# Patient Record
Sex: FEMALE | Race: WHITE | NOT HISPANIC OR LATINO | Employment: FULL TIME | URBAN - METROPOLITAN AREA
[De-identification: names, ages, dates, MRNs, and addresses within clinical notes are randomized per-mention and may not be internally consistent; named-entity substitution may affect disease eponyms.]

---

## 2017-01-10 ENCOUNTER — APPOINTMENT (OUTPATIENT)
Dept: MRI IMAGING | Facility: CLINIC | Age: 67
End: 2017-01-10
Attending: EMERGENCY MEDICINE
Payer: COMMERCIAL

## 2017-01-10 ENCOUNTER — HOSPITAL ENCOUNTER (EMERGENCY)
Facility: CLINIC | Age: 67
Discharge: HOME | End: 2017-01-10
Attending: EMERGENCY MEDICINE | Admitting: EMERGENCY MEDICINE
Payer: COMMERCIAL

## 2017-01-10 VITALS
RESPIRATION RATE: 16 BRPM | DIASTOLIC BLOOD PRESSURE: 90 MMHG | SYSTOLIC BLOOD PRESSURE: 130 MMHG | BODY MASS INDEX: 35.94 KG/M2 | HEART RATE: 80 BPM | WEIGHT: 229 LBS | OXYGEN SATURATION: 97 % | TEMPERATURE: 97.9 F | HEIGHT: 67 IN

## 2017-01-10 DIAGNOSIS — R20.2 FACIAL PARESTHESIA: ICD-10-CM

## 2017-01-10 LAB
CREAT BLD-MCNC: 0.9 MG/DL (ref 0.52–1.04)
GFR SERPL CREATININE-BSD FRML MDRD: 63 ML/MIN/1.7M2

## 2017-01-10 PROCEDURE — 82565 ASSAY OF CREATININE: CPT

## 2017-01-10 PROCEDURE — 96374 THER/PROPH/DIAG INJ IV PUSH: CPT | Performed by: EMERGENCY MEDICINE

## 2017-01-10 PROCEDURE — 70549 MR ANGIOGRAPH NECK W/O&W/DYE: CPT

## 2017-01-10 PROCEDURE — 25500045 ZZH RX 255: Performed by: EMERGENCY MEDICINE

## 2017-01-10 PROCEDURE — 70553 MRI BRAIN STEM W/O & W/DYE: CPT

## 2017-01-10 PROCEDURE — A9585 GADOBUTROL INJECTION: HCPCS | Performed by: EMERGENCY MEDICINE

## 2017-01-10 PROCEDURE — 25000125 ZZHC RX 250: Performed by: EMERGENCY MEDICINE

## 2017-01-10 PROCEDURE — 99285 EMERGENCY DEPT VISIT HI MDM: CPT | Mod: 25 | Performed by: EMERGENCY MEDICINE

## 2017-01-10 PROCEDURE — 99284 EMERGENCY DEPT VISIT MOD MDM: CPT | Mod: Z6 | Performed by: EMERGENCY MEDICINE

## 2017-01-10 RX ORDER — GADOBUTROL 604.72 MG/ML
10 INJECTION INTRAVENOUS ONCE
Status: COMPLETED | OUTPATIENT
Start: 2017-01-10 | End: 2017-01-10

## 2017-01-10 RX ADMIN — GADOBUTROL 10 ML: 604.72 INJECTION INTRAVENOUS at 11:07

## 2017-01-10 RX ADMIN — MIDAZOLAM 0.5 MG: 1 INJECTION INTRAMUSCULAR; INTRAVENOUS at 10:11

## 2017-01-10 ASSESSMENT — ENCOUNTER SYMPTOMS
NECK PAIN: 0
NUMBNESS: 1
FACIAL ASYMMETRY: 0
HEADACHES: 0
PALPITATIONS: 0
WEAKNESS: 0
SPEECH DIFFICULTY: 0

## 2017-01-10 ASSESSMENT — VISUAL ACUITY: OU: NORMAL ACUITY

## 2017-01-10 NOTE — DISCHARGE INSTRUCTIONS
"Please make an appointment to follow up with Your Primary Care Provider in 5-7 days.  If you have any worsening weakness, numbness, vision changes, difficulty walking or talking or other concerns, return to the emergency department for re-evaluation.        Paraesthesias  Paraesthesia refers to a burning or prickling sensation that is sometimes felt in the hands, arms, legs or feet. It can also occur in other parts of the body. It can also feel like tingling or numbness, skin crawling, or itching. The feeling is not comfortable, but it is not painful. (The \"pins and needles\" feeling that happens when a foot or hand \"falls asleep\" is a temporary paraesthesia.)  Paraesthesias that last or come and go may be caused by medical issues that need to be treated. These include stroke, bulging disk (pressing on a nerve), a trapped nerve), vitamin deficiencies, or even certain medicines.  Tests are often done. These tests may include blood tests, X-ray, CT (computerized tomography) scan, or a muscle test (electromyography). Depending on the cause, treatment may include physical therapy.  Home care    Tell the healthcare provider about all medicines you take. This includes prescription and over-the-counter medicines, vitamins, and herbs. Ask if any of the medicines may be causing your problems. Do not make any changes to prescription medicines without talking to your healthcare provider first.    You may be prescribed medicines to help relieve the tingling feeling or for pain. Take all medicines as directed.    A numb hand or foot may be more prone to injury. To help protect it:    Always use oven mitts.    Test water with an unaffected hand or foot.    Use caution when trimming nails. File sharp areas.    Wear shoes that fit well to avoid pressure points, blisters, and ulcers.    Inspect your hands and feet carefully (including the soles of your feet and between your toes) at least once a week. If you see red areas, sores, or " other problems, tell your healthcare provider.  Follow-up care  Follow up with your doctor or as advised by our staff. You may need further testing or evaluation.  When to seek medical advice  Call your healthcare provider right away if any of the following occur:    Numbness or weakness of the face, one arm, or one leg    Slurred speech, confusion, trouble speaking, walking, or seeing    Severe headache, fainting spell, dizziness, or seizure    Chest, arm, neck, or upper back pain    Loss of bladder or bowel control    Open wound with redness, swelling, or pus    8562-5980 [employer]. [address] [place] [address]. All rights reserved. This information is not intended as a substitute for professional medical care. Always follow your healthcare professional's instructions.

## 2017-01-10 NOTE — ED AVS SNAPSHOT
" [facility], Emergency Department    [address] [facility] [address]    Phone:  [phone]                                       Alysha Youngblood   MRN: [id number]    Department:  [facility], Emergency Department   Date of Visit:  1/10/2017           Patient Information     Date Of Birth          1950        Your diagnoses for this visit were:     Facial paresthesia        You were seen by Ashley Sarabia MD.        Discharge Instructions       Please make an appointment to follow up with Your Primary Care Provider in 5-7 days.  If you have any worsening weakness, numbness, vision changes, difficulty walking or talking or other concerns, return to the emergency department for re-evaluation.        Paraesthesias  Paraesthesia refers to a burning or prickling sensation that is sometimes felt in the hands, arms, legs or feet. It can also occur in other parts of the body. It can also feel like tingling or numbness, skin crawling, or itching. The feeling is not comfortable, but it is not painful. (The \"pins and needles\" feeling that happens when a foot or hand \"falls asleep\" is a temporary paraesthesia.)  Paraesthesias that last or come and go may be caused by medical issues that need to be treated. These include stroke, bulging disk (pressing on a nerve), a trapped nerve), vitamin deficiencies, or even certain medicines.  Tests are often done. These tests may include blood tests, X-ray, CT (computerized tomography) scan, or a muscle test (electromyography). Depending on the cause, treatment may include physical therapy.  Home care    Tell the healthcare provider about all medicines you take. This includes prescription and over-the-counter medicines, vitamins, and herbs. Ask if any of the medicines may be causing your problems. Do not make any changes to prescription medicines without talking to your healthcare provider first.    You may be prescribed medicines to help relieve the tingling feeling " or for pain. Take all medicines as directed.    A numb hand or foot may be more prone to injury. To help protect it:    Always use oven mitts.    Test water with an unaffected hand or foot.    Use caution when trimming nails. File sharp areas.    Wear shoes that fit well to avoid pressure points, blisters, and ulcers.    Inspect your hands and feet carefully (including the soles of your feet and between your toes) at least once a week. If you see red areas, sores, or other problems, tell your healthcare provider.  Follow-up care  Follow up with your doctor or as advised by our staff. You may need further testing or evaluation.  When to seek medical advice  Call your healthcare provider right away if any of the following occur:    Numbness or weakness of the face, one arm, or one leg    Slurred speech, confusion, trouble speaking, walking, or seeing    Severe headache, fainting spell, dizziness, or seizure    Chest, arm, neck, or upper back pain    Loss of bladder or bowel control    Open wound with redness, swelling, or pus    6209-9825 [employer]. [address] [place] [address]. All rights reserved. This information is not intended as a substitute for professional medical care. Always follow your healthcare professional's instructions.              24 Hour Appointment Hotline       To make an appointment at any [facility], call [phone] ([phone]). If you don't have a family doctor or clinic, we will help you find one. [facility] are conveniently located to serve the needs of you and your family.             Review of your medicines      Our records show that you are taking the medicines listed below. If these are incorrect, please call your family doctor or clinic.        Dose / Directions Last dose taken    AMBIEN PO   Dose:  5 mg        Take 5 mg by mouth nightly as needed for sleep   Refills:  0        apixaban ANTICOAGULANT 5 MG tablet   Commonly known as:   "ELIQUIS   Dose:  5 mg   Quantity:  180 tablet        Take 1 tablet (5 mg) by mouth 2 times daily   Refills:  3        fish oil-omega-3 fatty acids 1000 MG capsule        One capsule daily.   Refills:  0        hydrochlorothiazide 25 MG tablet   Commonly known as:  HYDRODIURIL   Dose:  25 mg        Take 25 mg by mouth daily   Refills:  0        losartan 100 MG tablet   Commonly known as:  COZAAR   Dose:  100 mg        Take 100 mg by mouth daily   Refills:  0        lovastatin 40 MG tablet   Commonly known as:  MEVACOR   Dose:  40 mg        Take 40 mg by mouth At Bedtime.   Refills:  0        metoprolol 25 MG 24 hr tablet   Commonly known as:  TOPROL-XL   Dose:  50 mg        Take 50 mg by mouth daily   Refills:  0        SLEEP AID PO        Refills:  0        VIACTIV PO        Take  by mouth.   Refills:  0                Procedures and tests performed during your visit     Creatinine POCT    ISTAT creatinine  POCT    MR Brain for Stroke Complete (UU,UA,SH)      Orders Needing Specimen Collection     None      Pending Results     Date and Time Order Name Status Description    1/10/2017 0925 MR Brain for Stroke Complete (UU,UA,SH) In process             Pending Culture Results     No orders found from 1/9/2017 to 1/11/2017.            Thank you for choosing [facility]       Thank you for choosing [facility] for your care. Our goal is always to provide you with excellent care. Hearing back from our patients is one way we can continue to improve our services. Please take a few minutes to complete the written survey that you may receive in the mail after you visit with us. Thank you!        [employer] Information     [employer] lets you send messages to your doctor, view your test results, renew your prescriptions, schedule appointments and more. To sign up, go to [email or web address] . Click on \"Log in\" on the left side of the screen, which will take you to the Welcome page. Then click on \"Sign up Now\" on the right side of the " page.     You will be asked to enter the access code listed below, as well as some personal information. Please follow the directions to create your username and password.     Your access code is: [email or web address]  Expires: 2017  6:30 AM     Your access code will  in 90 days. If you need help or a new code, please call your [facility] clinic or [phone].        Care EveryWhere ID     This is your Care EveryWhere ID. This could be used by other organizations to access your [facility] medical records  [id number]        After Visit Summary       This is your record. Keep this with you and show to your community pharmacist(s) and doctor(s) at your next visit.

## 2017-01-10 NOTE — ED NOTES
PT arrived from stress testing. PT reports numbness to the left side of face x 3 days. Numbness in left upper leg.

## 2017-01-10 NOTE — ED PROVIDER NOTES
History     Chief Complaint   Patient presents with     Numbness     HPI  Alysha Youngblood is a 66 year old female with a history of atrial fibrillation s/p ablation (2012), hypertension, Hashimoto's disease, and appendectomy who presents from clinic for evaluation of left-sided numbness. Patient was in clinic this morning for a routine nuclear stress test when she complained that over the past three days she has noticed slight numbness of the left side of her face as well as her left lower extremity localized to her thigh, so she was referred here to the emergency department for further evaluation. She reports her left leg numbness first started after exercising abtou 2-3 weeks ago, and notes her symptoms will wax and wane, worsening after exercise, specifically the Elliptical. She denies bilateral lower extremity weakness, or weakness of her bilateral upper extremities. She denies paresthesias of her other extremities. Three days ago the left face began to feel numb without any weakness.  No vision changes, speech difficulty, or drooling. She denies headaches, hearing changes or tinnitus. No neck pain. No medication allergies. She is anticoagulated on Eliquis for a history of atrial fibrillation.  Denies any recent palpitations.      I have reviewed the Medications, Allergies, Past Medical and Surgical History, and Social History in the [employer] system.  Past Medical History   Diagnosis Date     Atrial fibrillation (H)      ablated 1/12     ISABELA (obstructive sleep apnea) 3/12     AHI 7     Hypertension      Hyperlipidaemia      Hashimoto's disease      PAC (premature atrial contraction)      Ex-smoker      PVC (premature ventricular contraction)        Past Surgical History   Procedure Laterality Date     Appendectomy       Ent surgery       H ablation focal afib  6/24/2011     [facility].      H ablation focal afib  1/12/2012     [facility]. afib and aflutter ablations.        Family History   Problem Relation Age of Onset  "    CANCER No family hx of      No known family hx of skin cancer       Social History   Substance Use Topics     Smoking status: Never Smoker      Smokeless tobacco: Never Used     Alcohol Use: No     No current facility-administered medications for this encounter.     Current Outpatient Prescriptions   Medication     Doxylamine Succinate, Sleep, (SLEEP AID PO)     apixaban ANTICOAGULANT (ELIQUIS) 5 MG tablet     Zolpidem Tartrate (AMBIEN PO)     losartan (COZAAR) 100 MG tablet     hydrochlorothiazide (HYDRODIURIL) 25 MG tablet     metoprolol (TOPROL-XL) 25 MG 24 hr tablet     lovastatin (MEVACOR) 40 MG tablet     fish oil-omega-3 fatty acids (FISH OIL) 1000 MG capsule     Calcium-Vitamin D-Vitamin K (VIACTIV PO)     Facility-Administered Medications Ordered in Other Encounters   Medication     technetium Tc 99m tetrofosmin 2UD study (MYOVIEW) radioisotope injection 3-42 mCi        Allergies   Allergen Reactions     Nsaids GI Disturbance       Review of Systems   HENT: Negative for drooling and hearing loss.    Eyes: Negative for visual disturbance.   Cardiovascular: Negative for palpitations.   Musculoskeletal: Negative for neck pain.   Neurological: Positive for numbness (left side of face; left thigh). Negative for syncope, facial asymmetry, speech difficulty, weakness and headaches.   All other systems reviewed and are negative.      Physical Exam   Pulse: 85  Temp: 97.9  F (36.6  C)  Resp: 16  Height: 170.2 cm (5' 7\")  Weight: 103.874 kg (229 lb)  SpO2: 96 %  Physical Exam  General: patient is alert and oriented and in no acute distress   Head: atraumatic and normocephalic   EENT: moist mucus membranes without tonsillar erythema or exudates, pupils equal round and reactive, slight conjunctival injection in the left eye,TM pearly gray without erythema or effusion  Neck: supple   Cardiovascular: regular rate and rhythm, extremities warm and well perfused, no lower extremity edema  Pulmonary: lungs clear to " auscultation bilaterally   Abdomen: soft, non-tender   Musculoskeletal: normal range of motion   Neurological: alert and oriented, moving all extremities symmetrically, CN II-XII intact, strength 5/5 and symmetric in , elbow flexion/extension, hip flexion/extension, knee flexion/extension and ankle plantar/dorsiflexion, sensation to light touch in distal upper and lower extremities intact, normal finger to nose bilaterally  Skin: warm, dry     ED Course   Procedures       9:17 AM  The patient was seen and examined by Dr. Sarabia in Room 9.     Critical Care time:  none               Labs Ordered and Resulted from Time of ED Arrival Up to the Time of Departure from the ED - No data to display    Assessments & Plan (with Medical Decision Making)   Mrs. Youngblood is a 66 year old female with a history of atrial fibrillation s/p ablation (2012), hypertension, Hashimoto's disease, and appendectomy who presents from clinic for evaluation of left-sided numbness.  I have obtained an MRI which does not show any evidence of acute stroke.  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  She does not have evidence of peripheral weakness to suggest a Bell's palsy.  She has no skin lesions or lesions in the ear canal to suggest shingles.  No temporal artery tenderness on palpation to suggest temporal arteritis.  Will plan to discharge to home with instructions to follow up with her primary care provider for reevaluation or return to the ED with any new or worsening symptoms.      I have reviewed the nursing notes.    I have reviewed the findings, diagnosis, plan and need for follow up with the patient.    Discharge Medication List as of 1/10/2017 12:58 PM          Final diagnoses:   Facial paresthesia   IHenny, am serving as a trained medical scribe to document services personally performed by Ashley Sarabia MD, based on  the provider's statements to me.   I, Ashley Sarabia MD, was physically present and have reviewed and verified the accuracy of this note documented by Henny Fernández.      1/10/2017   [facility], [place], EMERGENCY DEPARTMENT      Ashley Sarabia MD  01/10/17 1549

## 2017-01-10 NOTE — ED AVS SNAPSHOT
[facility], [place], Emergency Department    [address] [place] [address]    Phone:  [phone]                                       Alysha Youngblood   MRN: [id number]    Department:  [facility], Emergency Department   Date of Visit:  1/10/2017           After Visit Summary Signature Page     I have received my discharge instructions, and my questions have been answered. I have discussed any challenges I see with this plan with the nurse or doctor.    ..........................................................................................................................................  Patient/Patient Representative Signature      ..........................................................................................................................................  Patient Representative Print Name and Relationship to Patient    ..................................................               ................................................  Date                                            Time    ..........................................................................................................................................  Reviewed by Signature/Title    ...................................................              ..............................................  Date                                                            Time

## 2017-01-16 ENCOUNTER — TELEPHONE (OUTPATIENT)
Dept: NURSING | Facility: CLINIC | Age: 67
End: 2017-01-16

## 2017-01-16 ENCOUNTER — HOSPITAL ENCOUNTER (EMERGENCY)
Facility: CLINIC | Age: 67
Discharge: HOME | End: 2017-01-16
Attending: EMERGENCY MEDICINE | Admitting: EMERGENCY MEDICINE
Payer: COMMERCIAL

## 2017-01-16 VITALS
HEART RATE: 122 BPM | TEMPERATURE: 98.2 F | DIASTOLIC BLOOD PRESSURE: 78 MMHG | OXYGEN SATURATION: 98 % | RESPIRATION RATE: 8 BRPM | SYSTOLIC BLOOD PRESSURE: 125 MMHG

## 2017-01-16 DIAGNOSIS — I48.3 TYPICAL ATRIAL FLUTTER (H): ICD-10-CM

## 2017-01-16 LAB
ANION GAP SERPL CALCULATED.3IONS-SCNC: 10 MMOL/L (ref 3–14)
BASOPHILS # BLD AUTO: 0 10E9/L (ref 0–0.2)
BASOPHILS NFR BLD AUTO: 0.2 %
BUN SERPL-MCNC: 19 MG/DL (ref 7–30)
CALCIUM SERPL-MCNC: 9.4 MG/DL (ref 8.5–10.1)
CHLORIDE SERPL-SCNC: 101 MMOL/L (ref 94–109)
CO2 SERPL-SCNC: 26 MMOL/L (ref 20–32)
CREAT SERPL-MCNC: 0.89 MG/DL (ref 0.52–1.04)
DIFFERENTIAL METHOD BLD: ABNORMAL
EOSINOPHIL # BLD AUTO: 0.1 10E9/L (ref 0–0.7)
EOSINOPHIL NFR BLD AUTO: 1.5 %
ERYTHROCYTE [DISTWIDTH] IN BLOOD BY AUTOMATED COUNT: 12.6 % (ref 10–15)
GFR SERPL CREATININE-BSD FRML MDRD: 64 ML/MIN/1.7M2
GLUCOSE SERPL-MCNC: 104 MG/DL (ref 70–99)
HCT VFR BLD AUTO: 47.4 % (ref 35–47)
HGB BLD-MCNC: 15.9 G/DL (ref 11.7–15.7)
IMM GRANULOCYTES # BLD: 0.1 10E9/L (ref 0–0.4)
IMM GRANULOCYTES NFR BLD: 0.6 %
LYMPHOCYTES # BLD AUTO: 1.1 10E9/L (ref 0.8–5.3)
LYMPHOCYTES NFR BLD AUTO: 12.6 %
MAGNESIUM SERPL-MCNC: 2.2 MG/DL (ref 1.6–2.3)
MCH RBC QN AUTO: 30.9 PG (ref 26.5–33)
MCHC RBC AUTO-ENTMCNC: 33.5 G/DL (ref 31.5–36.5)
MCV RBC AUTO: 92 FL (ref 78–100)
MONOCYTES # BLD AUTO: 0.6 10E9/L (ref 0–1.3)
MONOCYTES NFR BLD AUTO: 6.7 %
NEUTROPHILS # BLD AUTO: 7.1 10E9/L (ref 1.6–8.3)
NEUTROPHILS NFR BLD AUTO: 78.4 %
NRBC # BLD AUTO: 0 10*3/UL
NRBC BLD AUTO-RTO: 0 /100
PLATELET # BLD AUTO: 239 10E9/L (ref 150–450)
POTASSIUM SERPL-SCNC: 3.2 MMOL/L (ref 3.4–5.3)
RBC # BLD AUTO: 5.15 10E12/L (ref 3.8–5.2)
SODIUM SERPL-SCNC: 137 MMOL/L (ref 133–144)
TSH SERPL DL<=0.05 MIU/L-ACNC: 1.83 MU/L (ref 0.4–4)
WBC # BLD AUTO: 9.1 10E9/L (ref 4–11)

## 2017-01-16 PROCEDURE — 93010 ELECTROCARDIOGRAM REPORT: CPT | Mod: XU | Performed by: EMERGENCY MEDICINE

## 2017-01-16 PROCEDURE — 96361 HYDRATE IV INFUSION ADD-ON: CPT | Performed by: EMERGENCY MEDICINE

## 2017-01-16 PROCEDURE — 96374 THER/PROPH/DIAG INJ IV PUSH: CPT | Performed by: EMERGENCY MEDICINE

## 2017-01-16 PROCEDURE — 99291 CRITICAL CARE FIRST HOUR: CPT | Mod: 25 | Performed by: EMERGENCY MEDICINE

## 2017-01-16 PROCEDURE — 99285 EMERGENCY DEPT VISIT HI MDM: CPT | Mod: 25 | Performed by: EMERGENCY MEDICINE

## 2017-01-16 PROCEDURE — 25000128 H RX IP 250 OP 636: Performed by: EMERGENCY MEDICINE

## 2017-01-16 PROCEDURE — 92960 CARDIOVERSION ELECTRIC EXT: CPT | Mod: 59 | Performed by: EMERGENCY MEDICINE

## 2017-01-16 PROCEDURE — 25000125 ZZHC RX 250: Performed by: EMERGENCY MEDICINE

## 2017-01-16 PROCEDURE — 80048 BASIC METABOLIC PNL TOTAL CA: CPT | Performed by: EMERGENCY MEDICINE

## 2017-01-16 PROCEDURE — 93005 ELECTROCARDIOGRAM TRACING: CPT | Mod: XU | Performed by: EMERGENCY MEDICINE

## 2017-01-16 PROCEDURE — 25000125 ZZHC RX 250

## 2017-01-16 PROCEDURE — 25000132 ZZH RX MED GY IP 250 OP 250 PS 637: Performed by: EMERGENCY MEDICINE

## 2017-01-16 PROCEDURE — 85025 COMPLETE CBC W/AUTO DIFF WBC: CPT | Performed by: EMERGENCY MEDICINE

## 2017-01-16 PROCEDURE — 92960 CARDIOVERSION ELECTRIC EXT: CPT | Performed by: EMERGENCY MEDICINE

## 2017-01-16 PROCEDURE — 84443 ASSAY THYROID STIM HORMONE: CPT | Performed by: EMERGENCY MEDICINE

## 2017-01-16 PROCEDURE — 83735 ASSAY OF MAGNESIUM: CPT | Performed by: EMERGENCY MEDICINE

## 2017-01-16 RX ORDER — SODIUM CHLORIDE 9 MG/ML
1000 INJECTION, SOLUTION INTRAVENOUS CONTINUOUS
Status: DISCONTINUED | OUTPATIENT
Start: 2017-01-16 | End: 2017-01-17 | Stop reason: HOSPADM

## 2017-01-16 RX ORDER — POTASSIUM CHLORIDE 20MEQ/15ML
40 LIQUID (ML) ORAL ONCE
Status: COMPLETED | OUTPATIENT
Start: 2017-01-16 | End: 2017-01-16

## 2017-01-16 RX ORDER — FENTANYL CITRATE 50 UG/ML
75 INJECTION, SOLUTION INTRAMUSCULAR; INTRAVENOUS ONCE
Status: COMPLETED | OUTPATIENT
Start: 2017-01-16 | End: 2017-01-16

## 2017-01-16 RX ORDER — LABETALOL HYDROCHLORIDE 5 MG/ML
10 INJECTION, SOLUTION INTRAVENOUS ONCE
Status: COMPLETED | OUTPATIENT
Start: 2017-01-16 | End: 2017-01-16

## 2017-01-16 RX ORDER — SODIUM CHLORIDE 9 MG/ML
INJECTION, SOLUTION INTRAVENOUS CONTINUOUS
Status: DISCONTINUED | OUTPATIENT
Start: 2017-01-16 | End: 2017-01-17 | Stop reason: HOSPADM

## 2017-01-16 RX ORDER — FENTANYL CITRATE 50 UG/ML
INJECTION, SOLUTION INTRAMUSCULAR; INTRAVENOUS
Status: COMPLETED
Start: 2017-01-16 | End: 2017-01-16

## 2017-01-16 RX ORDER — PROPOFOL 10 MG/ML
INJECTION, EMULSION INTRAVENOUS
Status: COMPLETED
Start: 2017-01-16 | End: 2017-01-16

## 2017-01-16 RX ORDER — PROPOFOL 10 MG/ML
90 INJECTION, EMULSION INTRAVENOUS ONCE
Status: COMPLETED | OUTPATIENT
Start: 2017-01-16 | End: 2017-01-16

## 2017-01-16 RX ADMIN — Medication 40 MEQ: at 20:22

## 2017-01-16 RX ADMIN — PROPOFOL 90 MG: 10 INJECTION, EMULSION INTRAVENOUS at 22:10

## 2017-01-16 RX ADMIN — SODIUM CHLORIDE 1000 ML: 9 INJECTION, SOLUTION INTRAVENOUS at 21:33

## 2017-01-16 RX ADMIN — LABETALOL HYDROCHLORIDE 10 MG: 5 INJECTION, SOLUTION INTRAVENOUS at 21:33

## 2017-01-16 RX ADMIN — FENTANYL CITRATE 75 MCG: 50 INJECTION, SOLUTION INTRAMUSCULAR; INTRAVENOUS at 22:10

## 2017-01-16 RX ADMIN — SODIUM CHLORIDE 1000 ML: 9 INJECTION, SOLUTION INTRAVENOUS at 19:05

## 2017-01-16 ASSESSMENT — ENCOUNTER SYMPTOMS
DIARRHEA: 0
NAUSEA: 1
PALPITATIONS: 1
VOMITING: 0
CHEST TIGHTNESS: 0
SHORTNESS OF BREATH: 0

## 2017-01-16 NOTE — ED AVS SNAPSHOT
[facility], [place], Emergency Department    [address] [place] [address]    Phone:  [phone]                                       Alysha Youngblood   MRN: [id number]    Department:  [facility], Emergency Department   Date of Visit:  1/16/2017           After Visit Summary Signature Page     I have received my discharge instructions, and my questions have been answered. I have discussed any challenges I see with this plan with the nurse or doctor.    ..........................................................................................................................................  Patient/Patient Representative Signature      ..........................................................................................................................................  Patient Representative Print Name and Relationship to Patient    ..................................................               ................................................  Date                                            Time    ..........................................................................................................................................  Reviewed by Signature/Title    ...................................................              ..............................................  Date                                                            Time

## 2017-01-16 NOTE — TELEPHONE ENCOUNTER
"Call Type: Triage Call    Presenting Problem: On Beta Blockers  for history of A fib, reports  new onset rapid pulse rate of \"119\" for past 45-60 minutes.  Reports  a \"fluttering\" sensation in chest.  No missed doses of medications.  Triage Note:  Guideline Title: Irregular Heartbeat  Recommended Disposition: See ED Immediately  Original Inclination: Wanted to speak with a nurse  Override Disposition:  Intended Action: Go to Hospital / ED  Physician Contacted: No  New onset of rapid pulse (greater than 120 beats/minute at rest) OR slow pulse  (less than 50 beats per minute at rest) ?  YES  Loss of consciousness for any period of time ? NO  New or worsening signs and symptoms that may indicate shock ? NO  Signs of dehydration and pulse rate at rest greater than 100 beats/minute ? NO  Currently having palpitations/irregular heartbeats AND severe breathing problems ?  NO  Has a pacemaker AND new symptoms of decreased cardiac output (episode of feeling  faint, dizzy or fatigue, sudden slowing of pulse, or shortness of breath) ? NO  Any other cardiac signs/symptoms for more than 5 minutes, now or within last hour.  Pain is NOT associated with taking a deep breath or a productive cough, movement,  or touch to a localized area on the chest or upper body. ? NO  Known heart failure (HF) and new onset of palpitations or irregular pulse ? NO  Recently ingested or used stimulants or drugs AND pulse rate is suddenly irregular  or more than 120 beats/minute at rest for 30 minutes or more ? NO  Sudden onset of rapid pulse (greater than 120 beats/minute at rest) OR slow pulse  (less than 50 beats per minute at rest) with heat exposure (high temperature,  high humidity, strenuous exercise) ? NO  Pulse rate is suddenly more irregular or suddenly more than 100 beats/minute at  rest AND known coronary artery disease (history of myocardial infraction (MI),  angina or cardiac surgery) ? NO  Known heart valve disease AND pulse rate is " suddenly more irregular or suddenly  more than 100 beats/minute at rest ? NO  Known pulmonary hypertension AND pulse rate is suddenly more irregular or suddenly  more than 100 beats/minute at rest ? NO  Physician Instructions:  Care Advice: Another adult should drive.

## 2017-01-16 NOTE — ED AVS SNAPSHOT
[facility], Emergency Department    [address] [facility] [address]    Phone:  [phone]                                       Alysha Youngblood   MRN: [id number]    Department:  [facility], Emergency Department   Date of Visit:  1/16/2017           Patient Information     Date Of Birth          1950        Your diagnoses for this visit were:     Typical atrial flutter (H)        You were seen by Varun Balderrama MD.        Discharge Instructions         Follow up with Dr. Norris  Return if any problems or concerns    24 Hour Appointment Hotline       To make an appointment at any [facility] clinic, call [phone] ([phone]). If you don't have a family doctor or clinic, we will help you find one. [facility] clinics are conveniently located to serve the needs of you and your family.             Review of your medicines      Our records show that you are taking the medicines listed below. If these are incorrect, please call your family doctor or clinic.        Dose / Directions Last dose taken    AMBIEN PO   Dose:  5 mg        Take 5 mg by mouth nightly as needed for sleep   Refills:  0        apixaban ANTICOAGULANT 5 MG tablet   Commonly known as:  ELIQUIS   Dose:  5 mg   Quantity:  180 tablet        Take 1 tablet (5 mg) by mouth 2 times daily   Refills:  3        fish oil-omega-3 fatty acids 1000 MG capsule        One capsule daily.   Refills:  0        hydrochlorothiazide 25 MG tablet   Commonly known as:  HYDRODIURIL   Dose:  25 mg        Take 25 mg by mouth daily   Refills:  0        losartan 100 MG tablet   Commonly known as:  COZAAR   Dose:  100 mg        Take 100 mg by mouth daily   Refills:  0        lovastatin 40 MG tablet   Commonly known as:  MEVACOR   Dose:  40 mg        Take 40 mg by mouth At Bedtime.   Refills:  0        metoprolol 25 MG 24 hr tablet   Commonly known as:  TOPROL-XL   Dose:  50 mg        Take 50 mg by mouth daily   Refills:  0        SLEEP AID PO         "Refills:  0        VIACTIV PO        Take  by mouth.   Refills:  0                Procedures and tests performed during your visit     Basic metabolic panel    CBC with platelets differential    EKG 12 lead    EKG 12-lead    EKG 12-lead, tracing only    Magnesium    TSH      Orders Needing Specimen Collection     None      Pending Results     No orders found from 1/15/2017 to 2017.            Pending Culture Results     No orders found from 1/15/2017 to 2017.            Thank you for choosing [facility]       Thank you for choosing [facility] for your care. Our goal is always to provide you with excellent care. Hearing back from our patients is one way we can continue to improve our services. Please take a few minutes to complete the written survey that you may receive in the mail after you visit with us. Thank you!        InfoReachhart Information     Alitalia lets you send messages to your doctor, view your test results, renew your prescriptions, schedule appointments and more. To sign up, go to [email or web address] . Click on \"Log in\" on the left side of the screen, which will take you to the Welcome page. Then click on \"Sign up Now\" on the right side of the page.     You will be asked to enter the access code listed below, as well as some personal information. Please follow the directions to create your username and password.     Your access code is: [email or web address]  Expires: 2017  6:30 AM     Your access code will  in 90 days. If you need help or a new code, please call your [facility] clinic or [phone].        Care EveryWhere ID     This is your Care EveryWhere ID. This could be used by other organizations to access your [facility] medical records  [id number]        After Visit Summary       This is your record. Keep this with you and show to your community pharmacist(s) and doctor(s) at your next visit.                  "

## 2017-01-17 ENCOUNTER — MYC MEDICAL ADVICE (OUTPATIENT)
Dept: CARDIOLOGY | Facility: CLINIC | Age: 67
End: 2017-01-17

## 2017-01-17 LAB
INTERPRETATION ECG - MUSE: NORMAL

## 2017-01-17 NOTE — PROCEDURES
"     Cardioversion/Defibrillation:      Performed by Dread Weiner     Pre Procedure Rhythm:  Atrial Flutter  Consent: Consent given by: Patient who states understanding of the procedure being performed after discussing the  risks, benefits and alternatives.  Time out: Immediately prior to procedure a \"time out\" was called to verify the correct patient, procedure, equipment, support staff and site/side marked as required.    Sedation was managed by the emergency department physician:  Patient sedated with Fentanyl and Propofol  Vital signs, airway and pulse oximetry were monitored and remained stable throughout the procedure and sedation was maintained until the procedure was complete.  The patient was monitored with staff at the bedside until she fully regained consciousness.  Procedure: The patient was placed in the supine position and the chest area was exposed. The cardioversion pads were applied in the standard manner and configuration.  The Biphasic defibrillator was set on the Synchronized mode and the patient was shocked at 100 Joules, resulting in Sinus Rhythm.    The Patient tolerated the procedure well with no immediate complications.    The patient will be monitored in the emergency department and then discharged home to follow up with her cardiologist Dr. :Tyra Norris.    The patient has a history of atrial fibrillation and flutter and underwent ablations in 2011 and 2012.  She reports not having any symptomatic atrial fibrillation or flutter since then until today when she felt the sudden onset of dizziness and palpitations.  An echocardiogram on 11/22/16 showed a preserved ejection fraction and no significant valvular disease.  She neither appears to be in acute heart failure nor does she have symptoms of an acute infection.  TSH is normal.    She is anticoagulated with apixaban and assures me that she has been taking it regularly, including this morning and evening.  She is on metoprolol 50 mg XL " daily.    She is to follow up with Dr. Norris in clinic.

## 2017-01-17 NOTE — ED NOTES
Pt arrived via car with his  reporting that her heart rate is irregular. Pt has a history of atrial fibulation and had two ablations in the past. Pt alert and oriented x4, , irregular. /104. Pt reports she can tell when her heart is irregular. Denies chest pain or SOB.

## 2017-01-17 NOTE — ED PROVIDER NOTES
History     Chief Complaint   Patient presents with     Irregular Heart Beat     HPI  Alysha Youngblood is a 66 year old female with a history of atrial fibrillation, hypertension, hyperlipidemia, PACs and PVCs who presents to the Emergency Department with palpitations. The patient states that she felt her heart racing about 2 hours prior to arrival. She is followed by Dr. Norris. Patient reports that she had a cup of coffee today and has been taking her medication as prescribed. Last week the patient traveled to [place] and [place]. No leg swelling or shortness of breath. She reports nausea associated with her palpitations and this is normal for her. No recent illnesses.      Patient believes she could be dehydrated because she woke up with a dry mouth this morning. Her baseline heart rate is reportedly in the 80's.     I have reviewed the Medications, Allergies, Past Medical and Surgical History, and Social History in the iStoryTime system.    PAST MEDICAL HISTORY  Past Medical History   Diagnosis Date     Atrial fibrillation (H)      ablated 1/12     ISABELA (obstructive sleep apnea) 3/12     AHI 7     Hypertension      Hyperlipidaemia      Hashimoto's disease      PAC (premature atrial contraction)      Ex-smoker      PVC (premature ventricular contraction)      PAST SURGICAL HISTORY  Past Surgical History   Procedure Laterality Date     Appendectomy       Ent surgery       H ablation focal afib  6/24/2011     [facility].      H ablation focal afib  1/12/2012     [facility]. afib and aflutter ablations.      FAMILY HISTORY  Family History   Problem Relation Age of Onset     CANCER No family hx of      No known family hx of skin cancer     SOCIAL HISTORY  Social History   Substance Use Topics     Smoking status: Never Smoker      Smokeless tobacco: Never Used     Alcohol Use: No     MEDICATIONS  Current Facility-Administered Medications   Medication     0.9% sodium chloride infusion     0.9% sodium chloride infusion     Current  Outpatient Prescriptions   Medication     apixaban ANTICOAGULANT (ELIQUIS) 5 MG tablet     losartan (COZAAR) 100 MG tablet     hydrochlorothiazide (HYDRODIURIL) 25 MG tablet     metoprolol (TOPROL-XL) 25 MG 24 hr tablet     lovastatin (MEVACOR) 40 MG tablet     fish oil-omega-3 fatty acids (FISH OIL) 1000 MG capsule     Calcium-Vitamin D-Vitamin K (VIACTIV PO)     Doxylamine Succinate, Sleep, (SLEEP AID PO)     Zolpidem Tartrate (AMBIEN PO)     ALLERGIES  Allergies   Allergen Reactions     Nsaids GI Disturbance        Review of Systems   Respiratory: Negative for chest tightness and shortness of breath.    Cardiovascular: Positive for palpitations. Negative for chest pain and leg swelling.   Gastrointestinal: Positive for nausea. Negative for vomiting and diarrhea.   All other systems reviewed and are negative.      Physical Exam   BP: (!) 157/104 mmHg  Heart Rate: 123  Temp: 98.2  F (36.8  C)  Resp: 16  SpO2: 97 %  Physical Exam   Constitutional: She is oriented to person, place, and time. She appears well-developed and well-nourished. No distress.   Cardiovascular: Regular rhythm and normal heart sounds.  Tachycardia present.    No murmur heard.  Pulmonary/Chest: Effort normal and breath sounds normal.   Neurological: She is alert and oriented to person, place, and time.   Skin: She is diaphoretic.   Psychiatric: She has a normal mood and affect. Her behavior is normal.   Nursing note and vitals reviewed.      ED Course   Procedures             EKG Interpretation:      Interpreted by Varun Balderrama  Time reviewed: 1830  Symptoms at time of EKG: palp.   Rhythm: normal sinus   Rate: normal  Axis: normal  Ectopy: none  Conduction: normal  ST Segments/ T Waves: No ST-T wave changes  Q Waves: none  Comparison to prior: new onset Atrial flutter    Clinical Impression: atrial flutter           Cardioversion/Defibrillation:      Performed by Varun Balderrama     Pre Procedure Rhythm:  Atrial  "Flutter  Consent: Consent given by: Patient who states understanding of the procedure being performed after discussing the  risks, benefits and alternatives.  Time out: Immediately prior to procedure a \"time out\" was called to verify the correct patient, procedure, equipment, support staff and site/side marked as required.    Sedation:  Patient sedated with Fentanyl and Propofol  Vital signs, airway and pulse oximetry were monitored and remained stable throughout the procedure and sedation was maintained until the procedure was complete.  The patient was monitored with staff at the bedside until she fully regained consciousness.  Procedure: The patient was placed in the supine position and the chest area was exposed. The cardioversion pads were applied in the standard manner and configuration.     The Biphasic defibrillator was set on the Synchronized mode and the patient was shocked at 125 Joules, resulting in Sinus Rhythm.     The Patient tolerated the procedure well with no immediate complications.        Post cardioversion EKG shows normal sinus rhythm the rate is 81 no ST abnormalities, no ectopy        Critical Care time:  was 45 minutes for this patient excluding procedures.           Labs Ordered and Resulted from Time of ED Arrival Up to the Time of Departure from the ED   BASIC METABOLIC PANEL - Abnormal; Notable for the following:     Potassium 3.2 (*)     Glucose 104 (*)     All other components within normal limits   CBC WITH PLATELETS DIFFERENTIAL - Abnormal; Notable for the following:     Hemoglobin 15.9 (*)     Hematocrit 47.4 (*)     All other components within normal limits   TSH   MAGNESIUM   CALCIUM IONIZED WHOLE BLOOD       Assessments & Plan (with Medical Decision Making)   66-year-old female with history of tachy dysrhythmias; she is currently anticoagulated with apixaban. She has had ablations in the past. She comes in with acute onset of palpitations a few hours ago. Her initial EKG appeared " sinus at 124. A second EKG remained at 124 despite fluids and a small dose of labetalol. Her potassium was mildly low, which was replaced orally. Cardiology was consulted, saw the patient and agree she likely is in flutter. She was electrically cardioverted with success, and will be discharged home to follow up with her electrophysiologist.        I have reviewed the nursing notes.    I have reviewed the findings, diagnosis, plan and need for follow up with the patient.    New Prescriptions    No medications on file       Final diagnoses:   Typical atrial flutter (H)   I, Roverto Ag, am serving as a trained medical scribe to document services personally performed by Varun Balderrama MD, based on the provider's statements to me.      I, Varun Balderrama MD, was physically present and have reviewed and verified the accuracy of this note documented by Roverto Ag.      1/16/2017   [facility], [place], EMERGENCY DEPARTMENT      Varun Balderrama MD  01/16/17 0594

## 2017-02-01 ENCOUNTER — MYC MEDICAL ADVICE (OUTPATIENT)
Dept: CARDIOLOGY | Facility: CLINIC | Age: 67
End: 2017-02-01

## 2017-02-01 DIAGNOSIS — I48.0 PAROXYSMAL ATRIAL FIBRILLATION (H): Primary | ICD-10-CM

## 2017-02-06 ENCOUNTER — RADIANT APPOINTMENT (OUTPATIENT)
Dept: MAMMOGRAPHY | Facility: CLINIC | Age: 67
End: 2017-02-06

## 2017-02-06 DIAGNOSIS — I48.91 ATRIAL FIBRILLATION (H): Primary | ICD-10-CM

## 2017-02-06 DIAGNOSIS — Z12.31 VISIT FOR SCREENING MAMMOGRAM: ICD-10-CM

## 2017-02-07 ASSESSMENT — ENCOUNTER SYMPTOMS
SMELL DISTURBANCE: 0
SLEEP DISTURBANCES DUE TO BREATHING: 0
NECK MASS: 0
TROUBLE SWALLOWING: 0
PALPITATIONS: 1
SINUS PAIN: 0
TASTE DISTURBANCE: 0
EXERCISE INTOLERANCE: 0
LEG SWELLING: 0
CLAUDICATION: 0
TACHYCARDIA: 1
HYPERTENSION: 1
ORTHOPNEA: 0
LEG PAIN: 0
HYPOTENSION: 0
HOARSE VOICE: 0
LIGHT-HEADEDNESS: 0
SINUS CONGESTION: 0
SORE THROAT: 0
SYNCOPE: 0

## 2017-02-10 ENCOUNTER — HOSPITAL ENCOUNTER (OUTPATIENT)
Dept: NUCLEAR MEDICINE | Facility: CLINIC | Age: 67
Setting detail: NUCLEAR MEDICINE
End: 2017-02-10
Attending: INTERNAL MEDICINE
Payer: COMMERCIAL

## 2017-02-10 ENCOUNTER — HOSPITAL ENCOUNTER (OUTPATIENT)
Dept: CARDIOLOGY | Facility: CLINIC | Age: 67
Discharge: HOME | End: 2017-02-10
Attending: INTERNAL MEDICINE | Admitting: INTERNAL MEDICINE
Payer: COMMERCIAL

## 2017-02-10 DIAGNOSIS — I48.0 PAROXYSMAL ATRIAL FIBRILLATION (H): ICD-10-CM

## 2017-02-10 DIAGNOSIS — I48.0 PAROXYSMAL ATRIAL FIBRILLATION (H): Primary | ICD-10-CM

## 2017-02-10 PROCEDURE — 93018 CV STRESS TEST I&R ONLY: CPT | Performed by: INTERNAL MEDICINE

## 2017-02-10 PROCEDURE — A9502 TC99M TETROFOSMIN: HCPCS | Performed by: INTERNAL MEDICINE

## 2017-02-10 PROCEDURE — 34300033 ZZH RX 343: Performed by: INTERNAL MEDICINE

## 2017-02-10 PROCEDURE — 78452 HT MUSCLE IMAGE SPECT MULT: CPT

## 2017-02-10 PROCEDURE — 93017 CV STRESS TEST TRACING ONLY: CPT

## 2017-02-10 RX ADMIN — TETROFOSMIN 9.9 MCI.: 0.23 INJECTION, POWDER, LYOPHILIZED, FOR SOLUTION INTRAVENOUS at 12:26

## 2017-02-10 RX ADMIN — TETROFOSMIN 37.9 MCI.: 0.23 INJECTION, POWDER, LYOPHILIZED, FOR SOLUTION INTRAVENOUS at 11:06

## 2017-02-15 ENCOUNTER — PRE VISIT (OUTPATIENT)
Dept: CARDIOLOGY | Facility: CLINIC | Age: 67
End: 2017-02-15

## 2017-02-15 DIAGNOSIS — I48.92 ATRIAL FLUTTER, UNSPECIFIED TYPE (H): Primary | ICD-10-CM

## 2017-02-20 ENCOUNTER — OFFICE VISIT (OUTPATIENT)
Dept: CARDIOLOGY | Facility: CLINIC | Age: 67
End: 2017-02-20
Attending: INTERNAL MEDICINE
Payer: COMMERCIAL

## 2017-02-20 VITALS
OXYGEN SATURATION: 95 % | WEIGHT: 227.1 LBS | DIASTOLIC BLOOD PRESSURE: 83 MMHG | HEART RATE: 72 BPM | SYSTOLIC BLOOD PRESSURE: 133 MMHG | HEIGHT: 67 IN | BODY MASS INDEX: 35.64 KG/M2

## 2017-02-20 DIAGNOSIS — I48.92 ATRIAL FLUTTER, UNSPECIFIED TYPE (H): ICD-10-CM

## 2017-02-20 DIAGNOSIS — I48.0 PAROXYSMAL ATRIAL FIBRILLATION (H): Primary | ICD-10-CM

## 2017-02-20 PROCEDURE — 93010 ELECTROCARDIOGRAM REPORT: CPT | Mod: ZP | Performed by: INTERNAL MEDICINE

## 2017-02-20 PROCEDURE — 99212 OFFICE O/P EST SF 10 MIN: CPT | Mod: ZF

## 2017-02-20 PROCEDURE — 93005 ELECTROCARDIOGRAM TRACING: CPT | Mod: ZF

## 2017-02-20 PROCEDURE — 99214 OFFICE O/P EST MOD 30 MIN: CPT | Mod: ZP | Performed by: INTERNAL MEDICINE

## 2017-02-20 RX ORDER — METOPROLOL SUCCINATE 50 MG/1
75 TABLET, EXTENDED RELEASE ORAL DAILY
Qty: 135 TABLET | Refills: 3 | Status: SHIPPED | OUTPATIENT
Start: 2017-02-20 | End: 2018-01-15

## 2017-02-20 RX ORDER — LORATADINE 10 MG/1
10 TABLET ORAL DAILY
COMMUNITY

## 2017-02-20 RX ORDER — FLUCONAZOLE 10 MG/ML
POWDER, FOR SUSPENSION ORAL DAILY
COMMUNITY
End: 2018-11-05

## 2017-02-20 ASSESSMENT — PAIN SCALES - GENERAL: PAINLEVEL: NO PAIN (0)

## 2017-02-20 NOTE — PATIENT INSTRUCTIONS
Cardiology Provider you saw in clinic today: Dr. Norris    Medication Changes:     1. Increase Toprol XL to 75mg daily     Follow-up Visit:  As needed    Further Instructions:  Sleep eval referral placed.     You will receive all normal lab and testing results via [email or web address] or mail if not reviewed in clinic today. Please contact our office if you need assistance with setting up MyChart.    If you need a medication refill please contact your pharmacy. Please allow 3 business days for your refill to be completed.     As always, thank you for trusting us with your health care needs!    If you have any questions regarding your visit please contact your care team:   Cardiology  Telephone Number    Livier Land RN  Electrophysiology Nurse Coordinator [phone]     Call for EP procedure scheduling concerns  Dena Hilario,   EP  [phone]           Device Clinic (Pacemakers, ICDs, Loop)   RN's : Iona Maloney Connie, Dawn During business hours: [phone]    After business hours:   [phone]- select option 4 and ask for job code [id number].

## 2017-02-20 NOTE — MR AVS SNAPSHOT
After Visit Summary   2/20/2017    Alysha Youngblood    MRN: [id number]           Patient Information     Date Of Birth          1950        Visit Information        Provider Department      2/20/2017 3:20 PM Tyra Norris MD [facility]        Today's Diagnoses     Paroxysmal atrial fibrillation (H)    -  1    Atrial flutter, unspecified type (H)          Care Instructions    Cardiology Provider you saw in clinic today: Dr. Norris    Medication Changes:     1. Increase Toprol XL to 75mg daily     Follow-up Visit:  As needed    Further Instructions:  Sleep eval referral placed.     You will receive all normal lab and testing results via MyChart or mail if not reviewed in clinic today. Please contact our office if you need assistance with setting up MyChart.    If you need a medication refill please contact your pharmacy. Please allow 3 business days for your refill to be completed.     As always, thank you for trusting us with your health care needs!    If you have any questions regarding your visit please contact your care team:   Cardiology  Telephone Number    Livier Land RN  Electrophysiology Nurse Coordinator [phone]     Call for EP procedure scheduling concerns  Dena Hilario   EP  [phone]           Device Clinic (Pacemakers, ICDs, Loop)   RN's : Iona Maloney Connie, Dawn During business hours: [phone]    After business hours:   [phone]- select option 4 and ask for job code [id number].                  Follow-ups after your visit        Additional Services     SLEEP EVALUATION & MANAGEMENT REFERRAL - ADULT       Please be aware that coverage of these services is subject to the terms and limitations of your health insurance plan.  Call member services at your health plan with any benefit or coverage questions.      Please bring the following to your appointment:    >>   List of current medications   >>   This referral request   >>   Any documents/labs given to you  for this referral    [facility]/Lab  Ph [phone] (Age 15 and up)                  Follow-up notes from your care team     Return if symptoms worsen or fail to improve.      Your next 10 appointments already scheduled     Mar 03, 2017  1:00 PM CST   New Sleep Patient with EUSEBIA Cardenas CNP   [facility], [place], Sleep Study ([facility])    [address] [place] [id number] [phone]              Future tests that were ordered for you today     Open Future Orders        Priority Expected Expires Ordered    SLEEP EVALUATION & MANAGEMENT REFERRAL - ADULT Routine  2/21/2018 2/20/2017            Who to contact     If you have questions or need follow up information about today's clinic visit or your schedule please contact [facility] directly at [phone].  Normal or non-critical lab and imaging results will be communicated to you by MyChart, letter or phone within 4 business days after the clinic has received the results. If you do not hear from us within 7 days, please contact the clinic through Composerighthart or phone. If you have a critical or abnormal lab result, we will notify you by phone as soon as possible.  Submit refill requests through PeerPong or call your pharmacy and they will forward the refill request to us. Please allow 3 business days for your refill to be completed.          Additional Information About Your Visit        ComposerightharHadron Systems Information     PeerPong gives you secure access to your electronic health record. If you see a primary care provider, you can also send messages to your care team and make appointments. If you have questions, please call your primary care clinic.  If you do not have a primary care provider, please call [phone] and they will assist you.        Care EveryWhere ID     This is your Care EveryWhere ID. This could be used by other organizations to access your [facility]  "records  [id number]        Your Vitals Were     Pulse Height Pulse Oximetry BMI (Body Mass Index)          72 1.689 m (5' 6.5\") 95% 36.11 kg/m2         Blood Pressure from Last 3 Encounters:   02/20/17 133/83   01/16/17 125/78   01/10/17 130/90    Weight from Last 3 Encounters:   02/20/17 103 kg (227 lb 1.6 oz)   01/10/17 103.9 kg (229 lb)   12/19/16 103.9 kg (229 lb)              We Performed the Following     EKG 12-lead, tracing only (Future)          Today's Medication Changes          These changes are accurate as of: 2/20/17  4:16 PM.  If you have any questions, ask your nurse or doctor.               These medicines have changed or have updated prescriptions.        Dose/Directions    metoprolol 50 MG 24 hr tablet   Commonly known as:  TOPROL-XL   This may have changed:    - medication strength  - how much to take   Used for:  Atrial flutter, unspecified type (H)   Changed by:  Tyra Norris MD        Dose:  75 mg   Take 1.5 tablets (75 mg) by mouth daily   Quantity:  135 tablet   Refills:  3            Where to get your medicines      These medications were sent to [facility] [address] [place] [address] AT [facility] [employer]  [address] [place] [address]     Phone:  [phone]     metoprolol 50 MG 24 hr tablet                Primary Care Provider Office Phone # Fax #    Kiesha Cohen [phone]       [facility] [address] [facility] [address]        Thank you!     Thank you for choosing [facility]  for your care. Our goal is always to provide you with excellent care. Hearing back from our patients is one way we can continue to improve our services. Please take a few minutes to complete the written survey that you may receive in the mail after your visit with us. Thank you!             Your Updated Medication List - Protect others around you: Learn how to safely use, store and throw away your medicines at " www.disposemymeds.org.          This list is accurate as of: 2/20/17  4:16 PM.  Always use your most recent med list.                   Brand Name Dispense Instructions for use    AMBIEN PO      Take 5 mg by mouth nightly as needed for sleep       apixaban ANTICOAGULANT 5 MG tablet    ELIQUIS    180 tablet    Take 1 tablet (5 mg) by mouth 2 times daily       fish oil-omega-3 fatty acids 1000 MG capsule      One capsule daily.       fluconazole 10 MG/ML suspension    DIFLUCAN     Take by mouth daily       hydrochlorothiazide 25 MG tablet    HYDRODIURIL     Take 25 mg by mouth daily       loratadine 10 MG tablet    CLARITIN     Take 10 mg by mouth daily       losartan 100 MG tablet    COZAAR     Take 100 mg by mouth daily       lovastatin 40 MG tablet    MEVACOR     Take 40 mg by mouth At Bedtime.       metoprolol 50 MG 24 hr tablet    TOPROL-XL    135 tablet    Take 1.5 tablets (75 mg) by mouth daily       SLEEP AID PO          VIACTIV PO      Take  by mouth.

## 2017-02-20 NOTE — PROGRESS NOTES
HPI: PURPOSE OF VISIT:  I am seeing the patient in followup after a visit to the Emergency Department for atrial flutter.      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  The patient is status post ablation for paroxysmal atrial fibrillation in 06/2011 as well as another AFib ablation and atrial flutter ablation in 01/2012.  Both ablations were done at [facility].  The patient's last visit with me was in 12/2016.      On 01/15/2017 evening the patient had 2 glasses of wine, and as a result of the reflux, patient was not able to sleep that evening.  On 01/16, the patient had 4 cups of coffee, which is more than her usual average of 2 cups, and at the end of the day patient felt prolonged palpitations.  The patient was seen at the Emergency Department and underwent cardioversion.  The patient takes apixaban for stroke prophylaxis.      There was another episode on 02/10/2017 after patient underwent a stress test. The stress test did not show any evidence of myocardial ischemia.  The patient had a brief episode of atrial flutter on 02/10.      Since then, the patient has had no recurrent palpitation episodes to suggest atrial flutter.  The patient denies any frequent lightheadedness, presyncope or syncope.           PAST MEDICAL HISTORY:  Past Medical History   Diagnosis Date     Atrial fibrillation (H)      ablated 1/12     Ex-smoker      Hashimoto's disease      Hyperlipidaemia      Hypertension      ISABELA (obstructive sleep apnea) 3/12     AHI 7     PAC (premature atrial contraction)      PVC (premature ventricular contraction)        CURRENT MEDICATIONS:  Current Outpatient Prescriptions   Medication Sig Dispense Refill     loratadine (CLARITIN) 10 MG tablet Take 10 mg by mouth daily       fluconazole (DIFLUCAN) 10 MG/ML suspension Take by mouth daily       apixaban ANTICOAGULANT (ELIQUIS) 5 MG tablet Take 1  "tablet (5 mg) by mouth 2 times daily 180 tablet 3     Doxylamine Succinate, Sleep, (SLEEP AID PO)        Zolpidem Tartrate (AMBIEN PO) Take 5 mg by mouth nightly as needed for sleep       losartan (COZAAR) 100 MG tablet Take 100 mg by mouth daily       hydrochlorothiazide (HYDRODIURIL) 25 MG tablet Take 25 mg by mouth daily       lovastatin (MEVACOR) 40 MG tablet Take 40 mg by mouth At Bedtime.       fish oil-omega-3 fatty acids (FISH OIL) 1000 MG capsule One capsule daily.       Calcium-Vitamin D-Vitamin K (VIACTIV PO) Take  by mouth.         PAST SURGICAL HISTORY:  Past Surgical History   Procedure Laterality Date     Appendectomy       Ent surgery       H ablation focal afib  6/24/2011     [facility].      H ablation focal afib  1/12/2012     [facility]. afib and aflutter ablations.        ALLERGIES:     Allergies   Allergen Reactions     Hmg-Coa-R Inhibitors      Made her lips tingle, so they put her on another statin instead.     Ibuprofen Other (See Comments)     Nsaids GI Disturbance     Liquid Adhesive Rash     After wearing patches for > 8 days       FAMILY HISTORY:  - Premature coronary artery disease  - Atrial fibrillation  + Sudden cardiac death     SOCIAL HISTORY:  Social History   Substance Use Topics     Smoking status: Never Smoker     Smokeless tobacco: Never Used     Alcohol use No       ROS:   Constitutional: No fever, chills, or sweats. Weight stable.   ENT: No visual disturbance, ear ache, epistaxis, sore throat.   Cardiovascular: As per HPI.   Respiratory: No cough, hemoptysis.    GI: No nausea, vomiting, hematemesis, melena, or hematochezia.   : No hematuria.   Integument: Negative.   Psychiatric: Negative.   Hematologic:  Easy bruising, no easy bleeding.  Neuro: Negative.   Endocrinology: No significant heat or cold intolerance   Musculoskeletal: No myalgia.    Exam:  /83 (BP Location: Right arm, Patient Position: Chair, Cuff Size: Adult Large)  Pulse 72  Ht 1.689 m (5' 6.5\")  Wt 103 kg (227 " lb 1.6 oz)  SpO2 95%  BMI 36.11 kg/m2  GENERAL APPEARANCE: healthy, alert and no distress  HEENT: no icterus, no xanthelasmas, normal pupil size and reaction, normal palate, mucosa moist, no central cyanosis  NECK: no adenopathy, no asymmetry, masses, or scars, thyroid normal to palpation and no bruits, JVP not elevated  RESPIRATORY: lungs clear to auscultation - no rales, rhonchi or wheezes, no use of accessory muscles, no retractions, respirations are unlabored, normal respiratory rate  CARDIOVASCULAR: regular rhythm, normal S1 with physiologic split S2, no S3 or S4 and no murmur, click or rub, precordium quiet with normal PMI.  ABDOMEN: soft, non tender, without hepatosplenomegaly, no masses palpable, bowel sounds normal, aorta not enlarged by palpation, no abdominal bruits  EXTREMITIES: peripheral pulses normal, no edema, no bruits  NEURO: alert and oriented to person/place/time, normal speech, gait and affect  VASC: Radial, femoral, dorsalis pedis and posterior tibialis pulses are normal in volumes and symmetric bilaterally. No bruits are heard.  SKIN: no ecchymoses, no rashes    Labs:  CBC RESULTS:   Lab Results   Component Value Date    WBC 9.1 01/16/2017    RBC 5.15 01/16/2017    HGB 15.9 (H) 01/16/2017    HCT 47.4 (H) 01/16/2017    MCV 92 01/16/2017    MCH 30.9 01/16/2017    MCHC 33.5 01/16/2017    RDW 12.6 01/16/2017     01/16/2017       BMP RESULTS:  Lab Results   Component Value Date     01/16/2017    POTASSIUM 3.2 (L) 01/16/2017    CHLORIDE 101 01/16/2017    CO2 26 01/16/2017    ANIONGAP 10 01/16/2017     (H) 01/16/2017    BUN 19 01/16/2017    CR 0.89 01/16/2017    CR 62 10/10/2014    GFRESTIMATED 64 01/16/2017    GFRESTBLACK 77 01/16/2017    JANAE 9.4 01/16/2017        INR RESULTS:  Lab Results   Component Value Date    INR 0.94 02/28/2011    INR 0.90 02/27/2011       Procedures:      Assessment and Plan:     Recurrent atrial flutter, status post AFib ablation and atrial flutter  ablation in 2011 and 2012.      I discussed extensively with the patient this complex medical situation.  I discussed the various management options.  As a first step, I recommended increasing the dose of Toprol-XL to 75 mg once daily.  If patient has the next recurrence, she will contact us and at that point we will consider starting an antiarrhythmic medication such as dronedarone.  The patient has previously tried sotalol as well as flecainide pill-in-the-pocket without very much effect.  We will also refer patient for a sleep study.      All questions and concerns were addressed, and patient is happy with the plan.  The plan has also been communicated to the patient's primary care provider.           CC  Patient Care Team:  Kiesha Kamara MD as PCP - General (Internal Medicine)  Kiesha Kamara MD as MD (Internal Medicine)  Lanre Ramirez MD as MD (Dermapathology)  Livier Land, RN as Nurse Coordinator (Clinical Cardiac Electrophysiology)  Tyra Norris MD as MD (Cardiology)  Faina Peace MD as MD (Internal Medicine)  Danii Ocampo MD as MD (OB/Gyn)  Lanre Ramirez MD as MD (Dermapathology)  Faina Rowell APRN CNP as Nurse Practitioner  KIESHA KAMARA

## 2017-02-20 NOTE — LETTER
2/20/2017      RE: Alysha MARR Ford  [address] [place] [id number]       Dear Colleague,    Thank you for the opportunity to participate in the care of your patient, Alysha Youngblood, at the [facility] at [facility]. Please see a copy of my visit note below.    HPI: PURPOSE OF VISIT:  I am seeing the patient in followup after a visit to the Emergency Department for atrial flutter.      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  The patient is status post ablation for paroxysmal atrial fibrillation in 06/2011 as well as another AFib ablation and atrial flutter ablation in 01/2012.  Both ablations were done at [facility].  The patient's last visit with me was in 12/2016.      On 01/15/2017 evening the patient had 2 glasses of wine, and as a result of the reflux, patient was not able to sleep that evening.  On 01/16, the patient had 4 cups of coffee, which is more than her usual average of 2 cups, and at the end of the day patient felt prolonged palpitations.  The patient was seen at the Emergency Department and underwent cardioversion.  The patient takes apixaban for stroke prophylaxis.      There was another episode on 02/10/2017 after patient underwent a stress test. The stress test did not show any evidence of myocardial ischemia.  The patient had a brief episode of atrial flutter on 02/10.      Since then, the patient has had no recurrent palpitation episodes to suggest atrial flutter.  The patient denies any frequent lightheadedness, presyncope or syncope.           PAST MEDICAL HISTORY:  Past Medical History   Diagnosis Date     Atrial fibrillation (H)      ablated 1/12     Ex-smoker      Hashimoto's disease      Hyperlipidaemia      Hypertension      ISABELA (obstructive sleep apnea) 3/12     AHI 7     PAC (premature atrial contraction)      PVC  (premature ventricular contraction)        CURRENT MEDICATIONS:  Current Outpatient Prescriptions   Medication Sig Dispense Refill     loratadine (CLARITIN) 10 MG tablet Take 10 mg by mouth daily       fluconazole (DIFLUCAN) 10 MG/ML suspension Take by mouth daily       apixaban ANTICOAGULANT (ELIQUIS) 5 MG tablet Take 1 tablet (5 mg) by mouth 2 times daily 180 tablet 3     Doxylamine Succinate, Sleep, (SLEEP AID PO)        Zolpidem Tartrate (AMBIEN PO) Take 5 mg by mouth nightly as needed for sleep       losartan (COZAAR) 100 MG tablet Take 100 mg by mouth daily       hydrochlorothiazide (HYDRODIURIL) 25 MG tablet Take 25 mg by mouth daily       lovastatin (MEVACOR) 40 MG tablet Take 40 mg by mouth At Bedtime.       fish oil-omega-3 fatty acids (FISH OIL) 1000 MG capsule One capsule daily.       Calcium-Vitamin D-Vitamin K (VIACTIV PO) Take  by mouth.         PAST SURGICAL HISTORY:  Past Surgical History   Procedure Laterality Date     Appendectomy       Ent surgery       H ablation focal afib  6/24/2011     [facility].      H ablation focal afib  1/12/2012     [facility]. afib and aflutter ablations.        ALLERGIES:     Allergies   Allergen Reactions     Hmg-Coa-R Inhibitors      Made her lips tingle, so they put her on another statin instead.     Ibuprofen Other (See Comments)     Nsaids GI Disturbance     Liquid Adhesive Rash     After wearing patches for > 8 days       FAMILY HISTORY:  - Premature coronary artery disease  - Atrial fibrillation  + Sudden cardiac death     SOCIAL HISTORY:  Social History   Substance Use Topics     Smoking status: Never Smoker     Smokeless tobacco: Never Used     Alcohol use No       ROS:   Constitutional: No fever, chills, or sweats. Weight stable.   ENT: No visual disturbance, ear ache, epistaxis, sore throat.   Cardiovascular: As per HPI.   Respiratory: No cough, hemoptysis.    GI: No nausea, vomiting, hematemesis, melena, or hematochezia.   : No hematuria.   Integument: Negative.  "  Psychiatric: Negative.   Hematologic:  Easy bruising, no easy bleeding.  Neuro: Negative.   Endocrinology: No significant heat or cold intolerance   Musculoskeletal: No myalgia.    Exam:  /83 (BP Location: Right arm, Patient Position: Chair, Cuff Size: Adult Large)  Pulse 72  Ht 1.689 m (5' 6.5\")  Wt 103 kg (227 lb 1.6 oz)  SpO2 95%  BMI 36.11 kg/m2  GENERAL APPEARANCE: healthy, alert and no distress  HEENT: no icterus, no xanthelasmas, normal pupil size and reaction, normal palate, mucosa moist, no central cyanosis  NECK: no adenopathy, no asymmetry, masses, or scars, thyroid normal to palpation and no bruits, JVP not elevated  RESPIRATORY: lungs clear to auscultation - no rales, rhonchi or wheezes, no use of accessory muscles, no retractions, respirations are unlabored, normal respiratory rate  CARDIOVASCULAR: regular rhythm, normal S1 with physiologic split S2, no S3 or S4 and no murmur, click or rub, precordium quiet with normal PMI.  ABDOMEN: soft, non tender, without hepatosplenomegaly, no masses palpable, bowel sounds normal, aorta not enlarged by palpation, no abdominal bruits  EXTREMITIES: peripheral pulses normal, no edema, no bruits  NEURO: alert and oriented to person/place/time, normal speech, gait and affect  VASC: Radial, femoral, dorsalis pedis and posterior tibialis pulses are normal in volumes and symmetric bilaterally. No bruits are heard.  SKIN: no ecchymoses, no rashes    Labs:  CBC RESULTS:   Lab Results   Component Value Date    WBC 9.1 01/16/2017    RBC 5.15 01/16/2017    HGB 15.9 (H) 01/16/2017    HCT 47.4 (H) 01/16/2017    MCV 92 01/16/2017    MCH 30.9 01/16/2017    MCHC 33.5 01/16/2017    RDW 12.6 01/16/2017     01/16/2017       BMP RESULTS:  Lab Results   Component Value Date     01/16/2017    POTASSIUM 3.2 (L) 01/16/2017    CHLORIDE 101 01/16/2017    CO2 26 01/16/2017    ANIONGAP 10 01/16/2017     (H) 01/16/2017    BUN 19 01/16/2017    CR 0.89 01/16/2017    CR " 62 10/10/2014    GFRESTIMATED 64 01/16/2017    GFRESTBLACK 77 01/16/2017    JANAE 9.4 01/16/2017        INR RESULTS:  Lab Results   Component Value Date    INR 0.94 02/28/2011    INR 0.90 02/27/2011       Procedures:      Assessment and Plan:     Recurrent atrial flutter, status post AFib ablation and atrial flutter ablation in 2011 and 2012.      I discussed extensively with the patient this complex medical situation.  I discussed the various management options.  As a first step, I recommended increasing the dose of Toprol-XL to 75 mg once daily.  If patient has the next recurrence, she will contact us and at that point we will consider starting an antiarrhythmic medication such as dronedarone.  The patient has previously tried sotalol as well as flecainide pill-in-the-pocket without very much effect.  We will also refer patient for a sleep study.      All questions and concerns were addressed, and patient is happy with the plan.  The plan has also been communicated to the patient's primary care provider.       Please do not hesitate to contact me if you have any questions/concerns.     Sincerely,     Tyra Norris MD      CC  Patient Care Team:  Kiesha Cohen MD as PCP - General (Internal Medicine)  Lanre Ramirez MD as MD (Dermapathology)  Livier Land RN as Nurse Coordinator (Clinical Cardiac Electrophysiology)  Faina Peace MD as MD (Internal Medicine)  Danii Ocampo MD as MD (OB/Gyn)  Lanre Ramirez MD as MD (Dermapathology)

## 2017-02-20 NOTE — NURSING NOTE
Chief Complaint   Patient presents with     Follow Up For     1 mo f/u AFL in ED, successful DCCV. EKG.

## 2017-02-21 LAB — INTERPRETATION ECG - MUSE: NORMAL

## 2017-05-05 ENCOUNTER — TRANSFERRED RECORDS (OUTPATIENT)
Dept: HEALTH INFORMATION MANAGEMENT | Facility: CLINIC | Age: 67
End: 2017-05-05
Payer: COMMERCIAL

## 2017-05-05 LAB
CREATININE (EXTERNAL): 0.81 MG/DL (ref 0.52–1.04)
GFR ESTIMATED (EXTERNAL): 71 ML/MIN/1.7M2
GFR ESTIMATED (IF AFRICAN AMERICAN) (EXTERNAL): 85 ML/MIN/1.7M2
GLUCOSE (EXTERNAL): 119 MG/DL (ref 70–99)
POTASSIUM (EXTERNAL): 4 MMOL/L (ref 3.4–5.3)

## 2017-05-06 ENCOUNTER — TRANSFERRED RECORDS (OUTPATIENT)
Dept: HEALTH INFORMATION MANAGEMENT | Facility: CLINIC | Age: 67
End: 2017-05-06
Payer: COMMERCIAL

## 2017-06-02 NOTE — TELEPHONE ENCOUNTER
1.  Date/reason for appt: 17 - Pulmonary Nodules  2.  Referring provider: Dr Kiesha Cohen     3.  Call to patient (Yes / No - short description): No - scheduled with Theodora from clinic  4.  Previous care at / records requested from: [facility] - records in Epic  5.  Recent Imagin17 CT chest - in [facility]

## 2017-06-06 ASSESSMENT — ENCOUNTER SYMPTOMS
NECK PAIN: 0
VOMITING: 0
SMELL DISTURBANCE: 0
MUSCLE CRAMPS: 0
HOARSE VOICE: 0
ARTHRALGIAS: 1
STIFFNESS: 0
TASTE DISTURBANCE: 0
JAUNDICE: 0
CONSTIPATION: 0
JOINT SWELLING: 0
SORE THROAT: 0
MYALGIAS: 0
BLOOD IN STOOL: 0
NECK MASS: 0
SINUS CONGESTION: 1
NAUSEA: 0
BACK PAIN: 1
TROUBLE SWALLOWING: 0
RECTAL BLEEDING: 0
MUSCLE WEAKNESS: 0
ABDOMINAL PAIN: 0
SINUS PAIN: 0
BOWEL INCONTINENCE: 0
RECTAL PAIN: 0
DIARRHEA: 1
HEARTBURN: 1
BLOATING: 0

## 2017-06-07 ENCOUNTER — TRANSFERRED RECORDS (OUTPATIENT)
Dept: HEALTH INFORMATION MANAGEMENT | Facility: CLINIC | Age: 67
End: 2017-06-07
Payer: COMMERCIAL

## 2017-06-20 ENCOUNTER — PRE VISIT (OUTPATIENT)
Dept: PULMONOLOGY | Facility: CLINIC | Age: 67
End: 2017-06-20

## 2017-06-20 ENCOUNTER — OFFICE VISIT (OUTPATIENT)
Dept: PULMONOLOGY | Facility: CLINIC | Age: 67
End: 2017-06-20
Attending: INTERNAL MEDICINE
Payer: COMMERCIAL

## 2017-06-20 VITALS
TEMPERATURE: 98.3 F | HEART RATE: 90 BPM | RESPIRATION RATE: 16 BRPM | BODY MASS INDEX: 37.19 KG/M2 | DIASTOLIC BLOOD PRESSURE: 82 MMHG | OXYGEN SATURATION: 95 % | HEIGHT: 66 IN | SYSTOLIC BLOOD PRESSURE: 141 MMHG | WEIGHT: 231.4 LBS

## 2017-06-20 DIAGNOSIS — Z87.891 HISTORY OF TOBACCO USE: Primary | ICD-10-CM

## 2017-06-20 PROCEDURE — 99212 OFFICE O/P EST SF 10 MIN: CPT | Mod: ZF

## 2017-06-20 ASSESSMENT — PAIN SCALES - GENERAL: PAINLEVEL: NO PAIN (0)

## 2017-06-20 NOTE — NURSING NOTE
"Oncology Rooming Note    June 20, 2017 8:26 AM   Alysha Youngblood is a 66 year old female who presents for:    Chief Complaint   Patient presents with     Oncology Clinic Visit     Pulmonary Nodule- New Patient     Initial Vitals: /82  Pulse 90  Temp 98.3  F (36.8  C) (Oral)  Resp 16  Ht 1.689 m (5' 6.5\")  Wt 105 kg (231 lb 6.4 oz)  SpO2 95%  BMI 36.79 kg/m2 Estimated body mass index is 36.79 kg/(m^2) as calculated from the following:    Height as of this encounter: 1.689 m (5' 6.5\").    Weight as of this encounter: 105 kg (231 lb 6.4 oz). Body surface area is 2.22 meters squared.  No Pain (0) Comment: Data Unavailable   No LMP recorded. Patient is postmenopausal.  Allergies reviewed: Yes  Medications reviewed: Yes    Medications: Medication refills not needed today.  Pharmacy name entered into [facility]: [facility] [address] [place] [address] AT [facility]    Clinical concerns: no clinical concerns  provider was notified.    7 minutes for nursing intake (face to face time)     Naomi Parmar CMA              "

## 2017-06-20 NOTE — PATIENT INSTRUCTIONS
1.  Guidelines for Management of Incidental Pulmonary Nodules Detected on CT Images: From the Fleischner Society 2017    2.  Have Lung Cancer Screening CT scan in one year.

## 2017-06-20 NOTE — MR AVS SNAPSHOT
After Visit Summary   6/20/2017    Alysha Youngblood    MRN: [id number]           Patient Information     Date Of Birth          1950        Visit Information        Provider Department      6/20/2017 8:30 AM Manuel Archibald MD [facility]        Care Instructions    1.  Guidelines for Management of Incidental Pulmonary Nodules Detected on CT Images: From the Fleischner Society 2017    2.  Have Lung Cancer Screening CT scan in one year.                 Follow-ups after your visit        Your next 10 appointments already scheduled     Aug 22, 2017 12:30 PM CDT   (Arrive by 12:15 PM)   RETURN ENDOCRINE with Faina Peace MD   [facility] End[facility]ology ([facility])    [address] [address] [phone]              Who to contact     If you have questions or need follow up information about today's clinic visit or your schedule please contact [facility] directly at [phone].  Normal or non-critical lab and imaging results will be communicated to you by TranquilMedhart, letter or phone within 4 business days after the clinic has received the results. If you do not hear from us within 7 days, please contact the clinic through Auxogynt or phone. If you have a critical or abnormal lab result, we will notify you by phone as soon as possible.  Submit refill requests through authorGEN or call your pharmacy and they will forward the refill request to us. Please allow 3 business days for your refill to be completed.          Additional Information About Your Visit        TranquilMedhart Information     authorGEN gives you secure access to your electronic health record. If you see a primary care provider, you can also send messages to your care team and make appointments. If you have questions, please call your primary care clinic.  If you do not have a primary care provider, please call [phone] and they will  "assist you.        Care EveryWhere ID     This is your Care EveryWhere ID. This could be used by other organizations to access your [facility] medical records  [id number]        Your Vitals Were     Pulse Temperature Respirations Height Pulse Oximetry BMI (Body Mass Index)    90 98.3  F (36.8  C) (Oral) 16 1.689 m (5' 6.5\") 95% 36.79 kg/m2       Blood Pressure from Last 3 Encounters:   06/20/17 141/82   02/20/17 133/83   01/16/17 125/78    Weight from Last 3 Encounters:   06/20/17 105 kg (231 lb 6.4 oz)   02/20/17 103 kg (227 lb 1.6 oz)   01/10/17 103.9 kg (229 lb)              Today, you had the following     No orders found for display       Primary Care Provider Office Phone # Fax #    Kiesha Cohen [phone]       [facility] [address] [facility] [address]        Thank you!     Thank you for choosing [facility]  for your care. Our goal is always to provide you with excellent care. Hearing back from our patients is one way we can continue to improve our services. Please take a few minutes to complete the written survey that you may receive in the mail after your visit with us. Thank you!             Your Updated Medication List - Protect others around you: Learn how to safely use, store and throw away your medicines at www.disposemymeds.org.          This list is accurate as of: 6/20/17  8:53 AM.  Always use your most recent med list.                   Brand Name Dispense Instructions for use    AMBIEN PO      Take 5 mg by mouth nightly as needed for sleep       apixaban ANTICOAGULANT 5 MG tablet    ELIQUIS    180 tablet    Take 1 tablet (5 mg) by mouth 2 times daily       fish oil-omega-3 fatty acids 1000 MG capsule      One capsule daily.       fluconazole 10 MG/ML suspension    DIFLUCAN     Take by mouth daily       hydrochlorothiazide 25 MG tablet    HYDRODIURIL     Take 25 mg by mouth daily       loratadine 10 MG tablet    CLARITIN     " Take 10 mg by mouth daily       losartan 100 MG tablet    COZAAR     Take 100 mg by mouth daily       lovastatin 40 MG tablet    MEVACOR     Take 40 mg by mouth At Bedtime.       metoprolol 50 MG 24 hr tablet    TOPROL-XL    135 tablet    Take 1.5 tablets (75 mg) by mouth daily       SLEEP AID PO          VIACTIV PO      Take  by mouth.

## 2017-06-20 NOTE — LETTER
6/20/2017       RE: Alysha Youngblood  [address] [place] [id number]     Dear Colleague,    Thank you for referring your patient, Alysha Youngblood, to the [facility] CANCER [facility]INIC at [facility]. Please see a copy of my visit note below.    Pulmonary Clinic     We have been asked by Dr. Cohen to evaluate this patient in regards to pulmonary nodules.       HPI:      66 year old female referred to the Pulmonary Clinic secondary to the above noted chief complaint.  The patient does not have any active respiratory complaints.  She does snore.  She denies any episodes of apnea.  Overall she feels well rested in the morning.  Again, she has no active symptoms of wheezing fevers, chills, cough.  She does have a history of tobacco use intermittently from age 17 up until 2011.  Overall-pack-year history is likely greater than 30 pack years.       Review of Systems:   8 point ROS performed with pertinent +/- noted in the HPI.  The remainder of the ROS was otherwise negative.        Pertinent Medications     Current Outpatient Prescriptions   Medication     loratadine (CLARITIN) 10 MG tablet     metoprolol (TOPROL-XL) 50 MG 24 hr tablet     apixaban ANTICOAGULANT (ELIQUIS) 5 MG tablet     Doxylamine Succinate, Sleep, (SLEEP AID PO)     losartan (COZAAR) 100 MG tablet     hydrochlorothiazide (HYDRODIURIL) 25 MG tablet     lovastatin (MEVACOR) 40 MG tablet     fish oil-omega-3 fatty acids (FISH OIL) 1000 MG capsule     fluconazole (DIFLUCAN) 10 MG/ML suspension     Zolpidem Tartrate (AMBIEN PO)     Calcium-Vitamin D-Vitamin K (VIACTIV PO)     No current facility-administered medications for this visit.         Allergies:      Allergies   Allergen Reactions     Hmg-Coa-R Inhibitors      Made her lips tingle, so they put her on another statin instead.     Ibuprofen Other (See Comments)     Nsaids GI Disturbance     Liquid Adhesive Rash     After wearing patches for > 8 days         "  Past Medical Hx:       Atrial fibrillation (H) - s/p multiple ablations      PVC (premature ventricular contraction)      Elbow fracture      Pulmonary nodules      HTN      Chronic anti-coagulation      Hx of tobacco use, no active use since 2011       Family Hx:     Family History   Problem Relation Age of Onset     CANCER No family hx of      No known family hx of skin cancer        Social Hx:   The patient has a at least 31 pk yr tobacco hx.  She has no active use since 2011.  Alcohol use is 2 alcoholic drinks per week.  She denies use of recreational drugs.      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      The patient is .  Has 2 children.         Objective   Vitals:  /82  Pulse 90  Temp 98.3  F (36.8  C) (Oral)  Resp 16  Ht 1.689 m (5' 6.5\")  Wt 105 kg (231 lb 6.4 oz)  SpO2 95%  BMI 36.79 kg/m2    General:  Adult female;appears stated age; no acute distress; the patient is a good historian        Labs / Imaging/Studies     CBC RESULTS:   Recent Labs   Lab Test  01/16/17   1851  11/02/14 2004   WBC  9.1  7.4   RBC  5.15  4.93   HGB  15.9*  14.9   HCT  47.4*  44.0   MCV  92  89   MCH  30.9  30.2   MCHC  33.5  33.9   RDW  12.6  12.7   PLT  239  203          Lab Results   Component Value Date     01/16/2017     12/19/2016     12/07/2015    Lab Results   Component Value Date    CHLORIDE 101 01/16/2017    CHLORIDE 101 12/19/2016    CHLORIDE 101 12/07/2015    Lab Results   Component Value Date    BUN 19 01/16/2017    BUN 18 12/19/2016    BUN 24 12/07/2015      Lab Results   Component Value Date    POTASSIUM 3.2 01/16/2017    POTASSIUM 4.3 12/19/2016    POTASSIUM 3.7 12/07/2015    Lab Results   Component Value Date    CO2 26 01/16/2017    CO2 31 12/19/2016    CO2 29 12/07/2015    Lab Results   Component Value Date    CR 0.89 01/16/2017    CR 1.00 12/19/2016    CR 0.83 12/07/2015        Imaging:   CT chest:   1. ACR Assessment Category:  Lung-RADS Category 2. Benign appearance  or " behavior. .      Recommendation:  Lung-RADS Category 2. Benign appearance or behavior.  Recommendation:  continue annual screening. Please use(use lung cancer  screening exam code IMG 2290).           2. Significant Incidental Finding(s):  Category S: No.  3. Prior history of Lung cancer:  Category C: no.  4. Avoidance of tobacco smoke is strongly advised. Please consider  referral for smoking cessation to [facility] Medication Therapy Management  (MTM) if clinically appropriate.         Assessment and Plan:   Assessment: This is a 66-year-old female with a probable pack year history of 31 years or greater who has had a lung cancer screening CT scan in May 2016 as well as May 2017 with some small benign-appearing pulmonary nodules largest of which measures about 5-6 mm.  We discussed the current guidelines in regards to follow-up of a 6 mm nodule larger as well as the guidelines for lung cancer screening CT scan.  At this time I would recommend continued annual CT screening for lung cancer given her tobacco use history.  She has quit tobacco in 2011.    We also briefly discussed her  who also meets criteria for lung cancer screening and will discuss this further with his primary care physician at follow-up visit for yearly physical examination.    1. History of tobacco use  See above  - Prof fee: Shared Decisionmaking for Lung Cancer Screening  - CT Chest Lung Cancer Scrn Low Dose wo; Future    I spent 30 minutes with direct face to face interaction with this patient and provided at least 50% of this time counseling and coordinating care for pulmonary nodules as noted above in the assessment and plan.      Manuel Archibald MD  Pulmonary and Critical Care  [facility]  Pager:  [phone]

## 2017-06-20 NOTE — PROGRESS NOTES
Pulmonary Clinic     We have been asked by Dr. Cohen to evaluate this patient in regards to pulmonary nodules.       HPI:      66 year old female referred to the Pulmonary Clinic secondary to the above noted chief complaint.  The patient does not have any active respiratory complaints.  She does snore.  She denies any episodes of apnea.  Overall she feels well rested in the morning.  Again, she has no active symptoms of wheezing fevers, chills, cough.  She does have a history of tobacco use intermittently from age 17 up until 2011.  Overall-pack-year history is likely greater than 30 pack years.       Review of Systems:   8 point ROS performed with pertinent +/- noted in the HPI.  The remainder of the ROS was otherwise negative.        Pertinent Medications     Current Outpatient Prescriptions   Medication     loratadine (CLARITIN) 10 MG tablet     metoprolol (TOPROL-XL) 50 MG 24 hr tablet     apixaban ANTICOAGULANT (ELIQUIS) 5 MG tablet     Doxylamine Succinate, Sleep, (SLEEP AID PO)     losartan (COZAAR) 100 MG tablet     hydrochlorothiazide (HYDRODIURIL) 25 MG tablet     lovastatin (MEVACOR) 40 MG tablet     fish oil-omega-3 fatty acids (FISH OIL) 1000 MG capsule     fluconazole (DIFLUCAN) 10 MG/ML suspension     Zolpidem Tartrate (AMBIEN PO)     Calcium-Vitamin D-Vitamin K (VIACTIV PO)     No current facility-administered medications for this visit.         Allergies:      Allergies   Allergen Reactions     Hmg-Coa-R Inhibitors      Made her lips tingle, so they put her on another statin instead.     Ibuprofen Other (See Comments)     Nsaids GI Disturbance     Liquid Adhesive Rash     After wearing patches for > 8 days          Past Medical Hx:       Atrial fibrillation (H) - s/p multiple ablations      PVC (premature ventricular contraction)      Elbow fracture      Pulmonary nodules      HTN      Chronic anti-coagulation      Hx of tobacco use, no active use since 2011       Family Hx:     Family History  "  Problem Relation Age of Onset     CANCER No family hx of      No known family hx of skin cancer        Social Hx:   The patient has a at least 31 pk yr tobacco hx.  She has no active use since 2011.  Alcohol use is 2 alcoholic drinks per week.  She denies use of recreational drugs.      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      The patient is .  Has 2 children.         Objective   Vitals:  /82  Pulse 90  Temp 98.3  F (36.8  C) (Oral)  Resp 16  Ht 1.689 m (5' 6.5\")  Wt 105 kg (231 lb 6.4 oz)  SpO2 95%  BMI 36.79 kg/m2    General:  Adult female;appears stated age; no acute distress; the patient is a good historian        Labs / Imaging/Studies     CBC RESULTS:   Recent Labs   Lab Test  01/16/17   1851  11/02/14 2004   WBC  9.1  7.4   RBC  5.15  4.93   HGB  15.9*  14.9   HCT  47.4*  44.0   MCV  92  89   MCH  30.9  30.2   MCHC  33.5  33.9   RDW  12.6  12.7   PLT  239  203          Lab Results   Component Value Date     01/16/2017     12/19/2016     12/07/2015    Lab Results   Component Value Date    CHLORIDE 101 01/16/2017    CHLORIDE 101 12/19/2016    CHLORIDE 101 12/07/2015    Lab Results   Component Value Date    BUN 19 01/16/2017    BUN 18 12/19/2016    BUN 24 12/07/2015      Lab Results   Component Value Date    POTASSIUM 3.2 01/16/2017    POTASSIUM 4.3 12/19/2016    POTASSIUM 3.7 12/07/2015    Lab Results   Component Value Date    CO2 26 01/16/2017    CO2 31 12/19/2016    CO2 29 12/07/2015    Lab Results   Component Value Date    CR 0.89 01/16/2017    CR 1.00 12/19/2016    CR 0.83 12/07/2015        Imaging:   CT chest:   1. ACR Assessment Category:  Lung-RADS Category 2. Benign appearance  or behavior. .      Recommendation:  Lung-RADS Category 2. Benign appearance or behavior.  Recommendation:  continue annual screening. Please use(use lung cancer  screening exam code IMG 2290).           2. Significant Incidental Finding(s):  Category S: No.  3. Prior history of Lung " cancer:  Category C: no.  4. Avoidance of tobacco smoke is strongly advised. Please consider  referral for smoking cessation to [facility] Medication Therapy Management  (MTM) if clinically appropriate.         Assessment and Plan:   Assessment: This is a 66-year-old female with a probable pack year history of 31 years or greater who has had a lung cancer screening CT scan in May 2016 as well as May 2017 with some small benign-appearing pulmonary nodules largest of which measures about 5-6 mm.  We discussed the current guidelines in regards to follow-up of a 6 mm nodule larger as well as the guidelines for lung cancer screening CT scan.  At this time I would recommend continued annual CT screening for lung cancer given her tobacco use history.  She has quit tobacco in 2011.    We also briefly discussed her  who also meets criteria for lung cancer screening and will discuss this further with his primary care physician at follow-up visit for yearly physical examination.    1. History of tobacco use  See above  - Prof fee: Shared Decisionmaking for Lung Cancer Screening  - CT Chest Lung Cancer Scrn Low Dose wo; Future    I spent 30 minutes with direct face to face interaction with this patient and provided at least 50% of this time counseling and coordinating care for pulmonary nodules as noted above in the assessment and plan.      Manuel Archibald MD  Pulmonary and Critical Care  [facility]  Pager:  [phone]

## 2017-08-17 ASSESSMENT — ENCOUNTER SYMPTOMS
HYPERTENSION: 0
LEG PAIN: 0
RECTAL BLEEDING: 0
SLEEP DISTURBANCES DUE TO BREATHING: 0
SYNCOPE: 0
BOWEL INCONTINENCE: 0
ORTHOPNEA: 0
LEG SWELLING: 0
BLOATING: 0
TACHYCARDIA: 1
JAUNDICE: 0
VOMITING: 0
CLAUDICATION: 0
BLOOD IN STOOL: 0
DIARRHEA: 1
EXERCISE INTOLERANCE: 0
HYPOTENSION: 0
LIGHT-HEADEDNESS: 0
ABDOMINAL PAIN: 0
CONSTIPATION: 0
NAUSEA: 0
HEARTBURN: 0
PALPITATIONS: 1
RECTAL PAIN: 0

## 2017-08-22 ENCOUNTER — OFFICE VISIT (OUTPATIENT)
Dept: ENDOCRINOLOGY | Facility: CLINIC | Age: 67
End: 2017-08-22

## 2017-08-22 VITALS
BODY MASS INDEX: 36.73 KG/M2 | WEIGHT: 234 LBS | DIASTOLIC BLOOD PRESSURE: 88 MMHG | HEART RATE: 81 BPM | HEIGHT: 67 IN | SYSTOLIC BLOOD PRESSURE: 134 MMHG

## 2017-08-22 DIAGNOSIS — E04.9 GOITER: Primary | ICD-10-CM

## 2017-08-22 ASSESSMENT — PAIN SCALES - GENERAL: PAINLEVEL: NO PAIN (0)

## 2017-08-22 NOTE — LETTER
8/22/2017       RE: Alysha Youngblood  [address] [place] [id number]     Dear Colleague,    Thank you for referring your patient, Alysha Youngblood, to the [facility] ENDOCRINOLOGY at [facility]. Please see a copy of my visit note below.      This 66 year old woman returns for f/u of her multinodular goiter.  This was first diagnoses in the mid 1990s. She has had repeat biopsy x2 , most recently in 11/2012, which have all been benign.  Of note, previous biopsy noted Hurthle cells on background of lymphocytes indicative of Hashimoto's thyroiditis.  Review of past thyroid hormone tests are always euthyroid.  She doesn't think her thyroid gland has changed in size since her last visit.  However, she does report that last fall she had pain radiating from her ears to her thyroid at a time when she had a lot of allergy sx.  This sx is now gone.  She has no pain in ears or thyroid. She continues to feel hot all the time but has good energy.  She is on metoprolol for rate control of her A fib/flutter and this does cause her to feel tired sometime.  Weight has been increasing.      Current Outpatient Prescriptions on File Prior to Visit:  loratadine (CLARITIN) 10 MG tablet Take 10 mg by mouth daily   fluconazole (DIFLUCAN) 10 MG/ML suspension Take by mouth daily   metoprolol (TOPROL-XL) 50 MG 24 hr tablet Take 1.5 tablets (75 mg) by mouth daily   apixaban ANTICOAGULANT (ELIQUIS) 5 MG tablet Take 1 tablet (5 mg) by mouth 2 times daily   Doxylamine Succinate, Sleep, (SLEEP AID PO)    Zolpidem Tartrate (AMBIEN PO) Take 5 mg by mouth nightly as needed for sleep   losartan (COZAAR) 100 MG tablet Take 100 mg by mouth daily   hydrochlorothiazide (HYDRODIURIL) 25 MG tablet Take 25 mg by mouth daily   lovastatin (MEVACOR) 40 MG tablet Take 40 mg by mouth At Bedtime.   fish oil-omega-3 fatty acids (FISH OIL) 1000 MG capsule One capsule daily.   Calcium-Vitamin D-Vitamin K (VIACTIV PO) Take  by  "mouth.     No current facility-administered medications on file prior to visit.     ROS: 10 point ROS neg other than the symptoms noted above in the HPI.  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    Weight as of this encounter: 106.1 kg (234 lb).    NAD  Eyes - no periorbital edema, conjunctival injection, scleral icterus  Neck - enlarged thyroid with soft and smooth texture.  Right lobe is about 3 times normal in size with probably 1-2 cm nodule on medial edge, left lobe about 2-3 times normal in size without ? Nodule RUL.  Skin - normal texture.    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  It is unchanged in size from 2016.  Thyroid function checked earlier this year is normal.  Will plan for f/u in one year.    Faina Peace MD        "

## 2017-08-22 NOTE — PROGRESS NOTES
"  This 66 year old woman returns for f/u of her multinodular goiter.  This was first diagnoses in the mid 1990s. She has had repeat biopsy x2 , most recently in 11/2012, which have all been benign.  Of note, previous biopsy noted Hurthle cells on background of lymphocytes indicative of Hashimoto's thyroiditis.  Review of past thyroid hormone tests are always euthyroid.  She doesn't think her thyroid gland has changed in size since her last visit.  However, she does report that last fall she had pain radiating from her ears to her thyroid at a time when she had a lot of allergy sx.  This sx is now gone.  She has no pain in ears or thyroid. She continues to feel hot all the time but has good energy.  She is on metoprolol for rate control of her A fib/flutter and this does cause her to feel tired sometime.  Weight has been increasing.      Current Outpatient Prescriptions on File Prior to Visit:  loratadine (CLARITIN) 10 MG tablet Take 10 mg by mouth daily   fluconazole (DIFLUCAN) 10 MG/ML suspension Take by mouth daily   metoprolol (TOPROL-XL) 50 MG 24 hr tablet Take 1.5 tablets (75 mg) by mouth daily   apixaban ANTICOAGULANT (ELIQUIS) 5 MG tablet Take 1 tablet (5 mg) by mouth 2 times daily   Doxylamine Succinate, Sleep, (SLEEP AID PO)    Zolpidem Tartrate (AMBIEN PO) Take 5 mg by mouth nightly as needed for sleep   losartan (COZAAR) 100 MG tablet Take 100 mg by mouth daily   hydrochlorothiazide (HYDRODIURIL) 25 MG tablet Take 25 mg by mouth daily   lovastatin (MEVACOR) 40 MG tablet Take 40 mg by mouth At Bedtime.   fish oil-omega-3 fatty acids (FISH OIL) 1000 MG capsule One capsule daily.   Calcium-Vitamin D-Vitamin K (VIACTIV PO) Take  by mouth.     No current facility-administered medications on file prior to visit.     ROS: 10 point ROS neg other than the symptoms noted above in the HPI.  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    Weight as of this encounter: 106.1 kg (234 lb).    NAD  Eyes - no periorbital edema, conjunctival injection, scleral icterus  Neck - enlarged thyroid with soft and smooth texture.  Right lobe is about 3 times normal in size with probably 1-2 cm nodule on medial edge, left lobe about 2-3 times normal in size without ? Nodule RUL.  Skin - normal texture.    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  It is unchanged in size from 2016.  Thyroid function checked earlier this year is normal.  Will plan for f/u in one year.    Faina Peace MD        "

## 2017-08-22 NOTE — MR AVS SNAPSHOT
After Visit Summary   8/22/2017    Alysha Youngblood    MRN: [id number]           Patient Information     Date Of Birth          1950        Visit Information        Provider Department      8/22/2017 12:30 PM Faina Peace MD [facility] Endocrinology        Today's Diagnoses     Goiter    -  1       Follow-ups after your visit        Your next 10 appointments already scheduled     Jae 15, 2018 10:40 AM CST   (Arrive by 10:25 AM)   RETURN ARRHYTHMIA with Tyra Norris MD   [facility] ([facility])    [address] [place] [address]  3rd [facility] [id number] [phone]            Jun 19, 2018  8:00 AM CDT   (Arrive by 7:45 AM)   CT LUNG RESEARCH VELASQUEZ with UCCT2   [facility] [place] [facility] ([facility])    [address] [place] [id number] [phone]           Please bring any scans or X-rays taken at other hospitals, if similar tests were done. Also bring a list of your medicines, including vitamins, minerals and over-the-counter drugs. It is safest to leave personal items at home.  Be sure to tell your doctor:   If you have any allergies.   If there s any chance you are pregnant.   If you are breastfeeding.   If you have any special needs.  You do not need to do anything special to prepare.  Please wear loose clothing, such as a sweat suit or jogging clothes. Avoid snaps, zippers and other metal. We may ask you to undress and put on a hospital gown.            Jun 19, 2018  9:00 AM CDT   (Arrive by 8:45 AM)   Return Visit with Manuel Archibald MD   [facility] ([facility])    [address] [place] [id number] [phone]              Who to contact     Please call your clinic at [phone] to:    Ask questions about your health    Make or cancel appointments    Discuss your medicines    Learn about your test results    Speak  "to your doctor   If you have compliments or concerns about an experience at your clinic, or if you wish to file a complaint, please contact [facility] Patient Relations at [phone] or email us at [email or web address]         Additional Information About Your Visit        MyChart Information     [employer] gives you secure access to your electronic health record. If you see a primary care provider, you can also send messages to your care team and make appointments. If you have questions, please call your primary care clinic.  If you do not have a primary care provider, please call [phone] and they will assist you.      [employer] is an electronic gateway that provides easy, online access to your medical records. With [employer], you can request a clinic appointment, read your test results, renew a prescription or communicate with your care team.     To access your existing account, please contact your [facility] or call [phone] for assistance.        Care EveryWhere ID     This is your Care EveryWhere ID. This could be used by other organizations to access your [place] medical records  [id number]        Your Vitals Were     Pulse Height BMI (Body Mass Index)             81 1.689 m (5' 6.5\") 37.2 kg/m2          Blood Pressure from Last 3 Encounters:   08/22/17 134/88   06/20/17 141/82   02/20/17 133/83    Weight from Last 3 Encounters:   08/22/17 106.1 kg (234 lb)   06/20/17 105 kg (231 lb 6.4 oz)   02/20/17 103 kg (227 lb 1.6 oz)              Today, you had the following     No orders found for display       Primary Care Provider Office Phone # Fax #    Kiesha Cohen [phone]       [facility] [address] [facility] [address]        Equal Access to Services     JONATHAN LAYNE AH: Hadii stephanie Leiva, wareda yeyo, qaaniyahta jaden king, maia keller " ah. So [facility] [phone].    ATENCIÓN: Si juan francisco morales, tiene a erazo disposición servicios gratuitos de asistencia lingüística. Paulino griffin [phone].    We comply with applicable federal civil rights laws and [place] laws. We do not discriminate on the basis of race, color, national origin, age, disability sex, sexual orientation or gender identity.            Thank you!     Thank you for choosing [facility]  for your care. Our goal is always to provide you with excellent care. Hearing back from our patients is one way we can continue to improve our services. Please take a few minutes to complete the written survey that you may receive in the mail after your visit with us. Thank you!             Your Updated Medication List - Protect others around you: Learn how to safely use, store and throw away your medicines at www.disposemymeds.org.          This list is accurate as of: 8/22/17  1:04 PM.  Always use your most recent med list.                   Brand Name Dispense Instructions for use Diagnosis    AMBIEN PO      Take 5 mg by mouth nightly as needed for sleep        apixaban ANTICOAGULANT 5 MG tablet    ELIQUIS    180 tablet    Take 1 tablet (5 mg) by mouth 2 times daily    Atrial fibrillation (H)       fish oil-omega-3 fatty acids 1000 MG capsule      One capsule daily.        fluconazole 10 MG/ML suspension    DIFLUCAN     Take by mouth daily        hydrochlorothiazide 25 MG tablet    HYDRODIURIL     Take 25 mg by mouth daily        loratadine 10 MG tablet    CLARITIN     Take 10 mg by mouth daily        losartan 100 MG tablet    COZAAR     Take 100 mg by mouth daily        lovastatin 40 MG tablet    MEVACOR     Take 40 mg by mouth At Bedtime.        metoprolol 50 MG 24 hr tablet    TOPROL-XL    135 tablet    Take 1.5 tablets (75 mg) by mouth daily    Atrial flutter, unspecified type (H)       SLEEP AID PO           VIACTIV PO      Take  by mouth.

## 2017-11-28 ENCOUNTER — OFFICE VISIT (OUTPATIENT)
Dept: DERMATOLOGY | Facility: CLINIC | Age: 67
End: 2017-11-28

## 2017-11-28 DIAGNOSIS — L82.1 SK (SEBORRHEIC KERATOSIS): ICD-10-CM

## 2017-11-28 DIAGNOSIS — D23.9 DERMAL NEVUS: Primary | ICD-10-CM

## 2017-11-28 ASSESSMENT — PAIN SCALES - GENERAL: PAINLEVEL: NO PAIN (0)

## 2017-11-28 NOTE — LETTER
11/28/2017       RE: Alysha Youngblood  [facility] [employer]  [facility] [address]     Dear Colleague,    Thank you for referring your patient, Alysha Youngblood, to the [facility] DERMATOLOGY at [facility]. Please see a copy of my visit note below.    [facility] Dermatology Note      Dermatology Problem List:  1. Dermal nevus L upper back    Encounter Date: Nov 28, 2017    CC:   Chief Complaint   Patient presents with     Skin Check     Alysha is here today for a mole that she is concerned about.          History of Present Illness:  Ms. Alysha Youngblood is a 67 year old female with a history of extensive sun exposure in her youth but no history of skin cancer who presents for evaluation of a spot on her L upper back, which she noted this morning. She says her back has been itchy; while scratching she noted a spot on her L upper back that she did not recognize. She took a picture of it and is concerned that it is irregular.  She is otherwise wel and has no other concerns today.      Past Medical History:   Patient Active Problem List   Diagnosis     Elbow fracture     Atrial fibrillation (H)     PVC (premature ventricular contraction)     Goiter     Past Medical History:   Diagnosis Date     Atrial fibrillation (H)     ablated 1/12     Ex-smoker      Hashimoto's disease      Hyperlipidaemia      Hypertension      ISABELA (obstructive sleep apnea) 3/12    AHI 7     PAC (premature atrial contraction)      PVC (premature ventricular contraction)      Past Surgical History:   Procedure Laterality Date     APPENDECTOMY       ENT SURGERY       H ABLATION FOCAL AFIB  6/24/2011    [facility].      H ABLATION FOCAL AFIB  1/12/2012    [facility]. afib and aflutter ablations.        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     Family History:  There is no family history of  melanoma or nonmelanoma skin cancer.    Medications:  Current Outpatient Prescriptions   Medication Sig Dispense Refill     loratadine (CLARITIN) 10 MG tablet Take 10 mg by mouth daily       metoprolol (TOPROL-XL) 50 MG 24 hr tablet Take 1.5 tablets (75 mg) by mouth daily 135 tablet 3     apixaban ANTICOAGULANT (ELIQUIS) 5 MG tablet Take 1 tablet (5 mg) by mouth 2 times daily 180 tablet 3     Doxylamine Succinate, Sleep, (SLEEP AID PO)        Zolpidem Tartrate (AMBIEN PO) Take 5 mg by mouth nightly as needed for sleep       losartan (COZAAR) 100 MG tablet Take 100 mg by mouth daily       hydrochlorothiazide (HYDRODIURIL) 25 MG tablet Take 25 mg by mouth daily       lovastatin (MEVACOR) 40 MG tablet Take 40 mg by mouth At Bedtime.       fish oil-omega-3 fatty acids (FISH OIL) 1000 MG capsule One capsule daily.       Calcium-Vitamin D-Vitamin K (VIACTIV PO) Take  by mouth.       fluconazole (DIFLUCAN) 10 MG/ML suspension Take by mouth daily          Allergies   Allergen Reactions     Hmg-Coa-R Inhibitors      Made her lips tingle, so they put her on another statin instead.     Ibuprofen Other (See Comments)     Nsaids GI Disturbance     Liquid Adhesive Rash     After wearing patches for > 8 days       Review of Systems:  -As per HPI  -Constitutional: The patient denies fatigue, fevers, chills, unintended weight loss, and night sweats.  -HEENT: Patient denies nonhealing oral sores.  -Skin: As above in HPI. No additional skin concerns.    Physical exam:  Vitals: There were no vitals taken for this visit.  GEN: This is a well developed, well-nourished female in no acute distress, in a pleasant mood.    SKIN: Gross and dermoscopic exam of the face, ears, neck, back, chest:  - Phototype II, mild-to-moderate dermatoheliosis  -L upper back: 6mm fleshy papule, clinically within normal limits. 2 other similar nevi on the R back. Superior to this is a 4mm waxy brown papule with stuck-on appearance. There are a couple other  2-4mm brown macules, clinically within normal limits. Bx taken at last visit has healed well.  -No other lesions of concern on areas examined.     Impression/Plan:  Dermal nevus, clinically normal. Monitor. If area becomes irritated or painful, RTC for re-evaluation and possible biopsy.    SK - pt reassured benign.    RTC for regularly scheduled skin exam    Staff Only:  Naye Driscoll MD, PhD    Dermatology

## 2017-11-28 NOTE — MR AVS SNAPSHOT
After Visit Summary   11/28/2017    Alysha Youngblood    MRN: [id number]           Patient Information     Date Of Birth          1950        Visit Information        Provider Department      11/28/2017 3:00 PM Naye Driscoll MD [facility]        Today's Diagnoses     Dermal nevus    -  1    SK (seborrheic keratosis)           Follow-ups after your visit        Your next 10 appointments already scheduled     Jae 15, 2018 10:40 AM CST   (Arrive by 10:25 AM)   RETURN ARRHYTHMIA with Tyra Norris MD   [facility] ([facility] [place])    [address] [place] [address]  3rd [facility] [address] [phone]            Jun 19, 2018  8:00 AM CDT   (Arrive by 7:45 AM)   CT LUNG RESEARCH VELASQUEZ with UCCT2   [facility] ([facility])    [address] [place] [address] [phone]           Please bring any scans or X-rays taken at other hospitals, if similar tests were done. Also bring a list of your medicines, including vitamins, minerals and over-the-counter drugs. It is safest to leave personal items at home.  Be sure to tell your doctor:   If you have any allergies.   If there s any chance you are pregnant.   If you are breastfeeding.   If you have any special needs.  You do not need to do anything special to prepare.  Please wear loose clothing, such as a sweat suit or jogging clothes. Avoid snaps, zippers and other metal. We may ask you to undress and put on a hospital gown.            Jun 19, 2018  9:00 AM CDT   (Arrive by 8:45 AM)   Return Visit with Manuel Archibald MD   [facility] ([facility] [place])    [address] [place] [address] [phone]              Who to contact     Please call your clinic at [phone] to:    Ask questions about your health    Make or cancel appointments    Discuss your  medicines    Learn about your test results    Speak to your doctor   If you have compliments or concerns about an experience at your clinic, or if you wish to file a complaint, please contact [facility] Patient Relations at [phone] or email us at [email or web address]         Additional Information About Your Visit        MyChart Information     [employer] gives you secure access to your electronic health record. If you see a primary care provider, you can also send messages to your care team and make appointments. If you have questions, please call your primary care clinic.  If you do not have a primary care provider, please call [phone] and they will assist you.      [employer] is an electronic gateway that provides easy, online access to your medical records. With [employer], you can request a clinic appointment, read your test results, renew a prescription or communicate with your care team.     To access your existing account, please contact your [facility] or call [phone] for assistance.        Care EveryWhere ID     This is your Care EveryWhere ID. This could be used by other organizations to access your [place] medical records  [id number]         Blood Pressure from Last 3 Encounters:   08/22/17 134/88   06/20/17 141/82   02/20/17 133/83    Weight from Last 3 Encounters:   08/22/17 106.1 kg (234 lb)   06/20/17 105 kg (231 lb 6.4 oz)   02/20/17 103 kg (227 lb 1.6 oz)              Today, you had the following     No orders found for display       Primary Care Provider Office Phone # Fax #    Kiesha Cohen [phone]       [facility] [address] [facility] [address]        Equal Access to Services     KAITLYNN [facility] SystemTEJINDER : Abel Leiva, chao orona, maia burroughs. So [facility] [phone].    ATENCIÓN: Si juan francisco morales,  tiene a erazo disposición servicios gratuitos de asistencia lingüística. Paulino griffin [phone].    We comply with applicable federal civil rights laws and [place] laws. We do not discriminate on the basis of race, color, national origin, age, disability, sex, sexual orientation, or gender identity.            Thank you!     Thank you for choosing [facility]  for your care. Our goal is always to provide you with excellent care. Hearing back from our patients is one way we can continue to improve our services. Please take a few minutes to complete the written survey that you may receive in the mail after your visit with us. Thank you!             Your Updated Medication List - Protect others around you: Learn how to safely use, store and throw away your medicines at www.disposemymeds.org.          This list is accurate as of: 11/28/17  3:13 PM.  Always use your most recent med list.                   Brand Name Dispense Instructions for use Diagnosis    AMBIEN PO      Take 5 mg by mouth nightly as needed for sleep        apixaban ANTICOAGULANT 5 MG tablet    ELIQUIS    180 tablet    Take 1 tablet (5 mg) by mouth 2 times daily    Atrial fibrillation (H)       fish oil-omega-3 fatty acids 1000 MG capsule      One capsule daily.        fluconazole 10 MG/ML suspension    DIFLUCAN     Take by mouth daily        hydrochlorothiazide 25 MG tablet    HYDRODIURIL     Take 25 mg by mouth daily        loratadine 10 MG tablet    CLARITIN     Take 10 mg by mouth daily        losartan 100 MG tablet    COZAAR     Take 100 mg by mouth daily        lovastatin 40 MG tablet    MEVACOR     Take 40 mg by mouth At Bedtime.        metoprolol 50 MG 24 hr tablet    TOPROL-XL    135 tablet    Take 1.5 tablets (75 mg) by mouth daily    Atrial flutter, unspecified type (H)       SLEEP AID PO           VIACTIV PO      Take  by mouth.

## 2017-11-28 NOTE — PROGRESS NOTES
[facility] Dermatology Note      Dermatology Problem List:  1. Dermal nevus L upper back    Encounter Date: Nov 28, 2017    CC:   Chief Complaint   Patient presents with     Skin Check     Alysha is here today for a mole that she is concerned about.          History of Present Illness:  Ms. Alysha Yougnblood is a 67 year old female with a history of extensive sun exposure in her youth but no history of skin cancer who presents for evaluation of a spot on her L upper back, which she noted this morning. She says her back has been itchy; while scratching she noted a spot on her L upper back that she did not recognize. She took a picture of it and is concerned that it is irregular.  She is otherwise wel and has no other concerns today.      Past Medical History:   Patient Active Problem List   Diagnosis     Elbow fracture     Atrial fibrillation (H)     PVC (premature ventricular contraction)     Goiter     Past Medical History:   Diagnosis Date     Atrial fibrillation (H)     ablated 1/12     Ex-smoker      Hashimoto's disease      Hyperlipidaemia      Hypertension      ISABELA (obstructive sleep apnea) 3/12    AHI 7     PAC (premature atrial contraction)      PVC (premature ventricular contraction)      Past Surgical History:   Procedure Laterality Date     APPENDECTOMY       ENT SURGERY       H ABLATION FOCAL AFIB  6/24/2011    [facility].      H ABLATION FOCAL AFIB  1/12/2012    [facility]. afib and aflutter ablations.        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     Family History:  There is no family history of melanoma or nonmelanoma skin cancer.    Medications:  Current Outpatient Prescriptions   Medication Sig Dispense Refill     loratadine (CLARITIN) 10 MG tablet Take 10 mg by mouth daily       metoprolol (TOPROL-XL) 50 MG 24 hr tablet Take 1.5 tablets (75 mg) by mouth daily 135  tablet 3     apixaban ANTICOAGULANT (ELIQUIS) 5 MG tablet Take 1 tablet (5 mg) by mouth 2 times daily 180 tablet 3     Doxylamine Succinate, Sleep, (SLEEP AID PO)        Zolpidem Tartrate (AMBIEN PO) Take 5 mg by mouth nightly as needed for sleep       losartan (COZAAR) 100 MG tablet Take 100 mg by mouth daily       hydrochlorothiazide (HYDRODIURIL) 25 MG tablet Take 25 mg by mouth daily       lovastatin (MEVACOR) 40 MG tablet Take 40 mg by mouth At Bedtime.       fish oil-omega-3 fatty acids (FISH OIL) 1000 MG capsule One capsule daily.       Calcium-Vitamin D-Vitamin K (VIACTIV PO) Take  by mouth.       fluconazole (DIFLUCAN) 10 MG/ML suspension Take by mouth daily          Allergies   Allergen Reactions     Hmg-Coa-R Inhibitors      Made her lips tingle, so they put her on another statin instead.     Ibuprofen Other (See Comments)     Nsaids GI Disturbance     Liquid Adhesive Rash     After wearing patches for > 8 days       Review of Systems:  -As per HPI  -Constitutional: The patient denies fatigue, fevers, chills, unintended weight loss, and night sweats.  -HEENT: Patient denies nonhealing oral sores.  -Skin: As above in HPI. No additional skin concerns.    Physical exam:  Vitals: There were no vitals taken for this visit.  GEN: This is a well developed, well-nourished female in no acute distress, in a pleasant mood.    SKIN: Gross and dermoscopic exam of the face, ears, neck, back, chest:  - Phototype II, mild-to-moderate dermatoheliosis  -L upper back: 6mm fleshy papule, clinically within normal limits. 2 other similar nevi on the R back. Superior to this is a 4mm waxy brown papule with stuck-on appearance. There are a couple other 2-4mm brown macules, clinically within normal limits. Bx taken at last visit has healed well.  -No other lesions of concern on areas examined.     Impression/Plan:  Dermal nevus, clinically normal. Monitor. If area becomes irritated or painful, RTC for re-evaluation and possible  biopsy.    SK - pt reassured benign.    RTC for regularly scheduled skin exam    Staff Only:  Naye Driscoll MD, PhD    Dermatology

## 2018-01-01 ASSESSMENT — ENCOUNTER SYMPTOMS
SORE THROAT: 0
JOINT SWELLING: 0
SINUS PAIN: 0
STIFFNESS: 0
RECTAL PAIN: 0
ARTHRALGIAS: 0
TROUBLE SWALLOWING: 0
BOWEL INCONTINENCE: 0
SINUS CONGESTION: 1
BLOOD IN STOOL: 0
MUSCLE CRAMPS: 0
ABDOMINAL PAIN: 0
NECK MASS: 0
BACK PAIN: 1
NECK PAIN: 1
BLOATING: 0
TASTE DISTURBANCE: 0
MYALGIAS: 0
VOMITING: 0
SMELL DISTURBANCE: 0
JAUNDICE: 0
DIARRHEA: 1
NAUSEA: 0
CONSTIPATION: 1
MUSCLE WEAKNESS: 0
HEARTBURN: 0

## 2018-01-10 ENCOUNTER — PRE VISIT (OUTPATIENT)
Dept: CARDIOLOGY | Facility: CLINIC | Age: 68
End: 2018-01-10

## 2018-01-15 ENCOUNTER — OFFICE VISIT (OUTPATIENT)
Dept: CARDIOLOGY | Facility: CLINIC | Age: 68
End: 2018-01-15
Attending: INTERNAL MEDICINE
Payer: COMMERCIAL

## 2018-01-15 VITALS
WEIGHT: 235 LBS | BODY MASS INDEX: 36.88 KG/M2 | HEART RATE: 74 BPM | OXYGEN SATURATION: 98 % | SYSTOLIC BLOOD PRESSURE: 143 MMHG | HEIGHT: 67 IN | DIASTOLIC BLOOD PRESSURE: 89 MMHG

## 2018-01-15 DIAGNOSIS — I48.0 PAROXYSMAL ATRIAL FIBRILLATION (H): Primary | ICD-10-CM

## 2018-01-15 PROCEDURE — 99214 OFFICE O/P EST MOD 30 MIN: CPT | Mod: ZP | Performed by: INTERNAL MEDICINE

## 2018-01-15 PROCEDURE — G0463 HOSPITAL OUTPT CLINIC VISIT: HCPCS | Mod: 25,ZF

## 2018-01-15 PROCEDURE — 93005 ELECTROCARDIOGRAM TRACING: CPT | Mod: ZF

## 2018-01-15 RX ORDER — METOPROLOL SUCCINATE 50 MG/1
50 TABLET, EXTENDED RELEASE ORAL DAILY
Qty: 90 TABLET | Refills: 3 | Status: SHIPPED | OUTPATIENT
Start: 2018-01-15 | End: 2018-12-10

## 2018-01-15 ASSESSMENT — PAIN SCALES - GENERAL: PAINLEVEL: NO PAIN (0)

## 2018-01-15 NOTE — LETTER
1/15/2018      RE: Alysha Carmonanar  [address] [place] [id number]       Dear Colleague,    Thank you for the opportunity to participate in the care of your patient, Alysha Youngblood, at the [facility] HEART [facility] at [facility]. Please see a copy of my visit note below.    HPI: Purpose of visit: Patient is here for follow-up of paroxysmal atrial fibrillation and atrial flutter.      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  The patient is status post ablation for paroxysmal atrial fibrillation in 06/2011 as well as another AFib ablation and atrial flutter ablation in 01/2012.  Both ablations were done at [facility].  The patient's last visit with me was in 02/2017.    In the last 1 year, the patient has done very well.  She has had short fluttering episodes in the chest but no prolonged episodes of palpitations.  She denies any symptoms of exertional dyspnea, exertional angina, frequent lightheadedness presyncope or syncope.    She does report some easy fatigability from the use of Toprol XL 75 mg once daily.        PAST MEDICAL HISTORY:  Past Medical History:   Diagnosis Date     Atrial fibrillation (H)     ablated 1/12     Ex-smoker      Hashimoto's disease      Hyperlipidaemia      Hypertension      ISABELA (obstructive sleep apnea) 3/12    AHI 7     PAC (premature atrial contraction)      PVC (premature ventricular contraction)        CURRENT MEDICATIONS:  Current Outpatient Prescriptions   Medication Sig Dispense Refill     metoprolol succinate (TOPROL-XL) 50 MG 24 hr tablet Take 1 tablet (50 mg) by mouth daily 90 tablet 3     loratadine (CLARITIN) 10 MG tablet Take 10 mg by mouth daily       apixaban ANTICOAGULANT (ELIQUIS) 5 MG tablet Take 1 tablet (5 mg) by mouth 2 times daily 180 tablet 3     Zolpidem Tartrate (AMBIEN PO) Take 5 mg by mouth nightly as needed  for sleep       losartan (COZAAR) 100 MG tablet Take 100 mg by mouth daily       hydrochlorothiazide (HYDRODIURIL) 25 MG tablet Take 25 mg by mouth daily       lovastatin (MEVACOR) 40 MG tablet Take 40 mg by mouth At Bedtime.       fish oil-omega-3 fatty acids (FISH OIL) 1000 MG capsule One capsule daily.       Calcium-Vitamin D-Vitamin K (VIACTIV PO) Take by mouth 2 times daily        fluconazole (DIFLUCAN) 10 MG/ML suspension Take by mouth daily       [DISCONTINUED] metoprolol (TOPROL-XL) 50 MG 24 hr tablet Take 1.5 tablets (75 mg) by mouth daily 135 tablet 3     Doxylamine Succinate, Sleep, (SLEEP AID PO)          PAST SURGICAL HISTORY:  Past Surgical History:   Procedure Laterality Date     APPENDECTOMY       ENT SURGERY       H ABLATION FOCAL AFIB  6/24/2011    [facility].      H ABLATION FOCAL AFIB  1/12/2012    [facility]. afib and aflutter ablations.        ALLERGIES:     Allergies   Allergen Reactions     Hmg-Coa-R Inhibitors      Made her lips tingle, so they put her on another statin instead.     Ibuprofen Other (See Comments)     Nsaids GI Disturbance     Liquid Adhesive Rash     After wearing patches for > 8 days       FAMILY HISTORY:  - Premature coronary artery disease  - Atrial fibrillation  - Sudden cardiac death     SOCIAL HISTORY:  Social History   Substance Use Topics     Smoking status: Never Smoker     Smokeless tobacco: Never Used     Alcohol use No       ROS:   Constitutional: No fever, chills, or sweats. Weight stable.   ENT: No visual disturbance, ear ache, epistaxis, sore throat.   Cardiovascular: As per HPI.   Respiratory: No cough, hemoptysis.    GI: No nausea, vomiting, hematemesis, melena, or hematochezia.   : No hematuria.   Integument: Negative.   Psychiatric: Negative.   Hematologic:  Easy bruising, no easy bleeding.  Neuro: Negative.   Endocrinology: No significant heat or cold intolerance   Musculoskeletal: No myalgia.    Exam:  /89 (BP Location: Left arm, Patient Position: Chair,  "Cuff Size: Adult Large)  Pulse 74  Ht 1.689 m (5' 6.5\")  Wt 106.6 kg (235 lb)  SpO2 98%  BMI 37.36 kg/m2  GENERAL APPEARANCE: healthy, alert and no distress  HEENT: no icterus, no xanthelasmas, normal pupil size and reaction, normal palate, mucosa moist, no central cyanosis  NECK: no adenopathy, no asymmetry, masses, or scars, thyroid normal to palpation and no bruits, JVP not elevated  RESPIRATORY: lungs clear to auscultation - no rales, rhonchi or wheezes, no use of accessory muscles, no retractions, respirations are unlabored, normal respiratory rate  CARDIOVASCULAR: regular rhythm, normal S1 with physiologic split S2, no S3 or S4 and no murmur, click or rub, precordium quiet with normal PMI.  ABDOMEN: soft, non tender, without hepatosplenomegaly, no masses palpable, bowel sounds normal, aorta not enlarged by palpation, no abdominal bruits  EXTREMITIES: peripheral pulses normal, no edema, no bruits  NEURO: alert and oriented to person/place/time, normal speech, gait and affect  VASC: Radial, femoral, dorsalis pedis and posterior tibialis pulses are normal in volumes and symmetric bilaterally. No bruits are heard.  SKIN: no ecchymoses, no rashes    Labs:  CBC RESULTS:   Lab Results   Component Value Date    WBC 9.1 01/16/2017    RBC 5.15 01/16/2017    HGB 15.9 (H) 01/16/2017    HCT 47.4 (H) 01/16/2017    MCV 92 01/16/2017    MCH 30.9 01/16/2017    MCHC 33.5 01/16/2017    RDW 12.6 01/16/2017     01/16/2017       BMP RESULTS:  Lab Results   Component Value Date     01/16/2017    POTASSIUM 3.2 (L) 01/16/2017    CHLORIDE 101 01/16/2017    CO2 26 01/16/2017    ANIONGAP 10 01/16/2017     (H) 01/16/2017    BUN 19 01/16/2017    CR 0.89 01/16/2017    GFRESTIMATED 64 01/16/2017    GFRESTBLACK 77 01/16/2017    JANAE 9.4 01/16/2017        INR RESULTS:  Lab Results   Component Value Date    INR 0.94 02/28/2011    INR 0.90 02/27/2011       Procedures:      Assessment and Plan:     Recurrent atrial " flutter, status post AFib ablation and atrial flutter ablation in 2011 and 2012.     It is encouraging that patient is doing very well from the standpoint of her atrial arrhythmias.  I recommended decreasing Toprol XL to 50 mg once daily and she can take that in the evening.    We will see patient again in heart rhythm clinic in 1 year.    All questions and concerns were addressed and patient is happy with plan.    Sincerely,     Tyra Norris MD    CC  Patient Care Team:  Kiesha Cohen MD as PCP - General (Internal Medicine)  Kiesha Cohen MD as MD (Internal Medicine)  Lanre Ramirez MD as MD (Dermapathology)  Livier Land, RN as Nurse Coordinator (Clinical Cardiac Electrophysiology)  Tyra Norris MD as MD (Cardiology)  Faina Peace MD as MD (Internal Medicine)  Danii Ocampo MD as MD (OB/Gyn)  Lanre Ramirez MD as MD (Dermapathology)  Faina Rowell APRN CNP as Nurse Practitioner  ESTABLISHED PATIENT     Results already discussed in clinic or will be discussed on a clinic visit.

## 2018-01-15 NOTE — NURSING NOTE
Return Appointment: Patient given instructions regarding scheduling next clinic visit. Patient demonstrated understanding of this information and agreed to call with further questions or concerns.  Medication Change: Patient was educated regarding prescribed medication change, including discussion of the indication, administration, side effects, and when to report to MD or RN. Patient demonstrated understanding of this information and agreed to call with further questions or concerns.  Patient stated she understood all health information given and agreed to call with further questions or concerns.

## 2018-01-15 NOTE — NURSING NOTE
Chief Complaint   Patient presents with     Follow Up For     1 yr fu AF, AFL. EKG     Vitals were taken and medications were reconciled. EKG was performed.  NIGEL Kumar  10:49 AM

## 2018-01-15 NOTE — MR AVS SNAPSHOT
After Visit Summary   1/15/2018    Alysha Youngblood    MRN: [id number]           Patient Information     Date Of Birth          1950        Visit Information        Provider Department      1/15/2018 10:40 AM Tyra Norris MD [facility]        Today's Diagnoses     Paroxysmal atrial fibrillation (H)    -  1      Care Instructions    Cardiology Provider you saw in clinic today: Dr. Norris    Medication Changes:     1. Decrease Toprol XL to 50mg daily in the evening     Follow-up Visit:  1 year        Please contact us via Segway for questions or concerns.    Livier Land RN   Electrophysiology Nurse Coordinator.  [phone]    If your question concerns the schedule/appointment times, contact:  [facility] Procedure   [phone]    Device Clinic (Pacemakers, ICD, Loop Recorders)   [phone]      You will receive all normal lab and testing results via Segway or mail if not reviewed in clinic today.   If you need a medication refill please contact your pharmacy.    As always, thank you for trusting us with your health care needs!            Follow-ups after your visit        Additional Services     Follow-Up with Electrophysiologist       Myrna                  Your next 10 appointments already scheduled     Jun 19, 2018  8:00 AM CDT   (Arrive by 7:45 AM)   CT LUNG RESEARCH [facility]Z with [facility]   [facility] ([facility])    [address] [address] [phone]           Please bring any scans or X-rays taken at other hospitals, if similar tests were done. Also bring a list of your medicines, including vitamins, minerals and over-the-counter drugs. It is safest to leave personal items at home.  Be sure to tell your doctor:   If you have any allergies.   If there s any chance you are pregnant.   If you are breastfeeding.   If you have any special needs.  You do not need to do anything special to  "prepare.  Please wear loose clothing, such as a sweat suit or jogging clothes. Avoid snaps, zippers and other metal. We may ask you to undress and put on a hospital gown.            Jun 19, 2018  9:00 AM CDT   (Arrive by 8:45 AM)   Return Visit with Manuel Archibald MD   [facility] ([facility])    [address]  [address]  [facility] [address] [phone]              Who to contact     If you have questions or need follow up information about today's clinic visit or your schedule please contact [facility] directly at [phone].  Normal or non-critical lab and imaging results will be communicated to you by MyChart, letter or phone within 4 business days after the clinic has received the results. If you do not hear from us within 7 days, please contact the clinic through [employer] or phone. If you have a critical or abnormal lab result, we will notify you by phone as soon as possible.  Submit refill requests through Beam. or call your pharmacy and they will forward the refill request to us. Please allow 3 business days for your refill to be completed.          Additional Information About Your Visit        Beam. Information     Beam. gives you secure access to your electronic health record. If you see a primary care provider, you can also send messages to your care team and make appointments. If you have questions, please call your primary care clinic.  If you do not have a primary care provider, please call [phone] and they will assist you.        Care EveryWhere ID     This is your Care EveryWhere ID. This could be used by other organizations to access your [facility] medical records  [id number]        Your Vitals Were     Pulse Height Pulse Oximetry BMI (Body Mass Index)          74 1.689 m (5' 6.5\") 98% 37.36 kg/m2         Blood Pressure from Last 3 Encounters:   01/15/18 143/89   08/22/17 134/88   06/20/17 141/82    Weight from " Last 3 Encounters:   01/15/18 106.6 kg (235 lb)   08/22/17 106.1 kg (234 lb)   06/20/17 105 kg (231 lb 6.4 oz)              We Performed the Following     EKG 12-lead, tracing only (Same Day)          Today's Medication Changes          These changes are accurate as of: 1/15/18 11:59 PM.  If you have any questions, ask your nurse or doctor.               These medicines have changed or have updated prescriptions.        Dose/Directions    metoprolol succinate 50 MG 24 hr tablet   Commonly known as:  TOPROL-XL   This may have changed:  how much to take        Dose:  50 mg   Take 1 tablet (50 mg) by mouth daily   Quantity:  90 tablet   Refills:  3            Where to get your medicines      These medications were sent to [facility] [address] [place] [address] & [address] [address]     Phone:  [phone]     metoprolol succinate 50 MG 24 hr tablet                Primary Care Provider Office Phone # Fax #    Kiesha Cohen [phone]       [facility] [address] [address]        Equal Access to Services     KAITLYNN LAYNE AH: Hadii stephanie nowak hadasho Soericaali, waaxda luqadaha, qaybta kaalmada adeegyada, maia smalls. [facility] [phone].    ATENCIÓN: Si habla español, tiene a erazo disposición servicios gratuitos de asistencia lingüística. [facility] [phone].    We comply with applicable federal civil rights laws and [place] laws. We do not discriminate on the basis of race, color, national origin, age, disability, sex, sexual orientation, or gender identity.            Thank you!     Thank you for choosing [facility]  for your care. Our goal is always to provide you with excellent care. Hearing back from our patients is one way we can continue to improve our services. Please take a few minutes to complete the written survey that you may receive in the mail after your  visit with us. Thank you!             Your Updated Medication List - Protect others around you: Learn how to safely use, store and throw away your medicines at www.disposemymeds.org.          This list is accurate as of: 1/15/18 11:59 PM.  Always use your most recent med list.                   Brand Name Dispense Instructions for use Diagnosis    AMBIEN PO      Take 5 mg by mouth nightly as needed for sleep        apixaban ANTICOAGULANT 5 MG tablet    ELIQUIS    180 tablet    Take 1 tablet (5 mg) by mouth 2 times daily    Atrial fibrillation (H)       fish oil-omega-3 fatty acids 1000 MG capsule      One capsule daily.        fluconazole 10 MG/ML suspension    DIFLUCAN     Take by mouth daily        hydrochlorothiazide 25 MG tablet    HYDRODIURIL     Take 25 mg by mouth daily        loratadine 10 MG tablet    CLARITIN     Take 10 mg by mouth daily        losartan 100 MG tablet    COZAAR     Take 100 mg by mouth daily        lovastatin 40 MG tablet    MEVACOR     Take 40 mg by mouth At Bedtime.        metoprolol succinate 50 MG 24 hr tablet    TOPROL-XL    90 tablet    Take 1 tablet (50 mg) by mouth daily        SLEEP AID PO           VIACTIV PO      Take by mouth 2 times daily

## 2018-01-15 NOTE — PROGRESS NOTES
HPI: Purpose of visit: Patient is here for follow-up of paroxysmal atrial fibrillation and atrial flutter.      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  The patient is status post ablation for paroxysmal atrial fibrillation in 06/2011 as well as another AFib ablation and atrial flutter ablation in 01/2012.  Both ablations were done at [facility].  The patient's last visit with me was in 02/2017.    In the last 1 year, the patient has done very well.  She has had short fluttering episodes in the chest but no prolonged episodes of palpitations.  She denies any symptoms of exertional dyspnea, exertional angina, frequent lightheadedness presyncope or syncope.    She does report some easy fatigability from the use of Toprol XL 75 mg once daily.        PAST MEDICAL HISTORY:  Past Medical History:   Diagnosis Date     Atrial fibrillation (H)     ablated 1/12     Ex-smoker      Hashimoto's disease      Hyperlipidaemia      Hypertension      ISABELA (obstructive sleep apnea) 3/12    AHI 7     PAC (premature atrial contraction)      PVC (premature ventricular contraction)        CURRENT MEDICATIONS:  Current Outpatient Prescriptions   Medication Sig Dispense Refill     metoprolol succinate (TOPROL-XL) 50 MG 24 hr tablet Take 1 tablet (50 mg) by mouth daily 90 tablet 3     loratadine (CLARITIN) 10 MG tablet Take 10 mg by mouth daily       apixaban ANTICOAGULANT (ELIQUIS) 5 MG tablet Take 1 tablet (5 mg) by mouth 2 times daily 180 tablet 3     Zolpidem Tartrate (AMBIEN PO) Take 5 mg by mouth nightly as needed for sleep       losartan (COZAAR) 100 MG tablet Take 100 mg by mouth daily       hydrochlorothiazide (HYDRODIURIL) 25 MG tablet Take 25 mg by mouth daily       lovastatin (MEVACOR) 40 MG tablet Take 40 mg by mouth At Bedtime.       fish oil-omega-3 fatty acids (FISH OIL) 1000 MG capsule One capsule daily.        "Calcium-Vitamin D-Vitamin K (VIACTIV PO) Take by mouth 2 times daily        fluconazole (DIFLUCAN) 10 MG/ML suspension Take by mouth daily       [DISCONTINUED] metoprolol (TOPROL-XL) 50 MG 24 hr tablet Take 1.5 tablets (75 mg) by mouth daily 135 tablet 3     Doxylamine Succinate, Sleep, (SLEEP AID PO)          PAST SURGICAL HISTORY:  Past Surgical History:   Procedure Laterality Date     APPENDECTOMY       ENT SURGERY       H ABLATION FOCAL AFIB  6/24/2011    [facility].      H ABLATION FOCAL AFIB  1/12/2012    [facility]. afib and aflutter ablations.        ALLERGIES:     Allergies   Allergen Reactions     Hmg-Coa-R Inhibitors      Made her lips tingle, so they put her on another statin instead.     Ibuprofen Other (See Comments)     Nsaids GI Disturbance     Liquid Adhesive Rash     After wearing patches for > 8 days       FAMILY HISTORY:  - Premature coronary artery disease  - Atrial fibrillation  - Sudden cardiac death     SOCIAL HISTORY:  Social History   Substance Use Topics     Smoking status: Never Smoker     Smokeless tobacco: Never Used     Alcohol use No       ROS:   Constitutional: No fever, chills, or sweats. Weight stable.   ENT: No visual disturbance, ear ache, epistaxis, sore throat.   Cardiovascular: As per HPI.   Respiratory: No cough, hemoptysis.    GI: No nausea, vomiting, hematemesis, melena, or hematochezia.   : No hematuria.   Integument: Negative.   Psychiatric: Negative.   Hematologic:  Easy bruising, no easy bleeding.  Neuro: Negative.   Endocrinology: No significant heat or cold intolerance   Musculoskeletal: No myalgia.    Exam:  /89 (BP Location: Left arm, Patient Position: Chair, Cuff Size: Adult Large)  Pulse 74  Ht 1.689 m (5' 6.5\")  Wt 106.6 kg (235 lb)  SpO2 98%  BMI 37.36 kg/m2  GENERAL APPEARANCE: healthy, alert and no distress  HEENT: no icterus, no xanthelasmas, normal pupil size and reaction, normal palate, mucosa moist, no central cyanosis  NECK: no adenopathy, no " asymmetry, masses, or scars, thyroid normal to palpation and no bruits, JVP not elevated  RESPIRATORY: lungs clear to auscultation - no rales, rhonchi or wheezes, no use of accessory muscles, no retractions, respirations are unlabored, normal respiratory rate  CARDIOVASCULAR: regular rhythm, normal S1 with physiologic split S2, no S3 or S4 and no murmur, click or rub, precordium quiet with normal PMI.  ABDOMEN: soft, non tender, without hepatosplenomegaly, no masses palpable, bowel sounds normal, aorta not enlarged by palpation, no abdominal bruits  EXTREMITIES: peripheral pulses normal, no edema, no bruits  NEURO: alert and oriented to person/place/time, normal speech, gait and affect  VASC: Radial, femoral, dorsalis pedis and posterior tibialis pulses are normal in volumes and symmetric bilaterally. No bruits are heard.  SKIN: no ecchymoses, no rashes    Labs:  CBC RESULTS:   Lab Results   Component Value Date    WBC 9.1 01/16/2017    RBC 5.15 01/16/2017    HGB 15.9 (H) 01/16/2017    HCT 47.4 (H) 01/16/2017    MCV 92 01/16/2017    MCH 30.9 01/16/2017    MCHC 33.5 01/16/2017    RDW 12.6 01/16/2017     01/16/2017       BMP RESULTS:  Lab Results   Component Value Date     01/16/2017    POTASSIUM 3.2 (L) 01/16/2017    CHLORIDE 101 01/16/2017    CO2 26 01/16/2017    ANIONGAP 10 01/16/2017     (H) 01/16/2017    BUN 19 01/16/2017    CR 0.89 01/16/2017    GFRESTIMATED 64 01/16/2017    GFRESTBLACK 77 01/16/2017    JANAE 9.4 01/16/2017        INR RESULTS:  Lab Results   Component Value Date    INR 0.94 02/28/2011    INR 0.90 02/27/2011       Procedures:      Assessment and Plan:     Recurrent atrial flutter, status post AFib ablation and atrial flutter ablation in 2011 and 2012.     It is encouraging that patient is doing very well from the standpoint of her atrial arrhythmias.  I recommended decreasing Toprol XL to 50 mg once daily and she can take that in the evening.    We will see patient again in  heart rhythm clinic in 1 year.    All questions and concerns were addressed and patient is happy with plan.      CC  Patient Care Team:  Keisha Cohen MD as PCP - General (Internal Medicine)  Kiesha Cohen MD as MD (Internal Medicine)  Lanre Ramirez MD as MD (Dermapathology)  Livier Land, RN as Nurse Coordinator (Clinical Cardiac Electrophysiology)  Tyra Norris MD as MD (Cardiology)  Faina Peace MD as MD (Internal Medicine)  Danii Ocampo MD as MD (OB/Gyn)  Lanre Ramirez MD as MD (Dermapathology)  Faina Rowell APRN CNP as Nurse Practitioner  ESTABLISHED PATIENT

## 2018-01-15 NOTE — PATIENT INSTRUCTIONS
Cardiology Provider you saw in clinic today: Dr. Norris    Medication Changes:     1. Decrease Toprol XL to 50mg daily in the evening     Follow-up Visit:  1 year        Please contact us via [employer] for questions or concerns.    Livier Land RN   Electrophysiology Nurse Coordinator.  [phone]    If your question concerns the schedule/appointment times, contact:  [facility] Procedure   [phone]    Device Clinic (Pacemakers, ICD, Loop Recorders)   [phone]      You will receive all normal lab and testing results via [employer] or mail if not reviewed in clinic today.   If you need a medication refill please contact your pharmacy.    As always, thank you for trusting us with your health care needs!

## 2018-01-16 LAB — INTERPRETATION ECG - MUSE: NORMAL

## 2018-03-05 ENCOUNTER — RADIANT APPOINTMENT (OUTPATIENT)
Dept: MAMMOGRAPHY | Facility: CLINIC | Age: 68
End: 2018-03-05
Attending: INTERNAL MEDICINE
Payer: COMMERCIAL

## 2018-03-05 DIAGNOSIS — Z12.39 SCREENING BREAST EXAMINATION: ICD-10-CM

## 2018-03-09 ENCOUNTER — TRANSFERRED RECORDS (OUTPATIENT)
Dept: HEALTH INFORMATION MANAGEMENT | Facility: CLINIC | Age: 68
End: 2018-03-09
Payer: COMMERCIAL

## 2018-04-02 DIAGNOSIS — M25.561 RIGHT KNEE PAIN, UNSPECIFIED CHRONICITY: Primary | ICD-10-CM

## 2018-04-02 DIAGNOSIS — M25.521 RIGHT ELBOW PAIN: Primary | ICD-10-CM

## 2018-04-03 ENCOUNTER — OFFICE VISIT (OUTPATIENT)
Dept: ORTHOPEDICS | Facility: CLINIC | Age: 68
End: 2018-04-03
Payer: COMMERCIAL

## 2018-04-03 ENCOUNTER — THERAPY VISIT (OUTPATIENT)
Dept: PHYSICAL THERAPY | Facility: CLINIC | Age: 68
End: 2018-04-03
Payer: COMMERCIAL

## 2018-04-03 ENCOUNTER — RADIANT APPOINTMENT (OUTPATIENT)
Dept: GENERAL RADIOLOGY | Facility: CLINIC | Age: 68
End: 2018-04-03
Payer: COMMERCIAL

## 2018-04-03 VITALS — HEIGHT: 67 IN | WEIGHT: 235 LBS | RESPIRATION RATE: 16 BRPM | BODY MASS INDEX: 36.88 KG/M2

## 2018-04-03 DIAGNOSIS — M25.521 RIGHT ELBOW PAIN: ICD-10-CM

## 2018-04-03 DIAGNOSIS — M70.21 OLECRANON BURSITIS OF RIGHT ELBOW: ICD-10-CM

## 2018-04-03 DIAGNOSIS — M25.561 PATELLOFEMORAL ARTHRALGIA OF RIGHT KNEE: Primary | ICD-10-CM

## 2018-04-03 DIAGNOSIS — M25.561 RIGHT KNEE PAIN, UNSPECIFIED CHRONICITY: Primary | ICD-10-CM

## 2018-04-03 DIAGNOSIS — M25.561 RIGHT KNEE PAIN, UNSPECIFIED CHRONICITY: ICD-10-CM

## 2018-04-03 DIAGNOSIS — M17.11 PRIMARY OSTEOARTHRITIS OF RIGHT KNEE: ICD-10-CM

## 2018-04-03 PROCEDURE — 97110 THERAPEUTIC EXERCISES: CPT | Mod: GP | Performed by: PHYSICAL THERAPIST

## 2018-04-03 PROCEDURE — 97530 THERAPEUTIC ACTIVITIES: CPT | Mod: GP | Performed by: PHYSICAL THERAPIST

## 2018-04-03 PROCEDURE — 97161 PT EVAL LOW COMPLEX 20 MIN: CPT | Mod: GP | Performed by: PHYSICAL THERAPIST

## 2018-04-03 ASSESSMENT — ACTIVITIES OF DAILY LIVING (ADL)
RISE FROM A CHAIR: ACTIVITY IS FAIRLY DIFFICULT
GIVING WAY, BUCKLING OR SHIFTING OF KNEE: I DO NOT HAVE THE SYMPTOM
SWELLING: I DO NOT HAVE THE SYMPTOM
SIT WITH YOUR KNEE BENT: ACTIVITY IS MINIMALLY DIFFICULT
SQUAT: ACTIVITY IS VERY DIFFICULT
WALK: ACTIVITY IS MINIMALLY DIFFICULT
WEAKNESS: I DO NOT HAVE THE SYMPTOM
AS_A_RESULT_OF_YOUR_KNEE_INJURY,_HOW_WOULD_YOU_RATE_YOUR_CURRENT_LEVEL_OF_DAILY_ACTIVITY?: ABNORMAL
HOW_WOULD_YOU_RATE_THE_CURRENT_FUNCTION_OF_YOUR_KNEE_DURING_YOUR_USUAL_DAILY_ACTIVITIES_ON_A_SCALE_FROM_0_TO_100_WITH_100_BEING_YOUR_LEVEL_OF_KNEE_FUNCTION_PRIOR_TO_YOUR_INJURY_AND_0_BEING_THE_INABILITY_TO_PERFORM_ANY_OF_YOUR_USUAL_DAILY_ACTIVITIES?: 40
RAW_SCORE: 47
PAIN: THE SYMPTOM AFFECTS MY ACTIVITY MODERATELY
GO DOWN STAIRS: ACTIVITY IS FAIRLY DIFFICULT
STAND: ACTIVITY IS MINIMALLY DIFFICULT
HOW_WOULD_YOU_RATE_THE_OVERALL_FUNCTION_OF_YOUR_KNEE_DURING_YOUR_USUAL_DAILY_ACTIVITIES?: NEARLY NORMAL
STIFFNESS: I DO NOT HAVE THE SYMPTOM
GO UP STAIRS: ACTIVITY IS FAIRLY DIFFICULT
LIMPING: I DO NOT HAVE THE SYMPTOM
KNEE_ACTIVITY_OF_DAILY_LIVING_SUM: 47
KNEEL ON THE FRONT OF YOUR KNEE: ACTIVITY IS VERY DIFFICULT
KNEE_ACTIVITY_OF_DAILY_LIVING_SCORE: 67.14

## 2018-04-03 NOTE — PROGRESS NOTES
KEY PT FINDINGS:  1) 1+ effusion  2) Lacking end range flexion  3) Depressed intensity of quadriceps activation  4) functional movement patterns not assessed due to pain    Physical Therapy Initial Evaluation: Subjective History     Injury/Condition Details:  Presenting Complaint Right Knee Pain   Onset Timing/Date January 10, 2018   Mechanism Patient slipped and fell, her right knee went out straight and she suspects that the knee hit the side of the cupboard. She has tried to resume daily activities but has not been able to progress her exercise due to continued pain.   This is the 4th fall she has had since May.      Symptom Behavior Details    Primary Symptoms Sporadic symptoms; Activity/position dependent, pain (Location: Lateral patella, localized pain - feels behind the knee cap. , Quality: Sharp and Aching/Throbbing), stiffness, weakness   Worst Pain 7/10 (with Standing)   Symptom Provocators Stairs, walking   Best Pain 0/10    Symptom Relievers Unloading the leg (all the weight on the left side), sitting   Time of day dependent? No   Recent symptom change? 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 Continued pain   Orthopaedic history See Epic Chart   Notable medical history See Epic Chart     Lower Extremity Physical Therapy Examination    Dynamic Movement Screen:  2 leg stance: Unloads the right limb  2 leg squat: Not assessed    1 leg stance: Right: Not assessed; Left: Poor proprioception, lacking trunk control    Gait: Mild antalgic gait pattern    Knee Joint ROM   Hyperextension Extension Flexion   Right 2 deg 0 deg 100 deg   Left 3 deg 0 deg 115 deg          Basic Muscle Activation:  Transversus Abdominus: Poor control, decreased activation  Quadriceps: Right:  Depressed intensity, delayed activation, Left: Normal    Knee Joint Effusion (Stroke Test Assessment):  Right: 1+  Left: None    Lower Extremity Flexibility Screen:  Hamstrings (Supine SLR): Right: +; Left: +  Gastroc (Supine Active DF): Right: ++; Left: ++    Palpation:   Tender to palpation at the following structures: Lateral boarder of the patella,   NOT tender to palpation at the following structures: Joint line (medial and lateral)    Assessment/Plan:    Patient is a 67 year old female with right side knee complaints.    Patient has the following significant findings with corresponding treatment plan.                Diagnosis 1:  Post-traumatic knee pain  Pain -  hot/cold therapy, manual therapy, STS and splint/taping/bracing/orthotics  Decreased ROM/flexibility - manual therapy, therapeutic exercise and home program  Decreased strength - therapeutic exercise, therapeutic activities and home program  Impaired balance - neuro re-education, therapeutic activities and home program  Impaired muscle performance - neuro re-education and home program  Decreased function - therapeutic activities and home program    Therapy Evaluation Codes:   1) History comprised of:   Personal factors that impact the plan of care:      Age and Time since onset of symptoms.    Comorbidity factors that impact the plan of care are:      Overweight and Pain at night/rest.     Medications impacting care: None.  2) Examination of Body Systems comprised of:   Body structures and functions that impact the plan of care:      Knee.   Activity limitations that impact the plan of care are:      Sitting, Squatting/kneeling, Stairs, Standing and Walking.  3) Clinical presentation characteristics are:   Stable/Uncomplicated.  4) Decision-Making    Low complexity using standardized patient assessment instrument and/or measureable assessment of functional outcome.  Cumulative Therapy Evaluation is: Low complexity.    Previous and current functional  limitations:  (See Goal Flow Sheet for this information)    Short term and Long term goals: (See Goal Flow Sheet for this information)     Communication ability:  Patient appears to be able to clearly communicate and understand verbal and written communication and follow directions correctly.  Treatment Explanation - The following has been discussed with the patient:   RX ordered/plan of care  Anticipated outcomes  Possible risks and side effects  This patient would benefit from PT intervention to resume normal activities.   Rehab potential is good.    Frequency:  1 X week, once daily  Duration:  for 1 weeks  Discharge Plan:  Achieve all LTG.  Independent in home treatment program.  Reach maximal therapeutic benefit.    Please refer to the daily flowsheet for treatment today, total treatment time and time spent performing 1:1 timed codes.

## 2018-04-03 NOTE — PROGRESS NOTES
"Sports Medicine Clinic Visit    PCP: Kiehsa Cohen    Alysha Youngblood is a 67 year old female who is seen  in consultation at the request of Dr. Morales presenting with right elbow and knee pain. The pt reports falling in January. The elbow doesn't cause her pain anymore. She wants to know if she is ok to start repetitive movements again.     Injury: None    Location of Pain: right posterior elbow  Duration of Pain: 2 month(s)  Rating of Pain: 1-6/10  Pain is better with: Prednisone   Pain is worse with: Repetitive movements  Additional Features: Swelling  Treatment so far consists of: Prednisone  Prior History of related problems: Bursitis of same elbow a few years ago     Resp 16  Ht 5' 6.5\" (1.689 m)  Wt 235 lb (106.6 kg)  BMI 37.36 kg/m2       PMH:  Past Medical History:   Diagnosis Date     Atrial fibrillation (H)     ablated 1/12     Ex-smoker      Hashimoto's disease      Hyperlipidaemia      Hypertension      ISABELA (obstructive sleep apnea) 3/12    AHI 7     PAC (premature atrial contraction)      PVC (premature ventricular contraction)        Active problem list:  Patient Active Problem List   Diagnosis     Elbow fracture     Atrial fibrillation (H)     PVC (premature ventricular contraction)     Goiter       FH:  Family History   Problem Relation Age of Onset     CANCER No family hx of      No known family hx of skin cancer       SH:  Social History     Social History     Marital status:      Spouse name: N/A     Number of children: N/A     Years of education: N/A     Occupational History     Not on file.     Social History Main Topics     Smoking status: Never Smoker     Smokeless tobacco: Never Used     Alcohol use No     Drug use: No     Sexual activity: No     Other Topics Concern     Not on file     Social History Narrative       MEDS:  See EMR, reviewed  ALL:  See EMR, reviewed    REVIEW OF SYSTEMS:  CONSTITUTIONAL:NEGATIVE for fever, chills, change in weight  INTEGUMENTARY/SKIN: NEGATIVE for " worrisome rashes, moles or lesions  EYES: NEGATIVE for vision changes or irritation  ENT/MOUTH: NEGATIVE for ear, mouth and throat problems  RESP:NEGATIVE for significant cough or SOB  BREAST: NEGATIVE for masses, tenderness or discharge  CV: NEGATIVE for chest pain, palpitations or peripheral edema  GI: NEGATIVE for nausea, abdominal pain, heartburn, or change in bowel habits  :NEGATIVE for frequency, dysuria, or hematuria  :NEGATIVE for frequency, dysuria, or hematuria  NEURO: NEGATIVE for weakness, dizziness or paresthesias  ENDOCRINE: NEGATIVE for temperature intolerance, skin/hair changes  HEME/ALLERGY/IMMUNE: NEGATIVE for bleeding problems  PSYCHIATRIC: NEGATIVE for changes in mood or affect        SUBJECTIVE:  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  Three months ago she landed directly on her right anterior kneecap when she slipped and fell.  She is trying to return to her athletic activities.  She feels better when she is in a pull-up knee sleeve with a patellar cutout.  The knee does not swell or lock.  It was not swollen at the time of the injury, but it still gives her some discomfort about the anterior knee with her exercises.  She denies chronic problems with this knee.  In the last 3 months, she has had olecranon swelling at the right elbow that is slowly improving.  She does spend time in a chair holding onto a tablet.  She does not remember an injury where she would have hit this.  She has had elbow bursitis in the past, and she has associated it with swimming motions in the pool.  The elbow is without discomfort, and the skin has been intact.      OBJECTIVE:  She has a patulous, half of a hqnk-xoee-bredd, nontender fluid collection at the tip of the right elbow.  She has normal range of motion in the elbow through flexion and extension.  She is nontender about the medial epicondyle, the lateral epicondyle or the olecranon.   Pronation and supination are normal.  The right knee reveals no effusion.  She is tender along the inferior pole of the kneecap medially along the patellar facet, nontender directly over the patellar tendon, and she can do an active straight-leg raise without extension lag and with normal strength.  Patellar apprehension signs are negative.  She is nontender about the medial joint line or lateral joint line.  I can flex the knee easily to 90 degrees.  Anterior and posterior drawer is negative.  Lachman is negative.  MCL and LCL stresses are negative.  Normal range of motion at the hip.  Distal pulses and sensation are intact.  The overlying skin is normal.  Appropriate in conversation and affect.        IMAGING:  X-ray of the right knee shows some mild degenerative joint disease with relative narrowing in the tibiofemoral space with small peripheral spurs.  No significant DJD in the patellofemoral space.  No signs of acute fracture.  An x-ray of the right elbow shows no bony abnormality with no signs of DJD.      ASSESSMENT:   1.  Olecranon bursitis, right elbow.   2.  Traumatic patellofemoral discomfort, right knee.   3.  Right knee DJD, mild.      PLAN:  She would like to see Physical Therapy for the knee.  She will continue with her pull-up knee sleeve with patellar cutout.  She knows I would not recommend aspiration of the bursa at this point.  It risks the issue of introducing infection or fistula.  She will look for slow resolution over the next 6 months, and I encouraged her to offload the elbow by wearing a forearm pad when she is in her chair.  If the knee is not improving with physical therapy, she knows a cortisone shot would be an option.  She would like to avoid this if possible.

## 2018-04-03 NOTE — Clinical Note
Thank you for allowing me to see your patient in Sports Medicine Clinic.  Please see the attached copy of our visit.  Sincerely,  Slim Herrera MD

## 2018-04-03 NOTE — MR AVS SNAPSHOT
After Visit Summary   4/3/2018    Alysha Youngblood    MRN: [id number]           Patient Information     Date Of Birth          1950        Visit Information        Provider Department      4/3/2018 8:20 AM Ree Joseph PT [facility]        Today's Diagnoses     Right knee pain, unspecified chronicity    -  1       Follow-ups after your visit        Your next 10 appointments already scheduled     Jun 19, 2018  8:00 AM CDT   (Arrive by 7:45 AM)   CT LUNG RESEARCH VELASQUEZ with UCCT2   [facility] ([facility] [place])    [address]  1st Floor  [facility] [place] [facility] [address] [phone]           Please bring any scans or X-rays taken at other hospitals, if similar tests were done. Also bring a list of your medicines, including vitamins, minerals and over-the-counter drugs. It is safest to leave personal items at home.  Be sure to tell your doctor:   If you have any allergies.   If there s any chance you are pregnant.   If you are breastfeeding.  You do not need to do anything special to prepare for this exam.  Please wear loose clothing, such as a sweat suit or jogging clothes. Avoid snaps, zippers and other metal. We may ask you to undress and put on a hospital gown.            Jun 19, 2018  9:00 AM CDT   (Arrive by 8:45 AM)   Return Visit with Manuel Archibald MD   [facility] ([facility] [place])    [address]  [address]  [facility] [place] [facility] [address] [phone]              Who to contact     If you have questions or need follow up information about today's clinic visit or your schedule please contact [facility] directly at [phone].  Normal or non-critical lab and imaging results will be communicated to you by MyChart, letter or phone within 4 business days after the clinic has received the results. If you do not hear from us within 7 days, please contact the clinic  through [employer] or phone. If you have a critical or abnormal lab result, we will notify you by phone as soon as possible.  Submit refill requests through [employer] or call your pharmacy and they will forward the refill request to us. Please allow 3 business days for your refill to be completed.          Additional Information About Your Visit        GERShart Information     [employer] gives you secure access to your electronic health record. If you see a primary care provider, you can also send messages to your care team and make appointments. If you have questions, please call your primary care clinic.  If you do not have a primary care provider, please call [phone] and they will assist you.        Care EveryWhere ID     This is your Care EveryWhere ID. This could be used by other organizations to access your [place] medical records  [id number]         Blood Pressure from Last 3 Encounters:   01/15/18 143/89   08/22/17 134/88   06/20/17 141/82    Weight from Last 3 Encounters:   04/03/18 106.6 kg (235 lb)   01/15/18 106.6 kg (235 lb)   08/22/17 106.1 kg (234 lb)              We Performed the Following     HC PT EVAL, LOW COMPLEXITY     [facility] INITIAL EVAL REPORT     THERAPEUTIC ACTIVITIES     THERAPEUTIC EXERCISES        Primary Care Provider Office Phone # Fax #    Kiesha Cohen [phone]       [facility] [address] [address]        Equal Access to Services     [facility] Center[facility] : Hadii aad ku hadasho Soomaali, waaxda luqadaha, qaybta kaalmada adeegyada, maia keller . So [facility] [phone].    ATENCIÓN: Si habla español, tiene a erazo disposición servicios gratuitos de asistencia lingüística. Llame al [phone].    We comply with applicable federal civil rights laws and [place] laws. We do not discriminate on the basis of race, color, national origin, age, disability, sex, sexual orientation, or gender identity.             Thank you!     Thank you for choosing [facility] PHYSICAL [facility]ERAP[facility]  for your care. Our goal is always to provide you with excellent care. Hearing back from our patients is one way we can continue to improve our services. Please take a few minutes to complete the written survey that you may receive in the mail after your visit with us. Thank you!             Your Updated Medication List - Protect others around you: Learn how to safely use, store and throw away your medicines at www.disposemymeds.org.          This list is accurate as of 4/3/18  9:36 AM.  Always use your most recent med list.                   Brand Name Dispense Instructions for use Diagnosis    AMBIEN PO      Take 5 mg by mouth nightly as needed for sleep        apixaban ANTICOAGULANT 5 MG tablet    ELIQUIS    180 tablet    Take 1 tablet (5 mg) by mouth 2 times daily    Atrial fibrillation (H)       fish oil-omega-3 fatty acids 1000 MG capsule      One capsule daily.        fluconazole 10 MG/ML suspension    DIFLUCAN     Take by mouth daily        hydrochlorothiazide 25 MG tablet    HYDRODIURIL     Take 25 mg by mouth daily        loratadine 10 MG tablet    CLARITIN     Take 10 mg by mouth daily        losartan 100 MG tablet    COZAAR     Take 100 mg by mouth daily        lovastatin 40 MG tablet    MEVACOR     Take 40 mg by mouth At Bedtime.        metoprolol succinate 50 MG 24 hr tablet    TOPROL-XL    90 tablet    Take 1 tablet (50 mg) by mouth daily        SLEEP AID PO           VIACTIV PO      Take by mouth 2 times daily

## 2018-04-03 NOTE — MR AVS SNAPSHOT
After Visit Summary   4/3/2018    Alysha Youngblood    MRN: [id number]           Patient Information     Date Of Birth          1950        Visit Information        Provider Department      4/3/2018 7:30 AM Slim Herrera MD [facility] Sports Medicine        Today's Diagnoses     Patellofemoral arthralgia of right knee    -  1    Primary osteoarthritis of right knee        Olecranon bursitis of right elbow           Follow-ups after your visit        Additional Services     [facility] PT, HAND, AND CHIROPRACTIC REFERRAL       === This order will print in the [facility] Scheduling  Office ===    Physical therapy, hand therapy and chiropractic care are available through:    [facility]  [facility]    Call one easy number to schedule at any of the above locations:  [phone].    Your provider has referred you to Physical Therapy at [facility] or [facility], here at [facility]    Indication/Reason for Referral: Knee Pain  Onset of Illness:  Traumatic patellofemoral right knee pain s/p fall 3 months ago.  Xray mild tibiofemoral djd.  Therapy Orders:  Evaluate and Treat  Special Programs:  None  Special Request:  None    Additional Comments for the therapist or chiropractor:  4-6 visits    Please be aware that coverage of these services is subject to the terms and limitations of your health insurance plan.  Call member services at your health plan with any benefit or coverage questions.      Please bring the following to your appointment:    >>   Your personal calendar for scheduling future appointments  >>   Comfortable clothing                  Your next 10 appointments already scheduled     Jun 19, 2018  8:00 AM CDT   (Arrive by 7:45 AM)   CT LUNG RESEARCH VELASQUEZ with UCCT2   [facility] ([facility])    [address] [address] [phone]           Please bring any scans or X-rays taken  at other hospitals, if similar tests were done. Also bring a list of your medicines, including vitamins, minerals and over-the-counter drugs. It is safest to leave personal items at home.  Be sure to tell your doctor:   If you have any allergies.   If there s any chance you are pregnant.   If you are breastfeeding.  You do not need to do anything special to prepare for this exam.  Please wear loose clothing, such as a sweat suit or jogging clothes. Avoid snaps, zippers and other metal. We may ask you to undress and put on a hospital gown.            Jun 19, 2018  9:00 AM CDT   (Arrive by 8:45 AM)   Return Visit with Manuel Archibald MD   [facility] ([facility] [place])    [address]  [address]  [facility] [facility] [address] [phone]              Who to contact     Please call your clinic at [phone] to:    Ask questions about your health    Make or cancel appointments    Discuss your medicines    Learn about your test results    Speak to your doctor            Additional Information About Your Visit        [employer] Information     [employer] gives you secure access to your electronic health record. If you see a primary care provider, you can also send messages to your care team and make appointments. If you have questions, please call your primary care clinic.  If you do not have a primary care provider, please call [phone] and they will assist you.      [employer]uForm is an electronic gateway that provides easy, online access to your medical records. With [employer], you can request a clinic appointment, read your test results, renew a prescription or communicate with your care team.     To access your existing account, please contact your [facility] or call [phone] for assistance.        Care EveryWhere ID     This is your Care EveryWhere ID. This could be used by other organizations to access your [facility]  "records  [id number]        Your Vitals Were     Respirations Height BMI (Body Mass Index)             16 5' 6.5\" (1.689 m) 37.36 kg/m2          Blood Pressure from Last 3 Encounters:   01/15/18 143/89   08/22/17 134/88   06/20/17 141/82    Weight from Last 3 Encounters:   04/03/18 235 lb (106.6 kg)   01/15/18 235 lb (106.6 kg)   08/22/17 234 lb (106.1 kg)              We Performed the Following     [facility] PT, HAND, AND CHIROPRACTIC REFERRAL        Primary Care Provider Office Phone # Fax #    Kiesha Cohen [phone]       [address] [address]        Equal Access to Services     [facility] Hospital[facility]ER : Hadii stephanie nowak hadasho Soomaali, waaxda luqadaha, qaybta kaalmada adeegyada, waxmickie keller . So [facility] [phone].    ATENCIÓN: Si habla español, tiene a erazo disposición servicios gratuitos de asistencia lingüística. Paulino al [phone].    We comply with applicable federal civil rights laws and [place] laws. We do not discriminate on the basis of race, color, national origin, age, disability, sex, sexual orientation, or gender identity.            Thank you!     Thank you for choosing [facility]  for your care. Our goal is always to provide you with excellent care. Hearing back from our patients is one way we can continue to improve our services. Please take a few minutes to complete the written survey that you may receive in the mail after your visit with us. Thank you!             Your Updated Medication List - Protect others around you: Learn how to safely use, store and throw away your medicines at www.disposemymeds.org.          This list is accurate as of 4/3/18 11:25 AM.  Always use your most recent med list.                   Brand Name Dispense Instructions for use Diagnosis    AMBIEN PO      Take 5 mg by mouth nightly as needed for sleep        apixaban ANTICOAGULANT 5 MG tablet    ELIQUIS    180 tablet "    Take 1 tablet (5 mg) by mouth 2 times daily    Atrial fibrillation (H)       fish oil-omega-3 fatty acids 1000 MG capsule      One capsule daily.        fluconazole 10 MG/ML suspension    DIFLUCAN     Take by mouth daily        hydrochlorothiazide 25 MG tablet    HYDRODIURIL     Take 25 mg by mouth daily        loratadine 10 MG tablet    CLARITIN     Take 10 mg by mouth daily        losartan 100 MG tablet    COZAAR     Take 100 mg by mouth daily        lovastatin 40 MG tablet    MEVACOR     Take 40 mg by mouth At Bedtime.        metoprolol succinate 50 MG 24 hr tablet    TOPROL-XL    90 tablet    Take 1 tablet (50 mg) by mouth daily        SLEEP AID PO           VIACTIV PO      Take by mouth 2 times daily

## 2018-04-03 NOTE — LETTER
"  4/3/2018      RE: Alysha Youngblood  [address] [place] [id number]       Sports Medicine Clinic Visit    PCP: Kiesha Cohen    Alysha Youngblood is a 67 year old female who is seen  in consultation at the request of Dr. Morales presenting with right elbow and knee pain. The pt reports falling in January. The elbow doesn't cause her pain anymore. She wants to know if she is ok to start repetitive movements again.     Injury: None    Location of Pain: right posterior elbow  Duration of Pain: 2 month(s)  Rating of Pain: 1-6/10  Pain is better with: Prednisone   Pain is worse with: Repetitive movements  Additional Features: Swelling  Treatment so far consists of: Prednisone  Prior History of related problems: Bursitis of same elbow a few years ago     Resp 16  Ht 5' 6.5\" (1.689 m)  Wt 235 lb (106.6 kg)  BMI 37.36 kg/m2       PMH:  Past Medical History:   Diagnosis Date     Atrial fibrillation (H)     ablated 1/12     Ex-smoker      Hashimoto's disease      Hyperlipidaemia      Hypertension      ISABELA (obstructive sleep apnea) 3/12    AHI 7     PAC (premature atrial contraction)      PVC (premature ventricular contraction)        Active problem list:  Patient Active Problem List   Diagnosis     Elbow fracture     Atrial fibrillation (H)     PVC (premature ventricular contraction)     Goiter       FH:  Family History   Problem Relation Age of Onset     CANCER No family hx of      No known family hx of skin cancer       SH:  Social History     Social History     Marital status:      Spouse name: N/A     Number of children: N/A     Years of education: N/A     Occupational History     Not on file.     Social History Main Topics     Smoking status: Never Smoker     Smokeless tobacco: Never Used     Alcohol use No     Drug use: No     Sexual activity: No     Other Topics Concern     Not on file     Social History Narrative       MEDS:  See EMR, reviewed  ALL:  See EMR, reviewed    REVIEW OF " SYSTEMS:  CONSTITUTIONAL:NEGATIVE for fever, chills, change in weight  INTEGUMENTARY/SKIN: NEGATIVE for worrisome rashes, moles or lesions  EYES: NEGATIVE for vision changes or irritation  ENT/MOUTH: NEGATIVE for ear, mouth and throat problems  RESP:NEGATIVE for significant cough or SOB  BREAST: NEGATIVE for masses, tenderness or discharge  CV: NEGATIVE for chest pain, palpitations or peripheral edema  GI: NEGATIVE for nausea, abdominal pain, heartburn, or change in bowel habits  :NEGATIVE for frequency, dysuria, or hematuria  :NEGATIVE for frequency, dysuria, or hematuria  NEURO: NEGATIVE for weakness, dizziness or paresthesias  ENDOCRINE: NEGATIVE for temperature intolerance, skin/hair changes  HEME/ALLERGY/IMMUNE: NEGATIVE for bleeding problems  PSYCHIATRIC: NEGATIVE for changes in mood or affect        SUBJECTIVE:  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  Three months ago she landed directly on her right anterior kneecap when she slipped and fell.  She is trying to return to her athletic activities.  She feels better when she is in a pull-up knee sleeve with a patellar cutout.  The knee does not swell or lock.  It was not swollen at the time of the injury, but it still gives her some discomfort about the anterior knee with her exercises.  She denies chronic problems with this knee.  In the last 3 months, she has had olecranon swelling at the right elbow that is slowly improving.  She does spend time in a chair holding onto a tablet.  She does not remember an injury where she would have hit this.  She has had elbow bursitis in the past, and she has associated it with swimming motions in the pool.  The elbow is without discomfort, and the skin has been intact.      OBJECTIVE:  She has a patulous, half of a [id number]-gufbh, nontender fluid collection at the tip of the right elbow.  She has normal range of motion in the elbow through flexion  and extension.  She is nontender about the medial epicondyle, the lateral epicondyle or the olecranon.  Pronation and supination are normal.  The right knee reveals no effusion.  She is tender along the inferior pole of the kneecap medially along the patellar facet, nontender directly over the patellar tendon, and she can do an active straight-leg raise without extension lag and with normal strength.  Patellar apprehension signs are negative.  She is nontender about the medial joint line or lateral joint line.  I can flex the knee easily to 90 degrees.  Anterior and posterior drawer is negative.  Lachman is negative.  MCL and LCL stresses are negative.  Normal range of motion at the hip.  Distal pulses and sensation are intact.  The overlying skin is normal.  Appropriate in conversation and affect.        IMAGING:  X-ray of the right knee shows some mild degenerative joint disease with relative narrowing in the tibiofemoral space with small peripheral spurs.  No significant DJD in the patellofemoral space.  No signs of acute fracture.  An x-ray of the right elbow shows no bony abnormality with no signs of DJD.      ASSESSMENT:   1.  Olecranon bursitis, right elbow.   2.  Traumatic patellofemoral discomfort, right knee.   3.  Right knee DJD, mild.      PLAN:  She would like to see Physical Therapy for the knee.  She will continue with her pull-up knee sleeve with patellar cutout.  She knows I would not recommend aspiration of the bursa at this point.  It risks the issue of introducing infection or fistula.  She will look for slow resolution over the next 6 months, and I encouraged her to offload the elbow by wearing a forearm pad when she is in her chair.  If the knee is not improving with physical therapy, she knows a cortisone shot would be an option.  She would like to avoid this if possible.         Slim Herrera MD

## 2018-05-14 DIAGNOSIS — I48.91 ATRIAL FIBRILLATION (H): ICD-10-CM

## 2018-05-30 ENCOUNTER — TRANSFERRED RECORDS (OUTPATIENT)
Dept: HEALTH INFORMATION MANAGEMENT | Facility: CLINIC | Age: 68
End: 2018-05-30
Payer: COMMERCIAL

## 2018-06-07 ENCOUNTER — TRANSFERRED RECORDS (OUTPATIENT)
Dept: HEALTH INFORMATION MANAGEMENT | Facility: CLINIC | Age: 68
End: 2018-06-07
Payer: COMMERCIAL

## 2018-06-07 LAB
CREATININE (EXTERNAL): 0.76 MG/DL (ref 0.52–1.04)
GFR ESTIMATED (EXTERNAL): 75 ML/MIN/1.7M2
GFR ESTIMATED (IF AFRICAN AMERICAN) (EXTERNAL): >90 ML/MIN/1.7M2
GLUCOSE (EXTERNAL): 112 MG/DL (ref 70–99)
POTASSIUM (EXTERNAL): 3.8 MMOL/L (ref 3.4–5.3)

## 2018-06-08 ENCOUNTER — TRANSFERRED RECORDS (OUTPATIENT)
Dept: HEALTH INFORMATION MANAGEMENT | Facility: CLINIC | Age: 68
End: 2018-06-08
Payer: COMMERCIAL

## 2018-06-14 NOTE — PROGRESS NOTES
Pulmonary Nodule Clinic        HPI:     Alysha Youngblood is a 67 year old female  referred to the Lung Nodule Clinic for continued monitoring of pulmonary nodules.  She has had lung cancer screening CT scans in 2016 and 2017 (with stable appearing lung nodules the largest appearing to be 5-6 mm in size).  Currently she reports she is in good jennifer.  She denies any respiratory symptoms.  She has a significant smoking history (30 pack years, but quit it 2011.        Review of Systems:    ROS performed with pertinent +/- noted in the HPI.  The remainder of the ROS was otherwise negative.        Pertinent Medications     Current Outpatient Prescriptions   Medication Sig     apixaban ANTICOAGULANT (ELIQUIS) 5 MG tablet Take 1 tablet (5 mg) by mouth 2 times daily     Calcium-Vitamin D-Vitamin K (VIACTIV PO) Take by mouth 2 times daily      Doxylamine Succinate, Sleep, (SLEEP AID PO)      fish oil-omega-3 fatty acids (FISH OIL) 1000 MG capsule One capsule daily.     fluconazole (DIFLUCAN) 10 MG/ML suspension Take by mouth daily     hydrochlorothiazide (HYDRODIURIL) 25 MG tablet Take 25 mg by mouth daily     loratadine (CLARITIN) 10 MG tablet Take 10 mg by mouth daily     losartan (COZAAR) 100 MG tablet Take 100 mg by mouth daily     lovastatin (MEVACOR) 40 MG tablet Take 40 mg by mouth At Bedtime.     metoprolol succinate (TOPROL-XL) 50 MG 24 hr tablet Take 1 tablet (50 mg) by mouth daily     Zolpidem Tartrate (AMBIEN PO) Take 5 mg by mouth nightly as needed for sleep     No current facility-administered medications for this visit.           Allergies:      Allergies   Allergen Reactions     Hmg-Coa-R Inhibitors      Made her lips tingle, so they put her on another statin instead.     Ibuprofen Other (See Comments)     Nsaids GI Disturbance     Liquid Adhesive Rash     After wearing patches for > 8 days          Past Medical Hx:    Atrial fibrillation (H) - s/p multiple ablations   PVC (premature ventricular contraction)     Elbow fracture    Pulmonary nodules    HTN    Chronic anti-coagulation    Hx of tobacco use, no active use since 2011       Family Hx:     Family History   Problem Relation Age of Onset     CANCER No family hx of      No known family hx of skin cancer          Social Hx:     Social History   Substance Use Topics     Smoking status: Never Smoker     Smokeless tobacco: Never Used     Alcohol use No            Objective   Vitals:  BP mildly elevated at 149/95 all other vitals unremarkable.           Labs / Imaging/Studies      Imaging: CT chest 06/19/18: Not formally read at time of note. Personally reviewed and compared with previous imagining.  The small nodules appear similar, without increase in size. No new nodules noted.               Assessment and Plan:   Assessment: (R91.8) Lung nodules  (primary encounter diagnosis)    1. Bilateral Pulmonary Nodules.  The patient has had lung cancer screening CT scans dating back to 2016 with no change to the nodule seen.  The CT scan from today again appears to have no change to the previously seen nodules.   Will await formal report and contact patient with any concerning findings.    The patient plans to continue recommended yearly lung cancer screening CT scans with her primary care provider, given her significant tobacco use history.    Follow up with Nodule Clinic PRN.      I spent 20 minutes with direct face to face interaction with this patient and provided at least 50% of this time counseling and coordinating care for Ms. Youngblood as noted above in the assessment and plan.

## 2018-06-19 ENCOUNTER — HOSPITAL ENCOUNTER (OUTPATIENT)
Dept: CT IMAGING | Facility: CLINIC | Age: 68
Discharge: HOME | End: 2018-06-19
Attending: INTERNAL MEDICINE | Admitting: INTERNAL MEDICINE
Payer: COMMERCIAL

## 2018-06-19 ENCOUNTER — OFFICE VISIT (OUTPATIENT)
Dept: SURGERY | Facility: CLINIC | Age: 68
End: 2018-06-19
Attending: INTERNAL MEDICINE
Payer: COMMERCIAL

## 2018-06-19 VITALS
BODY MASS INDEX: 35.82 KG/M2 | SYSTOLIC BLOOD PRESSURE: 149 MMHG | OXYGEN SATURATION: 95 % | TEMPERATURE: 98.6 F | HEART RATE: 67 BPM | RESPIRATION RATE: 18 BRPM | DIASTOLIC BLOOD PRESSURE: 95 MMHG | HEIGHT: 66 IN | WEIGHT: 222.9 LBS

## 2018-06-19 DIAGNOSIS — R91.8 LUNG NODULES: Primary | ICD-10-CM

## 2018-06-19 DIAGNOSIS — Z87.891 HISTORY OF TOBACCO USE: ICD-10-CM

## 2018-06-19 PROCEDURE — G0463 HOSPITAL OUTPT CLINIC VISIT: HCPCS | Mod: ZF

## 2018-06-19 PROCEDURE — G0297 LDCT FOR LUNG CA SCREEN: HCPCS

## 2018-06-19 RX ORDER — INFLUENZA A VIRUS A/VICTORIA/4897/2022 IVR-238 (H1N1) ANTIGEN (FORMALDEHYDE INACTIVATED), INFLUENZA A VIRUS A/CALIFORNIA/122/2022 SAN-022 (H3N2) ANTIGEN (FORMALDEHYDE INACTIVATED), AND INFLUENZA B VIRUS B/MICHIGAN/01/2021 ANTIGEN (FORMALDEHYDE INACTIVATED) 60; 60; 60 UG/.5ML; UG/.5ML; UG/.5ML
INJECTION, SUSPENSION INTRAMUSCULAR
Refills: 0 | COMMUNITY
Start: 2017-08-28 | End: 2023-03-28

## 2018-06-19 RX ORDER — CHLORHEXIDINE GLUCONATE ORAL RINSE 1.2 MG/ML
SOLUTION DENTAL
Refills: 0 | COMMUNITY
Start: 2018-02-23 | End: 2019-12-16

## 2018-06-19 ASSESSMENT — PAIN SCALES - GENERAL: PAINLEVEL: NO PAIN (0)

## 2018-06-19 NOTE — MR AVS SNAPSHOT
"              After Visit Summary   6/19/2018    Alysha Youngblood    MRN: [id number]           Patient Information     Date Of Birth          1950        Visit Information        Provider Department      6/19/2018 9:00 AM Joan Rob APRN CNP [facility]        Today's Diagnoses     Lung nodules    -  1       Follow-ups after your visit        Follow-up notes from your care team     Return if symptoms worsen or fail to improve.      Who to contact     If you have questions or need follow up information about today's clinic visit or your schedule please contact [facility] directly at [phone].  Normal or non-critical lab and imaging results will be communicated to you by SHEEXhart, letter or phone within 4 business days after the clinic has received the results. If you do not hear from us within 7 days, please contact the clinic through SHEEXhart or phone. If you have a critical or abnormal lab result, we will notify you by phone as soon as possible.  Submit refill requests through [employer] or call your pharmacy and they will forward the refill request to us. Please allow 3 business days for your refill to be completed.          Additional Information About Your Visit        MyChart Information     [employer] gives you secure access to your electronic health record. If you see a primary care provider, you can also send messages to your care team and make appointments. If you have questions, please call your primary care clinic.  If you do not have a primary care provider, please call [phone] and they will assist you.        Care EveryWhere ID     This is your Care EveryWhere ID. This could be used by other organizations to access your [facility] medical records  [id number]        Your Vitals Were     Pulse Temperature Respirations Height Pulse Oximetry BMI (Body Mass Index)    67 98.6  F (37  C) (Tympanic) 18 1.689 m (5' 6.5\") 95% 35.44 kg/m2       Blood " Pressure from Last 3 Encounters:   06/19/18 (!) 149/95   01/15/18 143/89   08/22/17 134/88    Weight from Last 3 Encounters:   06/19/18 101.1 kg (222 lb 14.4 oz)   04/03/18 106.6 kg (235 lb)   01/15/18 106.6 kg (235 lb)              Today, you had the following     No orders found for display       Primary Care Provider Office Phone # Fax #    Kiesha Cohen [phone]       [facility] CARE [address] [facility] [id number]        Equal Access to Services     [facility]: Hadii aad ku hadasho Soomaali, waaxda luqadaha, qaybta kaalmada adeegyada, waxmickie keller . So [place] -[phone].    ATENCIÓN: Si habla español, tiene a erazo disposición servicios gratuitos de asistencia lingüística. [facility] [phone].    We comply with applicable federal civil rights laws and [place] laws. We do not discriminate on the basis of race, color, national origin, age, disability, sex, sexual orientation, or gender identity.            Thank you!     Thank you for choosing [facility]  for your care. Our goal is always to provide you with excellent care. Hearing back from our patients is one way we can continue to improve our services. Please take a few minutes to complete the written survey that you may receive in the mail after your visit with us. Thank you!             Your Updated Medication List - Protect others around you: Learn how to safely use, store and throw away your medicines at www.disposemymeds.org.          This list is accurate as of 6/19/18 10:29 AM.  Always use your most recent med list.                   Brand Name Dispense Instructions for use Diagnosis    AMBIEN PO      Take 5 mg by mouth nightly as needed for sleep        apixaban ANTICOAGULANT 5 MG tablet    ELIQUIS    180 tablet    Take 1 tablet (5 mg) by mouth 2 times daily    Atrial fibrillation (H)       chlorhexidine 0.12 % solution    PERIDEX     SSP 15 ML PO BID         fish oil-omega-3 fatty acids 1000 MG capsule      One capsule daily.        fluconazole 10 MG/ML suspension    DIFLUCAN     Take by mouth daily        FLUZONE HIGH-DOSE 0.5 ML injection   Generic drug:  influenza Vac Split High-Dose      ADM 0.5ML IM UTD        hydrochlorothiazide 25 MG tablet    HYDRODIURIL     Take 25 mg by mouth daily        loratadine 10 MG tablet    CLARITIN     Take 10 mg by mouth daily        losartan 100 MG tablet    COZAAR     Take 100 mg by mouth daily        lovastatin 40 MG tablet    MEVACOR     Take 40 mg by mouth At Bedtime.        metoprolol succinate 50 MG 24 hr tablet    TOPROL-XL    90 tablet    Take 1 tablet (50 mg) by mouth daily        SLEEP AID PO           VIACTIV PO      Take by mouth 2 times daily

## 2018-06-19 NOTE — NURSING NOTE
"Oncology Rooming Note    June 19, 2018 9:02 AM   Alysha Youngblood is a 67 year old female who presents for:    Chief Complaint   Patient presents with     Oncology Clinic Visit     New patient visit related Pulmonary Nodule     Initial Vitals: BP (!) 149/95 (BP Location: Left arm, Patient Position: Sitting, Cuff Size: Adult Large)  Pulse 67  Resp 18  Ht 1.689 m (5' 6.5\")  Wt 101.1 kg (222 lb 14.4 oz)  SpO2 95%  BMI 35.44 kg/m2 Estimated body mass index is 35.44 kg/(m^2) as calculated from the following:    Height as of this encounter: 1.689 m (5' 6.5\").    Weight as of this encounter: 101.1 kg (222 lb 14.4 oz). Body surface area is 2.18 meters squared.  No Pain (0) Comment: Data Unavailable   No LMP recorded. Patient is postmenopausal.  Allergies reviewed: Yes  Medications reviewed: Yes    Medications: Medication refills not needed today.  Pharmacy name entered into [employer]: [facility] [address] [place] [address] AT [facility]    Clinical concerns: No new concerns. Provider was notified.    10 minutes for nursing intake (face to face time)     Martha Yates LPN            "

## 2018-07-07 ENCOUNTER — HOSPITAL ENCOUNTER (EMERGENCY)
Facility: CLINIC | Age: 68
Discharge: HOME | End: 2018-07-07
Attending: FAMILY MEDICINE | Admitting: FAMILY MEDICINE
Payer: COMMERCIAL

## 2018-07-07 ENCOUNTER — APPOINTMENT (OUTPATIENT)
Dept: GENERAL RADIOLOGY | Facility: CLINIC | Age: 68
End: 2018-07-07
Attending: FAMILY MEDICINE
Payer: COMMERCIAL

## 2018-07-07 VITALS
TEMPERATURE: 98.2 F | DIASTOLIC BLOOD PRESSURE: 80 MMHG | BODY MASS INDEX: 34.55 KG/M2 | HEIGHT: 66 IN | RESPIRATION RATE: 18 BRPM | OXYGEN SATURATION: 98 % | HEART RATE: 80 BPM | WEIGHT: 215 LBS | SYSTOLIC BLOOD PRESSURE: 126 MMHG

## 2018-07-07 DIAGNOSIS — I48.92 ATRIAL FLUTTER, UNSPECIFIED TYPE (H): ICD-10-CM

## 2018-07-07 DIAGNOSIS — I48.0 PAROXYSMAL ATRIAL FIBRILLATION (H): ICD-10-CM

## 2018-07-07 LAB
ALBUMIN SERPL-MCNC: 3.5 G/DL (ref 3.4–5)
ALP SERPL-CCNC: 84 U/L (ref 40–150)
ALT SERPL W P-5'-P-CCNC: 24 U/L (ref 0–50)
ANION GAP SERPL CALCULATED.3IONS-SCNC: 7 MMOL/L (ref 3–14)
APTT PPP: 36 SEC (ref 22–37)
AST SERPL W P-5'-P-CCNC: 12 U/L (ref 0–45)
BASOPHILS # BLD AUTO: 0 10E9/L (ref 0–0.2)
BASOPHILS NFR BLD AUTO: 0.2 %
BILIRUB SERPL-MCNC: 0.7 MG/DL (ref 0.2–1.3)
BUN SERPL-MCNC: 14 MG/DL (ref 7–30)
CALCIUM SERPL-MCNC: 9.1 MG/DL (ref 8.5–10.1)
CHLORIDE SERPL-SCNC: 102 MMOL/L (ref 94–109)
CO2 SERPL-SCNC: 28 MMOL/L (ref 20–32)
CREAT SERPL-MCNC: 0.8 MG/DL (ref 0.52–1.04)
DIFFERENTIAL METHOD BLD: NORMAL
EOSINOPHIL # BLD AUTO: 0.1 10E9/L (ref 0–0.7)
EOSINOPHIL NFR BLD AUTO: 0.8 %
ERYTHROCYTE [DISTWIDTH] IN BLOOD BY AUTOMATED COUNT: 12.6 % (ref 10–15)
GFR SERPL CREATININE-BSD FRML MDRD: 71 ML/MIN/1.7M2
GLUCOSE SERPL-MCNC: 120 MG/DL (ref 70–99)
HCT VFR BLD AUTO: 44.6 % (ref 35–47)
HGB BLD-MCNC: 15 G/DL (ref 11.7–15.7)
IMM GRANULOCYTES # BLD: 0 10E9/L (ref 0–0.4)
IMM GRANULOCYTES NFR BLD: 0.4 %
INR PPP: 1.19 (ref 0.86–1.14)
LYMPHOCYTES # BLD AUTO: 1.3 10E9/L (ref 0.8–5.3)
LYMPHOCYTES NFR BLD AUTO: 12.3 %
MAGNESIUM SERPL-MCNC: 2.2 MG/DL (ref 1.6–2.3)
MCH RBC QN AUTO: 31.2 PG (ref 26.5–33)
MCHC RBC AUTO-ENTMCNC: 33.6 G/DL (ref 31.5–36.5)
MCV RBC AUTO: 93 FL (ref 78–100)
MONOCYTES # BLD AUTO: 0.5 10E9/L (ref 0–1.3)
MONOCYTES NFR BLD AUTO: 4.9 %
NEUTROPHILS # BLD AUTO: 8.3 10E9/L (ref 1.6–8.3)
NEUTROPHILS NFR BLD AUTO: 81.4 %
NRBC # BLD AUTO: 0 10*3/UL
NRBC BLD AUTO-RTO: 0 /100
PLATELET # BLD AUTO: 200 10E9/L (ref 150–450)
PLATELET # BLD EST: NORMAL 10*3/UL
POTASSIUM SERPL-SCNC: 3.7 MMOL/L (ref 3.4–5.3)
PROT SERPL-MCNC: 7.3 G/DL (ref 6.8–8.8)
RBC # BLD AUTO: 4.81 10E12/L (ref 3.8–5.2)
SODIUM SERPL-SCNC: 137 MMOL/L (ref 133–144)
TROPONIN I SERPL-MCNC: <0.015 UG/L (ref 0–0.04)
TSH SERPL DL<=0.005 MIU/L-ACNC: 0.92 MU/L (ref 0.4–4)
WBC # BLD AUTO: 10.2 10E9/L (ref 4–11)

## 2018-07-07 PROCEDURE — 83735 ASSAY OF MAGNESIUM: CPT | Performed by: FAMILY MEDICINE

## 2018-07-07 PROCEDURE — 25000128 H RX IP 250 OP 636: Performed by: FAMILY MEDICINE

## 2018-07-07 PROCEDURE — 71045 X-RAY EXAM CHEST 1 VIEW: CPT

## 2018-07-07 PROCEDURE — 99214 OFFICE O/P EST MOD 30 MIN: CPT | Mod: GC | Performed by: INTERNAL MEDICINE

## 2018-07-07 PROCEDURE — 85610 PROTHROMBIN TIME: CPT | Performed by: FAMILY MEDICINE

## 2018-07-07 PROCEDURE — 99285 EMERGENCY DEPT VISIT HI MDM: CPT | Mod: 25 | Performed by: FAMILY MEDICINE

## 2018-07-07 PROCEDURE — 80053 COMPREHEN METABOLIC PANEL: CPT | Performed by: FAMILY MEDICINE

## 2018-07-07 PROCEDURE — 93010 ELECTROCARDIOGRAM REPORT: CPT | Mod: 59 | Performed by: FAMILY MEDICINE

## 2018-07-07 PROCEDURE — 96361 HYDRATE IV INFUSION ADD-ON: CPT | Performed by: FAMILY MEDICINE

## 2018-07-07 PROCEDURE — 93308 TTE F-UP OR LMTD: CPT | Mod: 26 | Performed by: FAMILY MEDICINE

## 2018-07-07 PROCEDURE — 84443 ASSAY THYROID STIM HORMONE: CPT | Performed by: FAMILY MEDICINE

## 2018-07-07 PROCEDURE — 85730 THROMBOPLASTIN TIME PARTIAL: CPT | Performed by: FAMILY MEDICINE

## 2018-07-07 PROCEDURE — 93005 ELECTROCARDIOGRAM TRACING: CPT | Performed by: FAMILY MEDICINE

## 2018-07-07 PROCEDURE — 85025 COMPLETE CBC W/AUTO DIFF WBC: CPT | Performed by: FAMILY MEDICINE

## 2018-07-07 PROCEDURE — 93308 TTE F-UP OR LMTD: CPT | Performed by: FAMILY MEDICINE

## 2018-07-07 PROCEDURE — 84484 ASSAY OF TROPONIN QUANT: CPT | Performed by: FAMILY MEDICINE

## 2018-07-07 PROCEDURE — 96374 THER/PROPH/DIAG INJ IV PUSH: CPT | Performed by: FAMILY MEDICINE

## 2018-07-07 RX ORDER — PROPOFOL 10 MG/ML
INJECTION, EMULSION INTRAVENOUS
Status: DISCONTINUED
Start: 2018-07-07 | End: 2018-07-07 | Stop reason: HOSPADM

## 2018-07-07 RX ORDER — LIDOCAINE 40 MG/G
CREAM TOPICAL
Status: DISCONTINUED | OUTPATIENT
Start: 2018-07-07 | End: 2018-07-07 | Stop reason: HOSPADM

## 2018-07-07 RX ORDER — ADENOSINE 3 MG/ML
6 INJECTION, SOLUTION INTRAVENOUS ONCE
Status: COMPLETED | OUTPATIENT
Start: 2018-07-07 | End: 2018-07-07

## 2018-07-07 RX ORDER — PROPOFOL 10 MG/ML
INJECTION, EMULSION INTRAVENOUS
Status: DISCONTINUED
Start: 2018-07-07 | End: 2018-07-07 | Stop reason: WASHOUT

## 2018-07-07 RX ORDER — SODIUM CHLORIDE 9 MG/ML
1000 INJECTION, SOLUTION INTRAVENOUS CONTINUOUS
Status: DISCONTINUED | OUTPATIENT
Start: 2018-07-07 | End: 2018-07-07 | Stop reason: HOSPADM

## 2018-07-07 RX ADMIN — ADENOSINE 6 MG: 3 INJECTION, SOLUTION INTRAVENOUS at 11:15

## 2018-07-07 RX ADMIN — SODIUM CHLORIDE 1000 ML: 9 INJECTION, SOLUTION INTRAVENOUS at 09:02

## 2018-07-07 ASSESSMENT — ENCOUNTER SYMPTOMS
PALPITATIONS: 1
ABDOMINAL PAIN: 0
NAUSEA: 0
SHORTNESS OF BREATH: 0
LIGHT-HEADEDNESS: 0
DIZZINESS: 0
VOMITING: 0

## 2018-07-07 NOTE — ED PROVIDER NOTES
"  History     Chief Complaint   Patient presents with     Atrial Fib     HPI  Alysha Youngblood is a 67 year old female with a history of atrial fibrillation s/p ablation (x2 - most recently 2012), Hashimoto's disease, HTN, HLD, and ISABELA who presents for evaluation of palpitations. Patient reports approximately 1.5 hours prior to arrival she had the onset of feeling \"icky\" and \"could feel my heart\". She states the sensation was identical to her previous episodes of atrial fibrillation/atrial flutter, so she took an extra dose of her prescribed metoprolol without improvement. She denies chest pain or shortness of breath. No lightheadedness, dizziness, abdominal pain, nausea, or vomiting. She has been taking Claritin and Mucinex for a summer cold, but denies taking any other medications over the past few days. She follows with Dr. Norris for her EP care, and reports she typically goes into \"a slow atrial flutter\" 1-2 times per year.     I have reviewed the Medications, Allergies, Past Medical and Surgical History, and Social History in the [email or web address] system.  Past Medical History:   Diagnosis Date     Atrial fibrillation (H)     ablated 1/12     Ex-smoker      Hashimoto's disease      Hyperlipidaemia      Hypertension      ISABELA (obstructive sleep apnea) 3/12    AHI 7     PAC (premature atrial contraction)      PVC (premature ventricular contraction)        Past Surgical History:   Procedure Laterality Date     APPENDECTOMY       ENT SURGERY       H ABLATION FOCAL AFIB  6/24/2011    [facility].      H ABLATION FOCAL AFIB  1/12/2012    [facility]. afib and aflutter ablations.        Family History   Problem Relation Age of Onset     Cancer No family hx of      No known family hx of skin cancer       Social History   Substance Use Topics     Smoking status: Never Smoker     Smokeless tobacco: Never Used     Alcohol use No       No current facility-administered medications for this encounter.      Current Outpatient Prescriptions   Medication "     apixaban ANTICOAGULANT (ELIQUIS) 5 MG tablet     hydrochlorothiazide (HYDRODIURIL) 25 MG tablet     loratadine (CLARITIN) 10 MG tablet     lovastatin (MEVACOR) 40 MG tablet     Calcium-Vitamin D-Vitamin K (VIACTIV PO)     chlorhexidine (PERIDEX) 0.12 % solution     Doxylamine Succinate, Sleep, (SLEEP AID PO)     fish oil-omega-3 fatty acids (FISH OIL) 1000 MG capsule     fluconazole (DIFLUCAN) 10 MG/ML suspension     FLUZONE HIGH-DOSE 0.5 ML injection     losartan (COZAAR) 100 MG tablet     metoprolol succinate (TOPROL-XL) 50 MG 24 hr tablet     Zolpidem Tartrate (AMBIEN PO)        Allergies   Allergen Reactions     Hmg-Coa-R Inhibitors      Made her lips tingle, so they put her on another statin instead.     Ibuprofen Other (See Comments)     Nsaids GI Disturbance     Liquid Adhesive Rash     After wearing patches for > 8 days  After wearing patches for > 8 days       Review of Systems   Constitutional: Negative for activity change, appetite change and fever.   HENT: Negative for congestion.    Eyes: Negative for redness.   Respiratory: Positive for cough. Negative for shortness of breath.         Patient endorses recent cold symptoms with a cough nonproductive no hemoptysis.  No chest pain with this.  No wheezing or shortness of breath.   Cardiovascular: Positive for palpitations. Negative for chest pain.   Gastrointestinal: Negative for abdominal pain, nausea and vomiting.   Genitourinary: Negative for difficulty urinating and frequency.   Musculoskeletal: Negative for arthralgias and neck stiffness.   Skin: Negative for color change and rash.   Allergic/Immunologic: Negative for immunocompromised state.   Neurological: Negative for dizziness, syncope, weakness, light-headedness and headaches.   Hematological: Bruises/bleeds easily.        Patient takes Eliquis   Psychiatric/Behavioral: Negative for confusion, decreased concentration and dysphoric mood.   All other systems reviewed and are  "negative.      Physical Exam   BP: 110/77  Pulse: 120  Heart Rate: 120  Temp: 98.2  F (36.8  C)  Resp: 18  Height: 167.6 cm (5' 6\")  Weight: 97.5 kg (215 lb)  SpO2: 97 %      Physical Exam   Constitutional: She is oriented to person, place, and time. She appears distressed.   Patient alert and oriented pleasant here in the ER minimal symptoms this point but sensing her heart racing consistent with atrial flutter.  Vital signs show heart rate 120.   HENT:   Head: Atraumatic.   Mouth/Throat: Oropharynx is clear and moist. No oropharyngeal exudate.   Eyes: Conjunctivae and EOM are normal. Pupils are equal, round, and reactive to light. No scleral icterus.   Neck: No JVD present. No thyromegaly present.   Cardiovascular: Normal heart sounds and intact distal pulses.    Tachycardia noted 120s   Pulmonary/Chest: Breath sounds normal. No stridor. No respiratory distress. She has no wheezes. She has no rales.   Abdominal: Soft. Bowel sounds are normal. There is no tenderness. There is no rebound and no guarding.   Musculoskeletal: She exhibits no edema, tenderness or deformity.   Neurological: She is alert and oriented to person, place, and time. She has normal reflexes. No cranial nerve deficit. Coordination normal.   Skin: Skin is warm. No rash noted. She is not diaphoretic.   Psychiatric: She has a normal mood and affect. Her behavior is normal. Judgment and thought content normal.   Nursing note and vitals reviewed.      ED Course     ED Course       Patient was evaluated ER.  EKG was done revealing sinus consistent with atrial flutter at a rate of approximately 119 ventricular.  Patient had a chest x-ray done findings revealing no acute pulmonary disease.  IV established labs are drawn.  TSH was 0.92.  Troponin was negative.  Magnesium 2.2.  Sodium 137 potassium 3.7.  Glucose 120 creatinine 0.8.  Liver function tests normal.  INR is 1.19.  CBC normal I consulted cardiology also did come down and see the patient.  " Current recommendation at this point per cardiology to administer adenosine 6 mg IV for verification of atrial flutter.  If the she is in flutter we will then proceed to cardioversion in the emergency room.  Patient agreed with this plan was consented initially.  Patient currently monitored etc.  Bedside echo done by myself reveals no pericardial effusion.  It is difficult for me to assess if she is truly in atrial flutter but normal LV function etc. no sign of valvular disease etc.    Patient given adenosine 6 mg IV.  Patient noted was in atrial flutter we did continuous EKG tracing.  Patient then went into atrial fibrillation and was in that for several minutes.  Patient gradually feeling better.  Suddenly patient spontaneously converted back to normal sinus rhythm is maintained over the next hour is up ablating feeling fine.  At this point no intervention needed recommendations per cardiology patient is comfortably discharged will follow-up with cardiology continue current medications.    Procedures  Results for orders placed during the hospital encounter of 07/07/18   POC US ECHO LIMITED    Impression Limited Bedside Cardiac Ultrasound, performed and interpreted by me.   Indication: palpitations.  Parasternal long axis, parasternal short axis and apical 4 chamber views were acquired.   Image quality was satisfactory.    Findings:    Global left ventricular function appears intact.  Chambers do not appear dilated.  There is no evidence of free fluid within the pericardium.    IMPRESSION: Grossly normal left ventricular function and chamber size.  No pericardial effusion..       8:39 AM  The patient was seen and examined by Dr. Brown in Room 20.          EKG Interpretation:      Interpreted by Colby Brown  Time reviewed: 839  Symptoms at time of EKG: palpitations   Rhythm: a flutter  Rate: 119  Axis: normal  Ectopy: none  Conduction: normal  ST Segments/ T Waves: Nonspecific ST-T wave changes  Q Waves:  none  Comparison to prior: a flutter    Clinical Impression: a flutter vent rate 119          Critical Care time:  none             Labs Ordered and Resulted from Time of ED Arrival Up to the Time of Departure from the ED   INR - Abnormal; Notable for the following:        Result Value    INR 1.19 (*)     All other components within normal limits   COMPREHENSIVE METABOLIC PANEL - Abnormal; Notable for the following:     Glucose 120 (*)     All other components within normal limits   CBC WITH PLATELETS DIFFERENTIAL   PARTIAL THROMBOPLASTIN TIME   MAGNESIUM   TROPONIN I   TSH   CARDIAC CONTINUOUS MONITORING   PULSE OXIMETRY NURSING   PERIPHERAL IV CATHETER     Results for orders placed or performed during the hospital encounter of 07/07/18   XR Chest Port 1 View    Narrative    Exam: XR CHEST PORT 1 VW, 7/7/2018 8:58 AM    Indication: palpitations;     Comparison: 6/19/2018    Findings:   Heart and pulmonary vasculature within normal limits for a semiupright  film. No focal opacities identified within the lung. Upper abdomen  unremarkable.      Impression    Impression: No active pulmonary disease identified.    WALT KRAUS MD   POC US ECHO LIMITED    Impression    Limited Bedside Cardiac Ultrasound, performed and interpreted by me.   Indication: palpitations.  Parasternal long axis, parasternal short axis and apical 4 chamber views were acquired.   Image quality was satisfactory.    Findings:    Global left ventricular function appears intact.  Chambers do not appear dilated.  There is no evidence of free fluid within the pericardium.    IMPRESSION: Grossly normal left ventricular function and chamber size.  No pericardial effusion..   CBC with platelets differential   Result Value Ref Range    WBC 10.2 4.0 - 11.0 10e9/L    RBC Count 4.81 3.8 - 5.2 10e12/L    Hemoglobin 15.0 11.7 - 15.7 g/dL    Hematocrit 44.6 35.0 - 47.0 %    MCV 93 78 - 100 fl    MCH 31.2 26.5 - 33.0 pg    MCHC 33.6 31.5 - 36.5 g/dL    RDW 12.6  10.0 - 15.0 %    Platelet Count 200 150 - 450 10e9/L    Diff Method Automated Method     % Neutrophils 81.4 %    % Lymphocytes 12.3 %    % Monocytes 4.9 %    % Eosinophils 0.8 %    % Basophils 0.2 %    % Immature Granulocytes 0.4 %    Nucleated RBCs 0 0 /100    Absolute Neutrophil 8.3 1.6 - 8.3 10e9/L    Absolute Lymphocytes 1.3 0.8 - 5.3 10e9/L    Absolute Monocytes 0.5 0.0 - 1.3 10e9/L    Absolute Eosinophils 0.1 0.0 - 0.7 10e9/L    Absolute Basophils 0.0 0.0 - 0.2 10e9/L    Abs Immature Granulocytes 0.0 0 - 0.4 10e9/L    Absolute Nucleated RBC 0.0     Platelet Estimate Confirming automated cell count    Partial thromboplastin time   Result Value Ref Range    PTT 36 22 - 37 sec   INR   Result Value Ref Range    INR 1.19 (H) 0.86 - 1.14   Comprehensive metabolic panel   Result Value Ref Range    Sodium 137 133 - 144 mmol/L    Potassium 3.7 3.4 - 5.3 mmol/L    Chloride 102 94 - 109 mmol/L    Carbon Dioxide 28 20 - 32 mmol/L    Anion Gap 7 3 - 14 mmol/L    Glucose 120 (H) 70 - 99 mg/dL    Urea Nitrogen 14 7 - 30 mg/dL    Creatinine 0.80 0.52 - 1.04 mg/dL    GFR Estimate 71 >60 mL/min/1.7m2    GFR Estimate If Black 86 >60 mL/min/1.7m2    Calcium 9.1 8.5 - 10.1 mg/dL    Bilirubin Total 0.7 0.2 - 1.3 mg/dL    Albumin 3.5 3.4 - 5.0 g/dL    Protein Total 7.3 6.8 - 8.8 g/dL    Alkaline Phosphatase 84 40 - 150 U/L    ALT 24 0 - 50 U/L    AST 12 0 - 45 U/L   Magnesium   Result Value Ref Range    Magnesium 2.2 1.6 - 2.3 mg/dL   Troponin I   Result Value Ref Range    Troponin I ES <0.015 0.000 - 0.045 ug/L   TSH   Result Value Ref Range    TSH 0.92 0.40 - 4.00 mU/L   EKG 12-lead, tracing only   Result Value Ref Range    Interpretation ECG Click View Image link to view waveform and result    EKG 12 lead   Result Value Ref Range    Interpretation ECG Click View Image link to view waveform and result          Consults  Cardiology: Responded (07/07/18 [id number])    Assessments & Plan (with Medical Decision Making)  67-year-old female  history of previous ablations for atrial flutter atrial fib.  Patient presented now with an hour and a half symptoms concerning for atrial flutter.  Rate is approximate 120.  EKG showed a flutter pattern consultation with cardiology here in the ER.  Patient had labs done which within normal limits chest x-ray etc.  She has recent cough but no sign of pneumonia etc.  Patient's labs are normal bedside echo without abnormality.  Cardiology consultation ER recommended adenosine 6 mg IV which was given patient then verified atrial flutter then converted to atrial fibrillation and then spontaneously converted to normal sinus rhythm without other intervention needed in the ER.  Patient been fine feeling back to normal given IV fluids in the ER patient otherwise comfortable being discharged she will follow-up with cardiology continue current medications return if any concerns.         I have reviewed the nursing notes.    I have reviewed the findings, diagnosis, plan and need for follow up with the patient.    Discharge Medication List as of 7/7/2018 12:18 PM          Final diagnoses:   Atrial flutter, unspecified type (H)   Paroxysmal atrial fibrillation (H)   I, Henny Fernández, am serving as a trained medical scribe to document services personally performed by Colby Brown MD, based on the provider's statements to me.   IColby MD, was physically present and have reviewed and verified the accuracy of this note documented by Henny Fernández.      7/7/2018   [facility] EMERGENCY DEPARTMENT      This note was created at least in part by the use of dragon voice dictation system. Inadvertent typographical errors may still exist.  Colby Brown MD.         Colby Brown MD  07/08/18 [id number]

## 2018-07-07 NOTE — ED NOTES
Adenosine given at 1115 am.   A Flutter seen on 12 Lead, went to A Fib then spontaneously converted into normal sinus

## 2018-07-07 NOTE — CONSULTS
Cardiology Consult  Alysha Youngblood MRN: [id number]  Age: 67 year old, : 1950  Primary care provider: Kiesha Cohen            Assessment and Recommendations:     Alysha Youngblood is 67 year old female s/p ablation for paroxysmal atrial fibrillation in 2011 and atrial fibrillation ablation and atrial flutter ablation in 2012, HLD, HTN, and ISABELA who presents with palpitations. Cardiology consulted to assist with management.    Recurrent atrial flutter with atypical rate  Presented with palpitations similar to prior episodes of atrial fibrillation/flutter. Has been on anticoagulation without interruption. Appeared to have flutter waves on EKG. To confirm the flutter wave, adenosine 6 mg x 1 was given with plan to subsequently cardiovert. After giving adenosine 6 mg x 1, flutter waves were present and she subsequently went into atrial fibrillation. Prior to planned cardioversion, she spontaneously converted to NSR. Please see procedure note completed by Dr. Palomares.   - Plan to have patient monitored in the ED and then follow-up with Cardiology/Dr. Tyra Norris      Patient discussed with staff attending, Dr. Brasher.    Thank you for consulting the Cardiology Service the [facility]. Please do not hesitate to call with questions or concerns.     Adela Barreto MD, PhD  Cardiology Fellow  Pager: [phone]            Reason for Consult:     Management of SVT          History of Present Illness:     History is obtained from the patient.    Alysha Youngblood is 67 year old female s/p ablation for paroxysmal atrial fibrillation in 2011 and atrial fibrillation ablation and atrial flutter ablation in 2012, HLD, HTN, and ISABELA who presents with palpitations. Cardiology consulted to assist with management.    Patient follows with Dr. Tyra Norris, last saw Dr. Norris in 2018. Patient is currently on metoprolol succinate 50 mg and had fatigue when on metoprolol succinate 75 mg. Patient  notes that earlier this morning, she had a sudden onset of palpitations similar to the other times that she has had atrial fibrillation/flutter. She took an extra dose of metoprolol without improvement. Is on Eliquis and is adherent. Patient requesting to have cardioversion since she has plans to go on vacation today.    10 point ROS negative other than what is discussed above.          Past Medical History:     Past Medical History:   Diagnosis Date     Atrial fibrillation (H)     ablated 1/12     Ex-smoker      Hashimoto's disease      Hyperlipidaemia      Hypertension      ISABELA (obstructive sleep apnea) 3/12    AHI 7     PAC (premature atrial contraction)      PVC (premature ventricular contraction)               Past Surgical History:      Past Surgical History:   Procedure Laterality Date     APPENDECTOMY       ENT SURGERY       H ABLATION FOCAL AFIB  6/24/2011    [facility].      H ABLATION FOCAL AFIB  1/12/2012    [facility]. afib and aflutter ablations.               Social History:     Social History     Social History     Marital status:      Spouse name: N/A     Number of children: N/A     Years of education: N/A     Occupational History     Not on file.     Social History Main Topics     Smoking status: Never Smoker     Smokeless tobacco: Never Used     Alcohol use No     Drug use: No     Sexual activity: No     Other Topics Concern     Not on file     Social History Narrative              Family History:     Family History   Problem Relation Age of Onset     Cancer No family hx of      No known family hx of skin cancer     Family history reviewed and updated in EPIC          Allergies:     Allergies   Allergen Reactions     Hmg-Coa-R Inhibitors      Made her lips tingle, so they put her on another statin instead.     Ibuprofen Other (See Comments)     Nsaids GI Disturbance     Liquid Adhesive Rash     After wearing patches for > 8 days  After wearing patches for > 8 days              Medications:     No  current facility-administered medications for this encounter.      Current Outpatient Prescriptions   Medication     apixaban ANTICOAGULANT (ELIQUIS) 5 MG tablet     hydrochlorothiazide (HYDRODIURIL) 25 MG tablet     loratadine (CLARITIN) 10 MG tablet     lovastatin (MEVACOR) 40 MG tablet     Calcium-Vitamin D-Vitamin K (VIACTIV PO)     chlorhexidine (PERIDEX) 0.12 % solution     Doxylamine Succinate, Sleep, (SLEEP AID PO)     fish oil-omega-3 fatty acids (FISH OIL) 1000 MG capsule     fluconazole (DIFLUCAN) 10 MG/ML suspension     FLUZONE HIGH-DOSE 0.5 ML injection     losartan (COZAAR) 100 MG tablet     metoprolol succinate (TOPROL-XL) 50 MG 24 hr tablet     Zolpidem Tartrate (AMBIEN PO)                 Physical Exam:     B/P: 110/77, T: 98.2, P: 120, R: 18    Gen: No acute distress  HEENT: NC/AT, PERRL, EOM intact, MMM, OP without exudates  PULM/THORAX: Clear to auscultation bilaterally, no rales/rhonchi/wheezes  CV: tachycardic, regular rhythm, normal S1 and S2, no murmurs or rubs.  ABD: Soft, NTND, bowel sounds present, no masses  EXT: WWP. No LE edema.  NEURO: CN II-XII grossly intact. A&Ox3          Data:     Recent Results (from the past 24 hour(s))   EKG 12-lead, tracing only    Collection Time: 07/07/18  8:39 AM   Result Value Ref Range    Interpretation ECG Click View Image link to view waveform and result    CBC with platelets differential    Collection Time: 07/07/18  8:52 AM   Result Value Ref Range    WBC 10.2 4.0 - 11.0 10e9/L    RBC Count 4.81 3.8 - 5.2 10e12/L    Hemoglobin 15.0 11.7 - 15.7 g/dL    Hematocrit 44.6 35.0 - 47.0 %    MCV 93 78 - 100 fl    MCH 31.2 26.5 - 33.0 pg    MCHC 33.6 31.5 - 36.5 g/dL    RDW 12.6 10.0 - 15.0 %    Platelet Count 200 150 - 450 10e9/L    Diff Method Automated Method     % Neutrophils 81.4 %    % Lymphocytes 12.3 %    % Monocytes 4.9 %    % Eosinophils 0.8 %    % Basophils 0.2 %    % Immature Granulocytes 0.4 %    Nucleated RBCs 0 0 /100    Absolute Neutrophil 8.3  1.6 - 8.3 10e9/L    Absolute Lymphocytes 1.3 0.8 - 5.3 10e9/L    Absolute Monocytes 0.5 0.0 - 1.3 10e9/L    Absolute Eosinophils 0.1 0.0 - 0.7 10e9/L    Absolute Basophils 0.0 0.0 - 0.2 10e9/L    Abs Immature Granulocytes 0.0 0 - 0.4 10e9/L    Absolute Nucleated RBC 0.0     Platelet Estimate Confirming automated cell count    Partial thromboplastin time    Collection Time: 07/07/18  8:52 AM   Result Value Ref Range    PTT 36 22 - 37 sec   INR    Collection Time: 07/07/18  8:52 AM   Result Value Ref Range    INR 1.19 (H) 0.86 - 1.14   Comprehensive metabolic panel    Collection Time: 07/07/18  8:52 AM   Result Value Ref Range    Sodium 137 133 - 144 mmol/L    Potassium 3.7 3.4 - 5.3 mmol/L    Chloride 102 94 - 109 mmol/L    Carbon Dioxide 28 20 - 32 mmol/L    Anion Gap 7 3 - 14 mmol/L    Glucose 120 (H) 70 - 99 mg/dL    Urea Nitrogen 14 7 - 30 mg/dL    Creatinine 0.80 0.52 - 1.04 mg/dL    GFR Estimate 71 >60 mL/min/1.7m2    GFR Estimate If Black 86 >60 mL/min/1.7m2    Calcium 9.1 8.5 - 10.1 mg/dL    Bilirubin Total 0.7 0.2 - 1.3 mg/dL    Albumin 3.5 3.4 - 5.0 g/dL    Protein Total 7.3 6.8 - 8.8 g/dL    Alkaline Phosphatase 84 40 - 150 U/L    ALT 24 0 - 50 U/L    AST 12 0 - 45 U/L   Magnesium    Collection Time: 07/07/18  8:52 AM   Result Value Ref Range    Magnesium 2.2 1.6 - 2.3 mg/dL   Troponin I    Collection Time: 07/07/18  8:52 AM   Result Value Ref Range    Troponin I ES <0.015 0.000 - 0.045 ug/L   TSH    Collection Time: 07/07/18  8:52 AM   Result Value Ref Range    TSH 0.92 0.40 - 4.00 mU/L   EKG 12 lead    Collection Time: 07/07/18 11:22 AM   Result Value Ref Range    Interpretation ECG Click View Image link to view waveform and result                Most Recent Imaging:     EKG: , appears to have flutter waves

## 2018-07-07 NOTE — ED AVS SNAPSHOT
[facility], [place], Emergency Department    [address] [place] [address]    Phone:  [phone]                                       Alysha Youngblood   MRN: [id number]    Department:  [facility], Emergency Department   Date of Visit:  7/7/2018           After Visit Summary Signature Page     I have received my discharge instructions, and my questions have been answered. I have discussed any challenges I see with this plan with the nurse or doctor.    ..........................................................................................................................................  Patient/Patient Representative Signature      ..........................................................................................................................................  Patient Representative Print Name and Relationship to Patient    ..................................................               ................................................  Date                                            Time    ..........................................................................................................................................  Reviewed by Signature/Title    ...................................................              ..............................................  Date                                                            Time

## 2018-07-07 NOTE — DISCHARGE INSTRUCTIONS
Home.  Continue medications.  Monitor symptoms.  See MD or return if any concerns at all.  Follow up with Cardiology as planned.    Results for orders placed or performed during the hospital encounter of 07/07/18   XR Chest Port 1 View    Narrative    Exam: XR CHEST PORT 1 VW, 7/7/2018 8:58 AM    Indication: palpitations;     Comparison: 6/19/2018    Findings:   Heart and pulmonary vasculature within normal limits for a semiupright  film. No focal opacities identified within the lung. Upper abdomen  unremarkable.      Impression    Impression: No active pulmonary disease identified.    WALT KRAUS MD   CBC with platelets differential   Result Value Ref Range    WBC 10.2 4.0 - 11.0 10e9/L    RBC Count 4.81 3.8 - 5.2 10e12/L    Hemoglobin 15.0 11.7 - 15.7 g/dL    Hematocrit 44.6 35.0 - 47.0 %    MCV 93 78 - 100 fl    MCH 31.2 26.5 - 33.0 pg    MCHC 33.6 31.5 - 36.5 g/dL    RDW 12.6 10.0 - 15.0 %    Platelet Count 200 150 - 450 10e9/L    Diff Method Automated Method     % Neutrophils 81.4 %    % Lymphocytes 12.3 %    % Monocytes 4.9 %    % Eosinophils 0.8 %    % Basophils 0.2 %    % Immature Granulocytes 0.4 %    Nucleated RBCs 0 0 /100    Absolute Neutrophil 8.3 1.6 - 8.3 10e9/L    Absolute Lymphocytes 1.3 0.8 - 5.3 10e9/L    Absolute Monocytes 0.5 0.0 - 1.3 10e9/L    Absolute Eosinophils 0.1 0.0 - 0.7 10e9/L    Absolute Basophils 0.0 0.0 - 0.2 10e9/L    Abs Immature Granulocytes 0.0 0 - 0.4 10e9/L    Absolute Nucleated RBC 0.0     Platelet Estimate Confirming automated cell count    Partial thromboplastin time   Result Value Ref Range    PTT 36 22 - 37 sec   INR   Result Value Ref Range    INR 1.19 (H) 0.86 - 1.14   Comprehensive metabolic panel   Result Value Ref Range    Sodium 137 133 - 144 mmol/L    Potassium 3.7 3.4 - 5.3 mmol/L    Chloride 102 94 - 109 mmol/L    Carbon Dioxide 28 20 - 32 mmol/L    Anion Gap 7 3 - 14 mmol/L    Glucose 120 (H) 70 - 99 mg/dL    Urea Nitrogen 14 7 - 30 mg/dL    Creatinine 0.80  0.52 - 1.04 mg/dL    GFR Estimate 71 >60 mL/min/1.7m2    GFR Estimate If Black 86 >60 mL/min/1.7m2    Calcium 9.1 8.5 - 10.1 mg/dL    Bilirubin Total 0.7 0.2 - 1.3 mg/dL    Albumin 3.5 3.4 - 5.0 g/dL    Protein Total 7.3 6.8 - 8.8 g/dL    Alkaline Phosphatase 84 40 - 150 U/L    ALT 24 0 - 50 U/L    AST 12 0 - 45 U/L   Magnesium   Result Value Ref Range    Magnesium 2.2 1.6 - 2.3 mg/dL   Troponin I   Result Value Ref Range    Troponin I ES <0.015 0.000 - 0.045 ug/L   TSH   Result Value Ref Range    TSH 0.92 0.40 - 4.00 mU/L   EKG 12-lead, tracing only   Result Value Ref Range    Interpretation ECG Click View Image link to view waveform and result

## 2018-07-07 NOTE — ED NOTES
"ED TRIAGE    Medical / Trauma C/o:  67-yr female patient - states she felt herself go into A-fib, approx ~ 1-hour, prior to arriving to the ED.  Patient has been ablated x 2, in the past (2012).  Patient on Eliquis; compliant with medication.  No pain noted.  VSS, otherwise.    Duration of C/o:  ~ 1 hour    Contributing Factors / Concerning HX:  See HX    Significant Med's / Tx's:  See med's; on Eliquis    Febrile / Afebrile:  Afebrile    Patient Vitals for the past 24 hrs:   BP Temp Temp src Pulse Heart Rate Resp SpO2 Height Weight   07/07/18 0830 110/77 98.2  F (36.8  C) Oral 120 120 18 97 % 1.676 m (5' 6\") 97.5 kg (215 lb)       Eric Ordaz  July 7, 2018  8:48 AM    "

## 2018-07-07 NOTE — ED AVS SNAPSHOT
[facility], Emergency Department    [address] [address]    Phone:  [phone]                                       Alysha Youngblood   MRN: [id number]    Department:  [facility], Emergency Department   Date of Visit:  7/7/2018           Patient Information     Date Of Birth          1950        Your diagnoses for this visit were:     Atrial flutter, unspecified type (H)     Paroxysmal atrial fibrillation (H)        You were seen by Colby Brown MD.        Discharge Instructions       Home.  Continue medications.  Monitor symptoms.  See MD or return if any concerns at all.  Follow up with Cardiology as planned.    Results for orders placed or performed during the hospital encounter of 07/07/18   XR Chest Port 1 View    Narrative    Exam: XR CHEST PORT 1 VW, 7/7/2018 8:58 AM    Indication: palpitations;     Comparison: 6/19/2018    Findings:   Heart and pulmonary vasculature within normal limits for a semiupright  film. No focal opacities identified within the lung. Upper abdomen  unremarkable.      Impression    Impression: No active pulmonary disease identified.    WALT KRAUS MD   CBC with platelets differential   Result Value Ref Range    WBC 10.2 4.0 - 11.0 10e9/L    RBC Count 4.81 3.8 - 5.2 10e12/L    Hemoglobin 15.0 11.7 - 15.7 g/dL    Hematocrit 44.6 35.0 - 47.0 %    MCV 93 78 - 100 fl    MCH 31.2 26.5 - 33.0 pg    MCHC 33.6 31.5 - 36.5 g/dL    RDW 12.6 10.0 - 15.0 %    Platelet Count 200 150 - 450 10e9/L    Diff Method Automated Method     % Neutrophils 81.4 %    % Lymphocytes 12.3 %    % Monocytes 4.9 %    % Eosinophils 0.8 %    % Basophils 0.2 %    % Immature Granulocytes 0.4 %    Nucleated RBCs 0 0 /100    Absolute Neutrophil 8.3 1.6 - 8.3 10e9/L    Absolute Lymphocytes 1.3 0.8 - 5.3 10e9/L    Absolute Monocytes 0.5 0.0 - 1.3 10e9/L    Absolute Eosinophils 0.1 0.0 - 0.7 10e9/L    Absolute Basophils 0.0 0.0 - 0.2 10e9/L    Abs Immature Granulocytes 0.0 0 - 0.4 10e9/L     Absolute Nucleated RBC 0.0     Platelet Estimate Confirming automated cell count    Partial thromboplastin time   Result Value Ref Range    PTT 36 22 - 37 sec   INR   Result Value Ref Range    INR 1.19 (H) 0.86 - 1.14   Comprehensive metabolic panel   Result Value Ref Range    Sodium 137 133 - 144 mmol/L    Potassium 3.7 3.4 - 5.3 mmol/L    Chloride 102 94 - 109 mmol/L    Carbon Dioxide 28 20 - 32 mmol/L    Anion Gap 7 3 - 14 mmol/L    Glucose 120 (H) 70 - 99 mg/dL    Urea Nitrogen 14 7 - 30 mg/dL    Creatinine 0.80 0.52 - 1.04 mg/dL    GFR Estimate 71 >60 mL/min/1.7m2    GFR Estimate If Black 86 >60 mL/min/1.7m2    Calcium 9.1 8.5 - 10.1 mg/dL    Bilirubin Total 0.7 0.2 - 1.3 mg/dL    Albumin 3.5 3.4 - 5.0 g/dL    Protein Total 7.3 6.8 - 8.8 g/dL    Alkaline Phosphatase 84 40 - 150 U/L    ALT 24 0 - 50 U/L    AST 12 0 - 45 U/L   Magnesium   Result Value Ref Range    Magnesium 2.2 1.6 - 2.3 mg/dL   Troponin I   Result Value Ref Range    Troponin I ES <0.015 0.000 - 0.045 ug/L   TSH   Result Value Ref Range    TSH 0.92 0.40 - 4.00 mU/L   EKG 12-lead, tracing only   Result Value Ref Range    Interpretation ECG Click View Image link to view waveform and result            24 Hour Appointment Hotline       To make an appointment at any [facility], call [phone] ([phone]). If you don't have a family doctor or clinic, we will help you find one. [place] clinics are conveniently located to serve the needs of you and your family.             Review of your medicines      Our records show that you are taking the medicines listed below. If these are incorrect, please call your family doctor or clinic.        Dose / Directions Last dose taken    AMBIEN PO   Dose:  5 mg        Take 5 mg by mouth nightly as needed for sleep   Refills:  0        apixaban ANTICOAGULANT 5 MG tablet   Commonly known as:  ELIQUIS   Dose:  5 mg   Quantity:  180 tablet        Take 1 tablet (5 mg) by mouth 2 times daily   Refills:  3         chlorhexidine 0.12 % solution   Commonly known as:  PERIDEX        SSP 15 ML PO BID   Refills:  0        fish oil-omega-3 fatty acids 1000 MG capsule        One capsule daily.   Refills:  0        fluconazole 10 MG/ML suspension   Commonly known as:  DIFLUCAN        Take by mouth daily   Refills:  0        FLUZONE HIGH-DOSE 0.5 ML injection   Generic drug:  influenza Vac Split High-Dose        ADM 0.5ML IM UTD   Refills:  0        hydrochlorothiazide 25 MG tablet   Commonly known as:  HYDRODIURIL   Dose:  25 mg        Take 25 mg by mouth daily   Refills:  0        loratadine 10 MG tablet   Commonly known as:  CLARITIN   Dose:  10 mg        Take 10 mg by mouth daily   Refills:  0        losartan 100 MG tablet   Commonly known as:  COZAAR   Dose:  100 mg        Take 100 mg by mouth daily   Refills:  0        lovastatin 40 MG tablet   Commonly known as:  MEVACOR   Dose:  40 mg        Take 40 mg by mouth At Bedtime.   Refills:  0        metoprolol succinate 50 MG 24 hr tablet   Commonly known as:  TOPROL-XL   Dose:  50 mg   Quantity:  90 tablet        Take 1 tablet (50 mg) by mouth daily   Refills:  3        SLEEP AID PO        Refills:  0        VIACTIV PO        Take by mouth 2 times daily   Refills:  0                Procedures and tests performed during your visit     CBC with platelets differential    Cardiac Continuous Monitoring    Comprehensive metabolic panel    EKG 12 lead    EKG 12-lead, tracing only    INR    Magnesium    POC US ECHO LIMITED    Partial thromboplastin time    Peripheral IV catheter    Pulse oximetry nursing    TSH    Troponin I    XR Chest Port 1 View      Orders Needing Specimen Collection     None      Pending Results     Date and Time Order Name Status Description    7/7/2018 0851 POC US ECHO LIMITED In process     7/7/2018 0838 EKG 12-lead, tracing only Preliminary             Pending Culture Results     No orders found from 7/5/2018 to 7/8/2018.            Pending Results  Instructions     If you had any lab results that were not finalized at the time of your Discharge, you can call the ED Lab Result RN at [phone]. You will be contacted by this team for any positive Lab results or changes in treatment. The nurses are available 7 days a week from 10A to 6:30P.  You can leave a message 24 hours per day and they will return your call.        Thank you for choosing [facility]       Thank you for choosing [facility] for your care. Our goal is always to provide you with excellent care. Hearing back from our patients is one way we can continue to improve our services. Please take a few minutes to complete the written survey that you may receive in the mail after you visit with us. Thank you!        [employer] Information     [employer] gives you secure access to your electronic health record. If you see a primary care provider, you can also send messages to your care team and make appointments. If you have questions, please call your primary care clinic.  If you do not have a primary care provider, please call [phone] and they will assist you.        Care EveryWhere ID     This is your Care EveryWhere ID. This could be used by other organizations to access your [facility] medical records  [id number]        Equal Access to Services     JONATHAN LAYNE : Hadii stephanie Leiva, chao orona, silvana king, maia smalls. So [facility] [phone].    ATENCIÓN: Si habla español, tiene a erazo disposición servicios gratuitos de asistencia lingüística. Llame al [phone].    We comply with applicable federal civil rights laws and [place] laws. We do not discriminate on the basis of race, color, national origin, age, disability, sex, sexual orientation, or gender identity.            After Visit Summary       This is your record. Keep this with you and show to your community pharmacist(s) and doctor(s) at your next visit.

## 2018-07-07 NOTE — PROCEDURES
Patient presented to the ER in atrial flutter with atypical rate of 110--120 bpm.  Similar to her prior presentations with atrial flutter.  Felt the onset and it was about 7 am.  Has been on anticoagulation without interruption.  Hemodynamically stable.    In ER we gave adenosine, 6 mg x1 confirming presence of atrial flutter and then afterwards went to atrial fibrillation.           With Dr. Brown, we then planned to proceed with cardioversion.  ER physician and staff were monitoring respiratory status and hemodynamics/oxygenation.    Prior to cardioversion, she spontaneously converted to NSR.  Confirmed on EKG.  Will be monitored in the ER and can follow up with cardiology as previously scheduled.    Remy Palomares MD  Cardiology Fellow

## 2018-07-08 LAB
INTERPRETATION ECG - MUSE: NORMAL
INTERPRETATION ECG - MUSE: NORMAL

## 2018-07-08 ASSESSMENT — ENCOUNTER SYMPTOMS
COUGH: 1
NECK STIFFNESS: 0
WEAKNESS: 0
APPETITE CHANGE: 0
HEADACHES: 0
BRUISES/BLEEDS EASILY: 1
DYSPHORIC MOOD: 0
DECREASED CONCENTRATION: 0
COLOR CHANGE: 0
FEVER: 0
ARTHRALGIAS: 0
EYE REDNESS: 0
FREQUENCY: 0
ACTIVITY CHANGE: 0
CONFUSION: 0
DIFFICULTY URINATING: 0

## 2018-10-22 ENCOUNTER — HOSPITAL ENCOUNTER (EMERGENCY)
Facility: CLINIC | Age: 68
Discharge: HOME | End: 2018-10-22
Attending: EMERGENCY MEDICINE | Admitting: EMERGENCY MEDICINE
Payer: COMMERCIAL

## 2018-10-22 ENCOUNTER — APPOINTMENT (OUTPATIENT)
Dept: GENERAL RADIOLOGY | Facility: CLINIC | Age: 68
End: 2018-10-22
Attending: EMERGENCY MEDICINE
Payer: COMMERCIAL

## 2018-10-22 VITALS
DIASTOLIC BLOOD PRESSURE: 97 MMHG | SYSTOLIC BLOOD PRESSURE: 156 MMHG | WEIGHT: 220 LBS | RESPIRATION RATE: 16 BRPM | OXYGEN SATURATION: 98 % | BODY MASS INDEX: 35.51 KG/M2 | HEART RATE: 125 BPM | TEMPERATURE: 97.9 F

## 2018-10-22 DIAGNOSIS — I48.92 PAROXYSMAL ATRIAL FLUTTER (H): ICD-10-CM

## 2018-10-22 LAB
ANION GAP SERPL CALCULATED.3IONS-SCNC: 12 MMOL/L (ref 3–14)
BASOPHILS # BLD AUTO: 0 10E9/L (ref 0–0.2)
BASOPHILS NFR BLD AUTO: 0.3 %
BUN SERPL-MCNC: 18 MG/DL (ref 7–30)
CALCIUM SERPL-MCNC: 9 MG/DL (ref 8.5–10.1)
CHLORIDE SERPL-SCNC: 103 MMOL/L (ref 94–109)
CO2 SERPL-SCNC: 23 MMOL/L (ref 20–32)
CREAT SERPL-MCNC: 0.78 MG/DL (ref 0.52–1.04)
DIFFERENTIAL METHOD BLD: ABNORMAL
EOSINOPHIL # BLD AUTO: 0.1 10E9/L (ref 0–0.7)
EOSINOPHIL NFR BLD AUTO: 1.5 %
ERYTHROCYTE [DISTWIDTH] IN BLOOD BY AUTOMATED COUNT: 12.9 % (ref 10–15)
GFR SERPL CREATININE-BSD FRML MDRD: 73 ML/MIN/1.7M2
GLUCOSE SERPL-MCNC: 123 MG/DL (ref 70–99)
HCT VFR BLD AUTO: 48.6 % (ref 35–47)
HGB BLD-MCNC: 15.8 G/DL (ref 11.7–15.7)
IMM GRANULOCYTES # BLD: 0.1 10E9/L (ref 0–0.4)
IMM GRANULOCYTES NFR BLD: 0.9 %
INTERPRETATION ECG - MUSE: NORMAL
LYMPHOCYTES # BLD AUTO: 1.1 10E9/L (ref 0.8–5.3)
LYMPHOCYTES NFR BLD AUTO: 16 %
MCH RBC QN AUTO: 30.5 PG (ref 26.5–33)
MCHC RBC AUTO-ENTMCNC: 32.5 G/DL (ref 31.5–36.5)
MCV RBC AUTO: 94 FL (ref 78–100)
MONOCYTES # BLD AUTO: 0.4 10E9/L (ref 0–1.3)
MONOCYTES NFR BLD AUTO: 5.8 %
NEUTROPHILS # BLD AUTO: 5.2 10E9/L (ref 1.6–8.3)
NEUTROPHILS NFR BLD AUTO: 75.5 %
NRBC # BLD AUTO: 0 10*3/UL
NRBC BLD AUTO-RTO: 0 /100
PLATELET # BLD AUTO: 228 10E9/L (ref 150–450)
POTASSIUM SERPL-SCNC: 3.9 MMOL/L (ref 3.4–5.3)
RBC # BLD AUTO: 5.18 10E12/L (ref 3.8–5.2)
SODIUM SERPL-SCNC: 139 MMOL/L (ref 133–144)
TROPONIN I SERPL-MCNC: <0.015 UG/L (ref 0–0.04)
WBC # BLD AUTO: 6.9 10E9/L (ref 4–11)

## 2018-10-22 PROCEDURE — 92960 CARDIOVERSION ELECTRIC EXT: CPT | Performed by: EMERGENCY MEDICINE

## 2018-10-22 PROCEDURE — 25000128 H RX IP 250 OP 636

## 2018-10-22 PROCEDURE — 99285 EMERGENCY DEPT VISIT HI MDM: CPT | Mod: 25 | Performed by: EMERGENCY MEDICINE

## 2018-10-22 PROCEDURE — 84484 ASSAY OF TROPONIN QUANT: CPT | Performed by: EMERGENCY MEDICINE

## 2018-10-22 PROCEDURE — 93010 ELECTROCARDIOGRAM REPORT: CPT | Mod: Z6 | Performed by: EMERGENCY MEDICINE

## 2018-10-22 PROCEDURE — 99214 OFFICE O/P EST MOD 30 MIN: CPT | Performed by: INTERNAL MEDICINE

## 2018-10-22 PROCEDURE — 71045 X-RAY EXAM CHEST 1 VIEW: CPT

## 2018-10-22 PROCEDURE — 80048 BASIC METABOLIC PNL TOTAL CA: CPT | Performed by: EMERGENCY MEDICINE

## 2018-10-22 PROCEDURE — 85025 COMPLETE CBC W/AUTO DIFF WBC: CPT | Performed by: EMERGENCY MEDICINE

## 2018-10-22 PROCEDURE — 96360 HYDRATION IV INFUSION INIT: CPT | Performed by: EMERGENCY MEDICINE

## 2018-10-22 PROCEDURE — 25000128 H RX IP 250 OP 636: Performed by: EMERGENCY MEDICINE

## 2018-10-22 PROCEDURE — 93005 ELECTROCARDIOGRAM TRACING: CPT | Mod: XU | Performed by: EMERGENCY MEDICINE

## 2018-10-22 PROCEDURE — 92960 CARDIOVERSION ELECTRIC EXT: CPT | Mod: Z6 | Performed by: EMERGENCY MEDICINE

## 2018-10-22 PROCEDURE — 96361 HYDRATE IV INFUSION ADD-ON: CPT | Performed by: EMERGENCY MEDICINE

## 2018-10-22 RX ORDER — PROPOFOL 10 MG/ML
INJECTION, EMULSION INTRAVENOUS
Status: COMPLETED
Start: 2018-10-22 | End: 2018-10-22

## 2018-10-22 RX ORDER — FENTANYL CITRATE 50 UG/ML
25 INJECTION, SOLUTION INTRAMUSCULAR; INTRAVENOUS ONCE
Status: COMPLETED | OUTPATIENT
Start: 2018-10-22 | End: 2018-10-22

## 2018-10-22 RX ORDER — PROPOFOL 10 MG/ML
50 INJECTION, EMULSION INTRAVENOUS EVERY 5 MIN PRN
Status: COMPLETED | OUTPATIENT
Start: 2018-10-22 | End: 2018-10-22

## 2018-10-22 RX ORDER — FENTANYL CITRATE 50 UG/ML
INJECTION, SOLUTION INTRAMUSCULAR; INTRAVENOUS
Status: COMPLETED
Start: 2018-10-22 | End: 2018-10-22

## 2018-10-22 RX ORDER — SODIUM CHLORIDE 9 MG/ML
INJECTION, SOLUTION INTRAVENOUS ONCE
Status: COMPLETED | OUTPATIENT
Start: 2018-10-22 | End: 2018-10-22

## 2018-10-22 RX ADMIN — FENTANYL CITRATE 25 MCG: 50 INJECTION, SOLUTION INTRAMUSCULAR; INTRAVENOUS at 11:01

## 2018-10-22 RX ADMIN — PROPOFOL 25 MG: 10 INJECTION, EMULSION INTRAVENOUS at 11:11

## 2018-10-22 RX ADMIN — PROPOFOL 25 MG: 10 INJECTION, EMULSION INTRAVENOUS at 11:09

## 2018-10-22 RX ADMIN — PROPOFOL 25 MG: 10 INJECTION, EMULSION INTRAVENOUS at 11:06

## 2018-10-22 RX ADMIN — PROPOFOL 50 MG: 10 INJECTION, EMULSION INTRAVENOUS at 11:01

## 2018-10-22 RX ADMIN — SODIUM CHLORIDE: 9 INJECTION, SOLUTION INTRAVENOUS at 09:27

## 2018-10-22 RX ADMIN — PROPOFOL 25 MG: 10 INJECTION, EMULSION INTRAVENOUS at 11:03

## 2018-10-22 ASSESSMENT — ENCOUNTER SYMPTOMS
PALPITATIONS: 1
SHORTNESS OF BREATH: 1

## 2018-10-22 NOTE — ED TRIAGE NOTES
Pt reports waking up in afib today. Converted to aflutter en route. C/o shakyness. Denies cp or sob.

## 2018-10-22 NOTE — ED AVS SNAPSHOT
[facility], [place], Emergency Department    [address] [place] [address]    Phone:  [phone]                                       Alysha Youngblood   MRN: [id number]    Department:  [facility], Emergency Department   Date of Visit:  10/22/2018           After Visit Summary Signature Page     I have received my discharge instructions, and my questions have been answered. I have discussed any challenges I see with this plan with the nurse or doctor.    ..........................................................................................................................................  Patient/Patient Representative Signature      ..........................................................................................................................................  Patient Representative Print Name and Relationship to Patient    ..................................................               ................................................  Date                                   Time    ..........................................................................................................................................  Reviewed by Signature/Title    ...................................................              ..............................................  Date                                               Time          22EPIC Rev 08/18

## 2018-10-22 NOTE — ED PROVIDER NOTES
History     Chief Complaint   Patient presents with     Tachycardia     The history is provided by the patient and medical records.     Alysha Youngblood is a 67 year old female with a history of atrial fibrillation s/p ablation (x2 - most recently 2012), Hashimoto's disease, HTN, HLD, and ISABELA who presents to the Emergency Department for evaluation of palpitations. Patient reports that she felt as if she was in atrial flutter when she woke up around 7:00 AM this morning. Sometime during her way to the ED, she states that she went into atrial fibrillation. She notes that she has had episodes of atrial fibrillation/atrial flutter in the past and that her current symptoms are identical to those episodes. Additionally, patient reports feeling short of breath and having chest discomfort.     Patient reports that she felt well when she went to bed around 9:00 PM last night (10/21). However, she notes that she missed her evening medications of metoprolol, lovastatin and Eliquis. She took an additional 25 mg of metoprolol this morning for a total of 75 mg. Patient took all other morning medications, including a dose of Eliquis. She notes that she had one cup of decaf coffee approximately one hour prior to arrival (7:30 AM) but denies other p.o. Intake today.     No current facility-administered medications for this encounter.      Current Outpatient Prescriptions   Medication     apixaban ANTICOAGULANT (ELIQUIS) 5 MG tablet     Calcium-Vitamin D-Vitamin K (VIACTIV PO)     chlorhexidine (PERIDEX) 0.12 % solution     Doxylamine Succinate, Sleep, (SLEEP AID PO)     fish oil-omega-3 fatty acids (FISH OIL) 1000 MG capsule     fluconazole (DIFLUCAN) 10 MG/ML suspension     FLUZONE HIGH-DOSE 0.5 ML injection     hydrochlorothiazide (HYDRODIURIL) 25 MG tablet     loratadine (CLARITIN) 10 MG tablet     losartan (COZAAR) 100 MG tablet     lovastatin (MEVACOR) 40 MG tablet     metoprolol succinate (TOPROL-XL) 50 MG 24 hr tablet      Zolpidem Tartrate (AMBIEN PO)     Past Medical History:   Diagnosis Date     Atrial fibrillation (H)     ablated 1/12     Ex-smoker      Hashimoto's disease      Hyperlipidaemia      Hypertension      ISABELA (obstructive sleep apnea) 3/12    AHI 7     PAC (premature atrial contraction)      PVC (premature ventricular contraction)        Past Surgical History:   Procedure Laterality Date     APPENDECTOMY       ENT SURGERY       H ABLATION FOCAL AFIB  6/24/2011    [facility].      H ABLATION FOCAL AFIB  1/12/2012    [facility]. afib and aflutter ablations.        Family History   Problem Relation Age of Onset     Cancer No family hx of      No known family hx of skin cancer       Social History   Substance Use Topics     Smoking status: Never Smoker     Smokeless tobacco: Never Used     Alcohol use No     Allergies   Allergen Reactions     Hmg-Coa-R Inhibitors      Made her lips tingle, so they put her on another statin instead.     Ibuprofen Other (See Comments)     Nsaids GI Disturbance     Liquid Adhesive Rash     After wearing patches for > 8 days  After wearing patches for > 8 days     I have reviewed the Medications, Allergies, Past Medical and Surgical History, and Social History in the Epic system.    Review of Systems   Respiratory: Positive for shortness of breath.    Cardiovascular: Positive for palpitations.        Positive for chest discomfort   All other systems reviewed and are negative.      Physical Exam   BP: (!) 151/110  Pulse: 125  Heart Rate: 124  Temp: 97.9  F (36.6  C)  Resp: 18  Weight: 99.8 kg (220 lb)  SpO2: 98 %      Physical Exam   Gen:A&Ox3, no acute distress  HEENT:PERRL, no facial tenderness or wounds, head atraumatic, oropharynx clear, mucous membranes moist, TMs clear bilaterally  Neck:no bony tenderness or step offs, no JVD, trachea midline  Back: no CVA tenderness, no midline bony tenderness  CV: tachycardia, mostly regular rhythm with intermittent runs of irregularity.   PULM:Clear to  auscultation bilaterally  Abd:soft, nontender, nondistended. Bowel sounds present and normal  UE:No traumatic injuries, skin normal  LE:no traumatic injuries, skin normal, no LE edema.   Neuro:CN II-XII intact, strength 5/5 throughout, gait stable  Skin: no rashes or ecchymoses      ED Course   8:14 AM  The patient was seen and examined by Rita Malin MD in Room 8.   ED Course     Procedures             EKG Interpretation:      Interpreted by Rita Malin  Time reviewed: 8:07AM  Symptoms at time of EKG: palpitations   Rhythm: SVT   Rate: 125  Axis: normal  Ectopy: none  Conduction: normal  ST Segments/ T Waves: No ST-T wave changes  Q Waves: none  Comparison to prior: recurrent SVT    Clinical Impression: abnormal EKG           EKG Interpretation:      Interpreted by Rita Malin  Time reviewed: 11:12AM  Symptoms at time of EKG: post-cardioversion   Rhythm: normal sinus   Rate: 86  Axis: normal  Ectopy: none  Conduction: normal  ST Segments/ T Waves: No ST-T wave changes  Q Waves: none  Comparison to prior: resolution of SVT    Clinical Impression: normal EKG    [facility] Procedure Note        Sedation:      Performed by: Rita Malin  Authorized by: Rita Malin    Pre-Procedure Assessment done at 1030.    Expected Level:  Moderate Sedation    Indication:  Sedation is required to allow for Cardioversion    Consent obtained from patient after discussing the risks, benefits and alternatives.    PO Intake:  Appropriately NPO for procedure    ASA Class:  Class 2 - MILD SYSTEMIC DISEASE, NO ACUTE PROBLEMS, NO FUNCTIONAL LIMITATIONS.    Mallampati:  Grade 1:  Soft palate, uvula, tonsillar pillars, and posterior pharyngeal wall visible    Lungs: Lungs Clear with good breath sounds bilaterally.     Heart: tachycardia, regular rhythm, normal S1/S2    History and physical reviewed and no updates needed. I have reviewed the lab findings, diagnostic data, medications, and the plan for  sedation. I have determined this patient to be an appropriate candidate for the planned sedation and procedure and have reassessed the patient IMMEDIATELY PRIOR to sedation and procedure.      Sedation Post Procedure Summary:    Prior to the start of the procedure and with procedural staff participation, I verbally confirmed the patient s identity using two indicators, relevant allergies, that the procedure was appropriate and matched the consent or emergent situation, and that the correct equipment/implants were available. Immediately prior to starting the procedure I conducted the Time Out with the procedural staff and re-confirmed the patient s name, procedure, and site/side. (The Joint Commission universal protocol was followed.)  Yes      Sedatives: Fentanyl and Propofol    Vital signs, airway, End Tidal CO2 and pulse oximetry were monitored and remained stable throughout the procedure and sedation was maintained until the procedure was complete.  The patient was monitored by staff until sedation discharge criteria were met.    Patient tolerance: Patient tolerated the procedure well with no immediate complications.    Time of sedation in minutes:  20 minutes from beginning to end of physician one to one monitoring.        Critical Care time:  none    Labs Ordered and Resulted from Time of ED Arrival Up to the Time of Departure from the ED   CBC WITH PLATELETS DIFFERENTIAL - Abnormal; Notable for the following:        Result Value    Hemoglobin 15.8 (*)     Hematocrit 48.6 (*)     All other components within normal limits   BASIC METABOLIC PANEL - Abnormal; Notable for the following:     Glucose 123 (*)     All other components within normal limits   TROPONIN I   PERIPHERAL IV CATHETER       Consults  Cardiology: Responded (10/22/18 0537)    Assessments & Plan (with Medical Decision Making)   Alysha Youngblood is a 67 year old female with a history of paroxysmal atrial flutter status post ablation x2 and also  Hashimoto's disease. Presenting today with acute palpitations upon waking this morning, no symptoms at bedtime last night at 9 PM.  She has been compliant with her anticoagulation, as well as Cozaar and metoprolol, except for missing last night's doses of metoprolol, lovastatin and Eliquis.  She took Eliquis this morning in addition took 75 mg of metoprolol this morning instead of her usual 50 mg.    CBC unremarkable, basal metabolic panel within normal limits, and troponin negative.    EKG showing atrial flutter with occasional atrial fib with a rate around 125.    Chest x-ray without signs of acute pulmonary edema or other new cardiopulmonary structure abnormalities.  EP was consulted for discussion of if cardioversion was recommended given her missed dose of Eliquis last night but dose this morning.  They do recommend cardioversion, the patient is agreeable to this plan.  She was consented and sedated as described above in the procedure note, and synchronized cardioversion at 120 J was performed.  She tolerated the procedure well and converted with one attempt.  She woke from sedation as expected. Post-procedure EKG shows normal sinus rhythm without ischemic changes.  She was discharged home with follow-up with EP.    I have reviewed the nursing notes.    I have reviewed the findings, diagnosis, plan and need for follow up with the patient.    Discharge Medication List as of 10/22/2018 12:14 PM          Final diagnoses:   Paroxysmal atrial flutter (H)   ISusi, am serving as a trained medical scribe to document services personally performed by Rita Malin MD, based on the provider's statements to me.   IRita MD, was physically present and have reviewed and verified the accuracy of this note documented by Susi Up.    10/22/2018   [facility], EMERGENCY DEPARTMENT    MD GRACY Peralta Katrina Anne, MD  10/23/18 6550

## 2018-10-22 NOTE — ED AVS SNAPSHOT
[facility], Emergency Department    [address] [facility] [facility] [address]    Phone:  [phone]                                       Alysha Youngblood   MRN: [id number]    Department:  [facility], Emergency Department   Date of Visit:  10/22/2018           Patient Information     Date Of Birth          1950        Your diagnoses for this visit were:     Paroxysmal atrial flutter (H)        You were seen by Rita Malin MD.        Discharge Instructions       Thank you for coming to the [facility] Emergency Department.     Please follow up with Dr. Norris as scheduled. Continue your medications without change.     Return to the ER for:  Chest pain  Shortness of breath  Return of atrial fibrillation or flutter  Any stroke-like symptoms: facial droop, arm or leg weakness, slurred speech or confusion    Your next 10 appointments already scheduled     Jan 21, 2019  2:30 PM CST   (Arrive by 2:15 PM)   RETURN ARRHYTHMIA with Tyra Norris MD   [facility] [place])    [address] [place] [address]  [address] [place] [id number] [phone]              24 Hour Appointment Hotline       To make an appointment at any [facility], call [phone]). If you don't have a family doctor or clinic, we will help you find one. [facility] clinics are conveniently located to serve the needs of you and your family.             Review of your medicines      Our records show that you are taking the medicines listed below. If these are incorrect, please call your family doctor or clinic.        Dose / Directions Last dose taken    AMBIEN PO   Dose:  5 mg        Take 5 mg by mouth nightly as needed for sleep   Refills:  0        apixaban ANTICOAGULANT 5 MG tablet   Commonly known as:  ELIQUIS   Dose:  5 mg   Quantity:  180 tablet        Take 1 tablet (5 mg) by mouth 2 times daily   Refills:  3        chlorhexidine 0.12 % solution    Commonly known as:  PERIDEX        SSP 15 ML PO BID   Refills:  0        fish oil-omega-3 fatty acids 1000 MG capsule        One capsule daily.   Refills:  0        fluconazole 10 MG/ML suspension   Commonly known as:  DIFLUCAN        Take by mouth daily   Refills:  0        FLUZONE HIGH-DOSE 0.5 ML injection   Generic drug:  influenza Vac Split High-Dose        ADM 0.5ML IM UTD   Refills:  0        hydrochlorothiazide 25 MG tablet   Commonly known as:  HYDRODIURIL   Dose:  25 mg        Take 25 mg by mouth daily   Refills:  0        loratadine 10 MG tablet   Commonly known as:  CLARITIN   Dose:  10 mg        Take 10 mg by mouth daily   Refills:  0        losartan 100 MG tablet   Commonly known as:  COZAAR   Dose:  100 mg        Take 100 mg by mouth daily   Refills:  0        lovastatin 40 MG tablet   Commonly known as:  MEVACOR   Dose:  40 mg        Take 40 mg by mouth At Bedtime.   Refills:  0        metoprolol succinate 50 MG 24 hr tablet   Commonly known as:  TOPROL-XL   Dose:  50 mg   Quantity:  90 tablet        Take 1 tablet (50 mg) by mouth daily   Refills:  3        SLEEP AID PO        Refills:  0        VIACTIV PO        Take by mouth 2 times daily   Refills:  0                Procedures and tests performed during your visit     Basic metabolic panel    CBC with platelets differential    Chest  XR, 1 view portable    EKG 12 lead    Peripheral IV catheter    Troponin I      Orders Needing Specimen Collection     None      Pending Results     Date and Time Order Name Status Description    10/22/2018 0812 Chest  XR, 1 view portable Preliminary             Pending Culture Results     No orders found from 10/20/2018 to 10/23/2018.            Pending Results Instructions     If you had any lab results that were not finalized at the time of your Discharge, you can call the ED Lab Result RN at [phone]. You will be contacted by this team for any positive Lab results or changes in treatment. The nurses are  available 7 days a week from 10A to 6:30P.  You can leave a message 24 hours per day and they will return your call.        Thank you for choosing [facility]       Thank you for choosing [facility] for your care. Our goal is always to provide you with excellent care. Hearing back from our patients is one way we can continue to improve our services. Please take a few minutes to complete the written survey that you may receive in the mail after you visit with us. Thank you!        [employer] Information     [employer] gives you secure access to your electronic health record. If you see a primary care provider, you can also send messages to your care team and make appointments. If you have questions, please call your primary care clinic.  If you do not have a primary care provider, please call [phone] and they will assist you.        Care EveryWhere ID     This is your Care EveryWhere ID. This could be used by other organizations to access your [facility] medical records  [id number]        Equal Access to Services     JONATHAN LAYNE : Abel Leiva, chao orona, silvana king, maia keller . So [facility] [phone].    ATENCIÓN: Si habla español, tiene a erazo disposición servicios gratuitos de asistencia lingüística. Llame al [phone].    We comply with applicable federal civil rights laws and [place] laws. We do not discriminate on the basis of race, color, national origin, age, disability, sex, sexual orientation, or gender identity.            After Visit Summary       This is your record. Keep this with you and show to your community pharmacist(s) and doctor(s) at your next visit.

## 2018-10-22 NOTE — ED NOTES
"Called into patient's room; patient states that she \"feels worse\".  Symptoms are feeling \"shakier\" and my heart feels cold. Blood work resulted; glucose of 123 and all other results WNL.  Cardiology staff here to see patient. Will notify Dr. Malin.   "

## 2018-10-29 NOTE — DISCHARGE INSTRUCTIONS
Thank you for coming to the [facility] Emergency Department.     Please follow up with Dr. Norris as scheduled. Continue your medications without change.     Return to the ER for:  Chest pain  Shortness of breath  Return of atrial fibrillation or flutter  Any stroke-like symptoms: facial droop, arm or leg weakness, slurred speech or confusion      Recovery After Procedural Sedation (Adult)  You have been given medicine by vein to make you sleep during your surgery. This may have included both a pain medicine and sleeping medicine. Most of the effects have worn off. But you may still have some drowsiness for the next 6 to 8 hours.  Home care  Follow these guidelines when you get home:    For the next 8 hours, you should be watched by a responsible adult. This person should make sure your condition is not getting worse.    Don't drink any alcohol for the next 24 hours.    Don't drive, operate dangerous machinery, or make important business or personal decisions during the next 24 hours.  Note: Your healthcare provider may tell you not to take any medicine by mouth for pain or sleep in the next 4 hours. These medicines may react with the medicines you were given in the hospital. This could cause a much stronger response than usual.  Follow-up care  Follow up with your healthcare provider if you are not alert and back to your usual level of activity within 12 hours.  When to seek medical advice  Call your healthcare provider right away if any of these occur:    Drowsiness gets worse    Weakness or dizziness gets worse    Repeated vomiting    You can't be awakened   Date Last Reviewed: 10/18/2016    9978-6025 [employer]. [address] [place] [address], [place] [id number]. All rights reserved. This information is not intended as a substitute for professional medical care. Always follow your healthcare professional's instructions.

## 2018-10-29 NOTE — CONSULTS
Electrophysiology Consultation Note   EP Attending: .   Reason for consultation: AF.   Provider requesting consultation: DANNY Rodriguez MD.  Date of Service: 10/22/2018      HPI:   Ms. Youngblood is a 67 year old female who has a past medical history significant for PAF/AFL s/p ablation X2 last PVI/CTI in 2012, HTN, HLD, and ISABELA (uses CPAP). She presented to [facility] with palpitations, dizziness,and shakiness. She was found to be in AF with RVR. She was admitted in 7/2018 with palpitations and found to be in tachycardia. Adenosine was administered and revealed an AFL (appeared atypical) and then she converted to AF and then spontaneously to sinus. Her last echo from 2016 showed LVEF 55-60% and no valvular issues.  She had a NM Stress test in 2017 which was normal. She was first diagnosed with AF/AFL in 2011 and had previously been on AAT. When these failed, she underwent PVI ablation in 06/201 1 at [facility]. She had recurrences and had a second ablation ablation PVI/CTI at [facility] in 01/2012. She also a few years ago had some palpitations/skipped beats and was found to have some PVCs. She has been maintained on Toprol XL and Eliquis for stroke prophylaxis. She reports today around 630am she started feeling unwell and had palpitations, dizziness, and felt shaky. This prompted her to come in for evaluation. She denies any chest pain/pressures, worsening shortness of breath, leg/ankle swelling, PND, orthopnea, or syncopal symptoms. Presenting 12 lead ECG shows atypical AFL Vent Rate 125 bpm, QRS 78 ms, QTc 499 ms. Current cardiac medications include: Eliquis, Toprol XL, Hydrochlorothiazide, Losartan, and Lovastatin.     Past Medical History:   Past Medical History:   Diagnosis Date     Atrial fibrillation (H)     ablated 1/12     Ex-smoker      Hashimoto's disease      Hyperlipidaemia      Hypertension      ISABELA (obstructive sleep apnea) 3/12    AHI 7     PAC (premature atrial contraction)      PVC (premature  ventricular contraction)      Past Surgical History:   Past Surgical History:   Procedure Laterality Date     APPENDECTOMY       ENT SURGERY       H ABLATION FOCAL AFIB  6/24/2011    [facility].      H ABLATION FOCAL AFIB  1/12/2012    [facility]. afib and aflutter ablations.      Allergies: Per MAR     Allergies   Allergen Reactions     Hmg-Coa-R Inhibitors      Made her lips tingle, so they put her on another statin instead.     Ibuprofen Other (See Comments)     Nsaids GI Disturbance     Liquid Adhesive Rash     After wearing patches for > 8 days  After wearing patches for > 8 days     Medications:   Per MAR current outpatient cardiovascular medications include:   (Not in a hospital admission)  Current Outpatient Prescriptions   Medication Sig Dispense Refill     apixaban ANTICOAGULANT (ELIQUIS) 5 MG tablet Take 1 tablet (5 mg) by mouth 2 times daily 180 tablet 3     Calcium-Vitamin D-Vitamin K (VIACTIV PO) Take by mouth 2 times daily        chlorhexidine (PERIDEX) 0.12 % solution SSP 15 ML PO BID  0     Doxylamine Succinate, Sleep, (SLEEP AID PO)        fish oil-omega-3 fatty acids (FISH OIL) 1000 MG capsule One capsule daily.       fluconazole (DIFLUCAN) 10 MG/ML suspension Take by mouth daily       FLUZONE HIGH-DOSE 0.5 ML injection ADM 0.5ML IM UTD  0     hydrochlorothiazide (HYDRODIURIL) 25 MG tablet Take 25 mg by mouth daily       loratadine (CLARITIN) 10 MG tablet Take 10 mg by mouth daily       losartan (COZAAR) 100 MG tablet Take 100 mg by mouth daily       lovastatin (MEVACOR) 40 MG tablet Take 40 mg by mouth At Bedtime.       metoprolol succinate (TOPROL-XL) 50 MG 24 hr tablet Take 1 tablet (50 mg) by mouth daily 90 tablet 3     Zolpidem Tartrate (AMBIEN PO) Take 5 mg by mouth nightly as needed for sleep         Family History:   Family History   Problem Relation Age of Onset     Cancer No family hx of      No known family hx of skin cancer     Social History:   Social History   Substance Use Topics      Smoking status: Never Smoker     Smokeless tobacco: Never Used     Alcohol use No       ROS:   A comprehensive 10 point ROS was negative other than as mentioned in HPI.    Physical Examination:   VITALS: BP (!) 156/97  Pulse 125  Temp 97.9  F (36.6  C) (Oral)  Resp 16  Wt 99.8 kg (220 lb)  SpO2 98%  BMI 35.51 kg/m2  GENERAL APPEARANCE: AxO, NAD   HEENT: NCAT, EOMI, MMM. PERRLA.   NECK: Supple.    CHEST: CTAB   CARDIOVASCULAR: regularly irregular, tachy.   ABDOMEN: BS+, soft, NT, ND.   EXTREMITIES: No pedal edema. Distal pulses intact.   NEURO: Grossly nonfocal.   PSYCH: Normal affect.  SKIN: Warm and dry.   Data:   Labs:  BMP  Recent Labs  Lab 10/22/18  0805      POTASSIUM 3.9   CHLORIDE 103   JANAE 9.0   CO2 23   BUN 18   CR 0.78   *     CBC  Recent Labs  Lab 10/22/18  0805   WBC 6.9   RBC 5.18   HGB 15.8*   HCT 48.6*   MCV 94   MCH 30.5   MCHC 32.5   RDW 12.9        INRNo lab results found in last 7 days.  No results found for: CKTOTAL, CKMB, TROPN  Cholesterol (mg/dL)   Date Value   02/28/2011 157     Cholesterol/HDL Ratio (no units)   Date Value   02/28/2011 2.4     HDL Cholesterol (mg/dL)   Date Value   02/28/2011 66     LDL Cholesterol Calculated (mg/dL)   Date Value   02/28/2011 78        11/2016 ECHO:   Interpretation Summary  Global and regional left ventricular function is normal with an EF of 55-60%.  Global right ventricular function is normal.  No significant valvular dysfunction present.  The inferior vena cava is normal.  No pericardial effusion is present.  Assessment:   Ms. Youngblood is a 67 year old female who has a past medical history significant for PAF/AFL s/p ablation X2 last PVI/CTI in 2012, HTN, HLD, and ISABELA (uses CPAP). She presented to [facility] with palpitations, dizziness,and shakiness. She was found to be in AF with RVR. She was admitted in 7/2018 with palpitations and found to be in tachycardia. Adenosine was administered and revealed an AFL (appeared atypical) and then  she converted to AF and then spontaneously to sinus. Her last echo from  showed LVEF 55-60% and no valvular issues.  She had a NM Stress test in 2017 which was normal. She was first diagnosed with AF/AFL in  and had previously been on AAT. When these failed, she underwent PVI ablation in  1 at [facility]. She had recurrences and had a second ablation ablation PVI/CTI at [facility] in 2012. She also a few years ago had some palpitations/skipped beats and was found to have some PVCs. She has been maintained on Toprol XL and Eliquis for stroke prophylaxis. She reports today around 630am she started feeling unwell and had palpitations, dizziness, and felt shaky. This prompted her to come in for evaluation. She denies any chest pain/pressures, worsening shortness of breath, leg/ankle swelling, PND, orthopnea, or syncopal symptoms. Presenting 12 lead ECG shows atypical AFL Vent Rate 125 bpm, QRS 78 ms, QTc 499 ms. Current cardiac medications include: Eliquis, Toprol XL, Hydrochlorothiazide, Losartan, and Lovastatin.     EP Recommendations:  We discussed in detail with the patient management/treatment options for Janie includin. Stroke Prophylaxis:  CHADSVASC=+gender, +age, +HTN  3, corresponding to a 3.2% annual stroke / systemic emolism event rate. indicating need for long term oral anticoagulation.  She is appropriately on Eliquis. No bleeding issues.   2. Rate Control: Continue Toprol XL.   3. Rhythm Control: Cardioversion, Antiarrhythmics and/or ablation are options for rhythm control. She has had ablation X2 and previous DCCVs. She reports having previously been on AAT but has been off AAT for awhile now. She has now had two recurrences in 2018 and now. Discussed use of AAT vs repeat ablation. Her recurrences appear to be atypical AFL possible leak in prior PVI lines and seem amenable to ablation. We will have her undergo DCCV now and then she will follow up with her primary EP, Dr. Norris,  for consideration for AAT vs repeat ablation.   4. Risk Factor Management: Continue Statin, maintain tight BP control, and continued ISABELA treatment.      The patient states understanding and is agreeable with plan.   Thank you for allowing us to participate in the care of this patient.     The patient was discussed w/ Dr. Hough.  The above note reflects our joint plan.    EUSEBIA Carvalho CNP  Electrophysiology Consult Service  Pager: [phone]    EP STAFF NOTE  I have seen and examined the patient as part of a shared visit with COLLEEN Carvalho NP.  I agree with the note above. I reviewed today's vital signs and medications. I have reviewed and discussed with the advanced practice provider their physical examination, assessment, and plan   Briefly, recurrent atypical AFL, no interruption in AC  My key history/exam findings are: AFL.   The key management decisions made by me: DCCV now, consider repeat ablation..    Napoleon Hough MD [facility]  Cardiology - Electrophysiology

## 2018-10-29 NOTE — CONSULTS
Electrophysiology Consultation Note   EP Attending: .   Reason for consultation: AF.   Provider requesting consultation: DANNY Rodriguez MD.  Date of Service: 10/23/2018      HPI:   Ms. Youngblood is a 67 year old female who has a past medical history significant for PAF/AFL s/p ablation X2 last PVI/CTI in 2012, HTN, HLD, and ISABELA (uses CPAP). She presented to [facility] with palpitations, dizziness,and shakiness. She was found to be in AF with RVR. She was admitted in 7/2018 with palpitations and found to be in tachycardia. Adenosine was administered and revealed an AFL (appeared atypical) and then she converted to AF and then spontaneously to sinus. Her last echo from 2016 showed LVEF 55-60% and no valvular issues.  She had a NM Stress test in 2017 which was normal. She was first diagnosed with AF/AFL in 2011 and had previously been on AAT. When these failed, she underwent PVI ablation in 06/201 1 at [facility]. She had recurrences and had a second ablation ablation PVI/CTI at [facility] in 01/2012. She also a few years ago had some palpitations/skipped beats and was found to have some PVCs. She has been maintained on Toprol XL and Eliquis for stroke prophylaxis. She reports today around 630am she started feeling unwell and had palpitations, dizziness, and felt shaky. This prompted her to come in for evaluation. She denies any chest pain/pressures, worsening shortness of breath, leg/ankle swelling, PND, orthopnea, or syncopal symptoms. Presenting 12 lead ECG shows atypical AFL Vent Rate 125 bpm, QRS 78 ms, QTc 499 ms. Current cardiac medications include: Eliquis, Toprol XL, Hydrochlorothiazide, Losartan, and Lovastatin.     Past Medical History:   Past Medical History:   Diagnosis Date     Atrial fibrillation (H)     ablated 1/12     Ex-smoker      Hashimoto's disease      Hyperlipidaemia      Hypertension      ISABELA (obstructive sleep apnea) 3/12    AHI 7     PAC (premature atrial contraction)      PVC (premature  ventricular contraction)      Past Surgical History:   Past Surgical History:   Procedure Laterality Date     APPENDECTOMY       ENT SURGERY       H ABLATION FOCAL AFIB  6/24/2011    [facility].      H ABLATION FOCAL AFIB  1/12/2012    [facility]. afib and aflutter ablations.      Allergies: Per MAR     Allergies   Allergen Reactions     Hmg-Coa-R Inhibitors      Made her lips tingle, so they put her on another statin instead.     Ibuprofen Other (See Comments)     Nsaids GI Disturbance     Liquid Adhesive Rash     After wearing patches for > 8 days  After wearing patches for > 8 days     Medications:   Per MAR current outpatient cardiovascular medications include:   (Not in a hospital admission)  Current Outpatient Prescriptions   Medication Sig Dispense Refill     apixaban ANTICOAGULANT (ELIQUIS) 5 MG tablet Take 1 tablet (5 mg) by mouth 2 times daily 180 tablet 3     Calcium-Vitamin D-Vitamin K (VIACTIV PO) Take by mouth 2 times daily        chlorhexidine (PERIDEX) 0.12 % solution SSP 15 ML PO BID  0     Doxylamine Succinate, Sleep, (SLEEP AID PO)        fish oil-omega-3 fatty acids (FISH OIL) 1000 MG capsule One capsule daily.       fluconazole (DIFLUCAN) 10 MG/ML suspension Take by mouth daily       FLUZONE HIGH-DOSE 0.5 ML injection ADM 0.5ML IM UTD  0     hydrochlorothiazide (HYDRODIURIL) 25 MG tablet Take 25 mg by mouth daily       loratadine (CLARITIN) 10 MG tablet Take 10 mg by mouth daily       losartan (COZAAR) 100 MG tablet Take 100 mg by mouth daily       lovastatin (MEVACOR) 40 MG tablet Take 40 mg by mouth At Bedtime.       metoprolol succinate (TOPROL-XL) 50 MG 24 hr tablet Take 1 tablet (50 mg) by mouth daily 90 tablet 3     Zolpidem Tartrate (AMBIEN PO) Take 5 mg by mouth nightly as needed for sleep         Family History:   Family History   Problem Relation Age of Onset     Cancer No family hx of      No known family hx of skin cancer     Social History:   Social History   Substance Use Topics      Smoking status: Never Smoker     Smokeless tobacco: Never Used     Alcohol use No       ROS:   A comprehensive 10 point ROS was negative other than as mentioned in HPI.    Physical Examination:   VITALS: BP (!) 156/97  Pulse 125  Temp 97.9  F (36.6  C) (Oral)  Resp 16  Wt 99.8 kg (220 lb)  SpO2 98%  BMI 35.51 kg/m2  GENERAL APPEARANCE: AxO, NAD   HEENT: NCAT, EOMI, MMM. PERRLA.   NECK: Supple.    CHEST: CTAB   CARDIOVASCULAR: regularly irregular, tachy.   ABDOMEN: BS+, soft, NT, ND.   EXTREMITIES: No pedal edema. Distal pulses intact.   NEURO: Grossly nonfocal.   PSYCH: Normal affect.  SKIN: Warm and dry.   Data:   Labs:  BMP  Recent Labs  Lab 10/22/18  0805      POTASSIUM 3.9   CHLORIDE 103   JANAE 9.0   CO2 23   BUN 18   CR 0.78   *     CBC  Recent Labs  Lab 10/22/18  0805   WBC 6.9   RBC 5.18   HGB 15.8*   HCT 48.6*   MCV 94   MCH 30.5   MCHC 32.5   RDW 12.9        INRNo lab results found in last 7 days.  No results found for: CKTOTAL, CKMB, TROPN  Cholesterol (mg/dL)   Date Value   02/28/2011 157     Cholesterol/HDL Ratio (no units)   Date Value   02/28/2011 2.4     HDL Cholesterol (mg/dL)   Date Value   02/28/2011 66     LDL Cholesterol Calculated (mg/dL)   Date Value   02/28/2011 78        11/2016 ECHO:   Interpretation Summary  Global and regional left ventricular function is normal with an EF of 55-60%.  Global right ventricular function is normal.  No significant valvular dysfunction present.  The inferior vena cava is normal.  No pericardial effusion is present.  Assessment:   Ms. Youngblood is a 67 year old female who has a past medical history significant for PAF/AFL s/p ablation X2 last PVI/CTI in 2012, HTN, HLD, and ISABELA (uses CPAP). She presented to [facility] with palpitations, dizziness,and shakiness. She was found to be in AF with RVR. She was admitted in 7/2018 with palpitations and found to be in tachycardia. Adenosine was administered and revealed an AFL (appeared atypical) and then  she converted to AF and then spontaneously to sinus. Her last echo from  showed LVEF 55-60% and no valvular issues.  She had a NM Stress test in 2017 which was normal. She was first diagnosed with AF/AFL in  and had previously been on AAT. When these failed, she underwent PVI ablation in  1 at [facility]. She had recurrences and had a second ablation ablation PVI/CTI at [facility] in 2012. She also a few years ago had some palpitations/skipped beats and was found to have some PVCs. She has been maintained on Toprol XL and Eliquis for stroke prophylaxis. She reports today around 630am she started feeling unwell and had palpitations, dizziness, and felt shaky. This prompted her to come in for evaluation. She denies any chest pain/pressures, worsening shortness of breath, leg/ankle swelling, PND, orthopnea, or syncopal symptoms. Presenting 12 lead ECG shows atypical AFL Vent Rate 125 bpm, QRS 78 ms, QTc 499 ms. Current cardiac medications include: Eliquis, Toprol XL, Hydrochlorothiazide, Losartan, and Lovastatin.     EP Recommendations:  We discussed in detail with the patient management/treatment options for Janie includin. Stroke Prophylaxis:  CHADSVASC=+gender, +age, +HTN  3, corresponding to a 3.2% annual stroke / systemic emolism event rate. indicating need for long term oral anticoagulation.  She is appropriately on Eliquis. No bleeding issues.   2. Rate Control: Continue Toprol XL.   3. Rhythm Control: Cardioversion, Antiarrhythmics and/or ablation are options for rhythm control. She has had ablation X2 and previous DCCVs. She reports having previously been on AAT but has been off AAT for awhile now. She has now had two recurrences in 2018 and now. Discussed use of AAT vs repeat ablation. Her recurrences appear to be atypical AFL possible leak in prior PVI lines and seem amenable to ablation. We will have her undergo DCCV now and then she will follow up with her primary EP, Dr. Norris,  for consideration for AAT vs repeat ablation.   4. Risk Factor Management: Continue Statin, maintain tight BP control, and continued ISABELA treatment.      The patient states understanding and is agreeable with plan.   Thank you for allowing us to participate in the care of this patient.     The patient was discussed w/ Dr. Hough.  The above note reflects our joint plan.    EUSEBIA Carvalho CNP  Electrophysiology Consult Service  Pager: [phone]    EP STAFF NOTE  I have seen and examined the patient as part of a shared visit with COLLEEN Carvalho NP.  I agree with the note above. I reviewed today's vital signs and medications. I have reviewed and discussed with the advanced practice provider their physical examination, assessment, and plan   Briefly, recurrent atypical AFL, s/p PVI/CTI in 2012  My key history/exam findings are: AFL, symptomatic  The key management decisions made by me: consider repeat ablation, DCCV for now, uninterrupted AC.    Napoleon Hough MD [facility]  Cardiology - Electrophysiology

## 2018-11-02 PROBLEM — I48.92 ATRIAL FLUTTER (H): Status: ACTIVE | Noted: 2018-11-02

## 2018-11-03 ASSESSMENT — ENCOUNTER SYMPTOMS
SLEEP DISTURBANCES DUE TO BREATHING: 0
EXERCISE INTOLERANCE: 0
PALPITATIONS: 1
HYPOTENSION: 0
ORTHOPNEA: 0
LIGHT-HEADEDNESS: 0
SYNCOPE: 0
LEG PAIN: 0
HYPERTENSION: 1

## 2018-11-05 ENCOUNTER — OFFICE VISIT (OUTPATIENT)
Dept: CARDIOLOGY | Facility: CLINIC | Age: 68
End: 2018-11-05
Attending: INTERNAL MEDICINE
Payer: COMMERCIAL

## 2018-11-05 VITALS
BODY MASS INDEX: 35.51 KG/M2 | HEART RATE: 67 BPM | DIASTOLIC BLOOD PRESSURE: 84 MMHG | SYSTOLIC BLOOD PRESSURE: 137 MMHG | OXYGEN SATURATION: 95 % | HEIGHT: 66 IN

## 2018-11-05 DIAGNOSIS — I48.4 ATYPICAL ATRIAL FLUTTER (H): ICD-10-CM

## 2018-11-05 DIAGNOSIS — I48.0 PAROXYSMAL ATRIAL FIBRILLATION (H): Primary | ICD-10-CM

## 2018-11-05 PROCEDURE — 99215 OFFICE O/P EST HI 40 MIN: CPT | Mod: ZP | Performed by: INTERNAL MEDICINE

## 2018-11-05 PROCEDURE — 93010 ELECTROCARDIOGRAM REPORT: CPT | Mod: ZP | Performed by: INTERNAL MEDICINE

## 2018-11-05 PROCEDURE — G0463 HOSPITAL OUTPT CLINIC VISIT: HCPCS | Mod: ZF

## 2018-11-05 RX ORDER — METOPROLOL TARTRATE 25 MG/1
TABLET, FILM COATED ORAL
Qty: 30 TABLET | Refills: 0 | Status: SHIPPED | OUTPATIENT
Start: 2018-11-05 | End: 2020-05-13

## 2018-11-05 ASSESSMENT — PAIN SCALES - GENERAL: PAINLEVEL: NO PAIN (0)

## 2018-11-05 NOTE — PROGRESS NOTES
HPI:   Patient presents after visit to the ED for DCCV for atrial flutter.    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  The patient is status post ablation for paroxysmal atrial fibrillation in 06/2011 as well as another AFib ablation and atrial flutter ablation in 01/2012.  Both ablations were done at [facility].  The patient's last visit with me was in 01/2018.    At patient's last visit, her Metoprolol XL was decreased from 75 mg daily to 50 mg daily. Since patient's last clinic visit, she has had two ED visits for atrial flutter, one in July (which she converted out of flutter after receiving adenosine), and one in October (after receiving DCCV). Patient is now presenting to clinic for ED follow up. Patient endorses having substantially more energy since decreasing her metoprolol. States that she does have triggers that cause her to go into atrial flutter--URI's, flying, lack of sleep, too much coffee, etc. Her two most recent episodes were caused by viral URI's.    Today she is feeling well. No fevers, chills, chest pain, SOB, abdominal pain, nausea, vomiting, diarrhea, constipation. No dizziness, light-headedness, syncope, or pre-syncope.    PAST MEDICAL HISTORY:  Past Medical History:   Diagnosis Date     Atrial fibrillation (H)     ablated 1/12     Ex-smoker      Hashimoto's disease      Hyperlipidaemia      Hypertension      ISABELA (obstructive sleep apnea) 3/12    AHI 7     PAC (premature atrial contraction)      PVC (premature ventricular contraction)        CURRENT MEDICATIONS:  Current Outpatient Prescriptions   Medication Sig Dispense Refill     apixaban ANTICOAGULANT (ELIQUIS) 5 MG tablet Take 1 tablet (5 mg) by mouth 2 times daily 180 tablet 3     Calcium-Vitamin D-Vitamin K (VIACTIV PO) Take by mouth 2 times daily        chlorhexidine (PERIDEX) 0.12 % solution SSP 15 ML PO BID  0     fish oil-omega-3 fatty acids (FISH  OIL) 1000 MG capsule One capsule daily.       FLUZONE HIGH-DOSE 0.5 ML injection ADM 0.5ML IM UTD  0     hydrochlorothiazide (HYDRODIURIL) 25 MG tablet Take 25 mg by mouth daily       loratadine (CLARITIN) 10 MG tablet Take 10 mg by mouth daily       losartan (COZAAR) 100 MG tablet Take 100 mg by mouth daily       lovastatin (MEVACOR) 40 MG tablet Take 40 mg by mouth At Bedtime.       metoprolol succinate (TOPROL-XL) 50 MG 24 hr tablet Take 1 tablet (50 mg) by mouth daily 90 tablet 3     Zolpidem Tartrate (AMBIEN PO) Take 5 mg by mouth nightly as needed for sleep       Doxylamine Succinate, Sleep, (SLEEP AID PO)          PAST SURGICAL HISTORY:  Past Surgical History:   Procedure Laterality Date     APPENDECTOMY       ENT SURGERY       H ABLATION FOCAL AFIB  6/24/2011    [facility].      H ABLATION FOCAL AFIB  1/12/2012    [facility]. afib and aflutter ablations.        ALLERGIES:     Allergies   Allergen Reactions     Hmg-Coa-R Inhibitors      Made her lips tingle, so they put her on another statin instead.     Ibuprofen Other (See Comments)     Nsaids GI Disturbance     Liquid Adhesive Rash     After wearing patches for > 8 days  After wearing patches for > 8 days       FAMILY HISTORY:  - Premature coronary artery disease  - Atrial fibrillation  - Sudden cardiac death     SOCIAL HISTORY:  Social History   Substance Use Topics     Smoking status: Never Smoker     Smokeless tobacco: Never Used     Alcohol use No       ROS:   Constitutional: No fever, chills, or sweats. Weight stable.   ENT: No visual disturbance, ear ache, epistaxis, sore throat.   Cardiovascular: As per HPI.   Respiratory: No cough, hemoptysis.    GI: No nausea, vomiting, hematemesis, melena, or hematochezia.   : No hematuria.   Integument: Negative.   Psychiatric: Negative.   Hematologic:  Easy bruising, no easy bleeding.  Neuro: Negative.   Endocrinology: No significant heat or cold intolerance   Musculoskeletal: No myalgia.    Exam:  /84 (BP  "Location: Left arm, Patient Position: Chair, Cuff Size: Adult Large)  Pulse 67  Ht 1.676 m (5' 6\")  SpO2 95%  BMI 35.51 kg/m2     GENERAL APPEARANCE: healthy, alert and no distress  HEENT: no icterus, no xanthelasmas, normal pupil size and reaction, normal palate, mucosa moist, no central cyanosis  NECK: no adenopathy, no asymmetry, masses, or scars, thyroid normal to palpation and no bruits, JVP not elevated  RESPIRATORY: lungs clear to auscultation - no rales, rhonchi or wheezes, no use of accessory muscles, no retractions, respirations are unlabored, normal respiratory rate  CARDIOVASCULAR: regular rhythm, normal S1 with physiologic split S2, no S3 or S4 and no murmur, click or rub, precordium quiet with normal PMI.  ABDOMEN: soft, non tender, without hepatosplenomegaly, no masses palpable, bowel sounds normal, aorta not enlarged by palpation, no abdominal bruits  EXTREMITIES: peripheral pulses normal, no edema, no bruits  NEURO: alert and oriented to person/place/time, normal speech, gait and affect  VASC: Radial, femoral, dorsalis pedis and posterior tibialis pulses are normal in volumes and symmetric bilaterally. No bruits are heard.  SKIN: no ecchymoses, no rashes    Labs:  CBC RESULTS:   Lab Results   Component Value Date    WBC 6.9 10/22/2018    RBC 5.18 10/22/2018    HGB 15.8 (H) 10/22/2018    HCT 48.6 (H) 10/22/2018    MCV 94 10/22/2018    MCH 30.5 10/22/2018    MCHC 32.5 10/22/2018    RDW 12.9 10/22/2018     10/22/2018       BMP RESULTS:  Lab Results   Component Value Date     10/22/2018    POTASSIUM 3.9 10/22/2018    CHLORIDE 103 10/22/2018    CO2 23 10/22/2018    ANIONGAP 12 10/22/2018     (H) 10/22/2018    BUN 18 10/22/2018    CR 0.78 10/22/2018    GFRESTIMATED 73 10/22/2018    GFRESTBLACK 88 10/22/2018    JANAE 9.0 10/22/2018        INR RESULTS:  Lab Results   Component Value Date    INR 1.19 (H) 07/07/2018    INR 0.94 02/28/2011    INR 0.90 02/27/2011 "       Procedures:    Reviewed Echocardiogram from 11/2016 which was non-remarkable    Reviewed NM Stress Test from 2/2017 which was non-remarkable    Assessment and Plan:     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  The patient is status post ablation for paroxysmal atrial fibrillation in 06/2011 as well as another AFib ablation and atrial flutter ablation in 01/2012.  Both ablations were done at [facility].  The patient's last visit with me was in 01/2018.    1. Paroxysmal Atrial Fibrillation/Atrial Flutter. Patient becomes symptomatic when she goes into atrial fibrillation or atrial flutter requiring cardioversion. Has failed sotalol and flecainide therapy and has had two ablations in the past which she is not interested in undergoing again at this point.  - Had in depth discussion with patient regarding what therapy to pursue. Patient states that she is symptomatic with episodes but she does not have any syncope or other concerning symptoms. Patient would like to stay on current therapy with a plan to increase beta blockade if she has an episode.  - Will keep patient on Metoprolol XL 50 mg daily for now and give her 1 month supply of metoprolol 25 mg (short acting). Plan to have patient take 25 mg - 50 mg of short acting metoprolol if she goes into atrial flutter again.  - Will also give patient contact information of EP nurse coordinator to call if she needs to be cardioverted again   - Continue patient in Eliquis  - Reviewed lifestyle modifications, healthy habits, etc. with patient.      All questions and concerns were addressed and patient is happy with plan.      I have seen, interviewed, and examined patient. I reviewed the management plan with the patient.  I have reviewed the laboratory tests, imaging, and other investigations.  Discussed with the team and agree with the findings and plan in this resident/fellow/nurse  practitioner's note. In addition, changes in the physical examination, assessment and plan have been incorporated into the note by myself, as to make it a single cohesive document. Plan was communicated to referring team/physician.      Tyra Norris MD, MS  Cardiology/Cardiac EP Attending Staff        CC  Patient Care Team:  Kiesha Cohen MD as PCP - General (Internal Medicine)  Kiesha Cohen MD as MD (Internal Medicine)  Lanre Ramirez MD as MD (Dermapathology)  Livier Land, LIZETTE as Nurse Coordinator (Clinical Cardiac Electrophysiology)  Tyra Norris MD as MD (Cardiology)  Faina Peace MD as MD (Internal Medicine)  Danii Ocampo MD as MD (OB/Gyn)  Lanre Ramirez MD as MD (Dermapathology)  Faina Rowell APRN CNP as Nurse Practitioner  Slim Herrera MD as MD (Family Practice)  ESTABLISHED PATIENT      Answers for HPI/ROS submitted by the patient on 11/3/2018   General Symptoms: No  Skin Symptoms: No  HENT Symptoms: No  EYE SYMPTOMS: No  HEART SYMPTOMS: Yes  LUNG SYMPTOMS: No  INTESTINAL SYMPTOMS: No  URINARY SYMPTOMS: No  GYNECOLOGIC SYMPTOMS: No  BREAST SYMPTOMS: No  SKELETAL SYMPTOMS: No  BLOOD SYMPTOMS: No  NERVOUS SYSTEM SYMPTOMS: No  MENTAL HEALTH SYMPTOMS: No  Chest pain or pressure: No  Fast or irregular heartbeat: Yes  Pain in legs with walking: No  Trouble breathing while lying down: No  Fingers or toes appear blue: No  High blood pressure: Yes  Low blood pressure: No  Fainting: No  Murmurs: No  Pacemaker: No  Varicose veins: No  Edema or swelling: No  Wake up at night with shortness of breath: No  Light-headedness: No  Exercise intolerance: No

## 2018-11-05 NOTE — LETTER
11/5/2018      RE: Alysha MARR Ford  [address] [place] [id number]       Dear Colleague,    Thank you for the opportunity to participate in the care of your patient, Alysha Youngblood, at the [facility] HEART [facility]. Please see a copy of my visit note below.    HPI:   Patient presents after visit to the ED for DCCV for atrial flutter.    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  The patient is status post ablation for paroxysmal atrial fibrillation in 06/2011 as well as another AFib ablation and atrial flutter ablation in 01/2012.  Both ablations were done at [facility].  The patient's last visit with me was in 01/2018.    At patient's last visit, her Metoprolol XL was decreased from 75 mg daily to 50 mg daily. Since patient's last clinic visit, she has had two ED visits for atrial flutter, one in July (which she converted out of flutter after receiving adenosine), and one in October (after receiving DCCV). Patient is now presenting to clinic for ED follow up. Patient endorses having substantially more energy since decreasing her metoprolol. States that she does have triggers that cause her to go into atrial flutter--URI's, flying, lack of sleep, too much coffee, etc. Her two most recent episodes were caused by viral URI's.    Today she is feeling well. No fevers, chills, chest pain, SOB, abdominal pain, nausea, vomiting, diarrhea, constipation. No dizziness, light-headedness, syncope, or pre-syncope.    PAST MEDICAL HISTORY:  Past Medical History:   Diagnosis Date     Atrial fibrillation (H)     ablated 1/12     Ex-smoker      Hashimoto's disease      Hyperlipidaemia      Hypertension      ISABELA (obstructive sleep apnea) 3/12    AHI 7     PAC (premature atrial contraction)      PVC (premature ventricular contraction)        CURRENT MEDICATIONS:  Current Outpatient Prescriptions  "  Medication Sig Dispense Refill     apixaban ANTICOAGULANT (ELIQUIS) 5 MG tablet Take 1 tablet (5 mg) by mouth 2 times daily 180 tablet 3     Calcium-Vitamin D-Vitamin K (VIACTIV PO) Take by mouth 2 times daily        chlorhexidine (PERIDEX) 0.12 % solution SSP 15 ML PO BID  0     fish oil-omega-3 fatty acids (FISH OIL) 1000 MG capsule One capsule daily.       FLUZONE HIGH-DOSE 0.5 ML injection ADM 0.5ML IM UTD  0     hydrochlorothiazide (HYDRODIURIL) 25 MG tablet Take 25 mg by mouth daily       loratadine (CLARITIN) 10 MG tablet Take 10 mg by mouth daily       losartan (COZAAR) 100 MG tablet Take 100 mg by mouth daily       lovastatin (MEVACOR) 40 MG tablet Take 40 mg by mouth At Bedtime.       metoprolol succinate (TOPROL-XL) 50 MG 24 hr tablet Take 1 tablet (50 mg) by mouth daily 90 tablet 3     Zolpidem Tartrate (AMBIEN PO) Take 5 mg by mouth nightly as needed for sleep       Doxylamine Succinate, Sleep, (SLEEP AID PO)          PAST SURGICAL HISTORY:  Past Surgical History:   Procedure Laterality Date     APPENDECTOMY       ENT SURGERY       H ABLATION FOCAL AFIB  6/24/2011    [facility].      H ABLATION FOCAL AFIB  1/12/2012    [facility]. afib and aflutter ablations.        ALLERGIES:     Allergies   Allergen Reactions     Hmg-Coa-R Inhibitors      Made her lips tingle, so they put her on another statin instead.     Ibuprofen Other (See Comments)     Nsaids GI Disturbance     Liquid Adhesive Rash     After wearing patches for > 8 days  After wearing patches for > 8 days       FAMILY HISTORY:  - Premature coronary artery disease  - Atrial fibrillation  - Sudden cardiac death     SOCIAL HISTORY:  Social History   Substance Use Topics     Smoking status: Never Smoker     Smokeless tobacco: Never Used     Alcohol use No     Exam:  /84 (BP Location: Left arm, Patient Position: Chair, Cuff Size: Adult Large)  Pulse 67  Ht 1.676 m (5' 6\")  SpO2 95%  BMI 35.51 kg/m2     GENERAL APPEARANCE: healthy, alert and no " distress  HEENT: no icterus, no xanthelasmas, normal pupil size and reaction, normal palate, mucosa moist, no central cyanosis  NECK: no adenopathy, no asymmetry, masses, or scars, thyroid normal to palpation and no bruits, JVP not elevated  RESPIRATORY: lungs clear to auscultation - no rales, rhonchi or wheezes, no use of accessory muscles, no retractions, respirations are unlabored, normal respiratory rate  CARDIOVASCULAR: regular rhythm, normal S1 with physiologic split S2, no S3 or S4 and no murmur, click or rub, precordium quiet with normal PMI.  ABDOMEN: soft, non tender, without hepatosplenomegaly, no masses palpable, bowel sounds normal, aorta not enlarged by palpation, no abdominal bruits  EXTREMITIES: peripheral pulses normal, no edema, no bruits  NEURO: alert and oriented to person/place/time, normal speech, gait and affect  VASC: Radial, femoral, dorsalis pedis and posterior tibialis pulses are normal in volumes and symmetric bilaterally. No bruits are heard.  SKIN: no ecchymoses, no rashes    Labs:  CBC RESULTS:   Lab Results   Component Value Date    WBC 6.9 10/22/2018    RBC 5.18 10/22/2018    HGB 15.8 (H) 10/22/2018    HCT 48.6 (H) 10/22/2018    MCV 94 10/22/2018    MCH 30.5 10/22/2018    MCHC 32.5 10/22/2018    RDW 12.9 10/22/2018     10/22/2018       BMP RESULTS:  Lab Results   Component Value Date     10/22/2018    POTASSIUM 3.9 10/22/2018    CHLORIDE 103 10/22/2018    CO2 23 10/22/2018    ANIONGAP 12 10/22/2018     (H) 10/22/2018    BUN 18 10/22/2018    CR 0.78 10/22/2018    GFRESTIMATED 73 10/22/2018    GFRESTBLACK 88 10/22/2018    JANAE 9.0 10/22/2018        INR RESULTS:  Lab Results   Component Value Date    INR 1.19 (H) 07/07/2018    INR 0.94 02/28/2011    INR 0.90 02/27/2011       Procedures:    Reviewed Echocardiogram from 11/2016 which was non-remarkable    Reviewed NM Stress Test from 2/2017 which was non-remarkable    Assessment and Plan:     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  The patient is status post ablation for paroxysmal atrial fibrillation in 06/2011 as well as another AFib ablation and atrial flutter ablation in 01/2012.  Both ablations were done at [facility].  The patient's last visit with me was in 01/2018.    1. Paroxysmal Atrial Fibrillation/Atrial Flutter. Patient becomes symptomatic when she goes into atrial fibrillation or atrial flutter requiring cardioversion. Has failed sotalol and flecainide therapy and has had two ablations in the past which she is not interested in undergoing again at this point.  - Had in depth discussion with patient regarding what therapy to pursue. Patient states that she is symptomatic with episodes but she does not have any syncope or other concerning symptoms. Patient would like to stay on current therapy with a plan to increase beta blockade if she has an episode.  - Will keep patient on Metoprolol XL 50 mg daily for now and give her 1 month supply of metoprolol 25 mg (short acting). Plan to have patient take 25 mg - 50 mg of short acting metoprolol if she goes into atrial flutter again.  - Will also give patient contact information of EP nurse coordinator to call if she needs to be cardioverted again   - Continue patient in Eliquis  - Reviewed lifestyle modifications, healthy habits, etc. with patient.      All questions and concerns were addressed and patient is happy with plan.      I have seen, interviewed, and examined patient. I reviewed the management plan with the patient.  I have reviewed the laboratory tests, imaging, and other investigations.  Discussed with the team and agree with the findings and plan in this resident/fellow/nurse practitioner's note. In addition, changes in the physical examination, assessment and plan have been incorporated into the note by myself, as to make it a single cohesive document. Plan was  communicated to referring team/physician.      Tyra Norris MD, MS  Cardiology/Cardiac EP Attending Staff        CC  Patient Care Team:  Kiesha Cohen MD as PCP - General (Internal Medicine)  Kiesha Cohen MD as MD (Internal Medicine)  Lanre Ramirez MD as MD (Dermapathology)  Livier Land, RN as Nurse Coordinator (Clinical Cardiac Electrophysiology)  Tyra Norris MD as MD (Cardiology)  Faina Peace MD as MD (Internal Medicine)  Danii Ocampo MD as MD (OB/Gyn)  Lanre Ramirez MD as MD (Dermapathology)  Faina Rowell APRN CNP as Nurse Practitioner  Slim Herrera MD as MD (Family Practice)  ESTABLISHED PATIENT

## 2018-11-05 NOTE — PATIENT INSTRUCTIONS
Cardiology Provider you saw in clinic today: Dr. Tyra Norris     Medication Changes:      1. Metoprolol tartrate 25mg as needed up to 50mg a day for onset of atrial flutter.     Follow-up Visit:  As needed    Further Instructions:  Please call us if you are in atrial flutter and we will try to expedite cardioversion for you.        Please contact us via [employer] for questions or concerns.    Livier Land RN   Electrophysiology Nurse Coordinator.  [phone]    If your question concerns the schedule/appointment times, contact:  [facility] Procedure   [phone]    Device Clinic (Pacemakers, ICD, Loop Recorders)   [phone]      You will receive all normal lab and testing results via [employer] or mail if not reviewed in clinic today.   If you need a medication refill please contact your pharmacy.    As always, thank you for trusting us with your health care needs!

## 2018-11-05 NOTE — MR AVS SNAPSHOT
After Visit Summary   11/5/2018    Alysha Youngblood    MRN: [id number]           Patient Information     Date Of Birth          1950        Visit Information        Provider Department      11/5/2018 1:30 PM Tyra Norris MD [facility]        Today's Diagnoses     Paroxysmal atrial fibrillation (H)    -  1    Atypical atrial flutter (H)          Care Instructions    Cardiology Provider you saw in clinic today: Dr. Tyra Norris     Medication Changes:      1. Metoprolol tartrate 25mg as needed up to 50mg a day for onset of atrial flutter.     Follow-up Visit:  As needed    Further Instructions:  Please call us if you are in atrial flutter and we will try to expedite cardioversion for you.        Please contact us via [employer] for questions or concerns.    Livier Land RN   Electrophysiology Nurse Coordinator.  [phone]    If your question concerns the schedule/appointment times, contact:  [facility] Procedure   [phone]    Device Clinic (Pacemakers, ICD, Loop Recorders)   [phone]      You will receive all normal lab and testing results via [employer] or mail if not reviewed in clinic today.   If you need a medication refill please contact your pharmacy.    As always, thank you for trusting us with your health care needs!            Follow-ups after your visit        Who to contact     If you have questions or need follow up information about today's clinic visit or your schedule please contact [facility] directly at [phone].  Normal or non-critical lab and imaging results will be communicated to you by MyChart, letter or phone within 4 business days after the clinic has received the results. If you do not hear from us within 7 days, please contact the clinic through [employer] or phone. If you have a critical or abnormal lab result, we will notify you by phone as soon as possible.  Submit refill requests through [employer] or call your pharmacy and they will  "forward the refill request to us. Please allow 3 business days for your refill to be completed.          Additional Information About Your Visit        The Other [employer] I/O Information     [employer] gives you secure access to your electronic health record. If you see a primary care provider, you can also send messages to your care team and make appointments. If you have questions, please call your primary care clinic.  If you do not have a primary care provider, please call [phone] and they will assist you.        Care EveryWhere ID     This is your Care EveryWhere ID. This could be used by other organizations to access your [place] medical records  [id number]        Your Vitals Were     Pulse Height Pulse Oximetry BMI (Body Mass Index)          67 1.676 m (5' 6\") 95% 35.51 kg/m2         Blood Pressure from Last 3 Encounters:   11/05/18 137/84   10/22/18 (!) 156/97   07/07/18 126/80    Weight from Last 3 Encounters:   10/22/18 99.8 kg (220 lb)   07/07/18 97.5 kg (215 lb)   06/19/18 101.1 kg (222 lb 14.4 oz)              Today, you had the following     No orders found for display         Today's Medication Changes          These changes are accurate as of 11/5/18  6:53 PM.  If you have any questions, ask your nurse or doctor.               Start taking these medicines.        Dose/Directions    metoprolol tartrate 25 MG tablet   Commonly known as:  LOPRESSOR   Used for:  Paroxysmal atrial fibrillation (H), Atypical atrial flutter (H)   Started by:  Tyra Norris MD        Take 25mg with onset of arrhythmia, up to 50 mg daily.   Quantity:  30 tablet   Refills:  0         Stop taking these medicines if you haven't already. Please contact your care team if you have questions.     fluconazole 10 MG/ML suspension   Commonly known as:  DIFLUCAN   Stopped by:  Tyra Norris MD                Where to get your medicines      These medications were sent to [facility] [address] [facility] [address] [facility] [address]  [address] [facility] [address]     Phone:  [phone]     metoprolol tartrate 25 MG tablet                Primary Care Provider Office Phone # Fax #    Kiesha Cohen [phone]       [facility] CARE [address] [facility] [address]        Equal Access to Services     [facility]: Hadii aad ku hadasho Soomaali, waaxda luqadaha, qaybta kaalmada adeegyada, waxay idiin hayaan adeeg kharash la'aan . So [facility] [phone].    ATENCIÓN: Si habla español, tiene a erazo disposición servicios gratuitos de asistencia lingüística. Paulino al [phone].    We comply with applicable federal civil rights laws and [place] laws. We do not discriminate on the basis of race, color, national origin, age, disability, sex, sexual orientation, or gender identity.            Thank you!     Thank you for choosing [facility]  for your care. Our goal is always to provide you with excellent care. Hearing back from our patients is one way we can continue to improve our services. Please take a few minutes to complete the written survey that you may receive in the mail after your visit with us. Thank you!             Your Updated Medication List - Protect others around you: Learn how to safely use, store and throw away your medicines at www.disposemymeds.org.          This list is accurate as of 11/5/18  6:53 PM.  Always use your most recent med list.                   Brand Name Dispense Instructions for use Diagnosis    AMBIEN PO      Take 5 mg by mouth nightly as needed for sleep        apixaban ANTICOAGULANT 5 MG tablet    ELIQUIS    180 tablet    Take 1 tablet (5 mg) by mouth 2 times daily    Atrial fibrillation (H)       chlorhexidine 0.12 % solution    PERIDEX     SSP 15 ML PO BID        fish oil-omega-3 fatty acids 1000 MG capsule      One capsule daily.        FLUZONE HIGH-DOSE 0.5 ML injection   Generic drug:  influenza Vac Split High-Dose      ADM 0.5ML IM UTD         hydrochlorothiazide 25 MG tablet    HYDRODIURIL     Take 25 mg by mouth daily        loratadine 10 MG tablet    CLARITIN     Take 10 mg by mouth daily        losartan 100 MG tablet    COZAAR     Take 100 mg by mouth daily        lovastatin 40 MG tablet    MEVACOR     Take 40 mg by mouth At Bedtime.        metoprolol succinate 50 MG 24 hr tablet    TOPROL-XL    90 tablet    Take 1 tablet (50 mg) by mouth daily        metoprolol tartrate 25 MG tablet    LOPRESSOR    30 tablet    Take 25mg with onset of arrhythmia, up to 50 mg daily.    Paroxysmal atrial fibrillation (H), Atypical atrial flutter (H)       SLEEP AID PO           VIACTIV PO      Take by mouth 2 times daily

## 2018-12-06 NOTE — NURSING NOTE
Dermatology Rooming Note    Alysha Youngblood's goals for this visit include:   Chief Complaint   Patient presents with     Skin Check     Alysha is here today for a mole that she is concerned about.      NIGEL Boles    
Yes

## 2018-12-10 ENCOUNTER — OFFICE VISIT (OUTPATIENT)
Dept: CARDIOLOGY | Facility: CLINIC | Age: 68
End: 2018-12-10
Attending: NURSE PRACTITIONER
Payer: COMMERCIAL

## 2018-12-10 VITALS
SYSTOLIC BLOOD PRESSURE: 141 MMHG | DIASTOLIC BLOOD PRESSURE: 83 MMHG | HEIGHT: 67 IN | OXYGEN SATURATION: 96 % | HEART RATE: 65 BPM | BODY MASS INDEX: 34.21 KG/M2 | WEIGHT: 218 LBS

## 2018-12-10 DIAGNOSIS — I48.4 ATYPICAL ATRIAL FLUTTER (H): ICD-10-CM

## 2018-12-10 DIAGNOSIS — I48.0 PAROXYSMAL ATRIAL FIBRILLATION (H): Primary | ICD-10-CM

## 2018-12-10 PROCEDURE — 93005 ELECTROCARDIOGRAM TRACING: CPT | Mod: ZF

## 2018-12-10 PROCEDURE — 93010 ELECTROCARDIOGRAM REPORT: CPT | Mod: ZP | Performed by: INTERNAL MEDICINE

## 2018-12-10 PROCEDURE — 99213 OFFICE O/P EST LOW 20 MIN: CPT | Mod: ZP | Performed by: NURSE PRACTITIONER

## 2018-12-10 PROCEDURE — G0463 HOSPITAL OUTPT CLINIC VISIT: HCPCS | Mod: 25,ZF

## 2018-12-10 RX ORDER — METOPROLOL SUCCINATE 50 MG/1
75 TABLET, EXTENDED RELEASE ORAL DAILY
Qty: 135 TABLET | Refills: 3 | Status: SHIPPED | OUTPATIENT
Start: 2018-12-10 | End: 2019-12-18

## 2018-12-10 ASSESSMENT — ENCOUNTER SYMPTOMS
LIGHT-HEADEDNESS: 0
NAUSEA: 0
EXERCISE INTOLERANCE: 0
CONSTIPATION: 0
ABDOMINAL PAIN: 0
ORTHOPNEA: 0
HYPERTENSION: 0
LEG PAIN: 0
SYNCOPE: 0
BOWEL INCONTINENCE: 0
BLOATING: 0
BLOOD IN STOOL: 0
HEARTBURN: 0
SLEEP DISTURBANCES DUE TO BREATHING: 0
PALPITATIONS: 1
JAUNDICE: 0
DIARRHEA: 1
RECTAL PAIN: 0
VOMITING: 0
HYPOTENSION: 0

## 2018-12-10 ASSESSMENT — PAIN SCALES - GENERAL: PAINLEVEL: NO PAIN (0)

## 2018-12-10 ASSESSMENT — MIFFLIN-ST. JEOR: SCORE: 1548.53

## 2018-12-10 NOTE — PROGRESS NOTES
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  The patient has a past medical history significant for PAF/atypical AFL s/p AF ablation (6/2011), AF/AFL ablation (1/2012) both at [facility], and multiple DCCVs (last 10/2018), Hahimoto's disease, HTN, HLD, and ISABELA.  Sotalol and flecainide therapy have failed in the past.  She has not been interested in pursuing repeat ablation. She is currently on metoprolol XL 50 mg daily with an additional metoprolol 25-50 mg (short acting) PRN at the start of an  AF/AFL episode as a total dose of 75 mg metoprolol daily on schedule caused her to have fatigue.     Reviewed current interval:  -  Echocardiogram 11/2016 showed normal LV function with EF 55-60% and no significant valvular pathologies  - NM MPI treadmill 2/20187 showed no evidence of ischemia or perfusion abnormalities   - Presenting EKG personally reviewed tracings: NSR, Vent rate 63, MS interval 156 ms, QRS duration 84 ms, QTc 417 ms    Current Interval history:   States that she had diarrhea which was bad last week and increased palpitations last Mon-Wed for which she took an additonal short acting metoprolol which helped and she also has been taking metoprolol XL 75 mg once daily since which she also feels is helpful.  Improved since last Wed evening and now resolved. Patient states that she rarely misses doses of medication. Patient denies tobacco use.  Drinking alcohol, none for the past week.   Denies drinking caffeinated beverages. States that activity level is moderate, swimming and 20-30 minutes elliptical without problems.  She declines CPAP because she cannot sleep with it on.  Sleeps with one pillow under head.  Denies chest pain or pressure, dizziness, syncope, dyspnea at rest or with exertion, orthopnea, PND, or pedal edema.  Denies easy bruising or bleeding, hematuria, hematochezia, and  epistaxis. Denies signs/symptoms of stroke such as visual disturbance, difficulty speaking, facial drooping, confusion, problems with gait, or any new numbness or weakness.    Current Outpatient Medications   Medication Sig Dispense Refill     apixaban ANTICOAGULANT (ELIQUIS) 5 MG tablet Take 1 tablet (5 mg) by mouth 2 times daily 180 tablet 3     Calcium-Vitamin D-Vitamin K (VIACTIV PO) Take by mouth 2 times daily        chlorhexidine (PERIDEX) 0.12 % solution SSP 15 ML PO BID  0     Doxylamine Succinate, Sleep, (SLEEP AID PO)        fish oil-omega-3 fatty acids (FISH OIL) 1000 MG capsule One capsule daily.       FLUZONE HIGH-DOSE 0.5 ML injection ADM 0.5ML IM UTD  0     hydrochlorothiazide (HYDRODIURIL) 25 MG tablet Take 25 mg by mouth daily       loratadine (CLARITIN) 10 MG tablet Take 10 mg by mouth daily       losartan (COZAAR) 100 MG tablet Take 100 mg by mouth daily       lovastatin (MEVACOR) 40 MG tablet Take 40 mg by mouth At Bedtime.       metoprolol succinate (TOPROL-XL) 50 MG 24 hr tablet Take 1 tablet (50 mg) by mouth daily 90 tablet 3     metoprolol tartrate (LOPRESSOR) 25 MG tablet Take 25mg with onset of arrhythmia, up to 50 mg daily. 30 tablet 0     Zolpidem Tartrate (AMBIEN PO) Take 5 mg by mouth nightly as needed for sleep         Past Medical History:   Diagnosis Date     Atrial fibrillation (H)     ablated 1/12     Ex-smoker      Hashimoto's disease      Hyperlipidaemia      Hypertension      ISABELA (obstructive sleep apnea) 3/12    AHI 7     PAC (premature atrial contraction)      PVC (premature ventricular contraction)        Past Surgical History:   Procedure Laterality Date     APPENDECTOMY       ENT SURGERY       H ABLATION FOCAL AFIB  6/24/2011    [facility].      H ABLATION FOCAL AFIB  1/12/2012    [facility]. afib and aflutter ablations.        Family History   Problem Relation Age of Onset     Cancer No family hx of         No known family hx of skin cancer       Social History     Tobacco Use      "Smoking status: Never Smoker     Smokeless tobacco: Never Used   Substance Use Topics     Alcohol use: No       Allergies   Allergen Reactions     Hmg-Coa-R Inhibitors      Made her lips tingle, so they put her on another statin instead.     Ibuprofen Other (See Comments)     Nsaids GI Disturbance     Liquid Adhesive Rash     After wearing patches for > 8 days  After wearing patches for > 8 days         ROS:   A complete review of systems was performed and is negative except as noted in the HPI.     Physical Examination:  Vitals: /83 (BP Location: Left arm, Patient Position: Chair, Cuff Size: Adult Large)   Pulse 65   Ht 1.689 m (5' 6.5\")   Wt 98.9 kg (218 lb)   SpO2 96%   BMI 34.66 kg/m    BMI= Body mass index is 34.66 kg/m .    GENERAL APPEARANCE: healthy, alert, and no acute distress  HEENT: no icterus, no xanthelasmas, normal pupil size and reaction, no cyanosis.  NECK: no asymmetry, no cervical bruits, no JVD   RESPIRATORY: lungs clear to auscultation - no rales, rhonchi or wheezes, no use of accessory muscles, no retractions, respirations are unlabored, normal respiratory rate  CARDIOVASCULAR: regular rhythm, normal S1 with physiologic split S2, no S3 or S4 and no murmur, click or rub  GI:  no abdominal bruits, soft, non-tender  EXTREMITIES: no clubbing  NEURO: alert and oriented to person/place/time, normal speech, gait and affect  VASC: Radial and posterior tibialis pulses +2 and symmetric bilaterally. No cyanosis or edema.   SKIN: no ecchymoses, no rashes    Assessment and recommendations:    # Paroxysmal atrial fibrillation, atypical atrial flutter: Discussed in detail with the patient management/treatment options for AF/AFL includin. Stroke Prophylaxis:  CHADSVASC=age+, female+, HTN+  3, corresponding to a 3.2% annual stroke / systemic emolism event rate. indicating need for long term oral anticoagulation.  She has been taking apixaban without bleeding problems. Her kidney function has " been normal. Continue apixaban 5 mg twice daily.   2. Rate Control: Increase back to Metoprolol XL 75 mg daily. Take an additional 25 mg of short acting metoprolol at the start of an AF/AFL episode.   3. Rhythm Control: Utilizing a rate control strategy. Sotalol and flecainide therapy have failed in the past.  She has not been interested in pursuing repeat ablation.  She may call or send a [employer] message if she has an episode of AF/AFL and wishes to arrange an outpatient cardioversion.   4. Risk factor modifications: Keep healthy cholesterol levels, maintain BP control, maintain a healthy weight, and limit caffeine and alcohol.  Get at least 150 to 300 minutes/week moderate intensity aerobic physical activities.  Also, do muscle strengthening activities on 2 or more days/week.  6. ISABELA evaluation is not indicated.  She has ISABELA and declines CPAP.     FOLLOW UP: Follow up in one year with Dr. Tyra Norris or follow up sooner if needed for problems or symptoms.    Patient expresses understanding and agreement with the plan.    I appreciate the chance to help with Alysha Youngblood's care. Please let me know if you have any questions or concerns.    Leslye LAWS, CNP

## 2018-12-10 NOTE — NURSING NOTE
Chief Complaint   Patient presents with     Follow Up For     Follow up PAF/AFL.     Vitals were taken and medications were reconciled. EKG was performed    Agata MANNING  10:19 AM

## 2018-12-10 NOTE — LETTER
12/10/2018      RE: Alysha Youngblood  [address]  [facility] [address]       Dear Colleague,    Thank you for the opportunity to participate in the care of your patient, Alysha Youngblood, at the [facility] HEART [facility]. Please see a copy of my visit note below.        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  The patient has a past medical history significant for PAF/atypical AFL s/p AF ablation (6/2011), AF/AFL ablation (1/2012) both at [facility], and multiple DCCVs (last 10/2018), Hahimoto's disease, HTN, HLD, and ISABELA.  Sotalol and flecainide therapy have failed in the past.  She has not been interested in pursuing repeat ablation. She is currently on metoprolol XL 50 mg daily with an additional metoprolol 25-50 mg (short acting) PRN at the start of an  AF/AFL episode as a total dose of 75 mg metoprolol daily on schedule caused her to have fatigue.     Reviewed current interval:  -  Echocardiogram 11/2016 showed normal LV function with EF 55-60% and no significant valvular pathologies  - NM MPI treadmill 2/20187 showed no evidence of ischemia or perfusion abnormalities   - Presenting EKG personally reviewed tracings: NSR, Vent rate 63, CT interval 156 ms, QRS duration 84 ms, QTc 417 ms    Current Interval history:   States that she had diarrhea which was bad last week and increased palpitations last Mon-Wed for which she took an additonal short acting metoprolol which helped and she also has been taking metoprolol XL 75 mg once daily since which she also feels is helpful.  Improved since last Wed evening and now resolved. Patient states that she rarely misses doses of medication. Patient denies tobacco use.  Drinking alcohol, none for the past week.   Denies drinking caffeinated beverages. States that activity level is moderate, swimming and  20-30 minutes elliptical without problems.  She declines CPAP because she cannot sleep with it on.  Sleeps with one pillow under head.  Denies chest pain or pressure, dizziness, syncope, dyspnea at rest or with exertion, orthopnea, PND, or pedal edema.  Denies easy bruising or bleeding, hematuria, hematochezia, and epistaxis. Denies signs/symptoms of stroke such as visual disturbance, difficulty speaking, facial drooping, confusion, problems with gait, or any new numbness or weakness.    Current Outpatient Medications   Medication Sig Dispense Refill     apixaban ANTICOAGULANT (ELIQUIS) 5 MG tablet Take 1 tablet (5 mg) by mouth 2 times daily 180 tablet 3     Calcium-Vitamin D-Vitamin K (VIACTIV PO) Take by mouth 2 times daily        chlorhexidine (PERIDEX) 0.12 % solution SSP 15 ML PO BID  0     Doxylamine Succinate, Sleep, (SLEEP AID PO)        fish oil-omega-3 fatty acids (FISH OIL) 1000 MG capsule One capsule daily.       FLUZONE HIGH-DOSE 0.5 ML injection ADM 0.5ML IM UTD  0     hydrochlorothiazide (HYDRODIURIL) 25 MG tablet Take 25 mg by mouth daily       loratadine (CLARITIN) 10 MG tablet Take 10 mg by mouth daily       losartan (COZAAR) 100 MG tablet Take 100 mg by mouth daily       lovastatin (MEVACOR) 40 MG tablet Take 40 mg by mouth At Bedtime.       metoprolol succinate (TOPROL-XL) 50 MG 24 hr tablet Take 1 tablet (50 mg) by mouth daily 90 tablet 3     metoprolol tartrate (LOPRESSOR) 25 MG tablet Take 25mg with onset of arrhythmia, up to 50 mg daily. 30 tablet 0     Zolpidem Tartrate (AMBIEN PO) Take 5 mg by mouth nightly as needed for sleep         Past Medical History:   Diagnosis Date     Atrial fibrillation (H)     ablated 1/12     Ex-smoker      Hashimoto's disease      Hyperlipidaemia      Hypertension      ISABELA (obstructive sleep apnea) 3/12    AHI 7     PAC (premature atrial contraction)      PVC (premature ventricular contraction)        Past Surgical History:   Procedure Laterality Date      "APPENDECTOMY       ENT SURGERY       H ABLATION FOCAL AFIB  6/24/2011    [facility].      H ABLATION FOCAL AFIB  1/12/2012    [facility]. afib and aflutter ablations.        Family History   Problem Relation Age of Onset     Cancer No family hx of         No known family hx of skin cancer       Social History     Tobacco Use     Smoking status: Never Smoker     Smokeless tobacco: Never Used   Substance Use Topics     Alcohol use: No       Allergies   Allergen Reactions     Hmg-Coa-R Inhibitors      Made her lips tingle, so they put her on another statin instead.     Ibuprofen Other (See Comments)     Nsaids GI Disturbance     Liquid Adhesive Rash     After wearing patches for > 8 days  After wearing patches for > 8 days         ROS:   A complete review of systems was performed and is negative except as noted in the HPI.     Physical Examination:  Vitals: /83 (BP Location: Left arm, Patient Position: Chair, Cuff Size: Adult Large)   Pulse 65   Ht 1.689 m (5' 6.5\")   Wt 98.9 kg (218 lb)   SpO2 96%   BMI 34.66 kg/m     BMI= Body mass index is 34.66 kg/m .    GENERAL APPEARANCE: healthy, alert, and no acute distress  HEENT: no icterus, no xanthelasmas, normal pupil size and reaction, no cyanosis.  NECK: no asymmetry, no cervical bruits, no JVD   RESPIRATORY: lungs clear to auscultation - no rales, rhonchi or wheezes, no use of accessory muscles, no retractions, respirations are unlabored, normal respiratory rate  CARDIOVASCULAR: regular rhythm, normal S1 with physiologic split S2, no S3 or S4 and no murmur, click or rub  GI:  no abdominal bruits, soft, non-tender  EXTREMITIES: no clubbing  NEURO: alert and oriented to person/place/time, normal speech, gait and affect  VASC: Radial and posterior tibialis pulses +2 and symmetric bilaterally. No cyanosis or edema.   SKIN: no ecchymoses, no rashes    Assessment and recommendations:    # Paroxysmal atrial fibrillation, atypical atrial flutter: Discussed in detail with the " patient management/treatment options for AF/AFL includin. Stroke Prophylaxis:  CHADSVASC=age+, female+, HTN+  3, corresponding to a 3.2% annual stroke / systemic emolism event rate. indicating need for long term oral anticoagulation.  She has been taking apixaban without bleeding problems. Her kidney function has been normal. Continue apixaban 5 mg twice daily.   2. Rate Control: Increase back to Metoprolol XL 75 mg daily. Take an additional 25 mg of short acting metoprolol at the start of an AF/AFL episode.   3. Rhythm Control: Utilizing a rate control strategy. Sotalol and flecainide therapy have failed in the past.  She has not been interested in pursuing repeat ablation.  She may call or send a Wisr message if she has an episode of AF/AFL and wishes to arrange an outpatient cardioversion.   4. Risk factor modifications: Keep healthy cholesterol levels, maintain BP control, maintain a healthy weight, and limit caffeine and alcohol.  Get at least 150 to 300 minutes/week moderate intensity aerobic physical activities.  Also, do muscle strengthening activities on 2 or more days/week.  6. ISABELA evaluation is not indicated.  She has ISABELA and declines CPAP.     FOLLOW UP: Follow up in one year with Dr. Tyra Norris or follow up sooner if needed for problems or symptoms.    Patient expresses understanding and agreement with the plan.    I appreciate the chance to help with Alysha Youngblood's care. Please let me know if you have any questions or concerns.        EUSEBIA Reddy CNP

## 2018-12-10 NOTE — PATIENT INSTRUCTIONS
Cardiology Provider you saw in clinic today: Leslye LAWS, NP-C    Medication Changes:    1. Increase back to metoprolol XL 75 mg once daily.  2. Take an additional 25 mg of short acting metoprolol at the start of an Afib episode     Labs/Tests needed:  none     Follow-up Visit:  none    Further Instructions:      You will receive all normal lab and testing results via [employer] or mail if not reviewed in clinic today. Please contact our office if you need assistance with setting up [facility].    If you need a medication refill please contact your pharmacy. Please allow 3 business days for your refill to be completed.     As always, thank you for trusting us with your health care needs!    If you have any questions regarding your visit please contact your care team:   Cardiology  Telephone Number    EP RN  Electrophysiology Nurse Coordinator [phone]     Call for EP procedure scheduling concerns  COLLEEN Barrera  [phone]

## 2018-12-11 LAB — INTERPRETATION ECG - MUSE: NORMAL

## 2019-04-04 ENCOUNTER — TRANSFERRED RECORDS (OUTPATIENT)
Dept: HEALTH INFORMATION MANAGEMENT | Facility: CLINIC | Age: 69
End: 2019-04-04
Payer: COMMERCIAL

## 2019-04-10 ENCOUNTER — TRANSFERRED RECORDS (OUTPATIENT)
Dept: HEALTH INFORMATION MANAGEMENT | Facility: CLINIC | Age: 69
End: 2019-04-10
Payer: COMMERCIAL

## 2019-04-10 LAB
CREATININE (EXTERNAL): 0.73 MG/DL (ref 0.52–1.04)
GFR ESTIMATED (EXTERNAL): 86 ML/MIN/1.73M2
GFR ESTIMATED (IF AFRICAN AMERICAN) (EXTERNAL): >90 ML/MIN/1.73M2
GLUCOSE (EXTERNAL): 108 MG/DL (ref 70–99)
POTASSIUM (EXTERNAL): 4.2 MMOL/L (ref 3.4–5.3)

## 2019-04-11 ENCOUNTER — TRANSFERRED RECORDS (OUTPATIENT)
Dept: HEALTH INFORMATION MANAGEMENT | Facility: CLINIC | Age: 69
End: 2019-04-11
Payer: COMMERCIAL

## 2019-04-24 ENCOUNTER — ANCILLARY PROCEDURE (OUTPATIENT)
Dept: MAMMOGRAPHY | Facility: CLINIC | Age: 69
End: 2019-04-24
Attending: INTERNAL MEDICINE
Payer: COMMERCIAL

## 2019-04-24 DIAGNOSIS — Z12.39 SCREENING BREAST EXAMINATION: ICD-10-CM

## 2019-06-21 ENCOUNTER — ANCILLARY PROCEDURE (OUTPATIENT)
Dept: CT IMAGING | Facility: CLINIC | Age: 69
End: 2019-06-21
Attending: INTERNAL MEDICINE
Payer: COMMERCIAL

## 2019-06-21 DIAGNOSIS — R91.1 PULMONARY NODULE: ICD-10-CM

## 2019-06-21 LAB — RADIOLOGIST FLAGS: NORMAL

## 2019-06-24 ENCOUNTER — TRANSFERRED RECORDS (OUTPATIENT)
Dept: HEALTH INFORMATION MANAGEMENT | Facility: CLINIC | Age: 69
End: 2019-06-24
Payer: COMMERCIAL

## 2019-07-23 DIAGNOSIS — I48.91 ATRIAL FIBRILLATION (H): ICD-10-CM

## 2019-07-23 NOTE — TELEPHONE ENCOUNTER
apixaban ANTICOAGULANT (ELIQUIS) 5 MG tablet      Last Written Prescription Date:  5-14-18  Last Fill Quantity: 180,   # refills: 3  Last Office Visit : 12-10-18  Future Office visit:  none     FYI to Cardiology RF RN: Last clinic appt > 6 month ago, --per last clinic note rtc one year.  90 day sent to pharm  Lab will be due 10- 22-19

## 2019-10-03 ENCOUNTER — HEALTH MAINTENANCE LETTER (OUTPATIENT)
Age: 69
End: 2019-10-03

## 2019-10-21 DIAGNOSIS — I48.91 ATRIAL FIBRILLATION (H): ICD-10-CM

## 2019-10-21 NOTE — TELEPHONE ENCOUNTER
Received fax on 10/21/19 from [facility] in [place] requesting refill(s) for the following medication(s):    1.   Rx Number:  [id number]   Drug:  Eliquis 5 MG        Sig: Take 1 tablet by mouth twice daily   Prescribed Qty:  180    Last Refill:  07/23/19     Requested P/U Time:  10/22/2019      Patient's Last Cardiology Appointment:  12/10/18   Patient's Next Cardiology Appointment:  Patient will schedule at [facility].        Refill encounter routed to Abi silva .  Anne Beltran L.P.N.

## 2019-10-22 NOTE — TELEPHONE ENCOUNTER
Signed Prescriptions:                        Disp   Refills    apixaban ANTICOAGULANT (ELIQUIS) 5 MG tabl*180 ta*0        Sig: Take 1 tablet (5 mg) by mouth 2 times daily Please           schedule clinic visit for further refills, lab           work due   Authorizing Provider: TOPHER MELARA  Ordering User: MEKA LOPEZ    Rx filled x1 with no refills.  Patient is due for yearly and labs.  Meka Lopez RN

## 2019-11-18 ENCOUNTER — DOCUMENTATION ONLY (OUTPATIENT)
Dept: CARE COORDINATION | Facility: CLINIC | Age: 69
End: 2019-11-18

## 2019-12-13 DIAGNOSIS — Z92.29 HX OF LONG TERM USE OF BLOOD THINNERS: Primary | ICD-10-CM

## 2019-12-16 ENCOUNTER — OFFICE VISIT (OUTPATIENT)
Dept: CARDIOLOGY | Facility: CLINIC | Age: 69
End: 2019-12-16
Attending: INTERNAL MEDICINE
Payer: COMMERCIAL

## 2019-12-16 VITALS
OXYGEN SATURATION: 96 % | DIASTOLIC BLOOD PRESSURE: 89 MMHG | BODY MASS INDEX: 33.93 KG/M2 | SYSTOLIC BLOOD PRESSURE: 135 MMHG | HEIGHT: 67 IN | HEART RATE: 71 BPM | WEIGHT: 216.2 LBS

## 2019-12-16 DIAGNOSIS — Z92.29 HX OF LONG TERM USE OF BLOOD THINNERS: ICD-10-CM

## 2019-12-16 DIAGNOSIS — I48.0 PAROXYSMAL ATRIAL FIBRILLATION (H): Primary | ICD-10-CM

## 2019-12-16 LAB
ALBUMIN SERPL-MCNC: 3.4 G/DL (ref 3.4–5)
ALP SERPL-CCNC: 80 U/L (ref 40–150)
ALT SERPL W P-5'-P-CCNC: 42 U/L (ref 0–50)
ANION GAP SERPL CALCULATED.3IONS-SCNC: 4 MMOL/L (ref 3–14)
AST SERPL W P-5'-P-CCNC: 18 U/L (ref 0–45)
BILIRUB SERPL-MCNC: 0.3 MG/DL (ref 0.2–1.3)
BUN SERPL-MCNC: 25 MG/DL (ref 7–30)
CALCIUM SERPL-MCNC: 9.1 MG/DL (ref 8.5–10.1)
CHLORIDE SERPL-SCNC: 103 MMOL/L (ref 94–109)
CO2 SERPL-SCNC: 31 MMOL/L (ref 20–32)
CREAT SERPL-MCNC: 0.78 MG/DL (ref 0.52–1.04)
ERYTHROCYTE [DISTWIDTH] IN BLOOD BY AUTOMATED COUNT: 11.9 % (ref 10–15)
GFR SERPL CREATININE-BSD FRML MDRD: 77 ML/MIN/{1.73_M2}
GLUCOSE SERPL-MCNC: 112 MG/DL (ref 70–99)
HCT VFR BLD AUTO: 45.1 % (ref 35–47)
HGB BLD-MCNC: 14.6 G/DL (ref 11.7–15.7)
MCH RBC QN AUTO: 29.9 PG (ref 26.5–33)
MCHC RBC AUTO-ENTMCNC: 32.4 G/DL (ref 31.5–36.5)
MCV RBC AUTO: 92 FL (ref 78–100)
PLATELET # BLD AUTO: 275 10E9/L (ref 150–450)
POTASSIUM SERPL-SCNC: 3.5 MMOL/L (ref 3.4–5.3)
PROT SERPL-MCNC: 6.6 G/DL (ref 6.8–8.8)
RBC # BLD AUTO: 4.88 10E12/L (ref 3.8–5.2)
SODIUM SERPL-SCNC: 139 MMOL/L (ref 133–144)
WBC # BLD AUTO: 8.6 10E9/L (ref 4–11)

## 2019-12-16 PROCEDURE — 80053 COMPREHEN METABOLIC PANEL: CPT | Performed by: INTERNAL MEDICINE

## 2019-12-16 PROCEDURE — 93005 ELECTROCARDIOGRAM TRACING: CPT | Mod: ZF

## 2019-12-16 PROCEDURE — 99213 OFFICE O/P EST LOW 20 MIN: CPT | Mod: ZP | Performed by: INTERNAL MEDICINE

## 2019-12-16 PROCEDURE — 36415 COLL VENOUS BLD VENIPUNCTURE: CPT | Performed by: INTERNAL MEDICINE

## 2019-12-16 PROCEDURE — G0463 HOSPITAL OUTPT CLINIC VISIT: HCPCS | Mod: 25,ZF

## 2019-12-16 PROCEDURE — 85027 COMPLETE CBC AUTOMATED: CPT | Performed by: INTERNAL MEDICINE

## 2019-12-16 ASSESSMENT — MIFFLIN-ST. JEOR: SCORE: 1535.37

## 2019-12-16 ASSESSMENT — PAIN SCALES - GENERAL: PAINLEVEL: NO PAIN (0)

## 2019-12-16 NOTE — LETTER
12/16/2019    RE: Alysha Carmonanar  [address] [place] [id number]     Dear Colleague,    Thank you for the opportunity to participate in the care of your patient, Alysha Youngblood, at the [facility]. Please see a copy of my visit note below.    HPI: Patient presents for follow-up of atrial fibrillation.    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 The patient is status post ablation for paroxysmal atrial fibrillation in 06/2011 as well as another AFib ablation and atrial flutter ablation in 01/2012.  Both ablations were done at [facility].  The patient's last visit with me was in 12/2018.    Patient is currently taking metoprolol succinate 75 mg once daily which has controlled her atrial fibrillation episodes.  She is also taking apixaban for stroke prophylaxis.  In the last 1 year, patient has done extremely well without any recurrent episodes of atrial fibrillation.  She denies any symptoms of exertional dyspnea, exertional angina, frequent lightheadedness, presyncope or syncope.       PAST MEDICAL HISTORY:  Past Medical History:   Diagnosis Date     Atrial fibrillation (H)     ablated 1/12     Ex-smoker      Hashimoto's disease      Hyperlipidaemia      Hypertension      ISABELA (obstructive sleep apnea) 3/12    AHI 7     PAC (premature atrial contraction)      PVC (premature ventricular contraction)        CURRENT MEDICATIONS:  Current Outpatient Medications   Medication Sig Dispense Refill     apixaban ANTICOAGULANT (ELIQUIS) 5 MG tablet Take 1 tablet (5 mg) by mouth 2 times daily Please schedule clinic visit for further refills, lab work due  180 tablet 0     Calcium-Vitamin D-Vitamin K (VIACTIV PO) Take by mouth 2 times daily        Doxylamine Succinate, Sleep, (SLEEP AID PO)        fish oil-omega-3 fatty acids (FISH OIL) 1000 MG capsule One capsule daily.        hydrochlorothiazide (HYDRODIURIL) 25 MG tablet Take 25 mg by mouth daily       loratadine (CLARITIN) 10 MG tablet Take 10 mg by mouth daily       losartan (COZAAR) 100 MG tablet Take 100 mg by mouth daily       lovastatin (MEVACOR) 40 MG tablet Take 40 mg by mouth At Bedtime.       metoprolol succinate ER (TOPROL-XL) 50 MG 24 hr tablet Take 1.5 tablets (75 mg) by mouth daily 135 tablet 3     metoprolol tartrate (LOPRESSOR) 25 MG tablet Take 25mg with onset of arrhythmia, up to 50 mg daily. 30 tablet 0     Zolpidem Tartrate (AMBIEN PO) Take 5 mg by mouth nightly as needed for sleep       FLUZONE HIGH-DOSE 0.5 ML injection ADM 0.5ML IM UTD  0       PAST SURGICAL HISTORY:  Past Surgical History:   Procedure Laterality Date     APPENDECTOMY       ENT SURGERY       H ABLATION FOCAL AFIB  6/24/2011    [facility]      H ABLATION FOCAL AFIB  1/12/2012    [facility]. afib and aflutter ablations.        ALLERGIES:     Allergies   Allergen Reactions     Hmg-Coa-R Inhibitors      Made her lips tingle, so they put her on another statin instead.     Ibuprofen Other (See Comments)     Nsaids GI Disturbance     Liquid Adhesive Rash     After wearing patches for > 8 days  After wearing patches for > 8 days       FAMILY HISTORY:  - Premature coronary artery disease  - Atrial fibrillation  - Sudden cardiac death     SOCIAL HISTORY:  Social History     Tobacco Use     Smoking status: Never Smoker     Smokeless tobacco: Never Used   Substance Use Topics     Alcohol use: No     Drug use: No       ROS:   Constitutional: No fever, chills, or sweats. Weight stable.   ENT: No visual disturbance, ear ache, epistaxis, sore throat.   Cardiovascular: As per HPI.   Respiratory: No cough, hemoptysis.    GI: No nausea, vomiting, hematemesis, melena, or hematochezia.   : No hematuria.   Integument: Negative.   Psychiatric: Negative.   Hematologic:  Easy bruising, no easy bleeding.  Neuro: Negative.   Endocrinology: No significant heat or cold  "intolerance   Musculoskeletal: No myalgia.    Exam:  /89 (BP Location: Right arm, Patient Position: Chair, Cuff Size: Adult Large)   Pulse 71   Ht 1.689 m (5' 6.5\")   Wt 98.1 kg (216 lb 3.2 oz)   SpO2 96%   BMI 34.37 kg/m     GENERAL APPEARANCE: healthy, alert and no distress  HEENT: no icterus, no xanthelasmas, normal pupil size and reaction, normal palate, mucosa moist, no central cyanosis  NECK: no adenopathy, no asymmetry, masses, or scars, thyroid normal to palpation and no bruits, JVP not elevated  RESPIRATORY: lungs clear to auscultation - no rales, rhonchi or wheezes, no use of accessory muscles, no retractions, respirations are unlabored, normal respiratory rate  CARDIOVASCULAR: regular rhythm, normal S1 with physiologic split S2, no S3 or S4 and no murmur, click or rub, precordium quiet with normal PMI.  ABDOMEN: soft, non tender, without hepatosplenomegaly, no masses palpable, bowel sounds normal, aorta not enlarged by palpation, no abdominal bruits  EXTREMITIES: peripheral pulses normal, no edema, no bruits  NEURO: alert and oriented to person/place/time, normal speech, gait and affect  VASC: Radial, femoral, dorsalis pedis and posterior tibialis pulses are normal in volumes and symmetric bilaterally. No bruits are heard.  SKIN: no ecchymoses, no rashes    Labs:  CBC RESULTS:   Lab Results   Component Value Date    WBC 8.6 12/16/2019    RBC 4.88 12/16/2019    HGB 14.6 12/16/2019    HCT 45.1 12/16/2019    MCV 92 12/16/2019    MCH 29.9 12/16/2019    MCHC 32.4 12/16/2019    RDW 11.9 12/16/2019     12/16/2019       BMP RESULTS:  Lab Results   Component Value Date     12/16/2019    POTASSIUM 3.5 12/16/2019    CHLORIDE 103 12/16/2019    CO2 31 12/16/2019    ANIONGAP 4 12/16/2019     (H) 12/16/2019    BUN 25 12/16/2019    CR 0.78 12/16/2019    GFRESTIMATED 77 12/16/2019    GFRESTBLACK 90 12/16/2019    JANAE 9.1 12/16/2019        INR RESULTS:  Lab Results   Component Value Date    " INR 1.19 (H) 07/07/2018    INR 0.94 02/28/2011    INR 0.90 02/27/2011       Procedures:      Assessment and Plan:     Paroxysmal atrial fibrillation-well controlled by metoprolol succinate 75 mg once daily.    It is encouraging that patient is doing well from the atrial fibrillation standpoint.  We will continue with current treatment which includes metoprolol succinate and apixaban.  We will see patient again in 1 year in the heart rhythm clinic.    All questions and concerns were addressed and patient is happy with plan.  Please do not hesitate to contact me if you have any questions/concerns.     Sincerely,     Tyra Norris MD    CC  Patient Care Team:  Kiesha Cohen MD as PCP - General (Internal Medicine)  Kiesha Cohen MD as MD (Internal Medicine)  Lanre Ramirez MD as MD (Dermapathology)  Tyra Norris MD as MD (Cardiology)  Faina Peace MD as MD (Internal Medicine)  Danii Ocampo MD as MD (OB/Gyn)  Lanre Ramirez MD as MD (Dermapathology)  Faina Rowell APRN CNP as Nurse Practitioner  Slim Herrera MD as MD (Family Practice)  Livier Land, RN as Specialty Care Coordinator (Cardiology)  ESTABLISHED PATIENT

## 2019-12-16 NOTE — PROGRESS NOTES
HPI: Patient presents for follow-up of atrial fibrillation.    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 The patient is status post ablation for paroxysmal atrial fibrillation in 06/2011 as well as another AFib ablation and atrial flutter ablation in 01/2012.  Both ablations were done at [facility].  The patient's last visit with me was in 12/2018.    Patient is currently taking metoprolol succinate 75 mg once daily which has controlled her atrial fibrillation episodes.  She is also taking apixaban for stroke prophylaxis.  In the last 1 year, patient has done extremely well without any recurrent episodes of atrial fibrillation.  She denies any symptoms of exertional dyspnea, exertional angina, frequent lightheadedness, presyncope or syncope.       PAST MEDICAL HISTORY:  Past Medical History:   Diagnosis Date     Atrial fibrillation (H)     ablated 1/12     Ex-smoker      Hashimoto's disease      Hyperlipidaemia      Hypertension      ISABELA (obstructive sleep apnea) 3/12    AHI 7     PAC (premature atrial contraction)      PVC (premature ventricular contraction)        CURRENT MEDICATIONS:  Current Outpatient Medications   Medication Sig Dispense Refill     apixaban ANTICOAGULANT (ELIQUIS) 5 MG tablet Take 1 tablet (5 mg) by mouth 2 times daily Please schedule clinic visit for further refills, lab work due  180 tablet 0     Calcium-Vitamin D-Vitamin K (VIACTIV PO) Take by mouth 2 times daily        Doxylamine Succinate, Sleep, (SLEEP AID PO)        fish oil-omega-3 fatty acids (FISH OIL) 1000 MG capsule One capsule daily.       hydrochlorothiazide (HYDRODIURIL) 25 MG tablet Take 25 mg by mouth daily       loratadine (CLARITIN) 10 MG tablet Take 10 mg by mouth daily       losartan (COZAAR) 100 MG tablet Take 100 mg by mouth daily       lovastatin (MEVACOR) 40 MG tablet Take 40 mg by mouth At Bedtime.       metoprolol succinate ER  "(TOPROL-XL) 50 MG 24 hr tablet Take 1.5 tablets (75 mg) by mouth daily 135 tablet 3     metoprolol tartrate (LOPRESSOR) 25 MG tablet Take 25mg with onset of arrhythmia, up to 50 mg daily. 30 tablet 0     Zolpidem Tartrate (AMBIEN PO) Take 5 mg by mouth nightly as needed for sleep       FLUZONE HIGH-DOSE 0.5 ML injection ADM 0.5ML IM UTD  0       PAST SURGICAL HISTORY:  Past Surgical History:   Procedure Laterality Date     APPENDECTOMY       ENT SURGERY       H ABLATION FOCAL AFIB  6/24/2011    [facility]      H ABLATION FOCAL AFIB  1/12/2012    [facility]. afib and aflutter ablations.        ALLERGIES:     Allergies   Allergen Reactions     Hmg-Coa-R Inhibitors      Made her lips tingle, so they put her on another statin instead.     Ibuprofen Other (See Comments)     Nsaids GI Disturbance     Liquid Adhesive Rash     After wearing patches for > 8 days  After wearing patches for > 8 days       FAMILY HISTORY:  - Premature coronary artery disease  - Atrial fibrillation  - Sudden cardiac death     SOCIAL HISTORY:  Social History     Tobacco Use     Smoking status: Never Smoker     Smokeless tobacco: Never Used   Substance Use Topics     Alcohol use: No     Drug use: No       ROS:   Constitutional: No fever, chills, or sweats. Weight stable.   ENT: No visual disturbance, ear ache, epistaxis, sore throat.   Cardiovascular: As per HPI.   Respiratory: No cough, hemoptysis.    GI: No nausea, vomiting, hematemesis, melena, or hematochezia.   : No hematuria.   Integument: Negative.   Psychiatric: Negative.   Hematologic:  Easy bruising, no easy bleeding.  Neuro: Negative.   Endocrinology: No significant heat or cold intolerance   Musculoskeletal: No myalgia.    Exam:  /89 (BP Location: Right arm, Patient Position: Chair, Cuff Size: Adult Large)   Pulse 71   Ht 1.689 m (5' 6.5\")   Wt 98.1 kg (216 lb 3.2 oz)   SpO2 96%   BMI 34.37 kg/m    GENERAL APPEARANCE: healthy, alert and no distress  HEENT: no icterus, no " xanthelasmas, normal pupil size and reaction, normal palate, mucosa moist, no central cyanosis  NECK: no adenopathy, no asymmetry, masses, or scars, thyroid normal to palpation and no bruits, JVP not elevated  RESPIRATORY: lungs clear to auscultation - no rales, rhonchi or wheezes, no use of accessory muscles, no retractions, respirations are unlabored, normal respiratory rate  CARDIOVASCULAR: regular rhythm, normal S1 with physiologic split S2, no S3 or S4 and no murmur, click or rub, precordium quiet with normal PMI.  ABDOMEN: soft, non tender, without hepatosplenomegaly, no masses palpable, bowel sounds normal, aorta not enlarged by palpation, no abdominal bruits  EXTREMITIES: peripheral pulses normal, no edema, no bruits  NEURO: alert and oriented to person/place/time, normal speech, gait and affect  VASC: Radial, femoral, dorsalis pedis and posterior tibialis pulses are normal in volumes and symmetric bilaterally. No bruits are heard.  SKIN: no ecchymoses, no rashes    Labs:  CBC RESULTS:   Lab Results   Component Value Date    WBC 8.6 12/16/2019    RBC 4.88 12/16/2019    HGB 14.6 12/16/2019    HCT 45.1 12/16/2019    MCV 92 12/16/2019    MCH 29.9 12/16/2019    MCHC 32.4 12/16/2019    RDW 11.9 12/16/2019     12/16/2019       BMP RESULTS:  Lab Results   Component Value Date     12/16/2019    POTASSIUM 3.5 12/16/2019    CHLORIDE 103 12/16/2019    CO2 31 12/16/2019    ANIONGAP 4 12/16/2019     (H) 12/16/2019    BUN 25 12/16/2019    CR 0.78 12/16/2019    GFRESTIMATED 77 12/16/2019    GFRESTBLACK 90 12/16/2019    JANAE 9.1 12/16/2019        INR RESULTS:  Lab Results   Component Value Date    INR 1.19 (H) 07/07/2018    INR 0.94 02/28/2011    INR 0.90 02/27/2011       Procedures:      Assessment and Plan:     Paroxysmal atrial fibrillation-well controlled by metoprolol succinate 75 mg once daily.    It is encouraging that patient is doing well from the atrial fibrillation standpoint.  We will continue  with current treatment which includes metoprolol succinate and apixaban.  We will see patient again in 1 year in the heart rhythm clinic.    All questions and concerns were addressed and patient is happy with plan.      CC  Patient Care Team:  Kiesha Cohen MD as PCP - General (Internal Medicine)  Kiesha Cohen MD as MD (Internal Medicine)  Lanre Ramirez MD as MD (Dermapathology)  Tyra Norris MD as MD (Cardiology)  Faina Peace MD as MD (Internal Medicine)  Danii Ocampo MD as MD (OB/Gyn)  Lanre Ramirez MD as MD (Dermapathology)  Faina Rowell APRN CNP as Nurse Practitioner  Slim Herrera MD as MD (Family Practice)  Livier Land, RN as Specialty Care Coordinator (Cardiology)  ESTABLISHED PATIENT

## 2019-12-16 NOTE — PATIENT INSTRUCTIONS
You were seen in the Electrophysiology today by: Dr. Norris    Plan:     Follow up visit: 1 year      Your Care Team:  EP Cardiology   Telephone Number     Livier Land RN [phone]     For scheduling appts or procedures:    Dena Hilario   [phone]   For the Device Clinic (Pacemakers, ICDs, Loop Recorders)    During business hours: [phone]  After business hours:   [phone]- select option 4 and ask for job code [id number].       Cardiovascular Clinic:   [address] [place] [address]. [place] [address]      As always, Thank you for trusting us with your health care needs!

## 2019-12-16 NOTE — NURSING NOTE
Chief Complaint   Patient presents with     Follow Up     1 yr follow-up paroxysmal atrial fibrillation.      Vitals were taken, medications reconciled and EKG performed.    Luis Eden CMA    4:51 PM

## 2019-12-17 LAB — INTERPRETATION ECG - MUSE: NORMAL

## 2019-12-18 DIAGNOSIS — I48.4 ATYPICAL ATRIAL FLUTTER (H): ICD-10-CM

## 2019-12-18 DIAGNOSIS — I48.0 PAROXYSMAL ATRIAL FIBRILLATION (H): ICD-10-CM

## 2019-12-18 RX ORDER — METOPROLOL SUCCINATE 50 MG/1
75 TABLET, EXTENDED RELEASE ORAL DAILY
Qty: 135 TABLET | Refills: 3 | Status: SHIPPED | OUTPATIENT
Start: 2019-12-18 | End: 2020-12-21

## 2019-12-29 ENCOUNTER — HOSPITAL ENCOUNTER (EMERGENCY)
Facility: CLINIC | Age: 69
Discharge: HOME | End: 2019-12-29
Attending: EMERGENCY MEDICINE | Admitting: EMERGENCY MEDICINE
Payer: COMMERCIAL

## 2019-12-29 VITALS
HEIGHT: 67 IN | BODY MASS INDEX: 34.53 KG/M2 | DIASTOLIC BLOOD PRESSURE: 109 MMHG | RESPIRATION RATE: 16 BRPM | TEMPERATURE: 98.3 F | WEIGHT: 220 LBS | OXYGEN SATURATION: 97 % | HEART RATE: 74 BPM | SYSTOLIC BLOOD PRESSURE: 141 MMHG

## 2019-12-29 DIAGNOSIS — I48.92 ATRIAL FIBRILLATION/FLUTTER (H): ICD-10-CM

## 2019-12-29 DIAGNOSIS — I48.91 ATRIAL FIBRILLATION/FLUTTER (H): ICD-10-CM

## 2019-12-29 LAB
ANION GAP SERPL CALCULATED.3IONS-SCNC: 4 MMOL/L (ref 3–14)
BASOPHILS # BLD AUTO: 0 10E9/L (ref 0–0.2)
BASOPHILS NFR BLD AUTO: 0.5 %
BUN SERPL-MCNC: 24 MG/DL (ref 7–30)
CALCIUM SERPL-MCNC: 9.5 MG/DL (ref 8.5–10.1)
CHLORIDE SERPL-SCNC: 102 MMOL/L (ref 94–109)
CO2 SERPL-SCNC: 30 MMOL/L (ref 20–32)
CREAT SERPL-MCNC: 0.77 MG/DL (ref 0.52–1.04)
DIFFERENTIAL METHOD BLD: ABNORMAL
EOSINOPHIL # BLD AUTO: 0.2 10E9/L (ref 0–0.7)
EOSINOPHIL NFR BLD AUTO: 2.9 %
ERYTHROCYTE [DISTWIDTH] IN BLOOD BY AUTOMATED COUNT: 12.4 % (ref 10–15)
GFR SERPL CREATININE-BSD FRML MDRD: 79 ML/MIN/{1.73_M2}
GLUCOSE SERPL-MCNC: 103 MG/DL (ref 70–99)
HCT VFR BLD AUTO: 47.5 % (ref 35–47)
HGB BLD-MCNC: 15.6 G/DL (ref 11.7–15.7)
IMM GRANULOCYTES # BLD: 0.1 10E9/L (ref 0–0.4)
IMM GRANULOCYTES NFR BLD: 1.3 %
LYMPHOCYTES # BLD AUTO: 1.1 10E9/L (ref 0.8–5.3)
LYMPHOCYTES NFR BLD AUTO: 17.8 %
MAGNESIUM SERPL-MCNC: 2 MG/DL (ref 1.6–2.3)
MCH RBC QN AUTO: 29.7 PG (ref 26.5–33)
MCHC RBC AUTO-ENTMCNC: 32.8 G/DL (ref 31.5–36.5)
MCV RBC AUTO: 90 FL (ref 78–100)
MONOCYTES # BLD AUTO: 0.6 10E9/L (ref 0–1.3)
MONOCYTES NFR BLD AUTO: 10.1 %
NEUTROPHILS # BLD AUTO: 4 10E9/L (ref 1.6–8.3)
NEUTROPHILS NFR BLD AUTO: 67.4 %
NRBC # BLD AUTO: 0 10*3/UL
NRBC BLD AUTO-RTO: 0 /100
PLATELET # BLD AUTO: 230 10E9/L (ref 150–450)
POTASSIUM SERPL-SCNC: 3.8 MMOL/L (ref 3.4–5.3)
RBC # BLD AUTO: 5.26 10E12/L (ref 3.8–5.2)
SODIUM SERPL-SCNC: 137 MMOL/L (ref 133–144)
WBC # BLD AUTO: 6 10E9/L (ref 4–11)

## 2019-12-29 PROCEDURE — 99152 MOD SED SAME PHYS/QHP 5/>YRS: CPT | Performed by: EMERGENCY MEDICINE

## 2019-12-29 PROCEDURE — 96361 HYDRATE IV INFUSION ADD-ON: CPT

## 2019-12-29 PROCEDURE — 93010 ELECTROCARDIOGRAM REPORT: CPT | Mod: 59 | Performed by: EMERGENCY MEDICINE

## 2019-12-29 PROCEDURE — 92960 CARDIOVERSION ELECTRIC EXT: CPT | Performed by: EMERGENCY MEDICINE

## 2019-12-29 PROCEDURE — 92960 CARDIOVERSION ELECTRIC EXT: CPT

## 2019-12-29 PROCEDURE — 93005 ELECTROCARDIOGRAM TRACING: CPT

## 2019-12-29 PROCEDURE — 99292 CRITICAL CARE ADDL 30 MIN: CPT | Mod: 25

## 2019-12-29 PROCEDURE — 99291 CRITICAL CARE FIRST HOUR: CPT | Mod: 25 | Performed by: EMERGENCY MEDICINE

## 2019-12-29 PROCEDURE — 99291 CRITICAL CARE FIRST HOUR: CPT | Mod: 25

## 2019-12-29 PROCEDURE — 99285 EMERGENCY DEPT VISIT HI MDM: CPT | Mod: 25

## 2019-12-29 PROCEDURE — 96374 THER/PROPH/DIAG INJ IV PUSH: CPT

## 2019-12-29 PROCEDURE — 80048 BASIC METABOLIC PNL TOTAL CA: CPT | Performed by: EMERGENCY MEDICINE

## 2019-12-29 PROCEDURE — 85025 COMPLETE CBC W/AUTO DIFF WBC: CPT | Performed by: EMERGENCY MEDICINE

## 2019-12-29 PROCEDURE — 83735 ASSAY OF MAGNESIUM: CPT | Performed by: EMERGENCY MEDICINE

## 2019-12-29 PROCEDURE — 99292 CRITICAL CARE ADDL 30 MIN: CPT | Mod: 25 | Performed by: EMERGENCY MEDICINE

## 2019-12-29 PROCEDURE — 99152 MOD SED SAME PHYS/QHP 5/>YRS: CPT

## 2019-12-29 PROCEDURE — 25000128 H RX IP 250 OP 636: Performed by: EMERGENCY MEDICINE

## 2019-12-29 PROCEDURE — 25800030 ZZH RX IP 258 OP 636: Performed by: EMERGENCY MEDICINE

## 2019-12-29 RX ORDER — FENTANYL CITRATE 50 UG/ML
50 INJECTION, SOLUTION INTRAMUSCULAR; INTRAVENOUS ONCE
Status: COMPLETED | OUTPATIENT
Start: 2019-12-29 | End: 2019-12-29

## 2019-12-29 RX ORDER — SODIUM CHLORIDE 9 MG/ML
1000 INJECTION, SOLUTION INTRAVENOUS CONTINUOUS
Status: DISCONTINUED | OUTPATIENT
Start: 2019-12-29 | End: 2019-12-29 | Stop reason: HOSPADM

## 2019-12-29 RX ORDER — PROPOFOL 10 MG/ML
40 INJECTION, EMULSION INTRAVENOUS ONCE
Status: COMPLETED | OUTPATIENT
Start: 2019-12-29 | End: 2019-12-29

## 2019-12-29 RX ADMIN — PROPOFOL 20 MG: 10 INJECTION, EMULSION INTRAVENOUS at 17:15

## 2019-12-29 RX ADMIN — PROPOFOL 20 MG: 10 INJECTION, EMULSION INTRAVENOUS at 17:12

## 2019-12-29 RX ADMIN — PROPOFOL 20 MG: 10 INJECTION, EMULSION INTRAVENOUS at 17:16

## 2019-12-29 RX ADMIN — FENTANYL CITRATE 25 MCG: 50 INJECTION, SOLUTION INTRAMUSCULAR; INTRAVENOUS at 17:10

## 2019-12-29 RX ADMIN — SODIUM CHLORIDE 1000 ML: 9 INJECTION, SOLUTION INTRAVENOUS at 16:51

## 2019-12-29 RX ADMIN — PROPOFOL 40 MG: 10 INJECTION, EMULSION INTRAVENOUS at 17:11

## 2019-12-29 ASSESSMENT — ENCOUNTER SYMPTOMS
PALPITATIONS: 1
SHORTNESS OF BREATH: 0

## 2019-12-29 ASSESSMENT — MIFFLIN-ST. JEOR: SCORE: 1560.54

## 2019-12-29 NOTE — ED AVS SNAPSHOT
[facility], [place], Emergency Department  [address] [place] [id number]  Phone:  [phone]                                    Alysha Youngblood   MRN: [id number]    Department:  [facility], Emergency Department   Date of Visit:  12/29/2019           After Visit Summary Signature Page    I have received my discharge instructions, and my questions have been answered. I have discussed any challenges I see with this plan with the nurse or doctor.    ..........................................................................................................................................  Patient/Patient Representative Signature      ..........................................................................................................................................  Patient Representative Print Name and Relationship to Patient    ..................................................               ................................................  Date                                   Time    ..........................................................................................................................................  Reviewed by Signature/Title    ...................................................              ..............................................  Date                                               Time          22EPIC Rev 08/18

## 2019-12-29 NOTE — ED PROVIDER NOTES
[place] EMERGENCY DEPARTMENT ([facility])  12/29/19    History     Chief Complaint   Patient presents with     Palpitations     The history is provided by the patient and medical records.     Alysha Youngblood is a 69 year old female with a past medical history significant for atrial fibrillation s/p ablation x2 (last 2012) and cardioversion x1 (10/2018), HTN, HLD, ISABELA and Hashimoto's disease who presents here to the Emergency Department due to palpitations. Patient reports that she went into atrial fibrillation at 9:30 AM this morning that had subsequently changed to atrial flutter. Reports that she took an extra 25 mg of Metoprolol without helping. Currently on 75 mg Metoprolol daily, which has been controlling her PAF well. Denies chest pain or shortness of breath. Reports last time she had similar symptoms she was cardioverted here in the ED on 10/2018. She describes this rhythm as a slow aflutter that appears like normal sinus rhythm on EKG until she is given a medication to slow her heart rate down and then the atrial flutter can be read accurately on EKG. She states symptoms today are identical to when she presented to the Emergency Department on 10/22/18 and asks that we refer to notes from that encounter. She is on Eliquis, no missed doses.     I have reviewed the Medications, Allergies, Past Medical and Surgical History, and Social History in the [employer] system.    Past Medical History:   Diagnosis Date     Atrial fibrillation (H)     ablated 1/12     Ex-smoker      Hashimoto's disease      Hyperlipidaemia      Hypertension      ISABELA (obstructive sleep apnea) 3/12    AHI 7     PAC (premature atrial contraction)      PVC (premature ventricular contraction)        Past Surgical History:   Procedure Laterality Date     APPENDECTOMY       ENT SURGERY       H ABLATION FOCAL AFIB  6/24/2011    [facility].      H ABLATION FOCAL AFIB  1/12/2012    [facility]. afib and aflutter ablations.        Family History  "  Problem Relation Age of Onset     Cancer No family hx of         No known family hx of skin cancer       Social History     Tobacco Use     Smoking status: Never Smoker     Smokeless tobacco: Never Used   Substance Use Topics     Alcohol use: No       No current facility-administered medications for this encounter.      Current Outpatient Medications   Medication     apixaban ANTICOAGULANT (ELIQUIS) 5 MG tablet     Calcium-Vitamin D-Vitamin K (VIACTIV PO)     Doxylamine Succinate, Sleep, (SLEEP AID PO)     fish oil-omega-3 fatty acids (FISH OIL) 1000 MG capsule     FLUZONE HIGH-DOSE 0.5 ML injection     hydrochlorothiazide (HYDRODIURIL) 25 MG tablet     loratadine (CLARITIN) 10 MG tablet     losartan (COZAAR) 100 MG tablet     lovastatin (MEVACOR) 40 MG tablet     metoprolol succinate ER (TOPROL-XL) 50 MG 24 hr tablet     metoprolol tartrate (LOPRESSOR) 25 MG tablet     Zolpidem Tartrate (AMBIEN PO)        Allergies   Allergen Reactions     Hmg-Coa-R Inhibitors      Made her lips tingle, so they put her on another statin instead.     Ibuprofen Other (See Comments)     Nsaids GI Disturbance     Liquid Adhesive Rash     After wearing patches for > 8 days  After wearing patches for > 8 days       Review of Systems   Respiratory: Negative for shortness of breath.    Cardiovascular: Positive for palpitations. Negative for chest pain.   All other systems reviewed and are negative.      Physical Exam   BP: (!) 169/111  Pulse: 117  Heart Rate: 117  Temp: 98.4  F (36.9  C)  Resp: 18  Height: 170.2 cm (5' 7\")  Weight: 99.8 kg (220 lb)  SpO2: 97 %      Physical Exam  Vitals signs and nursing note reviewed.   Constitutional:       General: She is not in acute distress.     Appearance: She is well-developed. She is not ill-appearing, toxic-appearing or diaphoretic.   HENT:      Head: Normocephalic and atraumatic.      Mouth/Throat:      Lips: Pink.      Mouth: Mucous membranes are moist.      Pharynx: Oropharynx is clear. No " oropharyngeal exudate.   Eyes:      General: Lids are normal. No scleral icterus.     Extraocular Movements: Extraocular movements intact.      Right eye: No nystagmus.      Left eye: No nystagmus.      Conjunctiva/sclera: Conjunctivae normal.      Pupils: Pupils are equal, round, and reactive to light.   Neck:      Musculoskeletal: Normal range of motion and neck supple. No erythema or neck rigidity.      Thyroid: No thyromegaly.      Vascular: No JVD.      Trachea: No tracheal deviation.   Cardiovascular:      Rate and Rhythm: Tachycardia present. Rhythm irregular.      Pulses: Normal pulses.      Heart sounds: Normal heart sounds. No murmur. No friction rub. No gallop.    Pulmonary:      Effort: Pulmonary effort is normal. No respiratory distress.      Breath sounds: Normal breath sounds.   Abdominal:      General: Bowel sounds are normal. There is no distension.      Palpations: Abdomen is soft. There is no mass.      Tenderness: There is no abdominal tenderness. There is no guarding or rebound.   Musculoskeletal: Normal range of motion.         General: No tenderness.      Right lower leg: No edema.      Left lower leg: No edema.   Lymphadenopathy:      Cervical: No cervical adenopathy.   Skin:     General: Skin is warm and dry.      Capillary Refill: Capillary refill takes less than 2 seconds.      Coloration: Skin is not pale.      Findings: No erythema or rash.   Neurological:      Mental Status: She is alert and oriented to person, place, and time.      Cranial Nerves: No cranial nerve deficit.      Sensory: No sensory deficit.      Motor: Motor function is intact.   Psychiatric:         Mood and Affect: Mood and affect normal.         Speech: Speech normal.         Behavior: Behavior normal.         ED Course        [facility], [place]    -Cardioversion External  Date/Time: 12/31/2019 7:54 AM  Performed by: Bryant Mcdonnell MD  Authorized by: Bryant Mcdonnell,  MD     ED EVALUATION:      Assessment Time: 12/29/2019 4:54 PM      I have performed an Emergency Department Evaluation including taking a history and physical examination, this evaluation will be documented in the electronic medical record for this ED encounter.      Provisional Diagnosis: Cadioversion for A-fib/flutter    ASA Class: Class 2- mild systemic disease, no acute problems, no functional limitations    NPO Status: appropriately NPO for procedure  UNIVERSAL PROTOCOL   Site Marked: NA  Prior Images Obtained and Reviewed:  NA  Required items: Required blood products, implants, devices and special equipment available    Patient identity confirmed:  Verbally with patient  Patient was reevaluated immediately before administering moderate or deep sedation or anesthesia  Confirmation Checklist:  Patient's identity using two indicators, relevant allergies, procedure was appropriate and matched the consent or emergent situation and correct equipment/implants were available  Time out: Immediately prior to the procedure a time out was called    Universal Protocol: the Joint Commission Universal Protocol was followed    Preparation: Patient was prepped and draped in usual sterile fashion          PRE-PROCEDURE DETAILS:     Cardioversion basis:  Elective    Rhythm:  Atrial flutter    Electrode placement:  Anterior-posterior  Attempt one:     Cardioversion mode:  Synchronous    Waveform:  Biphasic    Shock (joules) attempt one: 120.    Shock outcome:  Conversion to normal sinus rhythm  Post-procedure details:     Patient status:  Awake    PROCEDURE   Patient Tolerance:  Patient tolerated the procedure well with no immediate complications    Length of time physician/provider present for 1:1 monitoring during sedation: 20               EKG Interpretation:      Interpreted by Bryant Mcdonnell MD  T  Symptoms at time of EKG: palpitations   Rhythm: atrial flutter  Rate: 118  Ectopy: none  Conduction: 2:1 block  ST  Segments/ T Waves: Non-specific ST-T wave changes  Q Waves: none  Comparison to prior: A flutter    Clinical Impression: atrial flutter (new onset)             No results found for this or any previous visit (from the past 24 hour(s)).  Medications   0.9% sodium chloride BOLUS (0 mLs Intravenous Stopped 12/29/19 1804)   propofol (DIPRIVAN) injection 10 mg/mL vial (20 mg Intravenous Given 12/29/19 1716)   fentaNYL (PF) (SUBLIMAZE) injection 50 mcg (25 mcg Intravenous Given 12/29/19 1710)         Assessments & Plan (with Medical Decision Making)   This patient presented to the Emergency Department complaining of feeling she was in atrial fibrillation flutter.  Her 12-lead EKG demonstrated what computer read as a sinus tachycardia, but did appear to have some subtle flutter waves. Patient's reported history of such that she tends to run a slow flutter that she says often gets confused for a sinus tachycardia.  Given this past history and other review of old records demonstrating that this does seem to be the case, I did elect to proceed with an electrical cardioversion.  This was discussed with the patient. She was in agreement with this.  Prior to cardioversion, as per the above procedure note, patient's rhythm was noted to be more consistent with an atrial fibrillation on the monitor.  She was cardioverted back to normal sinus rhythm and observed for a period of time reporting feeling better.  She was then discharged in good condition.  There is no signs of any significant electrolyte abnormalities that would have caused her to go into this arrhythmia.  Patient did have a grand child born today, and  I suspect that the stress and excitement from this may have been a trigger for her.    This part of the medical record was transcribed by Washington Ramírez Medical Scribe, from a dictation done by Bryant Mcdonnell MD.       I have reviewed the nursing notes.    I have reviewed the findings, diagnosis, plan and need for  follow up with the patient.    Discharge Medication List as of 12/29/2019  6:04 PM          Final diagnoses:   Atrial fibrillation/flutter (H)     I, Argenis Ramírez, am serving as a trained medical scribe to document services personally performed by Bryant Mcdonnell MD based on the provider's statements to me on December 29, 2019.  This document has been checked and approved by the attending provider.    IBryant MD, was physically present and have reviewed and verified the accuracy of this note documented by Argenis Ramírez, medical scribe.     12/29/2019   [facility], [place], EMERGENCY DEPARTMENT     Bryant Mcdonnell MD  12/31/19 0756

## 2019-12-29 NOTE — DISCHARGE INSTRUCTIONS
Call your cardiologist this week to discuss your visit and get follow-up recommendations.    Continue current medication management.    Return to the emergency department for any problems.

## 2019-12-29 NOTE — ED TRIAGE NOTES
Patient presents to triage reporting that she is in atrial flutter. She reports that she went in to atrial fibrillation at 0930 this morning that converted to atrial flutter. She took an extra 25 mg of metoprolol that did not help. She denies chest pain or shortness of breath. Last time this happened she states she needed to be cardioverted.

## 2019-12-30 LAB — INTERPRETATION ECG - MUSE: NORMAL

## 2020-02-04 DIAGNOSIS — I48.91 ATRIAL FIBRILLATION (H): ICD-10-CM

## 2020-02-08 ENCOUNTER — HEALTH MAINTENANCE LETTER (OUTPATIENT)
Age: 70
End: 2020-02-08

## 2020-05-13 DIAGNOSIS — I48.0 PAROXYSMAL ATRIAL FIBRILLATION (H): ICD-10-CM

## 2020-05-13 DIAGNOSIS — I48.4 ATYPICAL ATRIAL FLUTTER (H): ICD-10-CM

## 2020-05-13 RX ORDER — METOPROLOL TARTRATE 25 MG/1
TABLET, FILM COATED ORAL
Qty: 30 TABLET | Refills: 0 | Status: SHIPPED | OUTPATIENT
Start: 2020-05-13 | End: 2021-03-01

## 2020-05-13 NOTE — TELEPHONE ENCOUNTER
Contacted patient regarding refill request for metoprolol 25 mg tablet to be taken on an as needed basis for afib on top of her daily metoprolol dose. Patient states that she has not been experiencing afib recently and is doing well.  Did have a cardioversion Dec. 2019 but has been doing well since then.  She is wanting the Rx because she would like to avoid ER visit if possible during COVID.    Rx sent.    Signed Prescriptions:                        Disp   Refills    metoprolol tartrate (LOPRESSOR) 25 MG tabl*30 tab*0        Sig: Take 25mg with onset of arrhythmia, up to 50 mg           daily.  Authorizing Provider: TOPHER MELARA  Ordering User: MEKA LOPEZ    Rx fillled per refill protocol.  Meka Lopez RN

## 2020-11-07 ENCOUNTER — HEALTH MAINTENANCE LETTER (OUTPATIENT)
Age: 70
End: 2020-11-07

## 2020-12-16 DIAGNOSIS — I48.4 ATYPICAL ATRIAL FLUTTER (H): ICD-10-CM

## 2020-12-16 DIAGNOSIS — I48.0 PAROXYSMAL ATRIAL FIBRILLATION (H): ICD-10-CM

## 2020-12-21 RX ORDER — METOPROLOL SUCCINATE 50 MG/1
75 TABLET, EXTENDED RELEASE ORAL DAILY
Qty: 135 TABLET | Refills: 0 | Status: SHIPPED | OUTPATIENT
Start: 2020-12-21 | End: 2021-03-01

## 2020-12-21 NOTE — TELEPHONE ENCOUNTER
Last Clinic Visit: 12/16/19 recommended 1 year follow up, no upcoming visits scheduled.  90 day refill provided.  Last BP in care everywere 5/26/20  123/68.  Routed to clinic scheduling for follow up     Alert and oriented to person, place, time/situation. normal mood and affect. no apparent risk to self or others.

## 2021-02-03 DIAGNOSIS — I48.91 ATRIAL FIBRILLATION (H): ICD-10-CM

## 2021-02-05 NOTE — CONFIDENTIAL NOTE
apixaban ANTICOAGULANT (ELIQUIS ANTICOAGULANT) 5 MG tablet   Last Written Prescription Date:  2/4/20  Last Fill Quantity: 180,   # refills: 3  Last Office Visit : 12/16/19  [facility]  Future Office visit:  3/1/21 FK    We will see patient again in 1 year in the heart rhythm clinic.    90 day SENT to pharmacy    Routing refill request to provider for review/approval because: platelets, creat, wt,

## 2021-02-12 ENCOUNTER — TRANSFERRED RECORDS (OUTPATIENT)
Dept: HEALTH INFORMATION MANAGEMENT | Facility: CLINIC | Age: 71
End: 2021-02-12
Payer: COMMERCIAL

## 2021-03-01 ENCOUNTER — VIRTUAL VISIT (OUTPATIENT)
Dept: CARDIOLOGY | Facility: CLINIC | Age: 71
End: 2021-03-01
Payer: COMMERCIAL

## 2021-03-01 ENCOUNTER — TELEPHONE (OUTPATIENT)
Dept: ENDOCRINOLOGY | Facility: CLINIC | Age: 71
End: 2021-03-01

## 2021-03-01 DIAGNOSIS — I48.0 PAROXYSMAL ATRIAL FIBRILLATION (H): Primary | ICD-10-CM

## 2021-03-01 DIAGNOSIS — I48.4 ATYPICAL ATRIAL FLUTTER (H): ICD-10-CM

## 2021-03-01 DIAGNOSIS — I48.3 TYPICAL ATRIAL FLUTTER (H): ICD-10-CM

## 2021-03-01 DIAGNOSIS — I48.91 ATRIAL FIBRILLATION (H): ICD-10-CM

## 2021-03-01 PROCEDURE — 99213 OFFICE O/P EST LOW 20 MIN: CPT | Mod: 95 | Performed by: INTERNAL MEDICINE

## 2021-03-01 RX ORDER — METOPROLOL SUCCINATE 50 MG/1
75 TABLET, EXTENDED RELEASE ORAL DAILY
Qty: 135 TABLET | Refills: 3 | Status: SHIPPED | OUTPATIENT
Start: 2021-03-01 | End: 2022-03-24

## 2021-03-01 RX ORDER — METOPROLOL TARTRATE 25 MG/1
TABLET, FILM COATED ORAL
Qty: 30 TABLET | Refills: 0 | Status: SHIPPED | OUTPATIENT
Start: 2021-03-01 | End: 2021-11-14

## 2021-03-01 NOTE — PATIENT INSTRUCTIONS
Thank you for coming to the [facility] @ [facility]; please note the following instructions:    1.         If you have any questions regarding your visit please contact your care team:     Cardiology  Telephone Number   Meka LEWIS, RN  La HICKEY, RN   Yen MEJIA, NIGEL CRAIG, NIGEL FARRIS, LPN   [phone] (option 1)   For scheduling appts:     [phone] (select option 1)       For the Device Clinic (Pacemakers and ICD's)  RN's :  Amara Marquis   During business hours: [phone]    *After business hours:  [phone] (select option 4)      Normal test result notifications will be released via [employer] or mailed within 7 business days.  All other test results, will be communicated via telephone once reviewed by your cardiologist.    If you need a medication refill please contact your pharmacy.  Please allow 3 business days for your refill to be completed.    As always, thank you for trusting us with your health care needs!

## 2021-03-01 NOTE — CONFIDENTIAL NOTE
[facility] Call Center    Phone Message    May a detailed message be left on voicemail: yes     Reason for Call: Other: . pt says you told her to make a in-person appt on 4/27 so you can feel her thyroid.  Please leave note in chart for next  approving in-clinic appt if this is true.    Action Taken: Message routed to:  [facility] ([facility]): hansel    Travel Screening: Not Applicable

## 2021-03-01 NOTE — PROGRESS NOTES
Alysha is a 70 year old who is being evaluated via a billable video visit.      How would you like to obtain your AVS? Mail a copy  If the video visit is dropped, the invitation should be resent by: Other e-mail: [employer] video.   Back up [phone].   Will anyone else be joining your video visit? No    Video Start Time: 8:50 AM    Video-Visit Details    Type of service:  Video Visit    Video End Time: 9:02 AM    Originating Location (pt. Location): Home    Distant Location (provider location):  [facility] HEART [facility]     Platform used for Video Visit: Flux       HPI: Patient presents for follow-up of atrial fibrillation.     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 The patient is status post ablation for paroxysmal atrial fibrillation in 06/2011 as well as another AFib ablation and atrial flutter ablation in 01/2012.  Both ablations were done at [facility].  The patient's last visit with me was in 12/2019.     Patient is currently taking metoprolol succinate 75 mg once daily which has controlled her atrial fibrillation episodes.  She is also taking apixaban for stroke prophylaxis.  In the last 1 year, patient has done extremely well without any recurrent prolonged episodes of atrial fibrillation.  She denies any symptoms of exertional dyspnea, exertional angina, frequent lightheadedness, presyncope or syncope.    PAST MEDICAL HISTORY:  Past Medical History:   Diagnosis Date     Atrial fibrillation (H)     ablated 1/12     Ex-smoker      Hashimoto's disease      Hyperlipidaemia      Hypertension      ISABELA (obstructive sleep apnea) 3/12    AHI 7     PAC (premature atrial contraction)      PVC (premature ventricular contraction)        CURRENT MEDICATIONS:  Current Outpatient Medications   Medication Sig Dispense Refill     apixaban ANTICOAGULANT (ELIQUIS ANTICOAGULANT) 5 MG tablet Take 1 tablet (5 mg) by mouth 2 times  daily 180 tablet 3     Calcium-Vitamin D-Vitamin K (VIACTIV PO) Take by mouth 2 times daily        fish oil-omega-3 fatty acids (FISH OIL) 1000 MG capsule One capsule daily.       hydrochlorothiazide (HYDRODIURIL) 25 MG tablet Take 25 mg by mouth daily       loratadine (CLARITIN) 10 MG tablet Take 10 mg by mouth daily       losartan (COZAAR) 100 MG tablet Take 100 mg by mouth daily       lovastatin (MEVACOR) 40 MG tablet Take 40 mg by mouth At Bedtime.       metoprolol succinate ER (TOPROL-XL) 50 MG 24 hr tablet Take 1.5 tablets (75 mg) by mouth daily 135 tablet 3     metoprolol tartrate (LOPRESSOR) 25 MG tablet Take 25mg with onset of arrhythmia, up to 50 mg daily. 30 tablet 0     Doxylamine Succinate, Sleep, (SLEEP AID PO)        FLUZONE HIGH-DOSE 0.5 ML injection ADM 0.5ML IM UTD  0     Zolpidem Tartrate (AMBIEN PO) Take 5 mg by mouth nightly as needed for sleep         PAST SURGICAL HISTORY:  Past Surgical History:   Procedure Laterality Date     APPENDECTOMY       ENT SURGERY       H ABLATION FOCAL AFIB  6/24/2011    [facility].      H ABLATION FOCAL AFIB  1/12/2012    [facility]. afib and aflutter ablations.        ALLERGIES:     Allergies   Allergen Reactions     Hmg-Coa-R Inhibitors      Made her lips tingle, so they put her on another statin instead.     Ibuprofen Other (See Comments)     Nsaids GI Disturbance     Liquid Adhesive Rash     After wearing patches for > 8 days  After wearing patches for > 8 days       FAMILY HISTORY:  - Premature coronary artery disease  - Atrial fibrillation  - Sudden cardiac death     SOCIAL HISTORY:  Social History     Tobacco Use     Smoking status: Never Smoker     Smokeless tobacco: Never Used   Substance Use Topics     Alcohol use: No     Drug use: No       ROS:   Constitutional: No fever, chills, or sweats. Weight stable.   ENT: No visual disturbance, ear ache, epistaxis, sore throat.   Cardiovascular: As per HPI.   Respiratory: No cough, hemoptysis.    GI: No nausea, vomiting,  hematemesis, melena, or hematochezia.   : No hematuria.   Integument: Negative.   Psychiatric: Negative.   Hematologic:  Easy bruising, no easy bleeding.  Neuro: Negative.   Endocrinology: No significant heat or cold intolerance   Musculoskeletal: No myalgia.    Exam:  There were no vitals taken for this visit.  GENERAL APPEARANCE: healthy, alert and no distress    Labs:  CBC RESULTS:   Lab Results   Component Value Date    WBC 6.0 12/29/2019    RBC 5.26 (H) 12/29/2019    HGB 15.6 12/29/2019    HCT 47.5 (H) 12/29/2019    MCV 90 12/29/2019    MCH 29.7 12/29/2019    MCHC 32.8 12/29/2019    RDW 12.4 12/29/2019     12/29/2019       BMP RESULTS:  Lab Results   Component Value Date     12/29/2019    POTASSIUM 3.8 12/29/2019    CHLORIDE 102 12/29/2019    CO2 30 12/29/2019    ANIONGAP 4 12/29/2019     (H) 12/29/2019    BUN 24 12/29/2019    CR 0.77 12/29/2019    GFRESTIMATED 79 12/29/2019    GFRESTBLACK >90 12/29/2019    JANAE 9.5 12/29/2019        INR RESULTS:  Lab Results   Component Value Date    INR 1.19 (H) 07/07/2018    INR 0.94 02/28/2011    INR 0.90 02/27/2011       Procedures:      Assessment and Plan:   Paroxysmal atrial fibrillation-well controlled by metoprolol succinate 75 mg once daily.     It is encouraging that patient is doing well from the atrial fibrillation standpoint.  We will continue with current treatment which includes metoprolol succinate and apixaban.    We will renewed his medications for 1 year.  In addition, patient will have a basic metabolic panel checked this Wednesday as part of her visit with Dr. Kiesha Cohen.    We will see patient again in 1 year in the heart rhythm clinic.     All questions and concerns were addressed and patient is happy with plan.       CC  Patient Care Team:  Kiesha Cohen MD as PCP - General (Internal Medicine)  Kiesha Cohen MD as MD (Internal Medicine)  Lanre Ramirez MD as MD (Dermapathology)  Tyra Norris MD as MD  (Cardiology)  Faina Peace MD as MD (Internal Medicine)  Danii Ocampo MD as MD (OB/Gyn)  Lanre Ramirez MD as MD (Dermapathology)  Faina Rowell APRN CNP as Nurse Practitioner  Slim Herrera MD as MD (Family Practice)  Livier Land, RN as Specialty Care Coordinator (Cardiology)  Leslye Juarez APRN CNP as Assigned PCP  Topher Melara MD as Assigned Heart and Vascular Provider  TOPHER MELARA

## 2021-03-01 NOTE — NURSING NOTE
Med Reconcile: Reviewed and verified all current medications with the patient. The updated medication list was printed and given to the patient.    Metoprolol and Eliquis refilled.    Return Appointment: Patient given instructions regarding scheduling next clinic visit. Patient demonstrated understanding of this information and agreed to call with further questions or concerns.    Patient stated she understood all health information given and agreed to call with further questions or concerns.    Meka Lopez RN

## 2021-03-01 NOTE — LETTER
3/1/2021      RE: Alysha Youngblood  [address] [place] [phone]       Dear Colleague,    Thank you for the opportunity to participate in the care of your patient, Alysha Youngblood, at the [facility] HEART [facility] at [facility]. Please see a copy of my visit note below.    Alysha is a 70 year old who is being evaluated via a billable video visit.      How would you like to obtain your AVS? Mail a copy  If the video visit is dropped, the invitation should be resent by: Other e-mail: [employer].   Back up [phone].   Will anyone else be joining your video visit? No    Video Start Time: 8:50 AM    Video-Visit Details    Type of service:  Video Visit    Video End Time: 9:02 AM    Originating Location (pt. Location): Home    Distant Location (provider location):  [facility]     Platform used for Video Visit: Xquva       HPI: Patient presents for follow-up of atrial fibrillation.     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 The patient is status post ablation for paroxysmal atrial fibrillation in 06/2011 as well as another AFib ablation and atrial flutter ablation in 01/2012.  Both ablations were done at [facility].  The patient's last visit with me was in 12/2019.     Patient is currently taking metoprolol succinate 75 mg once daily which has controlled her atrial fibrillation episodes.  She is also taking apixaban for stroke prophylaxis.  In the last 1 year, patient has done extremely well without any recurrent prolonged episodes of atrial fibrillation.  She denies any symptoms of exertional dyspnea, exertional angina, frequent lightheadedness, presyncope or syncope.    PAST MEDICAL HISTORY:  Past Medical History:   Diagnosis Date     Atrial fibrillation (H)     ablated 1/12     Ex-smoker      Hashimoto's disease      Hyperlipidaemia       Hypertension      ISABELA (obstructive sleep apnea) 3/12    AHI 7     PAC (premature atrial contraction)      PVC (premature ventricular contraction)        CURRENT MEDICATIONS:  Current Outpatient Medications   Medication Sig Dispense Refill     apixaban ANTICOAGULANT (ELIQUIS ANTICOAGULANT) 5 MG tablet Take 1 tablet (5 mg) by mouth 2 times daily 180 tablet 3     Calcium-Vitamin D-Vitamin K (VIACTIV PO) Take by mouth 2 times daily        fish oil-omega-3 fatty acids (FISH OIL) 1000 MG capsule One capsule daily.       hydrochlorothiazide (HYDRODIURIL) 25 MG tablet Take 25 mg by mouth daily       loratadine (CLARITIN) 10 MG tablet Take 10 mg by mouth daily       losartan (COZAAR) 100 MG tablet Take 100 mg by mouth daily       lovastatin (MEVACOR) 40 MG tablet Take 40 mg by mouth At Bedtime.       metoprolol succinate ER (TOPROL-XL) 50 MG 24 hr tablet Take 1.5 tablets (75 mg) by mouth daily 135 tablet 3     metoprolol tartrate (LOPRESSOR) 25 MG tablet Take 25mg with onset of arrhythmia, up to 50 mg daily. 30 tablet 0     Doxylamine Succinate, Sleep, (SLEEP AID PO)        FLUZONE HIGH-DOSE 0.5 ML injection ADM 0.5ML IM UTD  0     Zolpidem Tartrate (AMBIEN PO) Take 5 mg by mouth nightly as needed for sleep         PAST SURGICAL HISTORY:  Past Surgical History:   Procedure Laterality Date     APPENDECTOMY       ENT SURGERY       H ABLATION FOCAL AFIB  6/24/2011    [place].      H ABLATION FOCAL AFIB  1/12/2012    [place]. afib and aflutter ablations.        ALLERGIES:     Allergies   Allergen Reactions     Hmg-Coa-R Inhibitors      Made her lips tingle, so they put her on another statin instead.     Ibuprofen Other (See Comments)     Nsaids GI Disturbance     Liquid Adhesive Rash     After wearing patches for > 8 days  After wearing patches for > 8 days       FAMILY HISTORY:  - Premature coronary artery disease  - Atrial fibrillation  - Sudden cardiac death     SOCIAL HISTORY:  Social History     Tobacco Use     Smoking  status: Never Smoker     Smokeless tobacco: Never Used   Substance Use Topics     Alcohol use: No     Drug use: No       ROS:   Constitutional: No fever, chills, or sweats. Weight stable.   ENT: No visual disturbance, ear ache, epistaxis, sore throat.   Cardiovascular: As per HPI.   Respiratory: No cough, hemoptysis.    GI: No nausea, vomiting, hematemesis, melena, or hematochezia.   : No hematuria.   Integument: Negative.   Psychiatric: Negative.   Hematologic:  Easy bruising, no easy bleeding.  Neuro: Negative.   Endocrinology: No significant heat or cold intolerance   Musculoskeletal: No myalgia.    Exam:  There were no vitals taken for this visit.  GENERAL APPEARANCE: healthy, alert and no distress    Labs:  CBC RESULTS:   Lab Results   Component Value Date    WBC 6.0 12/29/2019    RBC 5.26 (H) 12/29/2019    HGB 15.6 12/29/2019    HCT 47.5 (H) 12/29/2019    MCV 90 12/29/2019    MCH 29.7 12/29/2019    MCHC 32.8 12/29/2019    RDW 12.4 12/29/2019     12/29/2019       BMP RESULTS:  Lab Results   Component Value Date     12/29/2019    POTASSIUM 3.8 12/29/2019    CHLORIDE 102 12/29/2019    CO2 30 12/29/2019    ANIONGAP 4 12/29/2019     (H) 12/29/2019    BUN 24 12/29/2019    CR 0.77 12/29/2019    GFRESTIMATED 79 12/29/2019    GFRESTBLACK >90 12/29/2019    JANAE 9.5 12/29/2019        INR RESULTS:  Lab Results   Component Value Date    INR 1.19 (H) 07/07/2018    INR 0.94 02/28/2011    INR 0.90 02/27/2011       Procedures:      Assessment and Plan:   Paroxysmal atrial fibrillation-well controlled by metoprolol succinate 75 mg once daily.     It is encouraging that patient is doing well from the atrial fibrillation standpoint.  We will continue with current treatment which includes metoprolol succinate and apixaban.    We will renewed his medications for 1 year.  In addition, patient will have a basic metabolic panel checked this Wednesday as part of her visit with Dr. Kiesha Cohen.    We will see  patient again in 1 year in the heart rhythm clinic.     All questions and concerns were addressed and patient is happy with plan.       Please do not hesitate to contact me if you have any questions/concerns.     Sincerely,     Tyra Norris MD      CC  Patient Care Team:  Kiesha Cohen MD as PCP - General (Internal Medicine)  Faina Peace MD as MD (Internal Medicine)  Danii Ocampo MD as MD (OB/Gyn)  Lanre Ramirez MD as MD (Dermapathology)  Faina Rowell APRN CNP as Nurse Practitioner  Slmi Herrera MD as MD (Family Practice)  Livier Land, RN as Specialty Care Coordinator (Cardiology)  Leslye Juarez APRN CNP as Assigned PCP

## 2021-03-03 ENCOUNTER — TRANSFERRED RECORDS (OUTPATIENT)
Dept: HEALTH INFORMATION MANAGEMENT | Facility: CLINIC | Age: 71
End: 2021-03-03
Payer: COMMERCIAL

## 2021-03-03 LAB
ALT SERPL-CCNC: 43 U/L (ref 0–50)
AST SERPL-CCNC: 18 U/L (ref 0–45)
CHOLESTEROL (EXTERNAL): 217 MG/DL
CREATININE (EXTERNAL): 0.77 MG/DL (ref 0.52–1.04)
GFR ESTIMATED (EXTERNAL): 79 ML/MIN/1.73M2
GFR ESTIMATED (IF AFRICAN AMERICAN) (EXTERNAL): >90 ML/MIN/1.73M2
GLUCOSE (EXTERNAL): 118 MG/DL (ref 70–99)
HBA1C MFR BLD: 5.5 % (ref 0–6.4)
HDLC SERPL-MCNC: 64 MG/DL
LDL CHOLESTEROL (EXTERNAL): 117 MG/DL
NON HDL CHOLESTEROL (EXTERNAL): 152 MG/DL
POTASSIUM (EXTERNAL): 4.1 MMOL/L (ref 3.4–5.3)
TRIGLYCERIDES (EXTERNAL): 175 MG/DL
TSH SERPL-ACNC: 1.49 MU/L (ref 0.4–4)

## 2021-03-15 ENCOUNTER — TRANSCRIBE ORDERS (OUTPATIENT)
Dept: OTHER | Age: 71
End: 2021-03-15

## 2021-03-15 DIAGNOSIS — Z12.11 SCREEN FOR COLON CANCER: Primary | ICD-10-CM

## 2021-03-17 DIAGNOSIS — Z11.59 ENCOUNTER FOR SCREENING FOR OTHER VIRAL DISEASES: Primary | ICD-10-CM

## 2021-03-23 ENCOUNTER — MYC MEDICAL ADVICE (OUTPATIENT)
Dept: CARDIOLOGY | Facility: CLINIC | Age: 71
End: 2021-03-23

## 2021-03-23 ENCOUNTER — TELEPHONE (OUTPATIENT)
Dept: GASTROENTEROLOGY | Facility: CLINIC | Age: 71
End: 2021-03-23

## 2021-03-23 DIAGNOSIS — Z12.11 SPECIAL SCREENING FOR MALIGNANT NEOPLASMS, COLON: Primary | ICD-10-CM

## 2021-03-23 RX ORDER — BISACODYL 5 MG
TABLET, DELAYED RELEASE (ENTERIC COATED) ORAL
Qty: 4 TABLET | Refills: 0 | Status: SHIPPED | OUTPATIENT
Start: 2021-03-23 | End: 2023-03-28

## 2021-03-23 NOTE — TELEPHONE ENCOUNTER
Pre assessment questions completed.     Golytely prep script sent to [facility] pharmacy.     Patient instructed to contact prescribing provider for Eliquis holding prior to procedure.     Patient had no questions or concerns at this time.    Lourdes Colby RN

## 2021-03-24 NOTE — CONFIDENTIAL NOTE
jose martin message reviewed by patient.    Meka Lopez, RN  Cardiology Care Coordinator  [facility]  [phone] option 1

## 2021-03-26 ENCOUNTER — ANCILLARY PROCEDURE (OUTPATIENT)
Dept: CT IMAGING | Facility: CLINIC | Age: 71
End: 2021-03-26
Attending: INTERNAL MEDICINE
Payer: COMMERCIAL

## 2021-03-26 ENCOUNTER — ANCILLARY PROCEDURE (OUTPATIENT)
Dept: BONE DENSITY | Facility: CLINIC | Age: 71
End: 2021-03-26
Attending: INTERNAL MEDICINE
Payer: COMMERCIAL

## 2021-03-26 DIAGNOSIS — R91.8 PULMONARY INFILTRATE: ICD-10-CM

## 2021-03-26 DIAGNOSIS — M81.0 OSTEOPOROSIS WITHOUT PATHOLOGICAL FRACTURE: ICD-10-CM

## 2021-03-26 PROCEDURE — 71250 CT THORAX DX C-: CPT | Mod: GC | Performed by: RADIOLOGY

## 2021-03-26 PROCEDURE — 77080 DXA BONE DENSITY AXIAL: CPT

## 2021-03-27 ENCOUNTER — HEALTH MAINTENANCE LETTER (OUTPATIENT)
Age: 71
End: 2021-03-27

## 2021-03-27 DIAGNOSIS — Z11.59 ENCOUNTER FOR SCREENING FOR OTHER VIRAL DISEASES: ICD-10-CM

## 2021-03-27 LAB
LABORATORY COMMENT REPORT: NORMAL
SARS-COV-2 RNA RESP QL NAA+PROBE: NEGATIVE
SARS-COV-2 RNA RESP QL NAA+PROBE: NORMAL
SPECIMEN SOURCE: NORMAL
SPECIMEN SOURCE: NORMAL

## 2021-03-27 PROCEDURE — U0003 INFECTIOUS AGENT DETECTION BY NUCLEIC ACID (DNA OR RNA); SEVERE ACUTE RESPIRATORY SYNDROME CORONAVIRUS 2 (SARS-COV-2) (CORONAVIRUS DISEASE [COVID-19]), AMPLIFIED PROBE TECHNIQUE, MAKING USE OF HIGH THROUGHPUT TECHNOLOGIES AS DESCRIBED BY CMS-2020-01-R: HCPCS | Mod: 90 | Performed by: PATHOLOGY

## 2021-03-27 PROCEDURE — U0005 INFEC AGEN DETEC AMPLI PROBE: HCPCS | Mod: 90 | Performed by: PATHOLOGY

## 2021-03-29 ENCOUNTER — DOCUMENTATION ONLY (OUTPATIENT)
Dept: CARE COORDINATION | Facility: CLINIC | Age: 71
End: 2021-03-29

## 2021-03-31 ENCOUNTER — HOSPITAL ENCOUNTER (OUTPATIENT)
Facility: CLINIC | Age: 71
Discharge: HOME | End: 2021-03-31
Attending: INTERNAL MEDICINE | Admitting: INTERNAL MEDICINE
Payer: COMMERCIAL

## 2021-03-31 ENCOUNTER — TRANSFERRED RECORDS (OUTPATIENT)
Dept: HEALTH INFORMATION MANAGEMENT | Facility: CLINIC | Age: 71
End: 2021-03-31

## 2021-03-31 VITALS
TEMPERATURE: 98 F | RESPIRATION RATE: 16 BRPM | HEIGHT: 66 IN | DIASTOLIC BLOOD PRESSURE: 86 MMHG | BODY MASS INDEX: 34.83 KG/M2 | SYSTOLIC BLOOD PRESSURE: 128 MMHG | HEART RATE: 79 BPM | OXYGEN SATURATION: 99 % | WEIGHT: 216.71 LBS

## 2021-03-31 LAB — COLONOSCOPY: NORMAL

## 2021-03-31 PROCEDURE — 45382 COLONOSCOPY W/CONTROL BLEED: CPT | Performed by: INTERNAL MEDICINE

## 2021-03-31 PROCEDURE — 88305 TISSUE EXAM BY PATHOLOGIST: CPT | Mod: TC | Performed by: INTERNAL MEDICINE

## 2021-03-31 PROCEDURE — 45385 COLONOSCOPY W/LESION REMOVAL: CPT | Mod: PT | Performed by: INTERNAL MEDICINE

## 2021-03-31 PROCEDURE — 250N000009 HC RX 250: Performed by: INTERNAL MEDICINE

## 2021-03-31 PROCEDURE — 99153 MOD SED SAME PHYS/QHP EA: CPT | Performed by: INTERNAL MEDICINE

## 2021-03-31 PROCEDURE — 88305 TISSUE EXAM BY PATHOLOGIST: CPT | Mod: 26 | Performed by: PATHOLOGY

## 2021-03-31 PROCEDURE — 250N000011 HC RX IP 250 OP 636: Performed by: INTERNAL MEDICINE

## 2021-03-31 PROCEDURE — G0500 MOD SEDAT ENDO SERVICE >5YRS: HCPCS | Performed by: INTERNAL MEDICINE

## 2021-03-31 RX ORDER — NALOXONE HYDROCHLORIDE 0.4 MG/ML
0.4 INJECTION, SOLUTION INTRAMUSCULAR; INTRAVENOUS; SUBCUTANEOUS
Status: DISCONTINUED | OUTPATIENT
Start: 2021-03-31 | End: 2021-03-31 | Stop reason: HOSPADM

## 2021-03-31 RX ORDER — AMLODIPINE BESYLATE 5 MG/1
5 TABLET ORAL DAILY
COMMUNITY
Start: 2021-03-12 | End: 2022-04-04

## 2021-03-31 RX ORDER — ONDANSETRON 4 MG/1
4 TABLET, ORALLY DISINTEGRATING ORAL EVERY 6 HOURS PRN
Status: DISCONTINUED | OUTPATIENT
Start: 2021-03-31 | End: 2021-03-31 | Stop reason: HOSPADM

## 2021-03-31 RX ORDER — DIPHENHYDRAMINE HYDROCHLORIDE 50 MG/ML
INJECTION INTRAMUSCULAR; INTRAVENOUS PRN
Status: COMPLETED | OUTPATIENT
Start: 2021-03-31 | End: 2021-03-31

## 2021-03-31 RX ORDER — LIDOCAINE 40 MG/G
CREAM TOPICAL
Status: DISCONTINUED | OUTPATIENT
Start: 2021-03-31 | End: 2021-03-31 | Stop reason: HOSPADM

## 2021-03-31 RX ORDER — ONDANSETRON 2 MG/ML
4 INJECTION INTRAMUSCULAR; INTRAVENOUS EVERY 6 HOURS PRN
Status: DISCONTINUED | OUTPATIENT
Start: 2021-03-31 | End: 2021-03-31 | Stop reason: HOSPADM

## 2021-03-31 RX ORDER — FLUMAZENIL 0.1 MG/ML
0.2 INJECTION, SOLUTION INTRAVENOUS
Status: DISCONTINUED | OUTPATIENT
Start: 2021-03-31 | End: 2021-03-31 | Stop reason: HOSPADM

## 2021-03-31 RX ORDER — ONDANSETRON 2 MG/ML
4 INJECTION INTRAMUSCULAR; INTRAVENOUS
Status: DISCONTINUED | OUTPATIENT
Start: 2021-03-31 | End: 2021-03-31 | Stop reason: HOSPADM

## 2021-03-31 RX ORDER — FENTANYL CITRATE 50 UG/ML
INJECTION, SOLUTION INTRAMUSCULAR; INTRAVENOUS PRN
Status: COMPLETED | OUTPATIENT
Start: 2021-03-31 | End: 2021-03-31

## 2021-03-31 RX ORDER — VALACYCLOVIR HYDROCHLORIDE 1 G/1
TABLET, FILM COATED ORAL
COMMUNITY
Start: 2021-03-25 | End: 2023-03-28

## 2021-03-31 RX ORDER — NALOXONE HYDROCHLORIDE 0.4 MG/ML
0.2 INJECTION, SOLUTION INTRAMUSCULAR; INTRAVENOUS; SUBCUTANEOUS
Status: DISCONTINUED | OUTPATIENT
Start: 2021-03-31 | End: 2021-03-31 | Stop reason: HOSPADM

## 2021-03-31 RX ORDER — PROCHLORPERAZINE MALEATE 5 MG
5 TABLET ORAL EVERY 6 HOURS PRN
Status: DISCONTINUED | OUTPATIENT
Start: 2021-03-31 | End: 2021-03-31 | Stop reason: HOSPADM

## 2021-03-31 RX ADMIN — SIMETHICONE 2 ML: 63.3; 3.7 SOLUTION/ DROPS ORAL at 09:42

## 2021-03-31 RX ADMIN — MIDAZOLAM 1 MG: 1 INJECTION INTRAMUSCULAR; INTRAVENOUS at 10:42

## 2021-03-31 RX ADMIN — MIDAZOLAM 2 MG: 1 INJECTION INTRAMUSCULAR; INTRAVENOUS at 10:25

## 2021-03-31 RX ADMIN — FENTANYL CITRATE 50 MCG: 50 INJECTION, SOLUTION INTRAMUSCULAR; INTRAVENOUS at 09:54

## 2021-03-31 RX ADMIN — MIDAZOLAM 2 MG: 1 INJECTION INTRAMUSCULAR; INTRAVENOUS at 09:54

## 2021-03-31 RX ADMIN — MIDAZOLAM 2 MG: 1 INJECTION INTRAMUSCULAR; INTRAVENOUS at 10:02

## 2021-03-31 RX ADMIN — DIPHENHYDRAMINE HYDROCHLORIDE 25 MG: 50 INJECTION, SOLUTION INTRAMUSCULAR; INTRAVENOUS at 10:09

## 2021-03-31 RX ADMIN — DIPHENHYDRAMINE HYDROCHLORIDE 25 MG: 50 INJECTION, SOLUTION INTRAMUSCULAR; INTRAVENOUS at 10:03

## 2021-03-31 ASSESSMENT — MIFFLIN-ST. JEOR: SCORE: 1524.75

## 2021-03-31 NOTE — OR NURSING
Patient underwent colonoscopy with polypectomy x5 via hot snare with 4 clips placed to polypectomy sites. Stable post-procedure.

## 2021-03-31 NOTE — DISCHARGE INSTRUCTIONS
Discharge Instructions after Colonoscopy  or Sigmoidoscopy    Today you had a Colonoscopy    Activity and Diet  You were given medicine for pain. You may be dizzy or sleepy.  For 24 hours:    Do not drive or use heavy equipment.    Do not make important decisions.    Do not drink any alcohol.  You may return to your normal diet and medicines.    Discomfort    Air was placed in your colon during the exam in order to see it. Walking helps to pass the air.    You may take Tylenol (acetaminophen) for pain unless your doctor has told you not to.    Follow-up  We took small tissue samples or polyps to study. Your doctor will call you with the results  within two weeks.    When to call:    Call right away if you have:    Unusual pain in belly or chest pain not relieved with passing air.    More than 1 to 2 Tablespoons of bleeding from your rectum.    Fever above 100.6  F (37.5  C).    If you have severe pain, bleeding, or shortness of breath, go to an emergency room.    If you have questions, call:  Monday to Friday, 8 a.m. to 4:30 p.m.  Endoscopy: [phone] (We may have to call you back)    After hours  Hospital: [phone] (Ask for the GI fellow on call)

## 2021-04-01 LAB — COPATH REPORT: NORMAL

## 2021-04-24 ASSESSMENT — ENCOUNTER SYMPTOMS
POOR WOUND HEALING: 0
SKIN CHANGES: 0
NAIL CHANGES: 0

## 2021-04-27 ENCOUNTER — OFFICE VISIT (OUTPATIENT)
Dept: ENDOCRINOLOGY | Facility: CLINIC | Age: 71
End: 2021-04-27
Payer: COMMERCIAL

## 2021-04-27 VITALS
BODY MASS INDEX: 34.73 KG/M2 | DIASTOLIC BLOOD PRESSURE: 82 MMHG | WEIGHT: 221.3 LBS | HEIGHT: 67 IN | SYSTOLIC BLOOD PRESSURE: 133 MMHG

## 2021-04-27 DIAGNOSIS — E04.2 NON-TOXIC MULTINODULAR GOITER: Primary | ICD-10-CM

## 2021-04-27 PROCEDURE — 99203 OFFICE O/P NEW LOW 30 MIN: CPT | Performed by: INTERNAL MEDICINE

## 2021-04-27 ASSESSMENT — MIFFLIN-ST. JEOR: SCORE: 1557.47

## 2021-04-27 NOTE — LETTER
"4/27/2021       RE: Alysha Youngblood  [address] [place] [phone]     Dear Colleague,    Thank you for referring your patient, Alysha Youngblood, to the [facility] ENDOCRINOLOGY CLINIC [place] [facility]. Please see a copy of my visit note below.      This 70 year old woman returns for f/u of her multinodular goiter.  This was first diagnoses in the mid 1990s. She has had repeat biopsy x2 , most recently in 11/2012, which have all been benign.  Of note, previous biopsy noted Hurthle cells on background of lymphocytes indicative of Hashimoto's thyroiditis.  Review of past thyroid hormone tests are always euthyroid.  She doesn't think her thyroid gland has changed in size since her last visit. She denies dysphagia or neck pain.  She recently saw her PCP and had her BP meds changed.  She is concerned about her BP being up today.      She reports that her maternal aunt and sister now also both have goiters with mulitple nodules.    Current Outpatient Medications   Medication     amLODIPine (NORVASC) 5 MG tablet     apixaban ANTICOAGULANT (ELIQUIS ANTICOAGULANT) 5 MG tablet     Calcium-Vitamin D-Vitamin K (VIACTIV PO)     fish oil-omega-3 fatty acids (FISH OIL) 1000 MG capsule     hydrochlorothiazide (HYDRODIURIL) 25 MG tablet     loratadine (CLARITIN) 10 MG tablet     lovastatin (MEVACOR) 40 MG tablet     metoprolol succinate ER (TOPROL-XL) 50 MG 24 hr tablet     metoprolol tartrate (LOPRESSOR) 25 MG tablet     bisacodyl (DULCOLAX) 5 MG EC tablet     Doxylamine Succinate, Sleep, (SLEEP AID PO)     FLUZONE HIGH-DOSE 0.5 ML injection     polyethylene glycol (GOLYTELY) 236 g suspension     valACYclovir (VALTREX) 1000 mg tablet     Zolpidem Tartrate (AMBIEN PO)     No current facility-administered medications for this visit.      Vital signs:      BP: (!) 150/90             Height: 169.5 cm (5' 6.75\") Weight: 100.4 kg (221 lb 4.8 oz)  Estimated body mass " "index is 34.92 kg/m  as calculated from the following:    Height as of this encounter: 1.695 m (5' 6.75\").    Weight as of this encounter: 100.4 kg (221 lb 4.8 oz).    Repeat /82    NAD  Thyroid - right lobe about 3 times normal in size with a cigar shape; left lobe about 4 times normal in size with 2 or ? 3 nodules.  All soft.    No LN felt.    EXAMINATION: Chest CT  3/26/2021 8:48 AM     CLINICAL HISTORY: Pulmonary infiltrate     COMPARISON: 6/21/2019.     TECHNIQUE: CT imaging obtained through the chest without contrast.  Axial, coronal, and sagittal reconstructions and axial MIP reformatted  images are reviewed.      CONTRAST: None     FINDINGS:  Lungs: Central airways are patent. Mild-to-moderate centrilobular  emphysematous changes. Unchanged left perifissural lymph node.  Unchanged left subpleural 5 mm node (series 4, image 190). Unchanged  right upper lobe 4 mm solid node (series 4, image 69). No new  suspicious nodule or infiltrate. Minimal bibasilar atelectasis.     Mediastinum: No significant change in calcified nodules of the  thyroid. Aortic calcifications. Heart size is normal. No pericardial  effusion.  Normal thoracic vasculature. No thoracic lymphadenopathy.      Bones and soft tissues: No suspicious bone findings.      Upper Abdomen: Limited evaluation of the upper abdomen demonstrates no  acute abdominal pathology.                                                                      IMPRESSION:  1. Unchanged pulmonary nodules since at least 2017. Lung-RADS 2:  recommend resume annual CT lung cancer screening.  2. Stable numerous calcified thyroid nodules.     I have personally reviewed the examination and initial interpretation  and I agree with the findings.     HUY BORJAS MD        ENDO THYROID LABS-[facility] Latest Ref Rng & Units 7/7/2018 1/16/2017   TSH 0.40 - 4.00 mU/L 0.92 1.83   T4 TOTAL 5.0 - 11.0 ug/dL     T4 FREE 0.70 - 1.85 ng/dL     TRIIODOTHYRONINE(T3) 60 - 181 ng/dL   "       Assessment/Plan:    This 70 year old woman has been noted to have a multnodular goiter for 20 years.  She has been biopsied twice and found to have benign tissue.  On exam today the left lobe does feel a bit larger than my last note would suggest but the right lobe is unchanged.  She has had serial chest CT done for monitoring of a nodules and the scan done last month reports stable thyroid nodules.  Will follow for now and plan to see again in one year.    I spent 20 minutes with the  Patient and an additional 10 minutes on day of visit reviewing chart and labs, doing documentation.        Faina Peace MD

## 2021-04-27 NOTE — PROGRESS NOTES
"  This 70 year old woman returns for f/u of her multinodular goiter.  This was first diagnoses in the mid 1990s. She has had repeat biopsy x2 , most recently in 11/2012, which have all been benign.  Of note, previous biopsy noted Hurthle cells on background of lymphocytes indicative of Hashimoto's thyroiditis.  Review of past thyroid hormone tests are always euthyroid.  She doesn't think her thyroid gland has changed in size since her last visit. She denies dysphagia or neck pain.  She recently saw her PCP and had her BP meds changed.  She is concerned about her BP being up today.      She reports that her maternal aunt and sister now also both have goiters with mulitple nodules.    Current Outpatient Medications   Medication     amLODIPine (NORVASC) 5 MG tablet     apixaban ANTICOAGULANT (ELIQUIS ANTICOAGULANT) 5 MG tablet     Calcium-Vitamin D-Vitamin K (VIACTIV PO)     fish oil-omega-3 fatty acids (FISH OIL) 1000 MG capsule     hydrochlorothiazide (HYDRODIURIL) 25 MG tablet     loratadine (CLARITIN) 10 MG tablet     lovastatin (MEVACOR) 40 MG tablet     metoprolol succinate ER (TOPROL-XL) 50 MG 24 hr tablet     metoprolol tartrate (LOPRESSOR) 25 MG tablet     bisacodyl (DULCOLAX) 5 MG EC tablet     Doxylamine Succinate, Sleep, (SLEEP AID PO)     FLUZONE HIGH-DOSE 0.5 ML injection     polyethylene glycol (GOLYTELY) 236 g suspension     valACYclovir (VALTREX) 1000 mg tablet     Zolpidem Tartrate (AMBIEN PO)     No current facility-administered medications for this visit.      Vital signs:      BP: (!) 150/90             Height: 169.5 cm (5' 6.75\") Weight: 100.4 kg (221 lb 4.8 oz)  Estimated body mass index is 34.92 kg/m  as calculated from the following:    Height as of this encounter: 1.695 m (5' 6.75\").    Weight as of this encounter: 100.4 kg (221 lb 4.8 oz).    Repeat /82    NAD  Thyroid - right lobe about 3 times normal in size with a cigar shape; left lobe about 4 times normal in size with 2 or ? 3 " nodules.  All soft.    No LN felt.    EXAMINATION: Chest CT  3/26/2021 8:48 AM     CLINICAL HISTORY: Pulmonary infiltrate     COMPARISON: 6/21/2019.     TECHNIQUE: CT imaging obtained through the chest without contrast.  Axial, coronal, and sagittal reconstructions and axial MIP reformatted  images are reviewed.      CONTRAST: None     FINDINGS:  Lungs: Central airways are patent. Mild-to-moderate centrilobular  emphysematous changes. Unchanged left perifissural lymph node.  Unchanged left subpleural 5 mm node (series 4, image 190). Unchanged  right upper lobe 4 mm solid node (series 4, image 69). No new  suspicious nodule or infiltrate. Minimal bibasilar atelectasis.     Mediastinum: No significant change in calcified nodules of the  thyroid. Aortic calcifications. Heart size is normal. No pericardial  effusion.  Normal thoracic vasculature. No thoracic lymphadenopathy.      Bones and soft tissues: No suspicious bone findings.      Upper Abdomen: Limited evaluation of the upper abdomen demonstrates no  acute abdominal pathology.                                                                      IMPRESSION:  1. Unchanged pulmonary nodules since at least 2017. Lung-RADS 2:  recommend resume annual CT lung cancer screening.  2. Stable numerous calcified thyroid nodules.     I have personally reviewed the examination and initial interpretation  and I agree with the findings.     HUY BORJAS MD        ENDO THYROID LABS-[facility] Latest Ref Rng & Units 7/7/2018 1/16/2017   TSH 0.40 - 4.00 mU/L 0.92 1.83   T4 TOTAL 5.0 - 11.0 ug/dL     T4 FREE 0.70 - 1.85 ng/dL     TRIIODOTHYRONINE(T3) 60 - 181 ng/dL         Assessment/Plan:    This 70 year old woman has been noted to have a multnodular goiter for 20 years.  She has been biopsied twice and found to have benign tissue.  On exam today the left lobe does feel a bit larger than my last note would suggest but the right lobe is unchanged.  She has had serial chest CT done for  monitoring of a nodules and the scan done last month reports stable thyroid nodules.  Will follow for now and plan to see again in one year.    I spent 20 minutes with the  Patient and an additional 10 minutes on day of visit reviewing chart and labs, doing documentation.        Faina Peace MD

## 2021-06-15 DIAGNOSIS — Z12.31 VISIT FOR SCREENING MAMMOGRAM: ICD-10-CM

## 2021-06-15 PROCEDURE — 77063 BREAST TOMOSYNTHESIS BI: CPT | Mod: GC | Performed by: STUDENT IN AN ORGANIZED HEALTH CARE EDUCATION/TRAINING PROGRAM

## 2021-06-15 PROCEDURE — 77067 SCR MAMMO BI INCL CAD: CPT | Mod: GC | Performed by: STUDENT IN AN ORGANIZED HEALTH CARE EDUCATION/TRAINING PROGRAM

## 2021-06-23 ENCOUNTER — TRANSFERRED RECORDS (OUTPATIENT)
Dept: HEALTH INFORMATION MANAGEMENT | Facility: CLINIC | Age: 71
End: 2021-06-23
Payer: COMMERCIAL

## 2021-06-28 ENCOUNTER — ANCILLARY PROCEDURE (OUTPATIENT)
Dept: CT IMAGING | Facility: CLINIC | Age: 71
End: 2021-06-28
Attending: INTERNAL MEDICINE
Payer: COMMERCIAL

## 2021-06-28 DIAGNOSIS — R10.31 RLQ ABDOMINAL PAIN: ICD-10-CM

## 2021-06-28 PROCEDURE — 74177 CT ABD & PELVIS W/CONTRAST: CPT | Performed by: RADIOLOGY

## 2021-06-28 RX ORDER — IOPAMIDOL 755 MG/ML
135 INJECTION, SOLUTION INTRAVASCULAR ONCE
Status: COMPLETED | OUTPATIENT
Start: 2021-06-28 | End: 2021-06-28

## 2021-06-28 RX ADMIN — IOPAMIDOL 135 ML: 755 INJECTION, SOLUTION INTRAVASCULAR at 17:18

## 2021-09-05 ENCOUNTER — HEALTH MAINTENANCE LETTER (OUTPATIENT)
Age: 71
End: 2021-09-05

## 2021-09-28 NOTE — TELEPHONE ENCOUNTER
DIAGNOSIS: (R) knee pain/ Self/   APPOINTMENT DATE: 10.4.21   NOTES STATUS DETAILS   OFFICE NOTE from referring provider N/A    OFFICE NOTE from other specialist Internal 4.3.18 Dr Slim Herrera, [facility]   DISCHARGE SUMMARY from hospital N/A    DISCHARGE REPORT from the ER N/A    OPERATIVE REPORT N/A    EMG report N/A    MEDICATION LIST Internal    MRI N/A    DEXA (osteoporosis/bone health) Internal    CT SCAN N/A    XRAYS (IMAGES & REPORTS) Internal 4.3.18 R knee

## 2021-10-04 ENCOUNTER — PRE VISIT (OUTPATIENT)
Dept: ORTHOPEDICS | Facility: CLINIC | Age: 71
End: 2021-10-04

## 2021-10-04 ENCOUNTER — OFFICE VISIT (OUTPATIENT)
Dept: ORTHOPEDICS | Facility: CLINIC | Age: 71
End: 2021-10-04
Payer: COMMERCIAL

## 2021-10-04 ENCOUNTER — ANCILLARY PROCEDURE (OUTPATIENT)
Dept: GENERAL RADIOLOGY | Facility: CLINIC | Age: 71
End: 2021-10-04
Attending: STUDENT IN AN ORGANIZED HEALTH CARE EDUCATION/TRAINING PROGRAM
Payer: COMMERCIAL

## 2021-10-04 VITALS — BODY MASS INDEX: 35.36 KG/M2 | HEIGHT: 66 IN | WEIGHT: 220 LBS

## 2021-10-04 DIAGNOSIS — G89.29 CHRONIC PAIN OF RIGHT KNEE: Primary | ICD-10-CM

## 2021-10-04 DIAGNOSIS — G89.29 CHRONIC PAIN OF RIGHT KNEE: ICD-10-CM

## 2021-10-04 DIAGNOSIS — M25.561 CHRONIC PAIN OF RIGHT KNEE: Primary | ICD-10-CM

## 2021-10-04 DIAGNOSIS — M71.21 SYNOVIAL CYST OF RIGHT POPLITEAL SPACE: ICD-10-CM

## 2021-10-04 DIAGNOSIS — M25.561 CHRONIC PAIN OF RIGHT KNEE: ICD-10-CM

## 2021-10-04 PROCEDURE — 99204 OFFICE O/P NEW MOD 45 MIN: CPT | Performed by: STUDENT IN AN ORGANIZED HEALTH CARE EDUCATION/TRAINING PROGRAM

## 2021-10-04 PROCEDURE — 73562 X-RAY EXAM OF KNEE 3: CPT | Mod: RT | Performed by: RADIOLOGY

## 2021-10-04 ASSESSMENT — MIFFLIN-ST. JEOR: SCORE: 1539.66

## 2021-10-04 NOTE — LETTER
10/4/2021      RE: Alysha Youngblood  [address] [place] [phone]       [facility]  Sports Medicine Clinic  [facility]           SUBJECTIVE       Alysha Youngblood is a 70 year old female presenting to clinic todaywith concerns for right knee pain. She has been sen in the past because of this in [place] of 2018 by Dr. Herrera. Diagnosed with patellofemoral OA and started PT. Last Xr's were done at this time.     Study Result    Narrative & Impression   Exam: Single frontal view of each knee and a lateral and axial view of  the right knee dated 4/3/2018.     COMPARISON: None.     CLINICAL HISTORY: Right knee pain.     FINDINGS: Single frontal view of each knee and a lateral and axial  view of the right knee were obtained. Joint space narrowing in the  medial femorotibial joint compartment of both knee joints, greater on  the left. The right knee patella is well aligned. Small amount of  fluid in the right knee joint. No displaced fractures.                                                                      IMPRESSION: Joint space narrowing in the medial femorotibial joint  compartment of both knee joints.     Background:   Date of injury: no injury  Duration of symptoms: 6 weeks   Mechanism of Injury: legs crossed in airplane seat  Intensity: 6/10 without the brace , 3/10 with the brace  Aggravating factors: walking   Relieving Factors: OTC brace  this seems to brace the quadriceps tendon and the patellar tendon.  Prior Evaluation: Dr. Herrera, 2018, Patellofemoral OA, started PT    Patient is currently on Eliquis and is unable to take anti-inflammatories.  No history of injury to this knee.    PMH, Medications and Allergies were reviewed and updated as needed.    ROS:  As noted above otherwise negative.    Patient Active Problem List   Diagnosis     Elbow fracture     Atrial fibrillation (H)     PVC (premature ventricular contraction)     Goiter     Atrial flutter (H)  "      Current Outpatient Medications   Medication Sig Dispense Refill     amLODIPine (NORVASC) 5 MG tablet Take 5 mg by mouth daily       apixaban ANTICOAGULANT (ELIQUIS ANTICOAGULANT) 5 MG tablet Take 1 tablet (5 mg) by mouth 2 times daily 180 tablet 3     bisacodyl (DULCOLAX) 5 MG EC tablet Take as directed in [facility] patient instructions 4 tablet 0     Calcium-Vitamin D-Vitamin K (VIACTIV PO) Take by mouth 2 times daily        Doxylamine Succinate, Sleep, (SLEEP AID PO)        fish oil-omega-3 fatty acids (FISH OIL) 1000 MG capsule One capsule daily.       FLUZONE HIGH-DOSE 0.5 ML injection ADM 0.5ML IM UTD  0     hydrochlorothiazide (HYDRODIURIL) 25 MG tablet Take 25 mg by mouth daily       loratadine (CLARITIN) 10 MG tablet Take 10 mg by mouth daily       lovastatin (MEVACOR) 40 MG tablet Take 40 mg by mouth At Bedtime.       metoprolol succinate ER (TOPROL-XL) 50 MG 24 hr tablet Take 1.5 tablets (75 mg) by mouth daily 135 tablet 3     metoprolol tartrate (LOPRESSOR) 25 MG tablet Take 25mg with onset of arrhythmia, up to 50 mg daily. 30 tablet 0     polyethylene glycol (GOLYTELY) 236 g suspension Take as directed in mychart patient instructions 4000 mL 0     valACYclovir (VALTREX) 1000 mg tablet TAKE 1 TABLET BY MOUTH THREE TIMES DAILY       Zolpidem Tartrate (AMBIEN PO) Take 5 mg by mouth nightly as needed for sleep              OBJECTIVE:       Vitals:   Vitals:    10/04/21 1323   Weight: 99.8 kg (220 lb)   Height: 1.676 m (5' 6\")     BMI: Body mass index is 35.51 kg/m .    Gen:  Well nourished and in no acute distress  HEENT: Extraocular movement intact  Neck: Supple  Pulm:  Breathing Comfortably. No increased respiratory effort.  Psych: Euthymic. Appropriately answers questions    MSK: Right knee with range of motion from 0 to 90 degrees, and motion mildly painful within the medial and lateral joint lines.  Mild effusion noted about the medial and posterior aspects of the knee.  No warmth, erythema, or " color changes.  Mild tenderness to palpation within the medial joint line.  No signs of instability of the ACL, PCL, LCL/MCL.  Negative Juve.      XRAY : Noticeable patellofemoral and medial compartment osteoarthritis of bilateral knees.          ASSESSMENT and PLAN:     Alysha was seen today for consult.    Diagnoses and all orders for this visit:    Chronic pain of right knee  -     XR Knee Right 3 Views; Future  -     PHYSICAL THERAPY REFERRAL (Internal); Future    Synovial cyst of right popliteal space  -     PHYSICAL THERAPY REFERRAL (Internal); Future    70-year-old female presenting with chronic right knee pain.  Her overall examination as well as history and imaging is consistent with arthritis of both knees, symptomatic on the right with an accompanying Baker's cyst.  Patient has not tried noninvasive management at this point other than the brace that she recently bought over-the-counter.    Plan: Instructed patient that wearing the brace while ambulating is fine if this gives her comfort.  When at home, to assist with the Baker's cyst, we have shown her how to effectively Ace wrap her knee with elevation to alleviate the edema.  Due to the patient being on anticoagulants, NSAIDs are contraindicated so we will not venture this route.  Patient has tried topical diclofenac gel that was not helpful.  Her overall pain is not at its maximal height at this point, wrapping and physical therapy may be very helpful for her in the interim.  I think once her swelling posteriorly has resolved she could benefit from a steroid shot which she is amenable to.  She will start physical therapy and continue Ace wrapping at night.  She can return to clinic in 2 weeks, at that time if she is doing roughly the same we will give her a corticosteroid shot to that right knee.      Options for treatment and/or follow-up care were reviewed with the patient was actively involved in the decision making process. Patient verbalized  understanding and was in agreement with the plan.    Tico Ibarra DO  , Sports Medicine  Department of Family Medicine and [facility]

## 2021-10-04 NOTE — PROGRESS NOTES
[facility]  Sports Medicine Clinic  Clinics and Surgery Center           SUBJECTIVE       Alysha Youngblood is a 70 year old female presenting to clinic todaywith concerns for right knee pain. She has been sen in the past because of this in [place] of 2018 by Dr. Herrera. Diagnosed with patellofemoral OA and started PT. Last Xr's were done at this time.     Study Result    Narrative & Impression   Exam: Single frontal view of each knee and a lateral and axial view of  the right knee dated 4/3/2018.     COMPARISON: None.     CLINICAL HISTORY: Right knee pain.     FINDINGS: Single frontal view of each knee and a lateral and axial  view of the right knee were obtained. Joint space narrowing in the  medial femorotibial joint compartment of both knee joints, greater on  the left. The right knee patella is well aligned. Small amount of  fluid in the right knee joint. No displaced fractures.                                                                      IMPRESSION: Joint space narrowing in the medial femorotibial joint  compartment of both knee joints.     Background:   Date of injury: no injury  Duration of symptoms: 6 weeks   Mechanism of Injury: legs crossed in airplane seat  Intensity: 6/10 without the brace , 3/10 with the brace  Aggravating factors: walking   Relieving Factors: OTC brace  this seems to brace the quadriceps tendon and the patellar tendon.  Prior Evaluation: Dr. Herrera, 2018, Patellofemoral OA, started PT    Patient is currently on Eliquis and is unable to take anti-inflammatories.  No history of injury to this knee.    PMH, Medications and Allergies were reviewed and updated as needed.    ROS:  As noted above otherwise negative.    Patient Active Problem List   Diagnosis     Elbow fracture     Atrial fibrillation (H)     PVC (premature ventricular contraction)     Goiter     Atrial flutter (H)       Current Outpatient Medications   Medication Sig Dispense Refill     amLODIPine (NORVASC) 5  "MG tablet Take 5 mg by mouth daily       apixaban ANTICOAGULANT (ELIQUIS ANTICOAGULANT) 5 MG tablet Take 1 tablet (5 mg) by mouth 2 times daily 180 tablet 3     bisacodyl (DULCOLAX) 5 MG EC tablet Take as [facility] 4 tablet 0     Calcium-Vitamin D-Vitamin K (VIACTIV PO) Take by mouth 2 times daily        Doxylamine Succinate, Sleep, (SLEEP AID PO)        fish oil-omega-3 fatty acids (FISH OIL) 1000 MG capsule One capsule daily.       FLUZONE HIGH-DOSE 0.5 ML injection ADM 0.5ML IM UTD  0     hydrochlorothiazide (HYDRODIURIL) 25 MG tablet Take 25 mg by mouth daily       loratadine (CLARITIN) 10 MG tablet Take 10 mg by mouth daily       lovastatin (MEVACOR) 40 MG tablet Take 40 mg by mouth At Bedtime.       metoprolol succinate ER (TOPROL-XL) 50 MG 24 hr tablet Take 1.5 tablets (75 mg) by mouth daily 135 tablet 3     metoprolol tartrate (LOPRESSOR) 25 MG tablet Take 25mg with onset of arrhythmia, up to 50 mg daily. 30 tablet 0     polyethylene glycol (GOLYTELY) 236 g suspension Take as directed in [facility] patient instructions 4000 mL 0     valACYclovir (VALTREX) 1000 mg tablet TAKE 1 TABLET BY MOUTH THREE TIMES DAILY       Zolpidem Tartrate (AMBIEN PO) Take 5 mg by mouth nightly as needed for sleep              OBJECTIVE:       Vitals:   Vitals:    10/04/21 1323   Weight: 99.8 kg (220 lb)   Height: 1.676 m (5' 6\")     BMI: Body mass index is 35.51 kg/m .    Gen:  Well nourished and in no acute distress  HEENT: Extraocular movement intact  Neck: Supple  Pulm:  Breathing Comfortably. No increased respiratory effort.  Psych: Euthymic. Appropriately answers questions    MSK: Right knee with range of motion from 0 to 90 degrees, and motion mildly painful within the medial and lateral joint lines.  Mild effusion noted about the medial and posterior aspects of the knee.  No warmth, erythema, or color changes.  Mild tenderness to palpation within the medial joint line.  No signs of instability " of the ACL, PCL, LCL/MCL.  Negative Juve.      XRAY : Noticeable patellofemoral and medial compartment osteoarthritis of bilateral knees.          ASSESSMENT and PLAN:     Alysha was seen today for consult.    Diagnoses and all orders for this visit:    Chronic pain of right knee  -     XR Knee Right 3 Views; Future  -     PHYSICAL THERAPY REFERRAL (Internal); Future    Synovial cyst of right popliteal space  -     PHYSICAL THERAPY REFERRAL (Internal); Future    70-year-old female presenting with chronic right knee pain.  Her overall examination as well as history and imaging is consistent with arthritis of both knees, symptomatic on the right with an accompanying Baker's cyst.  Patient has not tried noninvasive management at this point other than the brace that she recently bought over-the-counter.    Plan: Instructed patient that wearing the brace while ambulating is fine if this gives her comfort.  When at home, to assist with the Baker's cyst, we have shown her how to effectively Ace wrap her knee with elevation to alleviate the edema.  Due to the patient being on anticoagulants, NSAIDs are contraindicated so we will not venture this route.  Patient has tried topical diclofenac gel that was not helpful.  Her overall pain is not at its maximal height at this point, wrapping and physical therapy may be very helpful for her in the interim.  I think once her swelling posteriorly has resolved she could benefit from a steroid shot which she is amenable to.  She will start physical therapy and continue Ace wrapping at night.  She can return to clinic in 2 weeks, at that time if she is doing roughly the same we will give her a corticosteroid shot to that right knee.      Options for treatment and/or follow-up care were reviewed with the patient was actively involved in the decision making process. Patient verbalized understanding and was in agreement with the plan.    Tico Ibarra DO  , Sports  Medicine  Department of Family Medicine and [facility]

## 2021-10-06 ENCOUNTER — THERAPY VISIT (OUTPATIENT)
Dept: PHYSICAL THERAPY | Facility: CLINIC | Age: 71
End: 2021-10-06
Attending: STUDENT IN AN ORGANIZED HEALTH CARE EDUCATION/TRAINING PROGRAM
Payer: COMMERCIAL

## 2021-10-06 DIAGNOSIS — M17.11 OSTEOARTHRITIS OF RIGHT KNEE, UNSPECIFIED OSTEOARTHRITIS TYPE: ICD-10-CM

## 2021-10-06 DIAGNOSIS — M25.561 CHRONIC PAIN OF RIGHT KNEE: ICD-10-CM

## 2021-10-06 DIAGNOSIS — M71.21 SYNOVIAL CYST OF RIGHT POPLITEAL SPACE: ICD-10-CM

## 2021-10-06 DIAGNOSIS — G89.29 CHRONIC PAIN OF RIGHT KNEE: ICD-10-CM

## 2021-10-06 PROCEDURE — 97161 PT EVAL LOW COMPLEX 20 MIN: CPT | Mod: GP | Performed by: PHYSICAL THERAPIST

## 2021-10-06 PROCEDURE — 97530 THERAPEUTIC ACTIVITIES: CPT | Mod: GP | Performed by: PHYSICAL THERAPIST

## 2021-10-06 PROCEDURE — 97110 THERAPEUTIC EXERCISES: CPT | Mod: GP | Performed by: PHYSICAL THERAPIST

## 2021-10-06 ASSESSMENT — ACTIVITIES OF DAILY LIVING (ADL)
WALK: ACTIVITY IS FAIRLY DIFFICULT
KNEE_ACTIVITY_OF_DAILY_LIVING_SUM: 41
SWELLING: I DO NOT HAVE THE SYMPTOM
RAW_SCORE: 41
GIVING WAY, BUCKLING OR SHIFTING OF KNEE: I DO NOT HAVE THE SYMPTOM
HOW_WOULD_YOU_RATE_THE_OVERALL_FUNCTION_OF_YOUR_KNEE_DURING_YOUR_USUAL_DAILY_ACTIVITIES?: SEVERELY ABNORMAL
GO DOWN STAIRS: ACTIVITY IS VERY DIFFICULT
SQUAT: I AM UNABLE TO DO THE ACTIVITY
HOW_WOULD_YOU_RATE_THE_CURRENT_FUNCTION_OF_YOUR_KNEE_DURING_YOUR_USUAL_DAILY_ACTIVITIES_ON_A_SCALE_FROM_0_TO_100_WITH_100_BEING_YOUR_LEVEL_OF_KNEE_FUNCTION_PRIOR_TO_YOUR_INJURY_AND_0_BEING_THE_INABILITY_TO_PERFORM_ANY_OF_YOUR_USUAL_DAILY_ACTIVITIES?: 50
RISE FROM A CHAIR: ACTIVITY IS MINIMALLY DIFFICULT
AS_A_RESULT_OF_YOUR_KNEE_INJURY,_HOW_WOULD_YOU_RATE_YOUR_CURRENT_LEVEL_OF_DAILY_ACTIVITY?: SEVERELY ABNORMAL
SIT WITH YOUR KNEE BENT: ACTIVITY IS SOMEWHAT DIFFICULT
LIMPING: THE SYMPTOM AFFECTS MY ACTIVITY SEVERELY
PAIN: THE SYMPTOM AFFECTS MY ACTIVITY SEVERELY
KNEE_ACTIVITY_OF_DAILY_LIVING_SCORE: 58.57
WEAKNESS: I DO NOT HAVE THE SYMPTOM
KNEEL ON THE FRONT OF YOUR KNEE: ACTIVITY IS FAIRLY DIFFICULT
STAND: ACTIVITY IS NOT DIFFICULT
STIFFNESS: I DO NOT HAVE THE SYMPTOM
GO UP STAIRS: ACTIVITY IS FAIRLY DIFFICULT

## 2021-10-06 NOTE — PROGRESS NOTES
Physical Therapy Initial Evaluation  Subjective:  The history is provided by the patient. No  was used.   Patient Health History  Alysha Youngblood being seen for Physical therapy for right knee.     Date of Onset: 10/4/21 date of order.   Problem occurred: Not sure   Pain is reported as 3/10 on pain scale.  General health as reported by patient is good.     Red flags:  None as reported by patient.  Medical allergies: adhesives.   Surgeries include:  Other. Other surgery history details: Appendix stomach cyst c sections.    Current medications:  Cardiac medication, heparin/coumadin, high blood pressure medication and other. Other medications details: High cholesteral.    Current occupation is Professor.   Primary job tasks include:  Prolonged sitting.                  Patient Health History  Alysha Youngblood being seen for Physical therapy for right knee.     Date of Onset: 10/4/21 date of order.   Problem occurred: Not sure   Pain is reported as 3/10 on pain scale.  General health as reported by patient is good.     Red flags:  None as reported by patient.  Medical allergies: adhesives.   Surgeries include:  Other. Other surgery history details: Appendix stomach cyst c sections.    Current medications:  Cardiac medication, heparin/coumadin, high blood pressure medication and other. Other medications details: High cholesteral.    Current occupation is Professor.   Primary job tasks include:  Prolonged sitting.                  Therapist Generated HPI Evaluation  Problem details: The pt presents today with medial right knee pain when walking. She will wear an ACE bandage underneath a patellar brace.  Her brace reduces her pain significantly. She was flying on an airplane about 6 days ago and crossed her legs, which resulted in right knee pain when walking.    Goals: walk without pain    Activity: was doing the elliptical.         Type of problem:  Right knee.    This is a chronic condition.  Condition  occurred with:  Degenerative joint disease and insidious onset.  Where condition occurred: for unknown reasons.  Patient reports pain:  Medial and anterior.  Pain is described as aching and sharp   Radiates to: none. Pain is worse during the day.  Since onset symptoms are gradually improving (with brace).  Associated symptoms:  Edema. Symptoms are exacerbated by walking, descending stairs, bending/squatting and weight bearing (sitting for longer periods of time)  and relieved by bracing/immobilizing and ice.      Restrictions due to condition include:  Working in normal job without restrictions.  Barriers include:  None as reported by patient.                        Objective:    Gait:  Decreased stance time right lower extremity          Flexibility/Screens:       Lower Extremity:      Decreased right lower extremity flexibility:  Gastroc                                                      Knee Evaluation:  ROM:  AROM: normal            Strength:     Extension:  Left: 5/5   Pain:      Right: 5-/5    Pain:+  Flexion:  Left: 5/5   Pain:      Right: 5/5    Pain:+      Quad Set Right: Fair and good    Pain:      Palpation:      Right knee tenderness present at:  Medial Joint Line  Edema:  Edema of the knee: slight effusion R knee.            General     ROS    Assessment/Plan:    Patient is a 70 year old female with right side knee complaints.    Patient has the following significant findings with corresponding treatment plan.                Diagnosis 1:  Right knee OA  Pain -  hot/cold therapy, US, electric stimulation, mechanical traction, manual therapy, STS, splint/taping/bracing/orthotics, self management, education, directional preference exercise and home program  Decreased ROM/flexibility - manual therapy, therapeutic exercise, therapeutic activity and home program  Decreased strength - therapeutic exercise, therapeutic activities and home program  Impaired gait - gait training, assistive devices and home  program    Therapy Evaluation Codes:   Cumulative Therapy Evaluation is: Low complexity.    Previous and current functional limitations:  (See Goal Flow Sheet for this information)    Short term and Long term goals: (See Goal Flow Sheet for this information)     Communication ability:  Patient appears to be able to clearly communicate and understand verbal and written communication and follow directions correctly.  Treatment Explanation - The following has been discussed with the patient:   RX ordered/plan of care  Anticipated outcomes  Possible risks and side effects  This patient would benefit from PT intervention to resume normal activities.   Rehab potential is good.    Frequency:  1 X week, once daily  Duration:  for 8 weeks  Discharge Plan:  Achieve all LTG.  Independent in home treatment program.  Reach maximal therapeutic benefit.    Please refer to the daily flowsheet for treatment today, total treatment time and time spent performing 1:1 timed codes.

## 2021-10-14 ENCOUNTER — THERAPY VISIT (OUTPATIENT)
Dept: PHYSICAL THERAPY | Facility: CLINIC | Age: 71
End: 2021-10-14
Payer: COMMERCIAL

## 2021-10-14 DIAGNOSIS — M17.11 OSTEOARTHRITIS OF RIGHT KNEE, UNSPECIFIED OSTEOARTHRITIS TYPE: ICD-10-CM

## 2021-10-14 PROCEDURE — 97110 THERAPEUTIC EXERCISES: CPT | Mod: GP | Performed by: PHYSICAL THERAPIST

## 2021-10-14 PROCEDURE — 97140 MANUAL THERAPY 1/> REGIONS: CPT | Mod: GP | Performed by: PHYSICAL THERAPIST

## 2021-10-20 ENCOUNTER — TRANSFERRED RECORDS (OUTPATIENT)
Dept: HEALTH INFORMATION MANAGEMENT | Facility: CLINIC | Age: 71
End: 2021-10-20
Payer: COMMERCIAL

## 2021-10-22 NOTE — PROGRESS NOTES
[facility]  Sports Medicine Clinic  Clinics and Surgery Center           SUBJECTIVE       Alysha Youngblood is a 70 year old female presenting to clinic today as a f/u of her right knee pain.    10/4/21: Chronic pain of right knee  -     XR Knee Right 3 Views; Future  -     PHYSICAL THERAPY REFERRAL (Internal); Future     Synovial cyst of right popliteal space  -     PHYSICAL THERAPY REFERRAL (Internal); Future   70-year-old female presenting with chronic right knee pain.  Her overall examination as well as history and imaging is consistent with arthritis of both knees, symptomatic on the right with an accompanying Baker's cyst.  Patient has not tried noninvasive management at this point other than the brace that she recently bought over-the-counter.  Plan: Instructed patient that wearing the brace while ambulating is fine if this gives her comfort.  When at home, to assist with the Baker's cyst, we have shown her how to effectively Ace wrap her knee with elevation to alleviate the edema.  Due to the patient being on anticoagulants, NSAIDs are contraindicated so we will not venture this route.  Patient has tried topical diclofenac gel that was not helpful.  Her overall pain is not at its maximal height at this point, wrapping and physical therapy may be very helpful for her in the interim.  I think once her swelling posteriorly has resolved she could benefit from a steroid shot which she is amenable to.  She will start physical therapy and continue Ace wrapping at night.  She can return to clinic in 2 weeks, at that time if she is doing roughly the same we will give her a corticosteroid shot to that right knee.    Interval HX: Alysha is done well since her last visit.  Still has some mild pain if going on long walks.  Physical therapy has helped tremendously as well as patellar taping.  Her overall desire is to get over this hump, and begin more functional training as she is looking to be more physically  "active.  She does not have any catching or locking.  No swelling.  No instability.    PMH, Medications and Allergies were reviewed and updated as needed.    ROS:  As noted above otherwise negative.    Patient Active Problem List   Diagnosis     Elbow fracture     Atrial fibrillation (H)     PVC (premature ventricular contraction)     Goiter     Atrial flutter (H)     Osteoarthritis of right knee, unspecified osteoarthritis type       Current Outpatient Medications   Medication Sig Dispense Refill     amLODIPine (NORVASC) 5 MG tablet Take 5 mg by mouth daily       apixaban ANTICOAGULANT (ELIQUIS ANTICOAGULANT) 5 MG tablet Take 1 tablet (5 mg) by mouth 2 times daily 180 tablet 3     bisacodyl (DULCOLAX) 5 MG EC tablet Take as directed in mychart patient instructions 4 tablet 0     Calcium-Vitamin D-Vitamin K (VIACTIV PO) Take by mouth 2 times daily        Doxylamine Succinate, Sleep, (SLEEP AID PO)        fish oil-omega-3 fatty acids (FISH OIL) 1000 MG capsule One capsule daily.       FLUZONE HIGH-DOSE 0.5 ML injection ADM 0.5ML IM UTD  0     hydrochlorothiazide (HYDRODIURIL) 25 MG tablet Take 25 mg by mouth daily       loratadine (CLARITIN) 10 MG tablet Take 10 mg by mouth daily       lovastatin (MEVACOR) 40 MG tablet Take 40 mg by mouth At Bedtime.       metoprolol succinate ER (TOPROL-XL) 50 MG 24 hr tablet Take 1.5 tablets (75 mg) by mouth daily 135 tablet 3     metoprolol tartrate (LOPRESSOR) 25 MG tablet Take 25mg with onset of arrhythmia, up to 50 mg daily. 30 tablet 0     polyethylene glycol (GOLYTELY) 236 g suspension Take as directed in mychart patient instructions 4000 mL 0     valACYclovir (VALTREX) 1000 mg tablet TAKE 1 TABLET BY MOUTH THREE TIMES DAILY       Zolpidem Tartrate (AMBIEN PO) Take 5 mg by mouth nightly as needed for sleep              OBJECTIVE:       Vitals:   Vitals:    10/23/21 0922   Weight: 102.1 kg (225 lb)   Height: 1.702 m (5' 7\")     BMI: Body mass index is 35.24 kg/m .    Gen:  Well " nourished and in no acute distress  HEENT: Extraocular movement intact  Neck: Supple  Pulm:  Breathing Comfortably. No increased respiratory effort.  Psych: Euthymic. Appropriately answers questions    MSK: Right knee without effusion.  Full range of motion flexion and extension.  No noticeable tenderness to palpation.  No ligamentous laxity.    Study Result    Narrative & Impression   3 views right knee radiographs 10/4/2021 4:03 PM     History: Chronic pain of right knee; Chronic pain of right knee      Comparison: 4/3/2018     Findings:     AP view of bilateral knees, and lateral and patellofemoral views of  the right knee were obtained.      No acute osseous abnormality. Small joint effusion.     Mild right and moderate left medial compartment joint space loss,  similar to prior.     No patellar tilt or lateral subluxation.  Soft tissue is unremarkable.                                                                      Impression:  1. No acute osseous abnormality.  2. Mild right and moderate left medial compartment joint space loss.                ASSESSMENT and PLAN:     Alysha was seen today for follow up.    Diagnoses and all orders for this visit:    Chronic pain of right knee    -[age]-year-old female with mild arthrosis of the right knee and moderate of the left presenting as a follow-up today from her previous visit.  Physical therapy is done extremely well.  She still has very mild pain with longer walking to which she is desiring to get rid of.    Plan: At this time, I think it is reasonable for her to engage in an injection today to help get her over the hump with the pain and get her functionally improved.  I have discussed the benefits and risks of the injection with the patient.  Procedure note below.  I would like Alysha to continue to engage in physical therapy at the direction of her therapist.  Would like her to follow-up in 4 to 6 weeks from now to see exactly how well she has done with the  injection.      Options for treatment and/or follow-up care were reviewed with the patient was actively involved in the decision making process. Patient verbalized understanding and was in agreement with the plan.    Tico Ibarra DO  , Sports Medicine  Department of Family Medicine and [facility]

## 2021-10-23 ENCOUNTER — ANCILLARY PROCEDURE (OUTPATIENT)
Dept: GENERAL RADIOLOGY | Facility: CLINIC | Age: 71
End: 2021-10-23
Attending: STUDENT IN AN ORGANIZED HEALTH CARE EDUCATION/TRAINING PROGRAM
Payer: COMMERCIAL

## 2021-10-23 ENCOUNTER — OFFICE VISIT (OUTPATIENT)
Dept: ORTHOPEDICS | Facility: CLINIC | Age: 71
End: 2021-10-23
Payer: COMMERCIAL

## 2021-10-23 VITALS — WEIGHT: 225 LBS | BODY MASS INDEX: 35.31 KG/M2 | HEIGHT: 67 IN

## 2021-10-23 DIAGNOSIS — M25.561 CHRONIC PAIN OF RIGHT KNEE: ICD-10-CM

## 2021-10-23 DIAGNOSIS — M79.641 RIGHT HAND PAIN: ICD-10-CM

## 2021-10-23 DIAGNOSIS — M25.561 CHRONIC PAIN OF RIGHT KNEE: Primary | ICD-10-CM

## 2021-10-23 DIAGNOSIS — G89.29 CHRONIC PAIN OF RIGHT KNEE: ICD-10-CM

## 2021-10-23 DIAGNOSIS — G89.29 CHRONIC PAIN OF RIGHT KNEE: Primary | ICD-10-CM

## 2021-10-23 PROCEDURE — 73130 X-RAY EXAM OF HAND: CPT | Mod: RT | Performed by: RADIOLOGY

## 2021-10-23 PROCEDURE — 99214 OFFICE O/P EST MOD 30 MIN: CPT | Mod: 25 | Performed by: STUDENT IN AN ORGANIZED HEALTH CARE EDUCATION/TRAINING PROGRAM

## 2021-10-23 PROCEDURE — 20610 DRAIN/INJ JOINT/BURSA W/O US: CPT | Mod: RT | Performed by: STUDENT IN AN ORGANIZED HEALTH CARE EDUCATION/TRAINING PROGRAM

## 2021-10-23 ASSESSMENT — MIFFLIN-ST. JEOR: SCORE: 1578.22

## 2021-10-23 NOTE — NURSING NOTE
[address] [address]  Dept: [phone]  ______________________________________________________________________________    Patient: Alysha Youngblood   : 1950   MRN: [id number]   2021    INVASIVE PROCEDURE SAFETY CHECKLIST    Date: 10/23/21   Procedure:R knee CSI with kenalog  Patient Name: Alysha Youngblood  MRN: [id number]  YOB: 1950    Action: Complete sections as appropriate. Any discrepancy results in a HARD COPY until resolved.     PRE PROCEDURE:  Patient ID verified with 2 identifiers (name and  or MRN): Yes  Procedure and site verified with patient/designee (when able): Yes  Accurate consent documentation in medical record: Yes  H&P (or appropriate assessment) documented in medical record: Yes  H&P must be up to 20 days prior to procedure and updates within 24 hours of procedure as applicable: NA  Relevant diagnostic and radiology test results appropriately labeled and displayed as applicable: Yes  Procedure site(s) marked with provider initials: NA    TIMEOUT:  Time-Out performed immediately prior to starting procedure, including verbal and active participation of all team members addressing the following:Yes  * Correct patient identify  * Confirmed that the correct side and site are marked  * An accurate procedure consent form  * Agreement on the procedure to be done  * Correct patient position  * Relevant images and results are properly labeled and appropriately displayed  * The need to administer antibiotics or fluids for irrigation purposes during the procedure as applicable   * Safety precautions based on patient history or medication use    DURING PROCEDURE: Verification of correct person, site, and procedures any time the responsibility for care of the patient is transferred to another member of the care team.       Prior to injection, verified patient identity using patient's name and date of birth.  Due  to injection administration, patient instructed to remain in clinic for 15 minutes  afterwards, and to report any adverse reaction to me immediately.    Joint injection was performed.      Drug Amount Wasted:  Yes: 1 mg/ml   Vial/Syringe: Single dose vial  Expiration Date:  12/24 - lidocaine  4/23 - kenalog      Jovany Lowery, [facility]  October 23, 2021

## 2021-10-23 NOTE — LETTER
10/23/2021      RE: Alysha Youngblood  [address] [place] [id number]       [facility]  Sports Medicine Clinic  Clinics and Surgery Center           SUBJECTIVE       Alysha Youngblood is a 70 year old female presenting to clinic today as a f/u of her right knee pain.    10/4/21: Chronic pain of right knee  -     XR Knee Right 3 Views; Future  -     PHYSICAL THERAPY REFERRAL (Internal); Future     Synovial cyst of right popliteal space  -     PHYSICAL THERAPY REFERRAL (Internal); Future   70-year-old female presenting with chronic right knee pain.  Her overall examination as well as history and imaging is consistent with arthritis of both knees, symptomatic on the right with an accompanying Baker's cyst.  Patient has not tried noninvasive management at this point other than the brace that she recently bought over-the-counter.  Plan: Instructed patient that wearing the brace while ambulating is fine if this gives her comfort.  When at home, to assist with the Baker's cyst, we have shown her how to effectively Ace wrap her knee with elevation to alleviate the edema.  Due to the patient being on anticoagulants, NSAIDs are contraindicated so we will not venture this route.  Patient has tried topical diclofenac gel that was not helpful.  Her overall pain is not at its maximal height at this point, wrapping and physical therapy may be very helpful for her in the interim.  I think once her swelling posteriorly has resolved she could benefit from a steroid shot which she is amenable to.  She will start physical therapy and continue Ace wrapping at night.  She can return to clinic in 2 weeks, at that time if she is doing roughly the same we will give her a corticosteroid shot to that right knee.    Interval HX: Alysha is done well since her last visit.  Still has some mild pain if going on long walks.  Physical therapy has helped tremendously as well as patellar taping.  Her overall desire is to get over  this hump, and begin more functional training as she is looking to be more physically active.  She does not have any catching or locking.  No swelling.  No instability.    PMH, Medications and Allergies were reviewed and updated as needed.    ROS:  As noted above otherwise negative.    Patient Active Problem List   Diagnosis     Elbow fracture     Atrial fibrillation (H)     PVC (premature ventricular contraction)     Goiter     Atrial flutter (H)     Osteoarthritis of right knee, unspecified osteoarthritis type       Current Outpatient Medications   Medication Sig Dispense Refill     amLODIPine (NORVASC) 5 MG tablet Take 5 mg by mouth daily       apixaban ANTICOAGULANT (ELIQUIS ANTICOAGULANT) 5 MG tablet Take 1 tablet (5 mg) by mouth 2 times daily 180 tablet 3     bisacodyl (DULCOLAX) 5 MG EC tablet Take as directed in mychart patient instructions 4 tablet 0     Calcium-Vitamin D-Vitamin K (VIACTIV PO) Take by mouth 2 times daily        Doxylamine Succinate, Sleep, (SLEEP AID PO)        fish oil-omega-3 fatty acids (FISH OIL) 1000 MG capsule One capsule daily.       FLUZONE HIGH-DOSE 0.5 ML injection ADM 0.5ML IM UTD  0     hydrochlorothiazide (HYDRODIURIL) 25 MG tablet Take 25 mg by mouth daily       loratadine (CLARITIN) 10 MG tablet Take 10 mg by mouth daily       lovastatin (MEVACOR) 40 MG tablet Take 40 mg by mouth At Bedtime.       metoprolol succinate ER (TOPROL-XL) 50 MG 24 hr tablet Take 1.5 tablets (75 mg) by mouth daily 135 tablet 3     metoprolol tartrate (LOPRESSOR) 25 MG tablet Take 25mg with onset of arrhythmia, up to 50 mg daily. 30 tablet 0     polyethylene glycol (GOLYTELY) 236 g suspension Take as directed in mychart patient instructions 4000 mL 0     valACYclovir (VALTREX) 1000 mg tablet TAKE 1 TABLET BY MOUTH THREE TIMES DAILY       Zolpidem Tartrate (AMBIEN PO) Take 5 mg by mouth nightly as needed for sleep              OBJECTIVE:       Vitals:   Vitals:    10/23/21 0922   Weight: 102.1 kg  "(225 lb)   Height: 1.702 m (5' 7\")     BMI: Body mass index is 35.24 kg/m .    Gen:  Well nourished and in no acute distress  HEENT: Extraocular movement intact  Neck: Supple  Pulm:  Breathing Comfortably. No increased respiratory effort.  Psych: Euthymic. Appropriately answers questions    MSK: Right knee without effusion.  Full range of motion flexion and extension.  No noticeable tenderness to palpation.  No ligamentous laxity.    Study Result    Narrative & Impression   3 views right knee radiographs 10/4/2021 4:03 PM     History: Chronic pain of right knee; Chronic pain of right knee      Comparison: 4/3/2018     Findings:     AP view of bilateral knees, and lateral and patellofemoral views of  the right knee were obtained.      No acute osseous abnormality. Small joint effusion.     Mild right and moderate left medial compartment joint space loss,  similar to prior.     No patellar tilt or lateral subluxation.  Soft tissue is unremarkable.                                                                      Impression:  1. No acute osseous abnormality.  2. Mild right and moderate left medial compartment joint space loss.                ASSESSMENT and PLAN:     Alysha was seen today for follow up.    Diagnoses and all orders for this visit:    Chronic pain of right knee    -[age]-year-old female with mild arthrosis of the right knee and moderate of the left presenting as a follow-up today from her previous visit.  Physical therapy is done extremely well.  She still has very mild pain with longer walking to which she is desiring to get rid of.    Plan: At this time, I think it is reasonable for her to engage in an injection today to help get her over the hump with the pain and get her functionally improved.  I have discussed the benefits and risks of the injection with the patient.  Procedure note below.  I would like Alysha to continue to engage in physical therapy at the direction of her therapist.  Would like " her to follow-up in 4 to 6 weeks from now to see exactly how well she has done with the injection.      Options for treatment and/or follow-up care were reviewed with the patient was actively involved in the decision making process. Patient verbalized understanding and was in agreement with the plan.    Tico Ibarra DO  , Sports Medicine  Department of Family [facility] and [facility]      PROCEDURE ENCOUNTER    [facility]  Tico Ibarra DO  2021     Name: Alysha Youngblood  MRN: [id number]  Age: 70 year old  : 1950      Diagnosis: R knee OA    PROCEDURE    Knee Injection - Intraarticular  The patient was informed of the risks and the benefits of the procedure and a written consent was signed.  The patient s right knee was prepped with chlorhexidine in sterile fashion.   40 mg of triamcinolone suspension was drawn up into a 5 mL syringe with 4 mL of 1% lidocaine.  Topical ethyl chloride was used as an anesthetic.  Injection was performed using substerile technique.  A 1.5-inch 22-gauge needle was used to enter the anterolateral aspect of the right knee by landmark guidance.  Injection performed successfully without difficulty.  There were no complications. The patient tolerated the procedure well. There was negligible bleeding. Band-Aid applied after.   The patient was instructed to ice the knee upon leaving clinic and refrain from overuse over the next 3-5 days.   The patient was instructed to call or go to the emergency room with any unusual pain, swelling, redness, or if otherwise concerned.  Tico Ibarra DO  , Sports Medicine  Department of [facility] and [facility]            Large Joint Injection/Arthocentesis: R knee joint    Date/Time: 10/23/2021 10:15 AM  Performed by: Tico Ibarra DO  Authorized by: Tico Ibarra DO     Indications:  Pain  Needle Size:  22 G  Guidance:  landmark guided    Approach:  Anterolateral  Location:  Knee      Medications:  40 mg triamcinolone 40 MG/ML; 4 mL lidocaine (PF) 1 %  Outcome:  Tolerated well, no immediate complications  Procedure discussed: discussed risks, benefits, and alternatives    Consent Given by:  Patient  Timeout: timeout called immediately prior to procedure    Prep: patient was prepped and draped in usual sterile fashion     Scribed by Jovany Lowery, ATC, ATC for Dr. Tico Ibarra on 10/23/21 at 10AM, based on the provider's statements to me.        Tico Ibarra, DO

## 2021-10-23 NOTE — PROGRESS NOTES
PROCEDURE ENCOUNTER    [facility]  Tico Ibarra DO  2021     Name: Alysha Youngblood  MRN: [id number]  Age: 70 year old  : 1950      Diagnosis: R knee OA    PROCEDURE    Knee Injection - Intraarticular  The patient was informed of the risks and the benefits of the procedure and a written consent was signed.  The patient s right knee was prepped with chlorhexidine in sterile fashion.   40 mg of triamcinolone suspension was drawn up into a 5 mL syringe with 4 mL of 1% lidocaine.  Topical ethyl chloride was used as an anesthetic.  Injection was performed using substerile technique.  A 1.5-inch 22-gauge needle was used to enter the anterolateral aspect of the right knee by landmark guidance.  Injection performed successfully without difficulty.  There were no complications. The patient tolerated the procedure well. There was negligible bleeding. Band-Aid applied after.   The patient was instructed to ice the knee upon leaving clinic and refrain from overuse over the next 3-5 days.   The patient was instructed to call or go to the emergency room with any unusual pain, swelling, redness, or if otherwise concerned.  Tico Ibarra DO  , Sports Medicine  Department of Family [facility] and [facility]

## 2021-10-23 NOTE — PROGRESS NOTES
Large Joint Injection/Arthocentesis: R knee joint    Date/Time: 10/23/2021 10:15 AM  Performed by: Tico Ibarra DO  Authorized by: Tico Ibarra DO     Indications:  Pain  Needle Size:  22 G  Guidance: landmark guided    Approach:  Anterolateral  Location:  Knee      Medications:  40 mg triamcinolone 40 MG/ML; 4 mL lidocaine (PF) 1 %  Outcome:  Tolerated well, no immediate complications  Procedure discussed: discussed risks, benefits, and alternatives    Consent Given by:  Patient  Timeout: timeout called immediately prior to procedure    Prep: patient was prepped and draped in usual sterile fashion     Scribed by Jovany Lowery ATC, ATC for Dr. Tico Ibarra on 10/23/21 at 10AM, based on the provider's statements to me.

## 2021-11-01 ENCOUNTER — THERAPY VISIT (OUTPATIENT)
Dept: PHYSICAL THERAPY | Facility: CLINIC | Age: 71
End: 2021-11-01
Payer: COMMERCIAL

## 2021-11-01 DIAGNOSIS — M17.11 OSTEOARTHRITIS OF RIGHT KNEE, UNSPECIFIED OSTEOARTHRITIS TYPE: ICD-10-CM

## 2021-11-01 PROCEDURE — 97110 THERAPEUTIC EXERCISES: CPT | Mod: GP | Performed by: PHYSICAL THERAPIST

## 2021-11-01 PROCEDURE — 97530 THERAPEUTIC ACTIVITIES: CPT | Mod: GP | Performed by: PHYSICAL THERAPIST

## 2021-11-17 NOTE — PROGRESS NOTES
[facility]  Sports Medicine Clinic  Clinics and Surgery Center           SUBJECTIVE       Alysha Youngblood is a 71 year old female presenting to clinic today as a f/u of the knee pain.    10/23/21: -[age]-year-old female with mild arthrosis of the right knee and moderate of the left presenting as a follow-up today from her previous visit.  Physical therapy is done extremely well.  She still has very mild pain with longer walking to which she is desiring to get rid of.     Plan: At this time, I think it is reasonable for her to engage in an injection today to help get her over the hump with the pain and get her functionally improved.  I have discussed the benefits and risks of the injection with the patient.  Procedure note below.  I would like Alysha to continue to engage in physical therapy at the direction of her therapist.  Would like her to follow-up in 4 to 6 weeks from now to see exactly how well she has done with the injection.      Interval hx knee: Improved.       Background (Hand):   Date of injury: No specific injury  Duration of symptoms: 8 months   Mechanism of Injury: N/A  Intensity: 8/10   Aggravating factors: attempting to extend the middle finger   Relieving Factors: Heat, general movement  Prior Evaluation: No    Right middle finger pain that had an insidious onset.  Patient reports pain in the middle finger CMP crease.  Notices locking and catching of the finger at night but is painful as she begins to unlock it.  No numbness or tingling in the hand.  No pain associated with weather changes in the hand.    PMH, Medications and Allergies were reviewed and updated as needed.    ROS:  As noted above otherwise negative.    Patient Active Problem List   Diagnosis     Elbow fracture     Atrial fibrillation (H)     PVC (premature ventricular contraction)     Goiter     Atrial flutter (H)     Osteoarthritis of right knee, unspecified osteoarthritis type     Morbid obesity (H)       Current  "Outpatient Medications   Medication Sig Dispense Refill     amLODIPine (NORVASC) 5 MG tablet Take 5 mg by mouth daily       apixaban ANTICOAGULANT (ELIQUIS ANTICOAGULANT) 5 MG tablet Take 1 tablet (5 mg) by mouth 2 times daily 180 tablet 3     bisacodyl (DULCOLAX) 5 MG EC tablet Take as directed in [facility] patient instructions 4 tablet 0     Calcium-Vitamin D-Vitamin K (VIACTIV PO) Take by mouth 2 times daily        Doxylamine Succinate, Sleep, (SLEEP AID PO)        fish oil-omega-3 fatty acids (FISH OIL) 1000 MG capsule One capsule daily.       FLUZONE HIGH-DOSE 0.5 ML injection ADM 0.5ML IM UTD  0     hydrochlorothiazide (HYDRODIURIL) 25 MG tablet Take 25 mg by mouth daily       loratadine (CLARITIN) 10 MG tablet Take 10 mg by mouth daily       lovastatin (MEVACOR) 40 MG tablet Take 40 mg by mouth At Bedtime.       metoprolol succinate ER (TOPROL-XL) 50 MG 24 hr tablet Take 1.5 tablets (75 mg) by mouth daily 135 tablet 3     metoprolol tartrate (LOPRESSOR) 25 MG tablet Take 25mg with onset of arrhythmia, up to 50 mg daily. 30 tablet 0     polyethylene glycol (GOLYTELY) 236 g suspension Take as directed in mychart patient instructions 4000 mL 0     valACYclovir (VALTREX) 1000 mg tablet TAKE 1 TABLET BY MOUTH THREE TIMES DAILY       Zolpidem Tartrate (AMBIEN PO) Take 5 mg by mouth nightly as needed for sleep              OBJECTIVE:       Vitals:   Vitals:    11/19/21 0902   Weight: 102.1 kg (225 lb)   Height: 1.702 m (5' 7\")     BMI: Body mass index is 35.24 kg/m .    Gen:  Well nourished and in no acute distress  HEENT: Extraocular movement intact  Neck: Supple  Pulm:  Breathing Comfortably. No increased respiratory effort.  Psych: Euthymic. Appropriately answers questions    MSK: Right hand without visible asymmetry.  Normal range of motion of the wrist and associated phalanges.  Noticeable hypertrophied nodule in the palmar crease of the right middle finger that is consistent with trigger finger.  Normal  " strength.  No evidence of locking or catching on exam.      Study Result    Narrative & Impression   Exam: 3 views of the right hand dated 10/23/2021.     COMPARISON: None.     Clinical history: Chronic hand pain.     FINDINGS: AP, oblique, and lateral views of the right hand were  obtained. The bones are well aligned. The joint spaces are  well-maintained. Mild degenerative changes of the right first  carpometacarpal joint. No displaced fractures.                                                                      IMPRESSION: No acute bone abnormality in the right hand.               ASSESSMENT and PLAN:     Alysha was seen today for pain.    Diagnoses and all orders for this visit:    Trigger finger, right middle finger    8-year-old female presenting today with concerns of right middle finger pain that is associated with locking and catching.  Her exam and history is consistent with a trigger finger of the right middle finger.    Plan: Discussed options for management with Alysha in regards to the trigger finger.  Discussed ultrasound-guided injection into the A1 pulley that would potentially alleviate the pain associated with locking and catching.  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  She will look at her plantar and contact our clinic for a different time to schedule this injection.  I have, however recommended that she start using an oval 8 brace to be worn at night as she seems to have more discomfort but she is awakened by the pain.  Patient is agreement with this plan.  I would like her to follow-up when she is able for ultrasound-guided injection of the right hand trigger finger.      Options for treatment and/or follow-up care were reviewed with the patient was actively involved in  the decision making process. Patient verbalized understanding and was in agreement with the plan.    Tico Ibarra DO  , Sports Medicine  Department of Family Medicine and [facility]

## 2021-11-19 ENCOUNTER — OFFICE VISIT (OUTPATIENT)
Dept: ORTHOPEDICS | Facility: CLINIC | Age: 71
End: 2021-11-19
Payer: COMMERCIAL

## 2021-11-19 VITALS — WEIGHT: 225 LBS | BODY MASS INDEX: 35.31 KG/M2 | HEIGHT: 67 IN

## 2021-11-19 DIAGNOSIS — M65.331 TRIGGER FINGER, RIGHT MIDDLE FINGER: Primary | ICD-10-CM

## 2021-11-19 PROBLEM — E66.01 MORBID OBESITY (H): Status: ACTIVE | Noted: 2021-11-19

## 2021-11-19 PROCEDURE — 99214 OFFICE O/P EST MOD 30 MIN: CPT | Performed by: STUDENT IN AN ORGANIZED HEALTH CARE EDUCATION/TRAINING PROGRAM

## 2021-11-19 ASSESSMENT — MIFFLIN-ST. JEOR: SCORE: 1573.22

## 2021-11-19 NOTE — LETTER
11/19/2021      RE: Alysha Youngblood  [address] [place] [phone]       [facility]  Sports Medicine Clinic  Clinics and Surgery Center           SUBJECTIVE       Alysha Youngblood is a 71 year old female presenting to clinic today as a f/u of the knee pain.    10/23/21: -[age]-year-old female with mild arthrosis of the right knee and moderate of the left presenting as a follow-up today from her previous visit.  Physical therapy is done extremely well.  She still has very mild pain with longer walking to which she is desiring to get rid of.     Plan: At this time, I think it is reasonable for her to engage in an injection today to help get her over the hump with the pain and get her functionally improved.  I have discussed the benefits and risks of the injection with the patient.  Procedure note below.  I would like Alysha to continue to engage in physical therapy at the direction of her therapist.  Would like her to follow-up in 4 to 6 weeks from now to see exactly how well she has done with the injection.      Interval hx knee: Improved.       Background (Hand):   Date of injury: No specific injury  Duration of symptoms: 8 months   Mechanism of Injury: N/A  Intensity: 8/10   Aggravating factors: attempting to extend the middle finger   Relieving Factors: Heat, general movement  Prior Evaluation: No    Right middle finger pain that had an insidious onset.  Patient reports pain in the middle finger CMP crease.  Notices locking and catching of the finger at night but is painful as she begins to unlock it.  No numbness or tingling in the hand.  No pain associated with weather changes in the hand.    PMH, Medications and Allergies were reviewed and updated as needed.    ROS:  As noted above otherwise negative.    Patient Active Problem List   Diagnosis     Elbow fracture     Atrial fibrillation (H)     PVC (premature ventricular contraction)     Goiter     Atrial flutter (H)     Osteoarthritis of  "right knee, unspecified osteoarthritis type     Morbid obesity (H)       Current Outpatient Medications   Medication Sig Dispense Refill     amLODIPine (NORVASC) 5 MG tablet Take 5 mg by mouth daily       apixaban ANTICOAGULANT (ELIQUIS ANTICOAGULANT) 5 MG tablet Take 1 tablet (5 mg) by mouth 2 times daily 180 tablet 3     bisacodyl (DULCOLAX) 5 MG EC tablet [facility] 0     Calcium-Vitamin D-Vitamin K (VIACTIV PO) Take by mouth 2 times daily        Doxylamine Succinate, Sleep, (SLEEP AID PO)        fish oil-omega-3 fatty acids (FISH OIL) 1000 MG capsule One capsule daily.       FLUZONE HIGH-DOSE 0.5 ML injection ADM 0.5ML IM UTD  0     hydrochlorothiazide (HYDRODIURIL) 25 MG tablet Take 25 mg by mouth daily       loratadine (CLARITIN) 10 MG tablet Take 10 mg by mouth daily       lovastatin (MEVACOR) 40 MG tablet Take 40 mg by mouth At Bedtime.       metoprolol succinate ER (TOPROL-XL) 50 MG 24 hr tablet Take 1.5 tablets (75 mg) by mouth daily 135 tablet 3     metoprolol tartrate (LOPRESSOR) 25 MG tablet Take 25mg with onset of arrhythmia, up to 50 mg daily. 30 tablet 0     polyethylene glycol (GOLYTELY) 236 g suspension Take as directed in mychart patient instructions 4000 mL 0     valACYclovir (VALTREX) 1000 mg tablet TAKE 1 TABLET BY MOUTH THREE TIMES DAILY       Zolpidem Tartrate (AMBIEN PO) Take 5 mg by mouth nightly as needed for sleep              OBJECTIVE:       Vitals:   Vitals:    11/19/21 0902   Weight: 102.1 kg (225 lb)   Height: 1.702 m (5' 7\")     BMI: Body mass index is 35.24 kg/m .    Gen:  Well nourished and in no acute distress  HEENT: Extraocular movement intact  Neck: Supple  Pulm:  Breathing Comfortably. No increased respiratory effort.  Psych: Euthymic. Appropriately answers questions    MSK: Right hand without visible asymmetry.  Normal range of motion of the wrist and associated phalanges.  Noticeable hypertrophied nodule in the palmar crease of " the right middle finger that is consistent with trigger finger.  Normal  strength.  No evidence of locking or catching on exam.      Study Result    Narrative & Impression   Exam: 3 views of the right hand dated 10/23/2021.     COMPARISON: None.     Clinical history: Chronic hand pain.     FINDINGS: AP, oblique, and lateral views of the right hand were  obtained. The bones are well aligned. The joint spaces are  well-maintained. Mild degenerative changes of the right first  carpometacarpal joint. No displaced fractures.                                                                      IMPRESSION: No acute bone abnormality in the right hand.               ASSESSMENT and PLAN:     Alysha was seen today for pain.    Diagnoses and all orders for this visit:    Trigger finger, right middle finger    8-year-old female presenting today with concerns of right middle finger pain that is associated with locking and catching.  Her exam and history is consistent with a trigger finger of the right middle finger.    Plan: Discussed options for management with Alysha in regards to the trigger finger.  Discussed ultrasound-guided injection into the A1 pulley that would potentially alleviate the pain associated with locking and catching.  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  She will look at her plantar and contact our clinic for a different time to schedule this injection.  I have, however recommended that she start using an oval 8 brace to be worn at night as she seems to have more discomfort but she is awakened by the pain.  Patient is agreement with this plan.  I would like her to follow-up when she is able for ultrasound-guided injection of the right hand trigger finger.      Options for treatment  and/or follow-up care were reviewed with the patient was actively involved in the decision making process. Patient verbalized understanding and was in agreement with the plan.    Tico Ibarra DO  , Sports Medicine  Department of Family Medicine and [facility]        Tico Ibarra DO

## 2022-03-10 ENCOUNTER — TELEPHONE (OUTPATIENT)
Dept: INTERNAL MEDICINE | Facility: CLINIC | Age: 72
End: 2022-03-10
Payer: COMMERCIAL

## 2022-03-10 NOTE — TELEPHONE ENCOUNTER
LVM w/ pcc number to callback and schedule establish care visit w/ Dr. Burks for any open slot (not RUTH) - okayed by provider.

## 2022-03-10 NOTE — TELEPHONE ENCOUNTER
----- Message from Danii Burks MD sent at 3/10/2022  8:11 AM CST -----  Regarding: new patient scheduling  Dear Clinic Coordinators,    Please call patient non-urgently to offer new patient establish care appt with me.  Okay for any appointment (not RUTH).    Thanks,  Danii Burks MD  Internal Medicine

## 2022-03-23 DIAGNOSIS — I48.4 ATYPICAL ATRIAL FLUTTER (H): ICD-10-CM

## 2022-03-23 DIAGNOSIS — I48.0 PAROXYSMAL ATRIAL FIBRILLATION (H): ICD-10-CM

## 2022-03-23 RX ORDER — METOPROLOL SUCCINATE 50 MG/1
TABLET, EXTENDED RELEASE ORAL
Qty: 135 TABLET | Refills: 3 | Status: CANCELLED | OUTPATIENT
Start: 2022-03-23

## 2022-03-24 RX ORDER — METOPROLOL SUCCINATE 50 MG/1
TABLET, EXTENDED RELEASE ORAL
Qty: 135 TABLET | Refills: 0 | Status: SHIPPED | OUTPATIENT
Start: 2022-03-24 | End: 2022-04-04

## 2022-03-24 RX ORDER — METOPROLOL TARTRATE 25 MG/1
TABLET, FILM COATED ORAL
Qty: 30 TABLET | Refills: 0 | Status: SHIPPED | OUTPATIENT
Start: 2022-03-24 | End: 2023-04-17

## 2022-03-30 ASSESSMENT — ENCOUNTER SYMPTOMS
CONSTIPATION: 0
JOINT SWELLING: 0
LEG PAIN: 0
LIGHT-HEADEDNESS: 0
ORTHOPNEA: 0
STIFFNESS: 0
NECK PAIN: 0
ARTHRALGIAS: 1
BLOOD IN STOOL: 0
MUSCLE CRAMPS: 0
JAUNDICE: 0
EXERCISE INTOLERANCE: 0
VOMITING: 0
DIARRHEA: 1
MUSCLE WEAKNESS: 0
MYALGIAS: 0
HYPOTENSION: 0
NAUSEA: 0
HEARTBURN: 0
PALPITATIONS: 1
SYNCOPE: 0
ABDOMINAL PAIN: 0
HYPERTENSION: 1
BLOATING: 0
BOWEL INCONTINENCE: 0
RECTAL PAIN: 0
BACK PAIN: 0
SLEEP DISTURBANCES DUE TO BREATHING: 0

## 2022-03-30 ASSESSMENT — ACTIVITIES OF DAILY LIVING (ADL)
IN_THE_PAST_7_DAYS,_DID_YOU_NEED_HELP_FROM_OTHERS_TO_TAKE_CARE_OF_THINGS_SUCH_AS_LAUNDRY_AND_HOUSEKEEPING,_BANKING,_SHOPPING,_USING_THE_TELEPHONE,_FOOD_PREPARATION,_TRANSPORTATION,_OR_TAKING_YOUR_OWN_MEDICATIONS?: N
IN_THE_PAST_7_DAYS,_DID_YOU_NEED_HELP_FROM_OTHERS_TO_PERFORM_EVERYDAY_ACTIVITIES_SUCH_AS_EATING,_GETTING_DRESSED,_GROOMING,_BATHING,_WALKING,_OR_USING_THE_TOILET: N

## 2022-03-30 NOTE — PROGRESS NOTES
[facility]  Sports Medicine Clinic  Clinics and Surgery Center           SUBJECTIVE       Alysha Youngblood is a 71 year old female presenting to clinic today with concerns for left knee pain. Today, the patient reports that she hurt her left knee while on the elliptical in mid January. The pain is located over the posterior aspect of the knee. Pain is worse with knee flexion. She is not currently taking any medication for the pain. She has tried some of her exercises she does for the right knee. She has never received any steroid injections into the left knee.     Background:   Date of injury: mid January  Duration of symptoms: 2 months   Mechanism of Injury: Elliptical use  Intensity: 10/10 with knee flexion   Aggravating factors: knee flexion    Relieving Factors: knee extension   Prior Evaluation: No, but evaluated the right knee  Study Result    Narrative & Impression   3 views right knee radiographs 10/4/2021 4:03 PM     History: Chronic pain of right knee; Chronic pain of right knee      Comparison: 4/3/2018     Findings:     AP view of bilateral knees, and lateral and patellofemoral views of  the right knee were obtained.      No acute osseous abnormality. Small joint effusion.     Mild right and moderate left medial compartment joint space loss,  similar to prior.     No patellar tilt or lateral subluxation.  Soft tissue is unremarkable.                                                                      Impression:  1. No acute osseous abnormality.  2. Mild right and moderate left medial compartment joint space loss         PMH, Medications and Allergies were reviewed and updated as needed.    ROS:  As noted above otherwise negative.    Patient Active Problem List   Diagnosis     Elbow fracture     Atrial fibrillation (H)     PVC (premature ventricular contraction)     Goiter     Atrial flutter (H)     Osteoarthritis of right knee, unspecified osteoarthritis type     Morbid obesity (H)  "      Current Outpatient Medications   Medication Sig Dispense Refill     amLODIPine (NORVASC) 5 MG tablet Take 5 mg by mouth daily       apixaban ANTICOAGULANT (ELIQUIS ANTICOAGULANT) 5 MG tablet Take 1 tablet (5 mg) by mouth 2 times daily 180 tablet 3     Calcium-Vitamin D-Vitamin K (VIACTIV PO) Take by mouth 2 times daily        fish oil-omega-3 fatty acids (FISH OIL) 1000 MG capsule One capsule daily.       hydrochlorothiazide (HYDRODIURIL) 25 MG tablet Take 25 mg by mouth daily       loratadine (CLARITIN) 10 MG tablet Take 10 mg by mouth daily       lovastatin (MEVACOR) 40 MG tablet Take 40 mg by mouth At Bedtime.       metoprolol succinate ER (TOPROL-XL) 50 MG 24 hr tablet TAKE 1 AND 1/2 TABLETS(75 MG) BY MOUTH DAILY 135 tablet 0     metoprolol tartrate (LOPRESSOR) 25 MG tablet TAKE 1 TABLET BY MOUTH AS DIRECTED UP TO 2 TABLETS BY MOUTH EVERY DAY WITH ONSET OF ARRHYTHMIA 30 tablet 0     bisacodyl (DULCOLAX) 5 MG EC tablet Take as directed in mychart patient instructions 4 tablet 0     Doxylamine Succinate, Sleep, (SLEEP AID PO)        FLUZONE HIGH-DOSE 0.5 ML injection ADM 0.5ML IM UTD  0     polyethylene glycol (GOLYTELY) 236 g suspension Take as directed in mychart patient instructions 4000 mL 0     valACYclovir (VALTREX) 1000 mg tablet TAKE 1 TABLET BY MOUTH THREE TIMES DAILY       Zolpidem Tartrate (AMBIEN PO) Take 5 mg by mouth nightly as needed for sleep              OBJECTIVE:       Vitals:   Vitals:    04/01/22 1615   Weight: 102.1 kg (225 lb)   Height: 1.702 m (5' 7\")     BMI: Body mass index is 35.24 kg/m .    Gen:  Well nourished and in no acute distress  HEENT: Extraocular movement intact  Neck: Supple  Pulm:  Breathing Comfortably. No increased respiratory effort.  Psych: Euthymic. Appropriately answers questions    MSK: Left knee without effusion.  No discernible tenderness to palpation anteriorly.  Some joint line tenderness posteriorly bilaterally.  No ligamentous laxity noted.  Range of motion " from 0 to 100 degrees.  Negative varus/valgus stress testing.      Study Result    Narrative & Impression   3 views left knee radiographs 4/1/2022 4:48 PM     History: AP/LAT/SUN; Knee pain     Comparison: 10/4/2021     Findings:     AP, lateral and patellofemoral views of the left knee were obtained.      No acute osseous abnormality. Trace joint effusion.     Moderate to severe medial compartment joint space loss, similar to  comparison.     No patellar tilt or lateral subluxation.  Soft tissue is unremarkable.                                                                      Impression:  1. No acute osseous abnormality.  2. Moderate to severe medial compartment joint space loss, similar to  prior.               ASSESSMENT and PLAN:     Alysha was seen today for recheck.    Diagnoses and all orders for this visit:    Primary localized osteoarthritis of left knee    8-year-old female presented clinic today with an acute onset of left knee pain after doing more elliptical work.  Patient is not having mechanical symptoms or swelling on exam.  Overall presentation is consistent with a diagnosis of primary osteoarthritis of the medial compartment.  X-rays were reviewed in detail with the patient.  Options for management were discussed.  Patient would like to proceed with a corticosteroid injection today.  I think this is reasonable.  Patient will follow-up as needed for further concerns.    Procedure: Patient was seated.  Anterior lateral approach was used.  The patient was prepped and substerile fashion with chlorhexidine x45 seconds.  Topical ethyl chloride was used as an anesthetic spray.  A 22-gauge needle, 1-1/2 inch, was used to inject a mixture of 4/1, lidocaine/triamcinolone, into the joint space.  Minimal bleeding.  Band-Aid applied.  Patient tolerated the procedure well.  Discussed no overuse for the next 3 to 5 days.  Patient should visit the urgent care or emergency room if increased intolerable pain,  swelling, bruising, discharge, or redness ensue.      Options for treatment and/or follow-up care were reviewed with the patient was actively involved in the decision making process. Patient verbalized understanding and was in agreement with the plan.    Tico Ibarra DO  , Sports Medicine  Department of [facility] and [facility]    Large Joint Injection: L knee joint    Date/Time: 4/1/2022 4:58 PM  Performed by: Tico Ibarra DO  Authorized by: Tico Ibarra DO     Indications:  Pain  Needle Size:  22 G  Guidance: landmark guided    Approach:  Anterolateral  Location:  Knee      Medications:  40 mg triamcinolone 40 MG/ML; 4 mL lidocaine (PF) 1 %  Outcome:  Tolerated well, no immediate complications  Procedure discussed: discussed risks, benefits, and alternatives    Consent Given by:  Patient  Timeout: timeout called immediately prior to procedure    Prep: patient was prepped and draped in usual sterile fashion

## 2022-04-01 ENCOUNTER — ANCILLARY PROCEDURE (OUTPATIENT)
Dept: GENERAL RADIOLOGY | Facility: CLINIC | Age: 72
End: 2022-04-01
Attending: STUDENT IN AN ORGANIZED HEALTH CARE EDUCATION/TRAINING PROGRAM
Payer: COMMERCIAL

## 2022-04-01 ENCOUNTER — OFFICE VISIT (OUTPATIENT)
Dept: ORTHOPEDICS | Facility: CLINIC | Age: 72
End: 2022-04-01
Payer: COMMERCIAL

## 2022-04-01 VITALS — WEIGHT: 225 LBS | BODY MASS INDEX: 35.31 KG/M2 | HEIGHT: 67 IN

## 2022-04-01 DIAGNOSIS — M17.12 PRIMARY LOCALIZED OSTEOARTHRITIS OF LEFT KNEE: Primary | ICD-10-CM

## 2022-04-01 DIAGNOSIS — M25.569 KNEE PAIN: Primary | ICD-10-CM

## 2022-04-01 DIAGNOSIS — M25.569 KNEE PAIN: ICD-10-CM

## 2022-04-01 PROCEDURE — 20610 DRAIN/INJ JOINT/BURSA W/O US: CPT | Mod: LT | Performed by: STUDENT IN AN ORGANIZED HEALTH CARE EDUCATION/TRAINING PROGRAM

## 2022-04-01 PROCEDURE — 99213 OFFICE O/P EST LOW 20 MIN: CPT | Mod: 25 | Performed by: STUDENT IN AN ORGANIZED HEALTH CARE EDUCATION/TRAINING PROGRAM

## 2022-04-01 PROCEDURE — 73562 X-RAY EXAM OF KNEE 3: CPT | Mod: LT | Performed by: RADIOLOGY

## 2022-04-01 RX ORDER — TRIAMCINOLONE ACETONIDE 40 MG/ML
40 INJECTION, SUSPENSION INTRA-ARTICULAR; INTRAMUSCULAR
Status: SHIPPED | OUTPATIENT
Start: 2022-04-01

## 2022-04-01 RX ORDER — LIDOCAINE HYDROCHLORIDE 10 MG/ML
4 INJECTION, SOLUTION EPIDURAL; INFILTRATION; INTRACAUDAL; PERINEURAL
Status: SHIPPED | OUTPATIENT
Start: 2022-04-01

## 2022-04-01 RX ADMIN — TRIAMCINOLONE ACETONIDE 40 MG: 40 INJECTION, SUSPENSION INTRA-ARTICULAR; INTRAMUSCULAR at 16:58

## 2022-04-01 RX ADMIN — LIDOCAINE HYDROCHLORIDE 4 ML: 10 INJECTION, SOLUTION EPIDURAL; INFILTRATION; INTRACAUDAL; PERINEURAL at 16:58

## 2022-04-01 NOTE — LETTER
4/1/2022      RE: Alysha Youngblood  [address] [place] [id number]       [facility]  Sports Medicine Clinic  Clinics and Surgery Center           SUBJECTIVE       Alysha Youngblood is a 71 year old female presenting to clinic today with concerns for left knee pain. Today, the patient reports that she hurt her left knee while on the elliptical in mid January. The pain is located over the posterior aspect of the knee. Pain is worse with knee flexion. She is not currently taking any medication for the pain. She has tried some of her exercises she does for the right knee. She has never received any steroid injections into the left knee.     Background:   Date of injury: mid January  Duration of symptoms: 2 months   Mechanism of Injury: Elliptical use  Intensity: 10/10 with knee flexion   Aggravating factors: knee flexion    Relieving Factors: knee extension   Prior Evaluation: No, but evaluated the right knee  Study Result    Narrative & Impression   3 views right knee radiographs 10/4/2021 4:03 PM     History: Chronic pain of right knee; Chronic pain of right knee      Comparison: 4/3/2018     Findings:     AP view of bilateral knees, and lateral and patellofemoral views of  the right knee were obtained.      No acute osseous abnormality. Small joint effusion.     Mild right and moderate left medial compartment joint space loss,  similar to prior.     No patellar tilt or lateral subluxation.  Soft tissue is unremarkable.                                                                      Impression:  1. No acute osseous abnormality.  2. Mild right and moderate left medial compartment joint space loss         PMH, Medications and Allergies were reviewed and updated as needed.    ROS:  As noted above otherwise negative.    Patient Active Problem List   Diagnosis     Elbow fracture     Atrial fibrillation (H)     PVC (premature ventricular contraction)     Goiter     Atrial flutter (H)      "Osteoarthritis of right knee, unspecified osteoarthritis type     Morbid obesity (H)       Current Outpatient Medications   Medication Sig Dispense Refill     amLODIPine (NORVASC) 5 MG tablet Take 5 mg by mouth daily       apixaban ANTICOAGULANT (ELIQUIS ANTICOAGULANT) 5 MG tablet Take 1 tablet (5 mg) by mouth 2 times daily 180 tablet 3     Calcium-Vitamin D-Vitamin K (VIACTIV PO) Take by mouth 2 times daily        fish oil-omega-3 fatty acids (FISH OIL) 1000 MG capsule One capsule daily.       hydrochlorothiazide (HYDRODIURIL) 25 MG tablet Take 25 mg by mouth daily       loratadine (CLARITIN) 10 MG tablet Take 10 mg by mouth daily       lovastatin (MEVACOR) 40 MG tablet Take 40 mg by mouth At Bedtime.       metoprolol succinate ER (TOPROL-XL) 50 MG 24 hr tablet TAKE 1 AND 1/2 TABLETS(75 MG) BY MOUTH DAILY 135 tablet 0     metoprolol tartrate (LOPRESSOR) 25 MG tablet TAKE 1 TABLET BY MOUTH AS DIRECTED UP TO 2 TABLETS BY MOUTH EVERY DAY WITH ONSET OF ARRHYTHMIA 30 tablet 0     bisacodyl (DULCOLAX) 5 MG EC tablet Take as directed in mychart patient instructions 4 tablet 0     Doxylamine Succinate, Sleep, (SLEEP AID PO)        FLUZONE HIGH-DOSE 0.5 ML injection ADM 0.5ML IM UTD  0     polyethylene glycol (GOLYTELY) 236 g suspension Take as directed in mychart patient instructions 4000 mL 0     valACYclovir (VALTREX) 1000 mg tablet TAKE 1 TABLET BY MOUTH THREE TIMES DAILY       Zolpidem Tartrate (AMBIEN PO) Take 5 mg by mouth nightly as needed for sleep              OBJECTIVE:       Vitals:   Vitals:    04/01/22 1615   Weight: 102.1 kg (225 lb)   Height: 1.702 m (5' 7\")     BMI: Body mass index is 35.24 kg/m .    Gen:  Well nourished and in no acute distress  HEENT: Extraocular movement intact  Neck: Supple  Pulm:  Breathing Comfortably. No increased respiratory effort.  Psych: Euthymic. Appropriately answers questions    MSK: Left knee without effusion.  No discernible tenderness to palpation anteriorly.  Some joint " line tenderness posteriorly bilaterally.  No ligamentous laxity noted.  Range of motion from 0 to 100 degrees.  Negative varus/valgus stress testing.      Study Result    Narrative & Impression   3 views left knee radiographs 4/1/2022 4:48 PM     History: AP/LAT/SUN; Knee pain     Comparison: 10/4/2021     Findings:     AP, lateral and patellofemoral views of the left knee were obtained.      No acute osseous abnormality. Trace joint effusion.     Moderate to severe medial compartment joint space loss, similar to  comparison.     No patellar tilt or lateral subluxation.  Soft tissue is unremarkable.                                                                      Impression:  1. No acute osseous abnormality.  2. Moderate to severe medial compartment joint space loss, similar to  prior.               ASSESSMENT and PLAN:     Alysha was seen today for recheck.    Diagnoses and all orders for this visit:    Primary localized osteoarthritis of left knee    8-year-old female presented clinic today with an acute onset of left knee pain after doing more elliptical work.  Patient is not having mechanical symptoms or swelling on exam.  Overall presentation is consistent with a diagnosis of primary osteoarthritis of the medial compartment.  X-rays were reviewed in detail with the patient.  Options for management were discussed.  Patient would like to proceed with a corticosteroid injection today.  I think this is reasonable.  Patient will follow-up as needed for further concerns.    Procedure: Patient was seated.  Anterior lateral approach was used.  The patient was prepped and substerile fashion with chlorhexidine x45 seconds.  Topical ethyl chloride was used as an anesthetic spray.  A 22-gauge needle, 1-1/2 inch, was used to inject a mixture of 4/1, lidocaine/triamcinolone, into the joint space.  Minimal bleeding.  Band-Aid applied.  Patient tolerated the procedure well.  Discussed no overuse for the next 3 to 5 days.   Patient should visit the urgent care or emergency room if increased intolerable pain, swelling, bruising, discharge, or redness ensue.      Options for treatment and/or follow-up care were reviewed with the patient was actively involved in the decision making process. Patient verbalized understanding and was in agreement with the plan.    Tico Ibarra DO  , Sports Medicine  Department of [facility] and [facility]    Large Joint Injection: L knee joint    Date/Time: 4/1/2022 4:58 PM  Performed by: Tico Ibarra DO  Authorized by: Tico Ibarra DO     Indications:  Pain  Needle Size:  22 G  Guidance: landmark guided    Approach:  Anterolateral  Location:  Knee      Medications:  40 mg triamcinolone 40 MG/ML; 4 mL lidocaine (PF) 1 %  Outcome:  Tolerated well, no immediate complications  Procedure discussed: discussed risks, benefits, and alternatives    Consent Given by:  Patient  Timeout: timeout called immediately prior to procedure    Prep: patient was prepped and draped in usual sterile fashion              Tico Ibarra DO

## 2022-04-01 NOTE — NURSING NOTE
[address] [address]  Dept: [phone]  ______________________________________________________________________________    Patient: Alysha Youngblood   : 1950   MRN: [id number]   2022    INVASIVE PROCEDURE SAFETY CHECKLIST    Date: 22   Procedure:Left knee kenalog injection  Patient Name: Alysha Youngblood  MRN: [id number]  YOB: 1950    Action: Complete sections as appropriate. Any discrepancy results in a HARD COPY until resolved.     PRE PROCEDURE:  Patient ID verified with 2 identifiers (name and  or MRN): Yes  Procedure and site verified with patient/designee (when able): Yes  Accurate consent documentation in medical record: Yes  H&P (or appropriate assessment) documented in medical record: Yes  H&P must be up to 20 days prior to procedure and updates within 24 hours of procedure as applicable: NA  Relevant diagnostic and radiology test results appropriately labeled and displayed as applicable: Yes  Procedure site(s) marked with provider initials: NA    TIMEOUT:  Time-Out performed immediately prior to starting procedure, including verbal and active participation of all team members addressing the following:Yes  * Correct patient identify  * Confirmed that the correct side and site are marked  * An accurate procedure consent form  * Agreement on the procedure to be done  * Correct patient position  * Relevant images and results are properly labeled and appropriately displayed  * The need to administer antibiotics or fluids for irrigation purposes during the procedure as applicable   * Safety precautions based on patient history or medication use    DURING PROCEDURE: Verification of correct person, site, and procedures any time the responsibility for care of the patient is transferred to another member of the care team.       Prior to injection, verified patient identity using patient's name and date of birth.  Due  to injection administration, patient instructed to remain in clinic for 15 minutes  afterwards, and to report any adverse reaction to me immediately.    Joint injection was performed.      Drug Amount Wasted:  Yes: 16 mg/ml   Vial/Syringe: Multi dose vial  Expiration Date:  11/01/2025      Dorie Ramirez, ATC  April 1, 2022

## 2022-04-04 ENCOUNTER — VIRTUAL VISIT (OUTPATIENT)
Dept: CARDIOLOGY | Facility: CLINIC | Age: 72
End: 2022-04-04
Payer: COMMERCIAL

## 2022-04-04 ENCOUNTER — OFFICE VISIT (OUTPATIENT)
Dept: INTERNAL MEDICINE | Facility: CLINIC | Age: 72
End: 2022-04-04
Payer: COMMERCIAL

## 2022-04-04 VITALS
OXYGEN SATURATION: 100 % | RESPIRATION RATE: 16 BRPM | WEIGHT: 225 LBS | BODY MASS INDEX: 36.16 KG/M2 | SYSTOLIC BLOOD PRESSURE: 162 MMHG | HEIGHT: 66 IN | DIASTOLIC BLOOD PRESSURE: 85 MMHG | HEART RATE: 91 BPM

## 2022-04-04 DIAGNOSIS — I48.0 PAROXYSMAL ATRIAL FIBRILLATION (H): Primary | ICD-10-CM

## 2022-04-04 DIAGNOSIS — Z13.220 SCREENING FOR HYPERLIPIDEMIA: ICD-10-CM

## 2022-04-04 DIAGNOSIS — Z12.31 ENCOUNTER FOR SCREENING MAMMOGRAM FOR BREAST CANCER: Primary | ICD-10-CM

## 2022-04-04 DIAGNOSIS — I48.92 ATRIAL FLUTTER, UNSPECIFIED TYPE (H): ICD-10-CM

## 2022-04-04 DIAGNOSIS — E06.3 HASHIMOTO'S THYROIDITIS: ICD-10-CM

## 2022-04-04 DIAGNOSIS — Z23 NEED FOR VACCINATION: ICD-10-CM

## 2022-04-04 DIAGNOSIS — I48.4 ATYPICAL ATRIAL FLUTTER (H): ICD-10-CM

## 2022-04-04 DIAGNOSIS — I10 ESSENTIAL HYPERTENSION: ICD-10-CM

## 2022-04-04 DIAGNOSIS — I48.0 PAROXYSMAL ATRIAL FIBRILLATION (H): ICD-10-CM

## 2022-04-04 DIAGNOSIS — Z00.00 LABORATORY EXAM ORDERED AS PART OF ROUTINE GENERAL MEDICAL EXAMINATION: ICD-10-CM

## 2022-04-04 PROCEDURE — 90471 IMMUNIZATION ADMIN: CPT | Performed by: INTERNAL MEDICINE

## 2022-04-04 PROCEDURE — 90714 TD VACC NO PRESV 7 YRS+ IM: CPT | Performed by: INTERNAL MEDICINE

## 2022-04-04 PROCEDURE — 99213 OFFICE O/P EST LOW 20 MIN: CPT | Mod: 95 | Performed by: INTERNAL MEDICINE

## 2022-04-04 PROCEDURE — 99204 OFFICE O/P NEW MOD 45 MIN: CPT | Mod: 25 | Performed by: INTERNAL MEDICINE

## 2022-04-04 RX ORDER — AMLODIPINE BESYLATE 10 MG/1
10 TABLET ORAL DAILY
Qty: 90 TABLET | Refills: 3 | COMMUNITY
Start: 2022-04-04 | End: 2022-07-26

## 2022-04-04 RX ORDER — METOPROLOL SUCCINATE 50 MG/1
TABLET, EXTENDED RELEASE ORAL
Qty: 135 TABLET | Refills: 3 | Status: SHIPPED | OUTPATIENT
Start: 2022-04-04 | End: 2023-04-17

## 2022-04-04 RX ORDER — LOSARTAN POTASSIUM 100 MG/1
100 TABLET ORAL DAILY
Qty: 90 TABLET | Refills: 3 | COMMUNITY
Start: 2022-04-04 | End: 2022-07-26

## 2022-04-04 ASSESSMENT — PAIN SCALES - GENERAL: PAINLEVEL: NO PAIN (0)

## 2022-04-04 NOTE — LETTER
4/4/2022      RE: Alysha TEJINDER Ford  [address] [place] [id number]       Dear Colleague,    Thank you for the opportunity to participate in the care of your patient, Alysha Youngblood, at the [facility]. Please see a copy of my visit note below.      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 The patient is status post ablation for paroxysmal atrial fibrillation in 06/2011 as well as another AFib ablation and atrial flutter ablation in 01/2012.  Both ablations were done at [facility].  The patient's last visit with me was in March 2021.    In the last 1 year, patient has been doing well without recurrent atrial fibrillation episodes.  She has gone back to exercising at the [facility] doing water aerobics.  She did not report any symptoms of prolonged palpitations, irregular heartbeat sensation, exertional dyspnea, exertional angina, frequent lightheadedness, presyncope or syncope.    She is currently taking apixaban 5 mg twice daily and Toprol XL 75 mg once daily for her atrial fibrillation.    PAST MEDICAL HISTORY:  Past Medical History:   Diagnosis Date     Atrial fibrillation (H)     ablated 1/12     Ex-smoker      Hashimoto's disease      Hyperlipidaemia      Hypertension      ISABELA (obstructive sleep apnea) 3/12    AHI 7     PAC (premature atrial contraction)      PVC (premature ventricular contraction)        CURRENT MEDICATIONS:  Current Outpatient Medications   Medication Sig Dispense Refill     amLODIPine (NORVASC) 10 MG tablet Take 1 tablet (10 mg) by mouth daily 90 tablet 3     apixaban ANTICOAGULANT (ELIQUIS ANTICOAGULANT) 5 MG tablet Take 1 tablet (5 mg) by mouth 2 times daily 180 tablet 3     Calcium-Vitamin D-Vitamin K (VIACTIV PO) Take by mouth 2 times daily         fish oil-omega-3 fatty acids (FISH OIL) 1000 MG capsule One capsule daily.       hydrochlorothiazide (HYDRODIURIL) 25 MG tablet Take 25 mg by mouth daily       loratadine (CLARITIN) 10 MG tablet Take 10 mg by mouth daily       losartan (COZAAR) 100 MG tablet Take 1 tablet (100 mg) by mouth daily 90 tablet 3     lovastatin (MEVACOR) 40 MG tablet Take 40 mg by mouth At Bedtime.       metoprolol succinate ER (TOPROL-XL) 50 MG 24 hr tablet TAKE 1 AND 1/2 TABLETS(75 MG) BY MOUTH DAILY 135 tablet 0     metoprolol tartrate (LOPRESSOR) 25 MG tablet TAKE 1 TABLET BY MOUTH AS DIRECTED UP TO 2 TABLETS BY MOUTH EVERY DAY WITH ONSET OF ARRHYTHMIA 30 tablet 0     bisacodyl (DULCOLAX) 5 MG EC tablet Take as directed in mychart patient instructions 4 tablet 0     Doxylamine Succinate, Sleep, (SLEEP AID PO)        FLUZONE HIGH-DOSE 0.5 ML injection ADM 0.5ML IM UTD  0     polyethylene glycol (GOLYTELY) 236 g suspension Take as directed in mychart patient instructions 4000 mL 0     valACYclovir (VALTREX) 1000 mg tablet TAKE 1 TABLET BY MOUTH THREE TIMES DAILY       Zolpidem Tartrate (AMBIEN PO) Take 5 mg by mouth nightly as needed for sleep         PAST SURGICAL HISTORY:  Past Surgical History:   Procedure Laterality Date     APPENDECTOMY       ENT SURGERY       H ABLATION FOCAL AFIB  2011    [place].      H ABLATION FOCAL AFIB  2012    [place]. afib and aflutter ablations.        ALLERGIES:     Allergies   Allergen Reactions     Hmg-Coa-R Inhibitors      Made her lips tingle, so they put her on another statin instead.     Ibuprofen Other (See Comments)     Nsaids GI Disturbance     Liquid Adhesive Rash     After wearing patches for > 8 days  After wearing patches for > 8 days       FAMILY HISTORY:  - Premature coronary artery disease  - Atrial fibrillation  - Sudden cardiac death     SOCIAL HISTORY:  Social History     Tobacco Use     Smoking status: Former Smoker     Quit date:      Years since quittin.2      Smokeless tobacco: Never Used   Substance Use Topics     Alcohol use: Yes     Alcohol/week: 14.0 standard drinks     Types: 14 Shots of liquor per week     Drug use: No       ROS:   Constitutional: No fever, chills, or sweats. Weight stable.   ENT: No visual disturbance, ear ache, epistaxis, sore throat.   Cardiovascular: As per HPI.   Respiratory: No cough, hemoptysis.    GI: No nausea, vomiting, hematemesis, melena, or hematochezia.   : No hematuria.   Integument: Negative.   Psychiatric: Negative.   Hematologic:  Easy bruising, no easy bleeding.  Neuro: Negative.   Endocrinology: No significant heat or cold intolerance   Musculoskeletal: No myalgia.    Exam:  There were no vitals taken for this visit.  GENERAL APPEARANCE: healthy, alert and no distress    Labs:  CBC RESULTS:   Lab Results   Component Value Date    WBC 6.0 12/29/2019    RBC 5.26 (H) 12/29/2019    HGB 15.6 12/29/2019    HCT 47.5 (H) 12/29/2019    MCV 90 12/29/2019    MCH 29.7 12/29/2019    MCHC 32.8 12/29/2019    RDW 12.4 12/29/2019     12/29/2019       BMP RESULTS:  Lab Results   Component Value Date     12/29/2019    POTASSIUM 3.8 12/29/2019    CHLORIDE 102 12/29/2019    CO2 30 12/29/2019    ANIONGAP 4 12/29/2019     (H) 12/29/2019    BUN 24 12/29/2019    CR 0.77 12/29/2019    GFRESTIMATED 79 12/29/2019    GFRESTBLACK >90 12/29/2019    JANAE 9.5 12/29/2019        INR RESULTS:  Lab Results   Component Value Date    INR 1.19 (H) 07/07/2018    INR 0.94 02/28/2011    INR 0.90 02/27/2011       Procedures:      Assessment and Plan:   Paroxysmal atrial fibrillation status post ablation  Status post ablation for atrial flutter    It is encouraging that the patient has been doing well from an atrial arrhythmia standpoint.  We will continue current medications and send in a new prescription for the Toprol-XL.    We will see patient again by video in approximately 1 year.  All questions and concerns were addressed and the patient is  happy with the plan.    Please do not hesitate to contact me if you have any questions/concerns.     Sincerely,     Topher Melara MD      CC  Patient Care Team:  Danii Burks MD as PCP - General (Internal Medicine)  Kiesha Cohen MD as MD (Internal Medicine)  Lanre Ramirez MD as MD (Dermapathology)  Topher Melara MD as MD (Cardiology)  Faina Peace MD as MD (Internal Medicine)  Danii Ocampo MD as MD (OB/Gyn)  Lanre Ramirez MD as MD (Dermapathology)  Faina Rowell APRN CNP as Nurse Practitioner  Slim Herrera MD as MD (Family Practice)  Livier Land, RN as Specialty Care Coordinator (Cardiology)  Topher Melara MD as Assigned Heart and Vascular Provider  Faina Peace MD as Assigned PCP  Tico Ibarra DO as Assigned Musculoskeletal Provider  TOPHER MELARA

## 2022-04-04 NOTE — PATIENT INSTRUCTIONS
To schedule your mammogram with imaging, please call [phone].     To schedule a lab appointment at the [facility], please call [phone].

## 2022-04-04 NOTE — NURSING NOTE
Alysha Youngblood is a 71 year old female patient that presents today in clinic for the following:    Chief Complaint   Patient presents with     Establish Care     Hypertension     Recheck Medication     The patient's allergies and medications were reviewed as noted. A set of vitals were recorded as noted without incident. The patient does not have any other questions for the provider.    Galilea Guillermo, EMT at 8:58 AM on 4/4/2022

## 2022-04-04 NOTE — PATIENT INSTRUCTIONS
Thank you for coming to the [facility] at [place]; please note the following instructions:    1. We will continue current medications and send in a new prescription for the Toprol-XL.     2. We will see you again by video in approximately 1 year        If you have any questions regarding your visit, please contact your care team:     CARDIOLOGY  TELEPHONE NUMBER   Meka LEWIS, Registered Nurse  La HICKEY, Registered Nurse  Kiesha CRAIG, Registered Medical Assistant  Naomi DENIS, Visit Facilitator [phone] (select option 1)    *After hours: [phone]   For Scheduling Appts:     [phone] (select option 1)    *After hours: [phone]   For the Device Clinic (Pacemakers and ICD's)  Amara JOSE, Registered Nurse   During business hours: [phone]    *After business hours:  [phone] (select option 4)      Normal test result notifications will be released via [employer] or mailed within 7 business days.  All other test results, will be communicated via telephone once reviewed by your cardiologist.    If you need a medication refill, please contact your pharmacy.  Please allow 3 business days for your refill to be completed.    As always, thank you for trusting us with your health care needs!    Patient Education     Toprol XL Oral Tablet 50 mg  Uses  This medicine is used for the following purposes:    angina    heart attack    heart failure    high blood pressure    irregular heart beat    prevent migraine headaches    movement disorder  Instructions  Swallow the medicine without crushing or chewing it.  This medicine may be taken with or without food.  It is very important that you take the medicine at about the same time every day. It will work best if you do this.  Keep the medicine at room temperature. Avoid heat and direct light.  It is important that you keep taking each dose of this medicine on time even if you are feeling well.  If you forget to take a dose on time, take it as soon as you  remember. If it is almost time for the next dose, do not take the missed dose. Return to your normal dosing schedule. Do not take 2 doses of this medicine at one time.  Please tell your doctor and pharmacist about all the medicines you take. Include both prescription and over-the-counter medicines. Also tell them about any vitamins, herbal medicines, or anything else you take for your health.  This medicine may cause low blood sugar. It is very important to have regular meals and exercise regularly as instructed by your doctor. Notify your doctor if you experience symptoms of low blood sugar.  If you have diabetes, this medicine may hide some signs of low blood sugar, such as fast heartbeat. Check your blood sugar regularly and for other signs of low blood sugar.  Symptoms of low blood sugar may include nausea, shaking, sweating, cold skin, fast heartbeat, hunger, and irritability.  Do not suddenly stop taking this medicine. Check with your doctor before stopping.  Cautions  Tell your doctor and pharmacist if you ever had an allergic reaction to a medicine. Symptoms of an allergic reaction can include trouble breathing, skin rash, itching, swelling, or severe dizziness.  Some patients with weak hearts may have worsening of symptoms. If you notice difficulty breathing, weight gain, or swelling of your legs or ankles, let your doctor know right away.  Do not use the medication any more than instructed.  This medicine may cause dizziness or fainting, especially after exercising or in hot weather. Be very careful when standing or sitting up quickly.  Your ability to stay alert or to react quickly may be impaired by this medicine. Do not drive or operate machinery until you know how this medicine will affect you.  Please check with your doctor before drinking alcohol while on this medicine.  Tell the doctor or pharmacist if you are pregnant, planning to be pregnant, or breastfeeding.  Ask your pharmacist if this medicine  can interact with any of your other medicines. Be sure to tell them about all the medicines you take.  Please tell all your doctors and dentists that you are on this medicine before they provide care.  Do not start or stop any other medicines without first speaking to your doctor or pharmacist.  Do not share this medicine with anyone who has not been prescribed this medicine.  Side Effects  The following is a list of some common side effects from this medicine. Please speak with your doctor about what you should do if you experience these or other side effects.    diarrhea    dizziness    drowsiness or sedation    lack of energy and tiredness    slow heartbeat    low blood pressure  Call your doctor or get medical help right away if you notice any of these more serious side effects:    confusion    depression or feeling sad    fainting    pale or blue skin, lips or fingernails    shortness of breath    unusual or unexplained tiredness or weakness    sudden or unexplained weight gain  A few people may have an allergic reactions to this medicine. Symptoms can include difficulty breathing, skin rash, itching, swelling, or severe dizziness. If you notice any of these symptoms, seek medical help quickly.  Extra  Please speak with your doctor, nurse, or pharmacist if you have any questions about this medicine.  [email or web address]  IMPORTANT NOTE: This document tells you briefly how to take your medicine, but it does not tell you all there is to know about it.Your doctor or pharmacist may give you other documents about your medicine. Please talk to them if you have any questions.Always follow their advice. There is a more complete description of this medicine available in English.Scan this code on your smartphone or tablet or use the web address below. You can also ask your pharmacist for a printout. If you have any questions, please ask your pharmacist.     2021 [employer].          Thank you for coming to the [facility] @ [place]; please note the following instructions:            If you have any questions regarding your visit please contact your care team:     Cardiology  Telephone Number   Meka LEWIS, RN  La HICKEY, RN   Yen MEJIA, NIGEL CRAIG, NIGEL FARRIS, LPN   [phone] (option 1)   For scheduling appts:     [phone] (select option 1)       For the Device Clinic (Pacemakers and ICD's)  RN's :  Amara Marquis   During business hours: [phone]    *After business hours:  [phone] (select option 4)      Normal test result notifications will be released via Clipabout or mailed within 7 business days.  All other test results, will be communicated via telephone once reviewed by your cardiologist.    If you need a medication refill please contact your pharmacy.  Please allow 3 business days for your refill to be completed.    As always, thank you for trusting us with your health care needs!

## 2022-04-04 NOTE — PROGRESS NOTES
History of Present Illness:  Ms. Youngblood is a 71 year old female who presents for  Chief Complaint   Patient presents with     Establish Care     Hypertension     Recheck Medication     PMH of HTN, HLD, afib s/p ablation, ISABELA not on CPAP, colon polyps, benign pulmonary nodules, benign thyroid nodules, OA, trigger finger, presents to establish care.    Hydrochlorothiazide recall? Wondering about need to stop it.    Has had 2nd booster of covid vaccines. Wondering about treatment options if she gets covid.    Hx of afib, rare afib, does get episodes of aflutter, no recent cardioversions, she takes eliquis and metoprolol.    Doing water aerobics 5 times per week  Doesn't use CPAP machine  Currently had 14 G&T per week  Hurt knee last fall and couldn't do elliptical      Med List from [facility]    amLODIPine (NORVASC) 10 mg tablet Take 1 Tablet by mouth once daily 90 Tablet 3     hydroCHLOROthiazide (HYDRODIURIL) 25 mg tablet Take 1 Tablet by mouth once daily 90 Tablet 3     losartan (COZAAR) 100 mg tablet TAKE 1 TABLET BY MOUTH EVERY DAY 90 Tablet 3     valACYclovir (VALTREX) 1 gram tablet Take 1 Tablet by mouth 3 (three) times daily 21 Tablet 0     lovastatin (MEVACOR) 40 mg tablet Take 2 Tablets by mouth once daily with dinner 180 Tablet 3     apixaban (ELIQUIS) 5 mg tab Take 1 Tablet by mouth 2 (two) times daily 180 Tablet 3     loratadine (CLARITIN) 10 mg tablet Take 10 mg by mouth     metoprolol succinate (TOPROL-XL) 50 mg 24 hr tablet Take 1 Tab by mouth once daily 90 Tab 3         Routine Health Maintenence:  Lung Ca Screening (>20 pk age 50-80):  3/21 IMPRESSION:  1. Unchanged pulmonary nodules since at least 2017. Lung-RADS 2:  recommend resume annual CT lung cancer screening.  2. Stable numerous calcified thyroid nodules.  Colonoscopy (45-75 yrs):  3/21, several sessile polyps, rec 3 year f/u, due 3/24  Dexa (>65W or 70M yrs):  3/21 most negative and valid T-score of -2.1 at the level of the left femoral neck and  right femoral neck  Mammogram (40-75 yrs): 6/21, due 6/22  Pap (21-65 yrs): no abnormal  Lab Results   Component Value Date    PAP NIL 03/01/2016         Review of external notes as documented above   Review of prior external note(s) from - CareEverywhere information from [facility] reviewed                  A detailed Review of Systems was performed, verified and is negative except as documented in the HPI.  All health questionnaires were reviewed, verified and relevant information documented above.    Past Medical History:  Past Medical History:   Diagnosis Date     Atrial fibrillation (H)     ablated 1/12     Ex-smoker      Hashimoto's disease      Hyperlipidaemia      Hypertension      ISABELA (obstructive sleep apnea) 3/12    AHI 7     PAC (premature atrial contraction)      PVC (premature ventricular contraction)        Past Surgical History:  Past Surgical History:   Procedure Laterality Date     APPENDECTOMY       ENT SURGERY       H ABLATION FOCAL AFIB  6/24/2011    [place].      H ABLATION FOCAL AFIB  1/12/2012    [place]. afib and aflutter ablations.        Active Meds:  Current Outpatient Medications   Medication     amLODIPine (NORVASC) 5 MG tablet     apixaban ANTICOAGULANT (ELIQUIS ANTICOAGULANT) 5 MG tablet     bisacodyl (DULCOLAX) 5 MG EC tablet     Calcium-Vitamin D-Vitamin K (VIACTIV PO)     Doxylamine Succinate, Sleep, (SLEEP AID PO)     fish oil-omega-3 fatty acids (FISH OIL) 1000 MG capsule     FLUZONE HIGH-DOSE 0.5 ML injection     hydrochlorothiazide (HYDRODIURIL) 25 MG tablet     loratadine (CLARITIN) 10 MG tablet     lovastatin (MEVACOR) 40 MG tablet     metoprolol succinate ER (TOPROL-XL) 50 MG 24 hr tablet     metoprolol tartrate (LOPRESSOR) 25 MG tablet     polyethylene glycol (GOLYTELY) 236 g suspension     valACYclovir (VALTREX) 1000 mg tablet     Zolpidem Tartrate (AMBIEN PO)     Current Facility-Administered Medications   Medication     lidocaine (PF) (XYLOCAINE) 1 % injection 4 mL      "triamcinolone (KENALOG-40) injection 40 mg        Allergies:  Hmg-coa-r inhibitors, Ibuprofen, Nsaids, and Liquid adhesive    Family History:  family history is not on file.    Social History:  Social History     Tobacco Use     Smoking status: Never Smoker     Smokeless tobacco: Never Used   Substance Use Topics     Alcohol use: No     Drug use: No       Physical Exam:  Vitals: BP (!) 162/85 (BP Location: Right arm, Patient Position: Sitting, Cuff Size: Adult Regular)   Pulse 91   Resp 16   Ht 1.676 m (5' 6\")   Wt 102.1 kg (225 lb)   SpO2 100%   BMI 36.32 kg/m   --> 142/80  Constitutional: Alert, oriented, pleasant, no acute distress  Head: Normocephalic, atraumatic  Eyes: Extra-ocular movements intact, pupils equally round and reactive bilaterally, no scleral icterus  Neck: Supple, no lymphadenopathy, no nodules  Cardiovascular: Regular rate and rhythm, no murmurs, rubs or gallops, peripheral pulses full/symmetric  Respiratory: Good air movement bilaterally, lungs clear, no wheezes/rales/rhonchi  Musculoskeletal: No edema, normal muscle tone, normal gait  Neurologic: Alert and oriented, cranial nerves 2-12 intact, grossly non-focal  Psychiatric: normal mentation, affect and mood      Diagnostics:  Labs reviewed in Epic          Assessment and Plan:  Alysha was seen today for establish care, hypertension and recheck medication.    Diagnoses and all orders for this visit:    Encounter for screening mammogram for breast cancer  -     Mammogram, screening w renato (3D); Future    Need for vaccination  -     TD PRESERVATIVE FREE, AGE >=7 YR    Screening for hyperlipidemia  -     Lipid panel reflex to direct LDL Fasting; Future    Laboratory exam ordered as part of routine general medical examination  -     Hemoglobin A1c; Future    Hashimoto's thyroiditis  -     TSH with free T4 reflex; Future    Paroxysmal atrial fibrillation (H)  Rate control, no problems with eliquis    Essential hypertension  Suboptimally " controlled. Advised increased efforts with diet/exercise. Also, advised re: EtOH recommendations for women.  -     Comprehensive metabolic panel; Future            Danii Burks MD  Internal Medicine      >40 minutes spent today performing chart review, history and exam, documentation and further activities as noted above.                  Answers for HPI/ROS submitted by the patient on 3/30/2022  General Symptoms: No  Skin Symptoms: No  HENT Symptoms: No  EYE SYMPTOMS: No  HEART SYMPTOMS: Yes  LUNG SYMPTOMS: No  INTESTINAL SYMPTOMS: Yes  URINARY SYMPTOMS: No  GYNECOLOGIC SYMPTOMS: No  BREAST SYMPTOMS: No  SKELETAL SYMPTOMS: Yes  BLOOD SYMPTOMS: No  NERVOUS SYSTEM SYMPTOMS: No  MENTAL HEALTH SYMPTOMS: No  Chest pain or pressure: No  Fast or irregular heartbeat: Yes  Pain in legs with walking: No  Trouble breathing while lying down: No  Fingers or toes appear blue: No  High blood pressure: Yes  Low blood pressure: No  Fainting: No  Murmurs: No  Pacemaker: No  Varicose veins: No  Edema or swelling: No  Wake up at night with shortness of breath: No  Light-headedness: No  Exercise intolerance: No  Heart burn or indigestion: No  Nausea: No  Vomiting: No  Abdominal pain: No  Bloating: No  Constipation: No  Diarrhea: Yes  Blood in stool: No  Black stools: No  Rectal or Anal pain: No  Fecal incontinence: No  Yellowing of skin or eyes: No  Vomit with blood: No  Change in stools: No  Back pain: No  Muscle aches: No  Neck pain: No  Swollen joints: No  Joint pain: Yes  Bone pain: No  Muscle cramps: No  Muscle weakness: No  Joint stiffness: No  Bone fracture: No

## 2022-04-04 NOTE — PROGRESS NOTES
Alysha is a 71 year old who is being evaluated via a billable video visit.      How would you like to obtain your AVS? [place]on[employer]  If the video visit is dropped, the invitation should be resent by: [phone]  Will anyone else be joining your video visit? No    Video Start Time: 2:37 PM  Video-Visit Details    Type of service:  Video Visit    Video End Time: 2:51 PM    Originating Location (pt. 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 The patient is status post ablation for paroxysmal atrial fibrillation in 06/2011 as well as another AFib ablation and atrial flutter ablation in 01/2012.  Both ablations were done at [facility].  The patient's last visit with me was in March 2021.    In the last 1 year, patient has been doing well without recurrent atrial fibrillation episodes.  She has gone back to exercising at the [facility] doing water aerobics.  She did not report any symptoms of prolonged palpitations, irregular heartbeat sensation, exertional dyspnea, exertional angina, frequent lightheadedness, presyncope or syncope.    She is currently taking apixaban 5 mg twice daily and Toprol XL 75 mg once daily for her atrial fibrillation.    PAST MEDICAL HISTORY:  Past Medical History:   Diagnosis Date     Atrial fibrillation (H)     ablated 1/12     Ex-smoker      Hashimoto's disease      Hyperlipidaemia      Hypertension      ISABELA (obstructive sleep apnea) 3/12    AHI 7     PAC (premature atrial contraction)      PVC (premature ventricular contraction)        CURRENT MEDICATIONS:  Current Outpatient Medications   Medication Sig Dispense Refill     amLODIPine (NORVASC) 10 MG tablet Take 1 tablet (10 mg) by mouth daily 90 tablet  3     apixaban ANTICOAGULANT (ELIQUIS ANTICOAGULANT) 5 MG tablet Take 1 tablet (5 mg) by mouth 2 times daily 180 tablet 3     Calcium-Vitamin D-Vitamin K (VIACTIV PO) Take by mouth 2 times daily        fish oil-omega-3 fatty acids (FISH OIL) 1000 MG capsule One capsule daily.       hydrochlorothiazide (HYDRODIURIL) 25 MG tablet Take 25 mg by mouth daily       loratadine (CLARITIN) 10 MG tablet Take 10 mg by mouth daily       losartan (COZAAR) 100 MG tablet Take 1 tablet (100 mg) by mouth daily 90 tablet 3     lovastatin (MEVACOR) 40 MG tablet Take 40 mg by mouth At Bedtime.       metoprolol succinate ER (TOPROL-XL) 50 MG 24 hr tablet TAKE 1 AND 1/2 TABLETS(75 MG) BY MOUTH DAILY 135 tablet 0     metoprolol tartrate (LOPRESSOR) 25 MG tablet TAKE 1 TABLET BY MOUTH AS DIRECTED UP TO 2 TABLETS BY MOUTH EVERY DAY WITH ONSET OF ARRHYTHMIA 30 tablet 0     bisacodyl (DULCOLAX) 5 MG EC tablet Take as directed in mychart patient instructions 4 tablet 0     Doxylamine Succinate, Sleep, (SLEEP AID PO)        FLUZONE HIGH-DOSE 0.5 ML injection ADM 0.5ML IM UTD  0     polyethylene glycol (GOLYTELY) 236 g suspension Take as directed in mychart patient instructions 4000 mL 0     valACYclovir (VALTREX) 1000 mg tablet TAKE 1 TABLET BY MOUTH THREE TIMES DAILY       Zolpidem Tartrate (AMBIEN PO) Take 5 mg by mouth nightly as needed for sleep         PAST SURGICAL HISTORY:  Past Surgical History:   Procedure Laterality Date     APPENDECTOMY       ENT SURGERY       H ABLATION FOCAL AFIB  6/24/2011    [place].      H ABLATION FOCAL AFIB  1/12/2012    [place]. afib and aflutter ablations.        ALLERGIES:     Allergies   Allergen Reactions     Hmg-Coa-R Inhibitors      Made her lips tingle, so they put her on another statin instead.     Ibuprofen Other (See Comments)     Nsaids GI Disturbance     Liquid Adhesive Rash     After wearing patches for > 8 days  After wearing patches for > 8 days       FAMILY HISTORY:  - Premature coronary artery  disease  - Atrial fibrillation  - Sudden cardiac death     SOCIAL HISTORY:  Social History     Tobacco Use     Smoking status: Former Smoker     Quit date:      Years since quittin.2     Smokeless tobacco: Never Used   Substance Use Topics     Alcohol use: Yes     Alcohol/week: 14.0 standard drinks     Types: 14 Shots of liquor per week     Drug use: No       ROS:   Constitutional: No fever, chills, or sweats. Weight stable.   ENT: No visual disturbance, ear ache, epistaxis, sore throat.   Cardiovascular: As per HPI.   Respiratory: No cough, hemoptysis.    GI: No nausea, vomiting, hematemesis, melena, or hematochezia.   : No hematuria.   Integument: Negative.   Psychiatric: Negative.   Hematologic:  Easy bruising, no easy bleeding.  Neuro: Negative.   Endocrinology: No significant heat or cold intolerance   Musculoskeletal: No myalgia.    Exam:  There were no vitals taken for this visit.  GENERAL APPEARANCE: healthy, alert and no distress    Labs:  CBC RESULTS:   Lab Results   Component Value Date    WBC 6.0 2019    RBC 5.26 (H) 2019    HGB 15.6 2019    HCT 47.5 (H) 2019    MCV 90 2019    MCH 29.7 2019    MCHC 32.8 2019    RDW 12.4 2019     2019       BMP RESULTS:  Lab Results   Component Value Date     2019    POTASSIUM 3.8 2019    CHLORIDE 102 2019    CO2 30 2019    ANIONGAP 4 2019     (H) 2019    BUN 24 2019    CR 0.77 2019    GFRESTIMATED 79 2019    GFRESTBLACK >90 2019    JANAE 9.5 2019        INR RESULTS:  Lab Results   Component Value Date    INR 1.19 (H) 2018    INR 0.94 2011    INR 0.90 2011       Procedures:      Assessment and Plan:   Paroxysmal atrial fibrillation status post ablation  Status post ablation for atrial flutter    It is encouraging that the patient has been doing well from an atrial arrhythmia standpoint.  We will continue  current medications and send in a new prescription for the Toprol-XL.    We will see patient again by video in approximately 1 year.  All questions and concerns were addressed and the patient is happy with the plan.    CC  Patient Care Team:  Danii Burks MD as PCP - General (Internal Medicine)  Kiesha Cohen MD as MD (Internal Medicine)  Lanre Ramirez MD as MD (Dermapathology)  Topher Melara MD as MD (Cardiology)  Faina Peace MD as MD (Internal Medicine)  Danii Ocampo MD as MD (OB/Gyn)  Lanre Ramirez MD as MD (Dermapathology)  Faina Rowell APRN CNP as Nurse Practitioner  Slim Herrera MD as MD (Family Practice)  Livier Land, RN as Specialty Care Coordinator (Cardiology)  Topher Melara MD as Assigned Heart and Vascular Provider  Faina Peace MD as Assigned PCP  Tico Ibarra DO as Assigned Musculoskeletal Provider  TOPHER MELARA

## 2022-04-08 ENCOUNTER — TELEPHONE (OUTPATIENT)
Dept: INTERNAL MEDICINE | Facility: CLINIC | Age: 72
End: 2022-04-08
Payer: COMMERCIAL

## 2022-04-17 ENCOUNTER — HEALTH MAINTENANCE LETTER (OUTPATIENT)
Age: 72
End: 2022-04-17

## 2022-05-04 ENCOUNTER — LAB (OUTPATIENT)
Dept: LAB | Facility: CLINIC | Age: 72
End: 2022-05-04
Payer: COMMERCIAL

## 2022-05-04 DIAGNOSIS — E06.3 HASHIMOTO'S THYROIDITIS: ICD-10-CM

## 2022-05-04 DIAGNOSIS — Z13.220 SCREENING FOR HYPERLIPIDEMIA: ICD-10-CM

## 2022-05-04 DIAGNOSIS — I10 ESSENTIAL HYPERTENSION: ICD-10-CM

## 2022-05-04 DIAGNOSIS — Z00.00 LABORATORY EXAM ORDERED AS PART OF ROUTINE GENERAL MEDICAL EXAMINATION: ICD-10-CM

## 2022-05-04 LAB
ALBUMIN SERPL-MCNC: 3.6 G/DL (ref 3.4–5)
ALP SERPL-CCNC: 69 U/L (ref 40–150)
ALT SERPL W P-5'-P-CCNC: 46 U/L (ref 0–50)
ANION GAP SERPL CALCULATED.3IONS-SCNC: 8 MMOL/L (ref 3–14)
AST SERPL W P-5'-P-CCNC: 22 U/L (ref 0–45)
BILIRUB SERPL-MCNC: 0.7 MG/DL (ref 0.2–1.3)
BUN SERPL-MCNC: 15 MG/DL (ref 7–30)
CALCIUM SERPL-MCNC: 9.2 MG/DL (ref 8.5–10.1)
CHLORIDE BLD-SCNC: 102 MMOL/L (ref 94–109)
CHOLEST SERPL-MCNC: 199 MG/DL
CO2 SERPL-SCNC: 30 MMOL/L (ref 20–32)
CREAT SERPL-MCNC: 0.8 MG/DL (ref 0.52–1.04)
FASTING STATUS PATIENT QL REPORTED: YES
GFR SERPL CREATININE-BSD FRML MDRD: 79 ML/MIN/1.73M2
GLUCOSE BLD-MCNC: 120 MG/DL (ref 70–99)
HBA1C MFR BLD: 5.7 % (ref 0–5.6)
HDLC SERPL-MCNC: 90 MG/DL
LDLC SERPL CALC-MCNC: 85 MG/DL
NONHDLC SERPL-MCNC: 109 MG/DL
POTASSIUM BLD-SCNC: 3.5 MMOL/L (ref 3.4–5.3)
PROT SERPL-MCNC: 6.7 G/DL (ref 6.8–8.8)
SODIUM SERPL-SCNC: 140 MMOL/L (ref 133–144)
TRIGL SERPL-MCNC: 120 MG/DL
TSH SERPL DL<=0.005 MIU/L-ACNC: 1.42 MU/L (ref 0.4–4)

## 2022-05-04 PROCEDURE — 83036 HEMOGLOBIN GLYCOSYLATED A1C: CPT | Performed by: PATHOLOGY

## 2022-05-04 PROCEDURE — 36415 COLL VENOUS BLD VENIPUNCTURE: CPT | Performed by: PATHOLOGY

## 2022-05-04 PROCEDURE — 80061 LIPID PANEL: CPT | Performed by: PATHOLOGY

## 2022-05-04 PROCEDURE — 80053 COMPREHEN METABOLIC PANEL: CPT | Performed by: PATHOLOGY

## 2022-05-04 PROCEDURE — 84443 ASSAY THYROID STIM HORMONE: CPT | Performed by: PATHOLOGY

## 2022-05-24 DIAGNOSIS — I48.91 ATRIAL FIBRILLATION (H): ICD-10-CM

## 2022-05-24 NOTE — TELEPHONE ENCOUNTER
Pending Prescriptions:                       Disp   Refills    apixaban ANTICOAGULANT (ELIQUIS ANTICOAGU*180 ta*3            Sig: Take 1 tablet (5 mg) by mouth 2 times daily    Last visit: 04/04/2022    Last filled: 02/09/2022    Quantity: 180    Req received: 05/24/2022    ESTHER PeralesF

## 2022-05-24 NOTE — CONFIDENTIAL NOTE
Signed Prescriptions:                        Disp   Refills    apixaban ANTICOAGULANT (ELIQUIS ANTICOAGUL*180 ta*3        Sig: Take 1 tablet (5 mg) by mouth 2 times daily  Authorizing Provider: TOPHER MELARA  Ordering User: RICH ORELLANA Comprehensive Metabolic Panel:  Sodium   Date Value Ref Range Status   05/04/2022 140 133 - 144 mmol/L Final   12/29/2019 137 133 - 144 mmol/L Final     Potassium   Date Value Ref Range Status   05/04/2022 3.5 3.4 - 5.3 mmol/L Final   12/29/2019 3.8 3.4 - 5.3 mmol/L Final     Chloride   Date Value Ref Range Status   05/04/2022 102 94 - 109 mmol/L Final   12/29/2019 102 94 - 109 mmol/L Final     Carbon Dioxide   Date Value Ref Range Status   12/29/2019 30 20 - 32 mmol/L Final     Carbon Dioxide (CO2)   Date Value Ref Range Status   05/04/2022 30 20 - 32 mmol/L Final     Anion Gap   Date Value Ref Range Status   05/04/2022 8 3 - 14 mmol/L Final   12/29/2019 4 3 - 14 mmol/L Final     Glucose   Date Value Ref Range Status   05/04/2022 120 (H) 70 - 99 mg/dL Final   12/29/2019 103 (H) 70 - 99 mg/dL Final     Urea Nitrogen   Date Value Ref Range Status   05/04/2022 15 7 - 30 mg/dL Final   12/29/2019 24 7 - 30 mg/dL Final     Creatinine   Date Value Ref Range Status   05/04/2022 0.80 0.52 - 1.04 mg/dL Final   12/29/2019 0.77 0.52 - 1.04 mg/dL Final     GFR Estimate   Date Value Ref Range Status   05/04/2022 79 >60 mL/min/1.73m2 Final     Comment:     Effective December 21, 2021 eGFRcr in adults is calculated using the 2021 CKD-EPI creatinine equation which includes age and gender (Annette et al., NEJM, DOI: 10.1056/HKAWmh0319905)   12/29/2019 79 >60 mL/min/[1.73_m2] Final     Comment:     Non  GFR Calc  Starting 12/18/2018, serum creatinine based estimated GFR (eGFR) will be   calculated using the Chronic Kidney Disease Epidemiology Collaboration   (CKD-EPI) equation.       Calcium   Date Value Ref Range Status   05/04/2022 9.2 8.5 - 10.1 mg/dL Final   12/29/2019 9.5  8.5 - 10.1 mg/dL Final     rx refilled per protocol.    Meka Lopez, RN  Cardiology Care Coordinator  [facility]  [phone] option 1

## 2022-07-26 DIAGNOSIS — E78.2 MIXED HYPERLIPIDEMIA: Primary | ICD-10-CM

## 2022-07-26 DIAGNOSIS — I10 ESSENTIAL HYPERTENSION: ICD-10-CM

## 2022-07-26 RX ORDER — LOSARTAN POTASSIUM 100 MG/1
100 TABLET ORAL DAILY
Qty: 90 TABLET | Refills: 3 | Status: SHIPPED | OUTPATIENT
Start: 2022-07-26 | End: 2023-06-26

## 2022-07-26 RX ORDER — LOVASTATIN 40 MG
40 TABLET ORAL AT BEDTIME
Qty: 90 TABLET | Refills: 3 | Status: SHIPPED | OUTPATIENT
Start: 2022-07-26 | End: 2023-05-10

## 2022-07-26 RX ORDER — AMLODIPINE BESYLATE 10 MG/1
10 TABLET ORAL DAILY
Qty: 90 TABLET | Refills: 3 | Status: SHIPPED | OUTPATIENT
Start: 2022-07-26 | End: 2023-06-26

## 2022-07-26 RX ORDER — HYDROCHLOROTHIAZIDE 25 MG/1
25 TABLET ORAL DAILY
Qty: 90 TABLET | Refills: 3 | Status: SHIPPED | OUTPATIENT
Start: 2022-07-26 | End: 2023-05-10

## 2022-10-01 ENCOUNTER — MYC MEDICAL ADVICE (OUTPATIENT)
Dept: INTERNAL MEDICINE | Facility: CLINIC | Age: 72
End: 2022-10-01

## 2022-10-12 ENCOUNTER — OFFICE VISIT (OUTPATIENT)
Dept: PHYSICAL MEDICINE AND REHAB | Facility: CLINIC | Age: 72
End: 2022-10-12
Payer: COMMERCIAL

## 2022-10-12 VITALS
HEIGHT: 65 IN | OXYGEN SATURATION: 96 % | WEIGHT: 225 LBS | DIASTOLIC BLOOD PRESSURE: 83 MMHG | SYSTOLIC BLOOD PRESSURE: 152 MMHG | HEART RATE: 81 BPM | BODY MASS INDEX: 37.49 KG/M2

## 2022-10-12 DIAGNOSIS — M51.369 DDD (DEGENERATIVE DISC DISEASE), LUMBAR: ICD-10-CM

## 2022-10-12 DIAGNOSIS — M54.16 LEFT LUMBAR RADICULOPATHY: Primary | ICD-10-CM

## 2022-10-12 DIAGNOSIS — G89.29 CHRONIC BILATERAL LOW BACK PAIN WITH LEFT-SIDED SCIATICA: ICD-10-CM

## 2022-10-12 DIAGNOSIS — M48.061 LUMBAR FORAMINAL STENOSIS: ICD-10-CM

## 2022-10-12 DIAGNOSIS — M54.42 CHRONIC BILATERAL LOW BACK PAIN WITH LEFT-SIDED SCIATICA: ICD-10-CM

## 2022-10-12 PROCEDURE — 99204 OFFICE O/P NEW MOD 45 MIN: CPT | Performed by: NURSE PRACTITIONER

## 2022-10-12 RX ORDER — GABAPENTIN 300 MG/1
300 CAPSULE ORAL 3 TIMES DAILY
Qty: 90 CAPSULE | Refills: 3 | Status: SHIPPED | OUTPATIENT
Start: 2022-10-12 | End: 2023-03-28

## 2022-10-12 ASSESSMENT — PAIN SCALES - GENERAL: PAINLEVEL: MODERATE PAIN (4)

## 2022-10-12 NOTE — LETTER
10/12/2022         RE: Alysha Youngblood  [address] [place] [id number]        Dear Colleague,    Thank you for referring your patient, Alysha Youngblood, to the [facility] SPINE [facility] NEUROSURGERY. Please see a copy of my visit note below.    ASSESSMENT: Alysha Youngblood is a 71 year old female who presents for consultation at the request of PCP Danii Burks, with a past medical history significant for atrial fibrillation/flutter, morbid obesity, knee osteoarthritis, goiter who presents today for new patient evaluation of:    -Chronic bilateral low back pain with left lumbar radiculopathy L4 dermatomal pattern with paresthesias.    Patient is neurologically intact on exam. No myelopathic or red flag symptoms.     OSWESTRY DISABILITY INDEX 10/12/2022   Count 10   Sum 7   Oswestry Score (%) 14   Some recent data might be hidden            Diagnoses and all orders for this visit:  Left lumbar radiculopathy  -     MR Lumbar Spine w/o Contrast; Future  -     gabapentin (NEURONTIN) 300 MG capsule; Take 1 capsule (300 mg) by mouth 3 times daily  DDD (degenerative disc disease), lumbar  -     MR Lumbar Spine w/o Contrast; Future  Lumbar foraminal stenosis  -     MR Lumbar Spine w/o Contrast; Future  Chronic bilateral low back pain with left-sided sciatica  -     MR Lumbar Spine w/o Contrast; Future    PLAN:  Reviewed spine anatomy and disease process. Discussed diagnosis and treatment options with the patient today. A shared decision making model was used.  The patient's values and choices were respected. The following represents what was discussed and decided upon by the provider and the patient.      -DIAGNOSTIC TESTS:  Images were personally reviewed and interpreted and explained to patient today using spine model.   --Ordered urgent lumbar spine MRI to evaluate left lumbar radiculopathy for possible intervention.  --Lumbar CT abdomen pelvis 6/28/2021 does show normal alignment lumbar spine with  degenerative Grange is greatest at L4-5 with significant left foraminal stenosis.    -PHYSICAL THERAPY: Encourage patient continue with home exercises from prior physical therapy sessions which she is diligent about doing.  Did discuss that she would be a good candidate for the lumbar MedX program for more intensive core strengthening which she would be open to but she is hoping for an injection before she leaves for a big trip to [place].  Discussed the importance of core strengthening, ROM, stretching exercises with the patient and how each of these entities is important in decreasing pain.  Explained to the patient that the purpose of physical therapy is to teach the patient a home exercise program.  These exercises need to be performed every day in order to decrease pain and prevent future occurrences of pain.        -MEDICATIONS: Prescribed gabapentin 300 mg to titrate up to 1 tablet 3 times daily, did discuss that we could potentially go higher if she is tolerating without any side effect or benefit.  Discussed multiple medication options today with patient. Discussed risks, side effects, and proper use of medications. Patient verbalized understanding.    -INTERVENTIONS: Discussed the likelihood of recommending a Left L4-5 transforaminal epidural steroid injection which she is hoping for urgently as she is hoping to get an injection before she leaves for [place].  Discussed risks and benefits of injections with patient today.    -PATIENT EDUCATION:  Total time of 46 minutes, on the day of service, spent with the patient, reviewing the chart, placing orders, and documenting.   -Today we also discussed the issues related to the current COVID-19 pandemic, the pros and cons of the current treatment plan, the CDC guidelines such as social distancing, washing hands, masking, and covering the cough.    -FOLLOW-UP:   [facility]    Advised patient to  call the Spine Center if symptoms worsen or you have problems controlling bladder and bowel function.   ______________________________________________________________________    SUBJECTIVE:  HPI:  Alysha Youngblood  Is a 71 year old female who presents today for new patient evaluation of low back pain ongoing bilateral low back pain that is greatest on the left with pain into the left anterior thigh and anterior shin with associated numbness and tingling that has been progressive.  Currently her pain is a 3/10 up to an 8 at its worst and she has significant pain with walking and it does limit her normal day-to-day activity unfortunately.  She is concerned as she is going to [place] for a week for work in the next couple of weeks and is unsure if she has been able to make it with the severity of her pain.  Patient currently denies right leg symptoms.  Denies any recent trips or falls or balance changes.  Denies bowel or bladder loss control.    -Treatment to Date: No prior spinal surgery or spinal injection.  History of physical therapy for LBP, continues with home exercises.    History of right knee steroid injection 2021 with significant benefit    -Medications:  Acetaminophen    Current Outpatient Medications   Medication     apixaban ANTICOAGULANT (ELIQUIS ANTICOAGULANT) 5 MG tablet     gabapentin (NEURONTIN) 300 MG capsule     amLODIPine (NORVASC) 10 MG tablet     bisacodyl (DULCOLAX) 5 MG EC tablet     Calcium-Vitamin D-Vitamin K (VIACTIV PO)     Doxylamine Succinate, Sleep, (SLEEP AID PO)     fish oil-omega-3 fatty acids (FISH OIL) 1000 MG capsule     FLUZONE HIGH-DOSE 0.5 ML injection     hydrochlorothiazide (HYDRODIURIL) 25 MG tablet     loratadine (CLARITIN) 10 MG tablet     losartan (COZAAR) 100 MG tablet     lovastatin (MEVACOR) 40 MG tablet     metoprolol succinate ER (TOPROL-XL) 50 MG 24 hr tablet     metoprolol tartrate (LOPRESSOR) 25 MG tablet     polyethylene glycol (GOLYTELY) 236 g suspension      valACYclovir (VALTREX) 1000 mg tablet     Zolpidem Tartrate (AMBIEN PO)     Current Facility-Administered Medications   Medication     lidocaine (PF) (XYLOCAINE) 1 % injection 4 mL     triamcinolone (KENALOG-40) injection 40 mg       Allergies   Allergen Reactions     Hmg-Coa-R Inhibitors      Made her lips tingle, so they put her on another statin instead.     Ibuprofen Other (See Comments)     Nsaids GI Disturbance     Liquid Adhesive Rash     After wearing patches for > 8 days  After wearing patches for > 8 days       Past Medical History:   Diagnosis Date     Atrial fibrillation (H)     ablated 1/12     Ex-smoker      Hashimoto's disease      Hyperlipidaemia      Hypertension      ISABELA (obstructive sleep apnea) 3/12    AHI 7     PAC (premature atrial contraction)      PVC (premature ventricular contraction)         Patient Active Problem List   Diagnosis     Elbow fracture     Atrial fibrillation (H)     PVC (premature ventricular contraction)     Goiter     Atrial flutter (H)     Osteoarthritis of right knee, unspecified osteoarthritis type     Morbid obesity (H)       Past Surgical History:   Procedure Laterality Date     APPENDECTOMY       ENT SURGERY       H ABLATION FOCAL AFIB  6/24/2011    [place].      H ABLATION FOCAL AFIB  1/12/2012    [place]. afib and aflutter ablations.        Family History   Problem Relation Age of Onset     Depression Mother      Lung Cancer Father         tobacco use     Breast Cancer Sister      Cervical Cancer Maternal Grandmother      Cerebrovascular Disease Maternal Grandmother      Cerebrovascular Disease Paternal Grandmother      Heart Disease Paternal Grandmother      Cancer No family hx of         No known family hx of skin cancer       Reviewed past medical, surgical, and family history with patient found on new patient intake packet located in EMR Media tab.     SOCIAL HX: Patient is professor at [employer], with does exercise and water aerobics 4 to 5 minutes/day 6 days a  "week.  Patient denies smoking/tobacco use.  Does report alcohol use socially, denies history being a heavy drinker.  Denies recreational drug use.    ROS: Positive for joint pain, muscle pain, diarrhea, palpitations, headache.  Specifically negative for bowel/bladder dysfunction, balance changes, headache, dizziness, foot drop, fevers, chills, appetite changes, nausea/vomiting, unexplained weight loss. Otherwise 13 systems reviewed are negative. Please see the patient's intake questionnaire from today for details.    OBJECTIVE:  BP (!) 152/83 (BP Location: Right arm, Patient Position: Sitting)   Pulse 81   Ht 5' 5\" (1.651 m)   Wt 225 lb (102.1 kg)   SpO2 96%   BMI 37.44 kg/m      PHYSICAL EXAMINATION:    --CONSTITUTIONAL:  Vital signs as above.  No acute distress.  The patient is well nourished and well groomed.  --PSYCHIATRIC:  Appropriate mood and affect. The patient is awake, alert, oriented to person, place, time and answering questions appropriately with clear speech.    --SKIN:  Skin over the face, bilateral lower extremities, and posterior torso is clean, dry, intact without rashes.    --RESPIRATORY: Normal rhythm and effort. No abnormal accessory muscle breathing patterns noted.   --ABDOMINAL:  Non-distended.  --STANDING EXAMINATION:  Normal lumbar lordosis noted, no lateral shift.  --MUSCULOSKELETAL: Lumbar spine inspection reveals no evidence of deformity. Range of motion is not limited in lumbar flexion, extension, lateral rotation. No tenderness to palpation lumbar spine. Straight leg raising in the supine position is negative to radicular pain. Sciatic notch non-tender.  --SACROILIAC JOINT: Negative distraction.  Negative Tariq's with reproduction of pain to affected extremity.   --GROSS MOTOR: Gait is non-antalgic. Easily arises from a seated position. Toe walking and heel walking are normal without significant difficulty.    --LOWER EXTREMITY MOTOR TESTING:  Plantar flexion left 5/5, right 5/5 "   Dorsiflexion left 5/5, right 5/5   Great toe MTP extension left 5/5, right 5/5  Knee flexion left 5/5, right 5/5  Knee extension left 5/5, right 5/5   Hip flexion left 5/5, right 5/5  Hip abduction left 5/5, right 5/5  Hip adduction left 5/5, right 5/5   --HIPS: Full range of motion bilaterally. Negative FABERs on the involved lower extremity.   --NEUROLOGICAL:  2/4 patellar, medial hamstring, and achilles reflexes bilaterally.  Sensation to light touch is intact in the bilateral L4, L5, and S1 dermatomes. Babinski is negative. No clonus.  Negative Gaviota reflex bilaterally.  --VASCULAR:  2/4 dorsalis pedis and posterior tibialsi pulses bilaterally.  Bilateral lower extremities are warm.  There is no pitting edema of the bilateral lower extremities.    RESULTS: Prior medical records from [facility] and Care Everywhere were reviewed today.    Imaging: Spine imaging was personally reviewed and interpreted today. The images were shown to the patient and the findings were explained using a spine model.      CT abdomen pelvis 2021 reviewed.             Again, thank you for allowing me to participate in the care of your patient.        Sincerely,        Laurel Wynn, CNP

## 2022-10-12 NOTE — PATIENT INSTRUCTIONS
~Please call our [facility] Nurse Navigation line [phone] with any questions or concerns about your treatment plan, if symptoms worsen and you would like to be seen urgently, or if you have problems controlling bladder and bowel function. ~Please call our [facility] Nurse Navigation line [phone] with any questions or concerns about your treatment plan, if symptoms worsen and you would like to be seen urgently, or if you have problems controlling bladder and bowel function.       Importance of specialized Physical Therapy: Discussed the importance of core strengthening, ROM, stretching exercises with the patient and how each of these entities is important in decreasing pain.  Explained to the patient that the purpose of physical therapy is to teach the patient a home exercise program individualized to them and their specific health concerns.  These exercises need to be performed every day in order to decrease pain and prevent future occurrences of pain.           Imaging has been ordered. Radiology will call you to schedule. Please call below if you do not hear from them in the next couple of days.     [facility] Radiology Scheduling    Please call [phone] to schedule your image(s) (select option #1). There are 3 different locations, see below.     [address] [facility] [place]  [address], [address]   [place] [address]       Prescribed Gabapentin today, 300 mg tablets, to be titrated up to 3 tablets 3 times a day as tolerated for your nerve pain. Please follow Gabapentin dosing chart below.    Gabapentin 300mg Dosing Chart    DATE  MORNING AFTERNOON BEDTIME    Day 1 0 0 1    Day 2 0 0 1    Day 3 0 0 1    Day 4 1 0 1    Day 5 1 0 1    Day 6 1 0 1    Day 7 1 1 1    Day 8 1 1 1    Day 9 1 1 1    Day 10 1 1 2    Day 11 1 1 2     Day 12 1 1 2    Day 13 2 1 2    Day 14 2 1 2    Day 15 2 1 2    Day 16 2 2 2    Day 17 2 2 2    Day 18 2 2 2    Day 19 2 2 3    Day 20 2 2 3    Day 21 2 2 3    Day 22 3 2 3    Day 23 3 2 3    Day 24 3 2 3    Day 25 3 3 3    Day 26 3 3 3    Day 27 3 3 3     Continue medication, taking 3 capsules three times daily  Please call if you have any questions regarding how to take your medication

## 2022-10-12 NOTE — PROGRESS NOTES
ASSESSMENT: Alysha Youngblood is a 71 year old female who presents for consultation at the request of PCP Danii Burks, with a past medical history significant for atrial fibrillation/flutter, morbid obesity, knee osteoarthritis, goiter who presents today for new patient evaluation of:    -Chronic bilateral low back pain with left lumbar radiculopathy L4 dermatomal pattern with paresthesias.    Patient is neurologically intact on exam. No myelopathic or red flag symptoms.     OSWESTRY DISABILITY INDEX 10/12/2022   Count 10   Sum 7   Oswestry Score (%) 14   Some recent data might be hidden            Diagnoses and all orders for this visit:  Left lumbar radiculopathy  -     MR Lumbar Spine w/o Contrast; Future  -     gabapentin (NEURONTIN) 300 MG capsule; Take 1 capsule (300 mg) by mouth 3 times daily  DDD (degenerative disc disease), lumbar  -     MR Lumbar Spine w/o Contrast; Future  Lumbar foraminal stenosis  -     MR Lumbar Spine w/o Contrast; Future  Chronic bilateral low back pain with left-sided sciatica  -     MR Lumbar Spine w/o Contrast; Future    PLAN:  Reviewed spine anatomy and disease process. Discussed diagnosis and treatment options with the patient today. A shared decision making model was used.  The patient's values and choices were respected. The following represents what was discussed and decided upon by the provider and the patient.      -DIAGNOSTIC TESTS:  Images were personally reviewed and interpreted and explained to patient today using spine model.   --Ordered urgent lumbar spine MRI to evaluate left lumbar radiculopathy for possible intervention.  --Lumbar CT abdomen pelvis 6/28/2021 does show normal alignment lumbar spine with degenerative Grange is greatest at L4-5 with significant left foraminal stenosis.    -PHYSICAL THERAPY: Encourage patient continue with home exercises from prior physical therapy sessions which she is diligent about doing.  Did discuss that she would be a good  candidate for the lumbar MedX program for more intensive core strengthening which she would be open to but she is hoping for an injection before she leaves for a big trip to [place].  Discussed the importance of core strengthening, ROM, stretching exercises with the patient and how each of these entities is important in decreasing pain.  Explained to the patient that the purpose of physical therapy is to teach the patient a home exercise program.  These exercises need to be performed every day in order to decrease pain and prevent future occurrences of pain.        -MEDICATIONS: Prescribed gabapentin 300 mg to titrate up to 1 tablet 3 times daily, did discuss that we could potentially go higher if she is tolerating without any side effect or benefit.  Discussed multiple medication options today with patient. Discussed risks, side effects, and proper use of medications. Patient verbalized understanding.    -INTERVENTIONS: Discussed the likelihood of recommending a Left L4-5 transforaminal epidural steroid injection which she is hoping for urgently as she is hoping to get an injection before she leaves for [place].  Discussed risks and benefits of injections with patient today.    -PATIENT EDUCATION:  Total time of 46 minutes, on the day of service, spent with the patient, reviewing the chart, placing orders, and documenting.   -Today we also discussed the issues related to the current COVID-19 pandemic, the pros and cons of the current treatment plan, the CDC guidelines such as social distancing, washing hands, masking, and covering the cough.    -FOLLOW-UP:   Patient is okay with MyChart message for injection options-urgent injection.    Advised patient to call the Spine Center if symptoms worsen or you have problems controlling bladder and bowel function.   ______________________________________________________________________    SUBJECTIVE:  HPI:  Alysha Youngblood  Is a 71 year old female who presents today for new  patient evaluation of low back pain ongoing bilateral low back pain that is greatest on the left with pain into the left anterior thigh and anterior shin with associated numbness and tingling that has been progressive.  Currently her pain is a 3/10 up to an 8 at its worst and she has significant pain with walking and it does limit her normal day-to-day activity unfortunately.  She is concerned as she is going to [place] for a week for work in the next couple of weeks and is unsure if she has been able to make it with the severity of her pain.  Patient currently denies right leg symptoms.  Denies any recent trips or falls or balance changes.  Denies bowel or bladder loss control.    -Treatment to Date: No prior spinal surgery or spinal injection.  History of physical therapy for LBP, continues with home exercises.    History of right knee steroid injection 2021 with significant benefit    -Medications:  Acetaminophen    Current Outpatient Medications   Medication     apixaban ANTICOAGULANT (ELIQUIS ANTICOAGULANT) 5 MG tablet     gabapentin (NEURONTIN) 300 MG capsule     amLODIPine (NORVASC) 10 MG tablet     bisacodyl (DULCOLAX) 5 MG EC tablet     Calcium-Vitamin D-Vitamin K (VIACTIV PO)     Doxylamine Succinate, Sleep, (SLEEP AID PO)     fish oil-omega-3 fatty acids (FISH OIL) 1000 MG capsule     FLUZONE HIGH-DOSE 0.5 ML injection     hydrochlorothiazide (HYDRODIURIL) 25 MG tablet     loratadine (CLARITIN) 10 MG tablet     losartan (COZAAR) 100 MG tablet     lovastatin (MEVACOR) 40 MG tablet     metoprolol succinate ER (TOPROL-XL) 50 MG 24 hr tablet     metoprolol tartrate (LOPRESSOR) 25 MG tablet     polyethylene glycol (GOLYTELY) 236 g suspension     valACYclovir (VALTREX) 1000 mg tablet     Zolpidem Tartrate (AMBIEN PO)     Current Facility-Administered Medications   Medication     lidocaine (PF) (XYLOCAINE) 1 % injection 4 mL     triamcinolone (KENALOG-40) injection 40 mg       Allergies   Allergen Reactions      Hmg-Coa-R Inhibitors      Made her lips tingle, so they put her on another statin instead.     Ibuprofen Other (See Comments)     Nsaids GI Disturbance     Liquid Adhesive Rash     After wearing patches for > 8 days  After wearing patches for > 8 days       Past Medical History:   Diagnosis Date     Atrial fibrillation (H)     ablated 1/12     Ex-smoker      Hashimoto's disease      Hyperlipidaemia      Hypertension      ISABELA (obstructive sleep apnea) 3/12    AHI 7     PAC (premature atrial contraction)      PVC (premature ventricular contraction)         Patient Active Problem List   Diagnosis     Elbow fracture     Atrial fibrillation (H)     PVC (premature ventricular contraction)     Goiter     Atrial flutter (H)     Osteoarthritis of right knee, unspecified osteoarthritis type     Morbid obesity (H)       Past Surgical History:   Procedure Laterality Date     APPENDECTOMY       ENT SURGERY       H ABLATION FOCAL AFIB  6/24/2011    [place].      H ABLATION FOCAL AFIB  1/12/2012    [place]. afib and aflutter ablations.        Family History   Problem Relation Age of Onset     Depression Mother      Lung Cancer Father         tobacco use     Breast Cancer Sister      Cervical Cancer Maternal Grandmother      Cerebrovascular Disease Maternal Grandmother      Cerebrovascular Disease Paternal Grandmother      Heart Disease Paternal Grandmother      Cancer No family hx of         No known family hx of skin cancer       Reviewed past medical, surgical, and family history with patient found on new patient intake packet located in EMR Media tab.     SOCIAL HX: Patient is professor at 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  Patient denies smoking/tobacco use.  Does report alcohol use socially, denies history being a heavy drinker.  Denies recreational drug use.    ROS: Positive for joint pain, muscle pain, diarrhea, palpitations, headache.  Specifically negative for bowel/bladder  "dysfunction, balance changes, headache, dizziness, foot drop, fevers, chills, appetite changes, nausea/vomiting, unexplained weight loss. Otherwise 13 systems reviewed are negative. Please see the patient's intake questionnaire from today for details.    OBJECTIVE:  BP (!) 152/83 (BP Location: Right arm, Patient Position: Sitting)   Pulse 81   Ht 5' 5\" (1.651 m)   Wt 225 lb (102.1 kg)   SpO2 96%   BMI 37.44 kg/m      PHYSICAL EXAMINATION:    --CONSTITUTIONAL:  Vital signs as above.  No acute distress.  The patient is well nourished and well groomed.  --PSYCHIATRIC:  Appropriate mood and affect. The patient is awake, alert, oriented to person, place, time and answering questions appropriately with clear speech.    --SKIN:  Skin over the face, bilateral lower extremities, and posterior torso is clean, dry, intact without rashes.    --RESPIRATORY: Normal rhythm and effort. No abnormal accessory muscle breathing patterns noted.   --ABDOMINAL:  Non-distended.  --STANDING EXAMINATION:  Normal lumbar lordosis noted, no lateral shift.  --MUSCULOSKELETAL: Lumbar spine inspection reveals no evidence of deformity. Range of motion is not limited in lumbar flexion, extension, lateral rotation. No tenderness to palpation lumbar spine. Straight leg raising in the supine position is negative to radicular pain. Sciatic notch non-tender.  --SACROILIAC JOINT: Negative distraction.  Negative Tariq's with reproduction of pain to affected extremity.   --GROSS MOTOR: Gait is non-antalgic. Easily arises from a seated position. Toe walking and heel walking are normal without significant difficulty.    --LOWER EXTREMITY MOTOR TESTING:  Plantar flexion left 5/5, right 5/5   Dorsiflexion left 5/5, right 5/5   Great toe MTP extension left 5/5, right 5/5  Knee flexion left 5/5, right 5/5  Knee extension left 5/5, right 5/5   Hip flexion left 5/5, right 5/5  Hip abduction left 5/5, right 5/5  Hip adduction left 5/5, right 5/5   --HIPS: Full " range of motion bilaterally. Negative FABERs on the involved lower extremity.   --NEUROLOGICAL:  2/4 patellar, medial hamstring, and achilles reflexes bilaterally.  Sensation to light touch is intact in the bilateral L4, L5, and S1 dermatomes. Babinski is negative. No clonus.  Negative Gaviota reflex bilaterally.  --VASCULAR:  2/4 dorsalis pedis and posterior tibialsi pulses bilaterally.  Bilateral lower extremities are warm.  There is no pitting edema of the bilateral lower extremities.    RESULTS: Prior medical records from [facility] and Care Everywhere were reviewed today.    Imaging: Spine imaging was personally reviewed and interpreted today. The images were shown to the patient and the findings were explained using a spine model.      CT abdomen pelvis 2021 reviewed.

## 2022-10-16 ENCOUNTER — ANCILLARY PROCEDURE (OUTPATIENT)
Dept: MRI IMAGING | Facility: CLINIC | Age: 72
End: 2022-10-16
Attending: NURSE PRACTITIONER
Payer: COMMERCIAL

## 2022-10-16 DIAGNOSIS — M51.369 DDD (DEGENERATIVE DISC DISEASE), LUMBAR: ICD-10-CM

## 2022-10-16 DIAGNOSIS — M54.16 LEFT LUMBAR RADICULOPATHY: ICD-10-CM

## 2022-10-16 DIAGNOSIS — M54.42 CHRONIC BILATERAL LOW BACK PAIN WITH LEFT-SIDED SCIATICA: ICD-10-CM

## 2022-10-16 DIAGNOSIS — M48.061 LUMBAR FORAMINAL STENOSIS: ICD-10-CM

## 2022-10-16 DIAGNOSIS — G89.29 CHRONIC BILATERAL LOW BACK PAIN WITH LEFT-SIDED SCIATICA: ICD-10-CM

## 2022-10-16 PROCEDURE — 72148 MRI LUMBAR SPINE W/O DYE: CPT | Mod: GC | Performed by: RADIOLOGY

## 2022-10-17 ENCOUNTER — TELEPHONE (OUTPATIENT)
Dept: PHYSICAL MEDICINE AND REHAB | Facility: CLINIC | Age: 72
End: 2022-10-17

## 2022-10-17 DIAGNOSIS — M43.16 SPONDYLOLISTHESIS OF LUMBAR REGION: ICD-10-CM

## 2022-10-17 DIAGNOSIS — M48.061 LUMBAR FORAMINAL STENOSIS: ICD-10-CM

## 2022-10-17 DIAGNOSIS — M54.16 LEFT LUMBAR RADICULOPATHY: Primary | ICD-10-CM

## 2022-10-17 DIAGNOSIS — M51.369 DDD (DEGENERATIVE DISC DISEASE), LUMBAR: ICD-10-CM

## 2022-10-17 NOTE — TELEPHONE ENCOUNTER
Patient calling to get set up for an injection ASAP as ordered per her PSP; patient leaving on a trip this Friday. Patient had received her MRI results as well treatment recommendations through [facility].    Injection requirements reviewed in full with patient. Discussed steroids have immunosuppressant properties which could potentially put patient at a higher risk for a worsened course of any infection including COVID. Stated understanding. Patient decided she wants to hold off on getting the steroid injection until after she returns from [place]. Transferred to scheduling to make appointment.

## 2022-10-23 ENCOUNTER — HEALTH MAINTENANCE LETTER (OUTPATIENT)
Age: 72
End: 2022-10-23

## 2022-11-01 ENCOUNTER — ANCILLARY PROCEDURE (OUTPATIENT)
Dept: MAMMOGRAPHY | Facility: CLINIC | Age: 72
End: 2022-11-01
Attending: INTERNAL MEDICINE
Payer: COMMERCIAL

## 2022-11-01 DIAGNOSIS — Z12.31 VISIT FOR SCREENING MAMMOGRAM: ICD-10-CM

## 2022-11-01 PROCEDURE — 77063 BREAST TOMOSYNTHESIS BI: CPT | Performed by: RADIOLOGY

## 2022-11-01 PROCEDURE — 77067 SCR MAMMO BI INCL CAD: CPT | Performed by: RADIOLOGY

## 2022-11-14 DIAGNOSIS — M48.061 LUMBAR FORAMINAL STENOSIS: ICD-10-CM

## 2022-11-14 DIAGNOSIS — M43.16 SPONDYLOLISTHESIS OF LUMBAR REGION: ICD-10-CM

## 2022-11-14 DIAGNOSIS — M51.369 DDD (DEGENERATIVE DISC DISEASE), LUMBAR: ICD-10-CM

## 2022-11-14 DIAGNOSIS — M54.16 LEFT LUMBAR RADICULOPATHY: Primary | ICD-10-CM

## 2022-11-22 ENCOUNTER — E-VISIT (OUTPATIENT)
Dept: URGENT CARE | Facility: CLINIC | Age: 72
End: 2022-11-22
Payer: COMMERCIAL

## 2022-11-22 DIAGNOSIS — B96.89 ACUTE BACTERIAL SINUSITIS: Primary | ICD-10-CM

## 2022-11-22 DIAGNOSIS — J01.90 ACUTE BACTERIAL SINUSITIS: Primary | ICD-10-CM

## 2022-11-22 PROCEDURE — 99421 OL DIG E/M SVC 5-10 MIN: CPT | Performed by: EMERGENCY MEDICINE

## 2022-11-22 NOTE — PATIENT INSTRUCTIONS
Sinusitis (Antibiotic Treatment)    The sinuses are air-filled spaces within the bones of the face. They connect to the inside of the nose. Sinusitis is an inflammation of the tissue that lines the sinuses. Sinusitis can occur during a cold. It can also happen due to allergies to pollens and other particles in the air. Sinusitis can cause symptoms of sinus congestion and a feeling of fullness. A sinus infection causes fever, headache, and facial pain. There is often green or yellow fluid draining from the nose or into the back of the throat (post-nasal drip). You have been given antibiotics to treat this condition.   Home care    Take the full course of antibiotics as instructed. Don't stop taking them, even when you feel better.    Drink plenty of water, hot tea, and other liquids as directed by the healthcare provider. This may help thin nasal mucus. It also may help your sinuses drain fluids.    Heat may help soothe painful areas of your face. Use a towel soaked in hot water. Or,  the shower and direct the warm spray onto your face. Using a vaporizer along with a menthol rub at night may also help soothe symptoms.     An expectorant with guaifenesin may help thin nasal mucus and help your sinuses drain fluids. Talk with your provider or pharmacists before taking an over-the-counter (OTC) medicine if you have any questions about it or its side effects..    You can use an OTC decongestant, unless a similar medicine was prescribed to you. Nasal sprays work the fastest. Use one that contains phenylephrine or oxymetazoline. First blow your nose gently. Then use the spray. Don't use these medicines more often than directed on the label. If you do, your symptoms may get worse. You may also take pills that contain pseudoephedrine. Don t use products that combine multiple medicines. This is because side effects may be increased. Read labels. You can also ask the pharmacist for help. (People with high blood  pressure should not use decongestants. They can raise blood pressure.) Talk with your provider or pharmacist if you have any questions about the medicine..    OTC antihistamines may help if allergies contributed to your sinusitis. Talk with your provider or pharmacist if you have any questions about the medicine..    Don't use nasal rinses or irrigation during an acute sinus infection, unless your healthcare provider tells you to. Rinsing may spread the infection to other areas in your sinuses.    Use acetaminophen or ibuprofen to control pain, unless another pain medicine was prescribed to you. If you have chronic liver or kidney disease or ever had a stomach ulcer, talk with your healthcare provider before using these medicines. Never give aspirin to anyone under age 18 who is ill with a fever. It may cause severe liver damage.    Don't smoke. This can make symptoms worse.    Follow-up care  Follow up with your healthcare provider, or as advised.   When to seek medical advice  Call your healthcare provider if any of these occur:     Facial pain or headache that gets worse    Stiff neck    Unusual drowsiness or confusion    Swelling of your forehead or eyelids    Symptoms don't go away in 10 days    Vision problems, such as blurred or double vision    Fever of 100.4 F (38 C) or higher, or as directed by your healthcare provider  Call 911  Call 911 if any of these occur:     Seizure    Trouble breathing    Feeling dizzy or faint    Fingernails, skin or lips look blue, purple , or gray  Prevention  Here are steps you can take to help prevent an infection:     Keep good hand washing habits.    Don t have close contact with people who have sore throats, colds, or other upper respiratory infections.    Don t smoke, and stay away from secondhand smoke.    Stay up to date with of your vaccines.  [employer] last reviewed this educational content on 12/1/2019 2000-2021 [employer]. All rights reserved. This  information is not intended as a substitute for professional medical care. Always follow your healthcare professional's instructions.        Dear Alysha Youngblood    After reviewing your responses, I've been able to diagnose you with sinus infection  Based on your responses and diagnosis, I have prescribed Augmentin.   to treat your symptoms. I have sent this to your pharmacy.?     It is also important to stay well hydrated, get lots of rest and take over-the-counter decongestants,?tylenol?or ibuprofen if you?are able to?take those medications per your primary care provider to help relieve discomfort.?     It is important that you take?all of?your prescribed medication even if your symptoms are improving after a few doses.? Taking?all of?your medicine helps prevent the symptoms from returning.?     If your symptoms worsen, you develop severe headache, vomiting, high fever (>102), or are not improving in 7 days, please contact your primary care provider for an appointment or visit any of our convenient Walk-in Care or Urgent Care Centers to be seen which can be found on our website?here.?     Thanks again for choosing?us?as your health care partner,?   ?  Varun Mayer MD?    show

## 2022-12-05 ENCOUNTER — MYC MEDICAL ADVICE (OUTPATIENT)
Dept: INTERNAL MEDICINE | Facility: CLINIC | Age: 72
End: 2022-12-05

## 2022-12-05 DIAGNOSIS — H53.9 VISION CHANGES: Primary | ICD-10-CM

## 2022-12-05 DIAGNOSIS — H91.90 HEARING DIFFICULTY, UNSPECIFIED LATERALITY: ICD-10-CM

## 2022-12-07 ENCOUNTER — THERAPY VISIT (OUTPATIENT)
Dept: PHYSICAL THERAPY | Facility: CLINIC | Age: 72
End: 2022-12-07
Attending: NURSE PRACTITIONER
Payer: COMMERCIAL

## 2022-12-07 DIAGNOSIS — M54.41 BILATERAL LOW BACK PAIN WITH BILATERAL SCIATICA: ICD-10-CM

## 2022-12-07 DIAGNOSIS — M48.061 LUMBAR FORAMINAL STENOSIS: ICD-10-CM

## 2022-12-07 DIAGNOSIS — M54.42 BILATERAL LOW BACK PAIN WITH BILATERAL SCIATICA: ICD-10-CM

## 2022-12-07 DIAGNOSIS — M43.16 SPONDYLOLISTHESIS OF LUMBAR REGION: ICD-10-CM

## 2022-12-07 DIAGNOSIS — M51.369 DDD (DEGENERATIVE DISC DISEASE), LUMBAR: ICD-10-CM

## 2022-12-07 DIAGNOSIS — M54.16 LEFT LUMBAR RADICULOPATHY: ICD-10-CM

## 2022-12-07 PROCEDURE — 97112 NEUROMUSCULAR REEDUCATION: CPT | Mod: GP | Performed by: PHYSICAL THERAPIST

## 2022-12-07 PROCEDURE — 97161 PT EVAL LOW COMPLEX 20 MIN: CPT | Mod: GP | Performed by: PHYSICAL THERAPIST

## 2022-12-07 PROCEDURE — 97110 THERAPEUTIC EXERCISES: CPT | Mod: GP | Performed by: PHYSICAL THERAPIST

## 2022-12-07 NOTE — PROGRESS NOTES
Physical Therapy Initial Evaluation  Subjective:  The history is provided by the patient.   Patient Health History  Alysha Youngblood being seen for lower back pain not due to injury.     Problem began: 1/1/2001.      Pain is reported as 2/10 on pain scale.  General health as reported by patient is good.  Pertinent medical history includes: history of fractures, heart problems, high blood pressure, overweight, osteoporosis, pain at night/rest and thyroid problems.   Red flags:  None as reported by patient.  Medical allergies: adhesives.   Surgeries include:  Heart surgery.    Current medications:  Cardiac medication and high blood pressure medication.    Current occupation is professor.   Primary job tasks include:  Computer work and prolonged sitting.                  Therapist Generated HPI Evaluation         Type of problem:  Lumbar.    This is a recurrent condition.  Condition occurred with:  Insidious onset.  Where condition occurred: for unknown reasons.  Patient reports pain:  Lumbar spine left, lumbar spine right and lower lumbar spine.  Pain is described as aching, shooting and sharp and is intermittent.  Pain radiates to:  Gluteals left, gluteals right, thigh right and thigh left. Pain timing: no pattern - always feels stiff in the mornings.    Associated symptoms:  Loss of motion/stiffness and loss of strength. Symptoms are exacerbated by walking, standing and lying down (sitting feels good, bending fwd feels good)  and relieved by rest and activity/movement.  Special tests included:  MRI (see chart).    Restrictions due to condition include:  Working in normal job without restrictions.  Barriers include:  None as reported by patient.        Objective:    Gait:    Gait Type:  Normal         Flexibility/Screens:       Lower Extremity:  Decreased left lower extremity flexibility:Hip ER's and Piriformis    Decreased right lower extremity flexibility:  Hip ER's and Piriformis  Spine:  Decreased left spine  flexibility:  Quadratus Lumborum    Decreased right spine flexibility:  Quadratus Lumborum             Lumbar/SI Evaluation  ROM:    AROM Lumbar:   Flexion:            Wnl  Ext:                    Decreased 25%   Side Bend:        Left:  Wnl    Right:  Wnl  Rotation:           Left:  Wnl    Right:  Wnl  Side Glide:        Left:     Right:                   Neural Tension/Mobility:      Left side:SLR or Slump  negative.     Right side:   Slump or SLR  negative.   Lumbar Palpation:    Tenderness present at Left:    Quadratus Lumborum; Piriformis and Gluteus Medius  Tenderness not present at Left:    Vertebral  Tenderness present at Right: Quadratus Lumborum; Piriformis and Gluteus Medius  Tenderness not present at Right:  Vertebral        SI joint/Sacrum:          Left negative at:    Sacral thrust    Right negative at:  Sacral thrust                                      Hip Evaluation  HIP AROM:  AROM:    Left Hip:     Normal    Right Hip:   Normal                                     General     ROS    Assessment/Plan:    Patient is a 72 year old female with lumbar complaints.    Patient has the following significant findings with corresponding treatment plan.                Diagnosis 1:  LBP/B LE pain   Pain -  hot/cold therapy, manual therapy, self management, education, directional preference exercise and home program  Decreased ROM/flexibility - manual therapy, therapeutic exercise and home program  Decreased strength - therapeutic exercise, therapeutic activities and home program    Therapy Evaluation Codes:   1) History comprised of:   Personal factors that impact the plan of care:      None.    Comorbidity factors that impact the plan of care are:      None.     Medications impacting care: None.  2) Examination of Body Systems comprised of:   Body structures and functions that impact the plan of care:      Hip, Knee and Lumbar spine.   Activity limitations that impact the plan of care are:      Bathing,  Cooking, Dressing, Squatting/kneeling, Stairs, Standing, Walking, Working, Sleeping and Laying down.  3) Clinical presentation characteristics are:   Stable/Uncomplicated.  4) Decision-Making    Low complexity using standardized patient assessment instrument and/or measureable assessment of functional outcome.  Cumulative Therapy Evaluation is: Low complexity.    Previous and current functional limitations:  (See Goal Flow Sheet for this information)    Short term and Long term goals: One time visit only - pt will be independent with HEP in 1 visit in order to decrease back pain. GOAL MET    Communication ability:  Patient appears to be able to clearly communicate and understand verbal and written communication and follow directions correctly.  Treatment Explanation - The following has been discussed with the patient:   RX ordered/plan of care  Anticipated outcomes  Possible risks and side effects  This patient would benefit from PT intervention to resume normal activities.   Rehab potential is good.    Frequency:  1 X week  Duration:  for 1 weeks - this is a 1 time visit only, as pt is going to start the Med-ex program  Discharge Plan:  Achieve all LTG.  Independent in home treatment program.  Reach maximal therapeutic benefit.    Please refer to the daily flowsheet for treatment today, total treatment time and time spent performing 1:1 timed codes.

## 2022-12-22 DIAGNOSIS — M54.50 CHRONIC BILATERAL LOW BACK PAIN WITHOUT SCIATICA: Primary | ICD-10-CM

## 2022-12-22 DIAGNOSIS — G89.29 CHRONIC BILATERAL LOW BACK PAIN WITHOUT SCIATICA: Primary | ICD-10-CM

## 2023-01-10 ENCOUNTER — HOSPITAL ENCOUNTER (OUTPATIENT)
Dept: PHYSICAL THERAPY | Facility: CLINIC | Age: 73
Discharge: HOME | End: 2023-01-10
Attending: INTERNAL MEDICINE
Payer: COMMERCIAL

## 2023-01-10 DIAGNOSIS — M54.50 CHRONIC BILATERAL LOW BACK PAIN WITHOUT SCIATICA: ICD-10-CM

## 2023-01-10 DIAGNOSIS — G89.29 CHRONIC BILATERAL LOW BACK PAIN WITHOUT SCIATICA: ICD-10-CM

## 2023-01-10 PROCEDURE — 97110 THERAPEUTIC EXERCISES: CPT | Mod: GP | Performed by: PHYSICAL THERAPIST

## 2023-01-10 PROCEDURE — 97161 PT EVAL LOW COMPLEX 20 MIN: CPT | Mod: GP | Performed by: PHYSICAL THERAPIST

## 2023-01-10 NOTE — PROGRESS NOTES
Lumbar MedX Initial testing   1/10/23   AROM (full=  0-72  lumbar) 3-51   Max Extension Torque  218   Flex: ext ratio (ideal 1.4:1) 3.69:1         Date 1/10/23   Lumbar Parameters    Top Dead Center (TDC) 18   Counterbalance (CB) 508   Seat Pad 1   Femur Restraint 6   Week/Visit    Enter Week/Visit # Wk1 V1   Weight (lbs) 100#   Reps (#) 30   Time 174s   ROM (degrees) 23-51   Pain    Flex:Ext ratio    Cervical Parameters    Top Dead Center (TDC)    Counterbalance (CB)    Seat Height    Week/Visit    Enter Week/Visit #    Weight (lbs)    Reps (#)    Time    ROM (degrees)    Pain    Flex:Ext ratio      Date 1/10/23   Exercise    Supine QL and piriformis below 90 Hold 30 seconds                                                 Current ex: 45 min of water aerobics with a lot of stretches.  Other stretches: SKC, a version of piriformis, engaging core (pelvic tilt), LTR, sitting piriformis

## 2023-01-13 ENCOUNTER — HOSPITAL ENCOUNTER (OUTPATIENT)
Dept: PHYSICAL THERAPY | Facility: CLINIC | Age: 73
Discharge: HOME | End: 2023-01-13
Payer: COMMERCIAL

## 2023-01-13 DIAGNOSIS — M48.061 LUMBAR FORAMINAL STENOSIS: ICD-10-CM

## 2023-01-13 DIAGNOSIS — M54.16 LEFT LUMBAR RADICULOPATHY: ICD-10-CM

## 2023-01-13 DIAGNOSIS — M54.50 CHRONIC BILATERAL LOW BACK PAIN WITHOUT SCIATICA: Primary | ICD-10-CM

## 2023-01-13 DIAGNOSIS — G89.29 CHRONIC BILATERAL LOW BACK PAIN WITHOUT SCIATICA: Primary | ICD-10-CM

## 2023-01-13 DIAGNOSIS — M51.369 DDD (DEGENERATIVE DISC DISEASE), LUMBAR: ICD-10-CM

## 2023-01-13 DIAGNOSIS — M43.16 SPONDYLOLISTHESIS OF LUMBAR REGION: ICD-10-CM

## 2023-01-13 PROCEDURE — 97140 MANUAL THERAPY 1/> REGIONS: CPT | Mod: GP | Performed by: PHYSICAL THERAPIST

## 2023-01-13 PROCEDURE — 97110 THERAPEUTIC EXERCISES: CPT | Mod: GP | Performed by: PHYSICAL THERAPIST

## 2023-01-13 NOTE — PROGRESS NOTES
Subjective: Patient has mild soreness in her right lower back and still getting anterior thigh numbness with standing/walking too much. She tolerated the exercises from the first session.       Assessment: Patient tolerated MedX machines well today. We did discuss trying prone hip extension and prone knee flexion exercises. She is getting a long list of exercises on top of her 6 days per week of water aerobics, so she may not be able to add too much more to her HEP. Patient responded well to manual therapy in clinic today. She will add tennis ball to home STM.           Lumbar MedX Initial testing   1/10/23   AROM (full=  0-72  lumbar) 3-51   Max Extension Torque  218   Flex: ext ratio (ideal 1.4:1) 3.69:1         Date 1/13/2023  1/10/23   Lumbar Parameters     Top Dead Center (TDC) 18 18   Counterbalance (CB) 508 508   Seat Pad 1 1   Femur Restraint 6 6   Week/Visit     Enter Week/Visit # Wk 1 V 2 Wk1 V1   Weight (lbs) 103# 100#   Reps (#) 27 30   Time 195 174s   ROM (degrees) 0-51 23-51   Pain     Flex:Ext ratio       Date 1/13/2023  1/10/23   Exercise     Supine QL and piriformis below 90 Reviewed and patient to not pull too far over with the piriformis below 90 Hold 30 seconds   Rotary torso 90 sec each way 28#    Tennis ball for home STM Add to HEP PRN    prone hip extension 5- 10x    Prone knee flexion 5- 10x    Cat/cow and child's pose Cont (does periodically)                                    Current ex: 45 min of water aerobics with a lot of stretches.  Other stretches: SKC, a version of piriformis, engaging core (pelvic tilt), LTR, sitting piriformis

## 2023-01-19 ENCOUNTER — HOSPITAL ENCOUNTER (OUTPATIENT)
Dept: PHYSICAL THERAPY | Facility: CLINIC | Age: 73
Discharge: HOME | End: 2023-01-19
Payer: COMMERCIAL

## 2023-01-19 DIAGNOSIS — M54.50 CHRONIC BILATERAL LOW BACK PAIN WITHOUT SCIATICA: Primary | ICD-10-CM

## 2023-01-19 DIAGNOSIS — M54.16 LEFT LUMBAR RADICULOPATHY: ICD-10-CM

## 2023-01-19 DIAGNOSIS — G89.29 CHRONIC BILATERAL LOW BACK PAIN WITHOUT SCIATICA: Primary | ICD-10-CM

## 2023-01-19 PROCEDURE — 97140 MANUAL THERAPY 1/> REGIONS: CPT | Mod: GP | Performed by: PHYSICAL THERAPIST

## 2023-01-19 PROCEDURE — 97110 THERAPEUTIC EXERCISES: CPT | Mod: GP | Performed by: PHYSICAL THERAPIST

## 2023-01-19 NOTE — PROGRESS NOTES
Subjective: I am stressed out from the drive in the snow.  Two days ago I was very sore but is better today. Where the manual therapy was done for such a short amount of time last visit I am unsure it helped.  I have not had time to use the tennis balls yet.       Assessment: Patient returns and is in her second week of MedX.  She was very stressed walking in today due to driving in the weather.  Discussed with pt if she doesn't feel safe she should call and cancel.  She didn't feel that was an option so came today but will consider it in the future if it is snowing hard again.  Once relaxed she was able to perform a good number of reps in lumbar MedX.  She did not experience any increase in symptoms with it.  She also felt the manual therapy was more beneficial today targeting more of where she is sore.  She felt looser leaving the clinic and felt more relaxed walking out of the clinic.  Feel she continues to be a good MedX candidate to continue to work on strengthening for stabilization.         Lumbar MedX Initial testing   1/10/23   AROM (full=  0-72  lumbar) 3-51   Max Extension Torque  218   Flex: ext ratio (ideal 1.4:1) 3.69:1         Date 1/19/23 1/13/2023  1/10/23   Lumbar Parameters      Top Dead Center (TDC) 18 18 18   Counterbalance (CB) 508 508 508   Seat Pad 1 1 1   Femur Restraint 6 6 6   Week/Visit      Enter Week/Visit # Wk 2 V2 Wk 1 V 2 Wk1 V1   Weight (lbs) 106# 103# 100#   Reps (#) 26 27 30   Time 180 195 174s   ROM (degrees) 0-51 0-51 23-51   Pain      Flex:Ext ratio        Date 1/19/23 1/13/2023  1/10/23   Exercise      Supine QL and piriformis below 90  Reviewed and patient to not pull too far over with the piriformis below 90 Hold 30 seconds   treadmill 4 minutes     Rotary torso 90 sec each way 32# to L 28#    Tennis ball for home STM  Add to HEP PRN    prone hip extension  5- 10x    Prone knee flexion  5- 10x    Cat/cow and child's pose  Cont (does periodically)                                           Current ex: 45 min of water aerobics with a lot of stretches.  Other stretches: SKC, a version of piriformis, engaging core (pelvic tilt), LTR, sitting piriformis

## 2023-01-23 ENCOUNTER — HOSPITAL ENCOUNTER (OUTPATIENT)
Dept: PHYSICAL THERAPY | Facility: CLINIC | Age: 73
Discharge: HOME | End: 2023-01-23
Attending: INTERNAL MEDICINE
Payer: COMMERCIAL

## 2023-01-23 PROCEDURE — 97110 THERAPEUTIC EXERCISES: CPT | Mod: GP | Performed by: PHYSICAL THERAPIST

## 2023-01-23 PROCEDURE — 97140 MANUAL THERAPY 1/> REGIONS: CPT | Mod: GP | Performed by: PHYSICAL THERAPIST

## 2023-01-23 NOTE — PROGRESS NOTES
"Subjective:The patient reports that she is noticing improvement. Has been using the ball in the mornings and this seems to loosen things up. Feels the morning time is the worst pain, but her stretches and the ball seem to help.     Assessment: Patient returns and is in her third week of Medx training. She feels that there is some improvement since beginning PT. Tolerated Medx well, but required PT to hold this femur restraint due to feeling like she was compensating when the femur restraint falls. Good pacing during Medx and rotary torsion with appropriate fatigue. Continued N/T in L thigh with prolonged walking and weakness present. Patient will continue to benefit from skilled PTs services.     Lumbar MedX Initial testing   1/10/23   AROM (full=  0-72  lumbar 3-51   Max Extension Torque  218   Flex: ext ratio (ideal 1.4:1) 3.69:1         Date 1/23/22 1/19/23 1/13/2023  1/10/23   Lumbar Parameters       Top Dead Center (TDC) 18 18 18 18   Counterbalance (CB) 508 508 508 508   Seat Pad 1 1 1 1   Femur Restraint 5 (PT held up per patient request) 6 6 6   Week/Visit       Enter Week/Visit # wk3 v1 Wk 2 V2 Wk 1 V 2 Wk1 V1   Weight (lbs) 108# 106# 103# 100#   Reps (#) 31 26 27 30   Time 184 180 195 174s   ROM (degrees) 0-51 0-51 0-51 23-51   Pain       Flex:Ext ratio         Date 1/23/22 1/19/23 1/13/2023  1/10/23   Exercise       Supine QL and piriformis below 90   Reviewed and patient to not pull too far over with the piriformis below 90 Hold 30 seconds   treadmill 4 minutes  4 minutes     Rotary torso 90 sec each way 32# to R  32# to L 28#    Tennis ball for home STM rev  Add to HEP PRN    prone hip extension   5- 10x    Prone knee flexion   5- 10x    Cat/cow and child's pose   Cont (does periodically)    Standing HF stretch 2x10\"                                            Current ex: 45 min of water aerobics with a lot of stretches.  Other stretches: SKC, a version of piriformis, engaging core (pelvic tilt), LTR, " sitting piriformis

## 2023-01-27 ENCOUNTER — HOSPITAL ENCOUNTER (OUTPATIENT)
Dept: PHYSICAL THERAPY | Facility: CLINIC | Age: 73
Discharge: HOME | End: 2023-01-27
Payer: COMMERCIAL

## 2023-01-27 DIAGNOSIS — M54.50 CHRONIC BILATERAL LOW BACK PAIN WITHOUT SCIATICA: Primary | ICD-10-CM

## 2023-01-27 DIAGNOSIS — M48.061 LUMBAR FORAMINAL STENOSIS: ICD-10-CM

## 2023-01-27 DIAGNOSIS — M43.16 SPONDYLOLISTHESIS OF LUMBAR REGION: ICD-10-CM

## 2023-01-27 DIAGNOSIS — G89.29 CHRONIC BILATERAL LOW BACK PAIN WITHOUT SCIATICA: Primary | ICD-10-CM

## 2023-01-27 DIAGNOSIS — M51.369 DDD (DEGENERATIVE DISC DISEASE), LUMBAR: ICD-10-CM

## 2023-01-27 DIAGNOSIS — M54.16 LEFT LUMBAR RADICULOPATHY: ICD-10-CM

## 2023-01-27 PROCEDURE — 97110 THERAPEUTIC EXERCISES: CPT | Mod: GP | Performed by: PHYSICAL THERAPIST

## 2023-01-27 PROCEDURE — 97140 MANUAL THERAPY 1/> REGIONS: CPT | Mod: GP | Performed by: PHYSICAL THERAPIST

## 2023-01-27 NOTE — PROGRESS NOTES
Subjective: Still going to water aerobics 6 days per week. She is trying to get to her HEP, but mostly doing things in the water.  Feels the morning time is the worst pain, but her stretches and the ball seem to help.     Assessment: Patient returns and is in her third week of Medx training. She feels that there is some improvement since beginning PT. Still stiff in the mornings, but slihgtly improving. She is working on doing her exercises to loosen in the am. Tolerated Medx well, but required PT to hold this femur restraint due to feeling like she was compensating when the femur restraint falls. Good pacing during Medx and rotary torsion with appropriate fatigue. Continued N/T in L thigh with prolonged walking and weakness present. Patient will continue to benefit from skilled PTs services.     Lumbar MedX Initial testing   1/10/23   AROM (full=  0-72  lumbar 3-51   Max Extension Torque  218   Flex: ext ratio (ideal 1.4:1) 3.69:1         Date 1/27/2023  1/23/22 1/19/23 1/13/2023  1/10/23   Lumbar Parameters        Top Dead Center (TDC) 18 18 18 18 18   Counterbalance (CB) 508 [phone] 508   Seat Pad 1 1 1 1 1   Femur Restraint 5 (PT held up per patient request) this helps! 5 (PT held up per patient request) 6 6 6   Week/Visit        Enter Week/Visit # Wk 3 V 2 wk3 v1 Wk 2 V2 Wk 1 V 2 Wk1 V1   Weight (lbs) 112# 108# 106# 103# 100#   Reps (#) 30 31 26 27 30   Time 170 184 180 195 174s   ROM (degrees) 0-51 0-51 0-51 0-51 23-51   Pain        Flex:Ext ratio          Date 1/27/2023  1/23/22 1/19/23 1/13/2023  1/10/23   Exercise        Supine QL and piriformis below 90    Reviewed and patient to not pull too far over with the piriformis below 90 Hold 30 seconds   treadmill  4 minutes  4 minutes     Rotary torso 90 sec each way 36# to L 32# to R  32# to L 28#    Tennis ball for home STM  rev  Add to HEP PRN    prone hip extension    5- 10x    Prone knee flexion    5- 10x    Cat/cow and child's pose    Cont (does  "periodically)    Standing HF stretch  2x10\"                                                 Current ex: 45 min of water aerobics with a lot of stretches.  Other stretches: SKC, a version of piriformis, engaging core (pelvic tilt), LTR, sitting piriformis  "

## 2023-01-31 ENCOUNTER — HOSPITAL ENCOUNTER (OUTPATIENT)
Dept: PHYSICAL THERAPY | Facility: CLINIC | Age: 73
Discharge: HOME | End: 2023-01-31
Payer: COMMERCIAL

## 2023-01-31 DIAGNOSIS — G89.29 CHRONIC BILATERAL LOW BACK PAIN WITHOUT SCIATICA: Primary | ICD-10-CM

## 2023-01-31 DIAGNOSIS — M54.50 CHRONIC BILATERAL LOW BACK PAIN WITHOUT SCIATICA: Primary | ICD-10-CM

## 2023-01-31 DIAGNOSIS — M54.16 LEFT LUMBAR RADICULOPATHY: ICD-10-CM

## 2023-01-31 DIAGNOSIS — M51.369 DDD (DEGENERATIVE DISC DISEASE), LUMBAR: ICD-10-CM

## 2023-01-31 DIAGNOSIS — M48.061 LUMBAR FORAMINAL STENOSIS: ICD-10-CM

## 2023-01-31 PROCEDURE — 97110 THERAPEUTIC EXERCISES: CPT | Mod: GP | Performed by: PHYSICAL THERAPIST

## 2023-01-31 NOTE — PROGRESS NOTES
Subjective: I think the MedX is helping.  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  I I really like the tennis ball to help release tight muscles.     Assessment: Patient returns and is in her third week of Medx training and feels she is really starting to notice some strength changes and ability to stand longer at work with less pain. Tolerated Medx well, but required PT to hold this femur restraint due to feeling like she was compensating when the femur restraint falls. Also added reformer today versus manual therapy to continue strengthening.  She didn't feel she needed the manual therapy but it always feels good. Patient will continue to benefit from skilled PTs services.     Lumbar MedX Initial testing   1/10/23   AROM (full=  0-72  lumbar 3-51   Max Extension Torque  218   Flex: ext ratio (ideal 1.4:1) 3.69:1         Date 1/31/23 1/27/2023  1/23/22 1/19/23 1/13/2023  1/10/23   Lumbar Parameters         Top Dead Center (TDC) 18 18 18 18 18 18   Counterbalance (CB) 508 508 [phone] 508   Seat Pad 1 1 1 1 1 1   Femur Restraint 5 (PT held up per patient request) this helps!    5 (PT held up per patient request) this helps! 5 (PT held up per patient request) 6 6 6   Week/Visit         Enter Week/Visit # Wk 3 V 2 Wk 3 V 1 wk2 v2 Wk 2 V1 Wk 1 V 2 Wk1 V1   Weight (lbs) 117# 112# 108# 106# 103# 100#   Reps (#) 30 30 31 26 27 30   Time 175s 170 184 180 195 174s   ROM (degrees) 0-51 0-51 0-51 0-51 0-51 23-51   Pain         Flex:Ext ratio           Date 1/31/23 1/27/2023  1/23/22 1/19/23 1/13/2023  1/10/23   Exercise         Supine QL and piriformis below 90     Reviewed and patient to not pull too far over with the piriformis below 90 Hold 30 seconds   treadmill 4 min  4 minutes  4 minutes     Rotary torso 90 sec each way 450# to R 36# to L 32# to R  32# to L 28#    Tennis ball for home STM   rev  Add to HEP PRN    prone hip extension     5- 10x    Prone knee flexion     5- 10x    Cat/cow and child's pose   "   Cont (does periodically)    Standing HF stretch   2x10\"       Reformer, supine, leg press DL X 20   all springs        Reformer, supine, bridge-no carriage mvmt X 15  [place]        Reformer-prancer Did not like so will not do        Reformer, supine, lat pull down 2R X 15                   Current ex: 45 min of water aerobics with a lot of stretches.  Other stretches: SKC, a version of piriformis, engaging core (pelvic tilt), LTR, sitting piriformis  "

## 2023-02-03 ENCOUNTER — HOSPITAL ENCOUNTER (OUTPATIENT)
Dept: PHYSICAL THERAPY | Facility: CLINIC | Age: 73
Discharge: HOME | End: 2023-02-03
Payer: COMMERCIAL

## 2023-02-03 DIAGNOSIS — M54.50 CHRONIC BILATERAL LOW BACK PAIN WITHOUT SCIATICA: Primary | ICD-10-CM

## 2023-02-03 DIAGNOSIS — M54.16 LEFT LUMBAR RADICULOPATHY: ICD-10-CM

## 2023-02-03 DIAGNOSIS — M48.061 LUMBAR FORAMINAL STENOSIS: ICD-10-CM

## 2023-02-03 DIAGNOSIS — G89.29 CHRONIC BILATERAL LOW BACK PAIN WITHOUT SCIATICA: Primary | ICD-10-CM

## 2023-02-03 DIAGNOSIS — M51.369 DDD (DEGENERATIVE DISC DISEASE), LUMBAR: ICD-10-CM

## 2023-02-03 DIAGNOSIS — M43.16 SPONDYLOLISTHESIS OF LUMBAR REGION: ICD-10-CM

## 2023-02-03 PROCEDURE — 97110 THERAPEUTIC EXERCISES: CPT | Mod: GP | Performed by: PHYSICAL THERAPIST

## 2023-02-03 PROCEDURE — 97140 MANUAL THERAPY 1/> REGIONS: CPT | Mod: GP | Performed by: PHYSICAL THERAPIST

## 2023-02-03 NOTE — PROGRESS NOTES
[place]ive[place] had no numbness in her leg or back pain, typically this would have been an irritating walk, so she was happy with this.     Assessment: Patient returns and is in her forth week of Medx training and feels she is really starting to notice some strength changes and ability to stand longer at work with less pain. Even able to walk without numbness in her thigh. PT  Will continue to hold this femur restraint due to feeling like she was compensating when the femur restraint falls. Patient likes manual therapy and pilates reformer combo when there is time to do both. Patient continues to benefit from further PT. RE-test Next session.       Lumbar MedX Initial testing   1/10/23   AROM (full=  0-72  lumbar 3-51   Max Extension Torque  218   Flex: ext ratio (ideal 1.4:1) 3.69:1         Date * retest next session 2/3/2023  1/31/23 1/27/2023  1/23/22 1/19/23 1/13/2023  1/10/23   Lumbar Parameters           Top Dead Center (TDC)  18 18 18 18 18 18 18   Counterbalance (CB)  [phone]   Seat Pad  1 1 1 1 1 1 1   Femur Restraint  5 5 (PT held up per patient request) this helps!    5 (PT held up per patient request) this helps! 5 (PT held up per patient request) 6 6 6   Week/Visit           Enter Week/Visit #  Wk 4 V 1  Wk 3 V 2 Wk 3 V 1 wk2 v2 Wk 2 V1 Wk 1 V 2 Wk1 V1   Weight (lbs)  120# 117# 112# 108# 106# 103# 100#   Reps (#)  30 30 30 31 26 27 30   Time  240s 175s 170 184 180 195 174s   ROM (degrees)  0-51 0-51 0-51 0-51 0-51 0-51 23-51   Pain           Flex:Ext ratio             Date 2/3/2023  1/31/23 1/27/2023  1/23/22 1/19/23 1/13/2023  1/10/23   Exercise          Supine QL and piriformis below 90      Reviewed and patient to not pull too far over with the piriformis below 90 Hold 30 seconds   treadmill (warm from swimming) 4 min  4 minutes  4 minutes     Rotary torso 90 sec each way 44# to L 450# to R 36# to L 32# to R  32# to L 28#    Tennis ball for  "home STM    rev  Add to HEP PRN    prone hip extension      5- 10x    Prone knee flexion      5- 10x    Cat/cow and child's pose      Cont (does periodically)    Standing HF stretch    2x10\"       Reformer, supine, leg press DL all springs x 20 DL X 20   all springs        Reformer, supine, bridge-no carriage mvmt x20 all springs X 15  All springs        Reformer-prancer Tried again today, did ok Did not like so will not do        Reformer, supine, lat pull down  2R X 15                    Current ex: 45 min of water aerobics with a lot of stretches.  Other stretches: SKC, a version of piriformis, engaging core (pelvic tilt), LTR, sitting piriformis  "

## 2023-02-07 ENCOUNTER — HOSPITAL ENCOUNTER (OUTPATIENT)
Dept: PHYSICAL THERAPY | Facility: CLINIC | Age: 73
Discharge: HOME | End: 2023-02-07
Payer: COMMERCIAL

## 2023-02-07 DIAGNOSIS — M43.16 SPONDYLOLISTHESIS OF LUMBAR REGION: ICD-10-CM

## 2023-02-07 DIAGNOSIS — M48.061 LUMBAR FORAMINAL STENOSIS: ICD-10-CM

## 2023-02-07 DIAGNOSIS — M51.369 DDD (DEGENERATIVE DISC DISEASE), LUMBAR: ICD-10-CM

## 2023-02-07 DIAGNOSIS — G89.29 CHRONIC BILATERAL LOW BACK PAIN WITHOUT SCIATICA: Primary | ICD-10-CM

## 2023-02-07 DIAGNOSIS — M54.16 LEFT LUMBAR RADICULOPATHY: ICD-10-CM

## 2023-02-07 DIAGNOSIS — M54.50 CHRONIC BILATERAL LOW BACK PAIN WITHOUT SCIATICA: Primary | ICD-10-CM

## 2023-02-07 PROCEDURE — 97110 THERAPEUTIC EXERCISES: CPT | Mod: GP | Performed by: PHYSICAL THERAPIST

## 2023-02-07 PROCEDURE — 97140 MANUAL THERAPY 1/> REGIONS: CPT | Mod: GP | Performed by: PHYSICAL THERAPIST

## 2023-02-07 NOTE — PROGRESS NOTES
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    Daily Assessment:  Alysha is presenting for her 4th week in the MedX program. PT performed re-test and she is demonstrating significant improvement in overall isometric strength and muscle balance. She asks this visit, what stretches can help her gluteal and PT affirmed the stretches given to her the first day.       Lumbar MedX 4-week Re-test  2/7/23 Initial testing   1/10/23   AROM (full=  0-72  lumbar 3-51 3-51   Max Extension Torque  360# 218#   Flex: ext ratio (ideal 1.4:1) 2.67:1 3.69:1         Date 2/7/23 2/3/2023  1/31/23 1/27/2023  1/23/22 1/19/23 1/13/2023  1/10/23   Lumbar Parameters           Top Dead Center (TDC) 18 18 18 18 18 18 18 18   Counterbalance (CB) 508 508 508 [phone]   Seat Pad 1 1 1 1 1 1 1 1   Femur Restraint 5 5 5 (PT held up per patient request) this helps!    5 (PT held up per patient request) this helps! 5 (PT held up per patient request) 6 6 6   Week/Visit           Enter Week/Visit # Wk 4 v 2 Wk 4 V 1  Wk 3 V 2 Wk 3 V 1 wk2 v2 Wk 2 V1 Wk 1 V 2 Wk1 V1   Weight (lbs) 123# 120# 117# 112# 108# 106# 103# 100#   Reps (#) 30 30 30 30 31 26 27 30   Time 187 s 240s 175s 170 184 180 195 174s   ROM (degrees) 0-51 0-51 0-51 0-51 0-51 0-51 0-51 23-51   Pain           Flex:Ext ratio             Date 2/7/23 2/3/2023  1/31/23 1/27/2023  1/23/22 1/19/23 1/13/2023  1/10/23   Exercise           Supine QL and piriformis below 90       Reviewed and patient to not pull too far over with the piriformis below 90 Hold 30 seconds   treadmill X 3 min (warm from swimming) 4 min  4 minutes  4 minutes     Rotary torso 90 sec each way No time due to re-test 44# to L 450# to R 36# to L 32# to R  32# to L 28#    Tennis ball for home STM     rev  Add to HEP PRN    prone hip extension       5- 10x    Prone knee flexion       5- 10x    Cat/cow and child's pose       Cont (does  "periodically)    Standing HF stretch     2x10\"       Reformer, supine, leg press  DL all springs x 20 DL X 20   all springs        Reformer, supine, bridge-no carriage mvmt  x20 all springs X 15  All springs        Reformer-prancer  Tried again today, did ok Did not like so will not do        Reformer, supine, lat pull down   2R X 15                     Current ex: 45 min of water aerobics with a lot of stretches.  Other stretches: SKC, a version of piriformis, engaging core (pelvic tilt), LTR, sitting piriformis  "

## 2023-02-13 ENCOUNTER — E-VISIT (OUTPATIENT)
Dept: INTERNAL MEDICINE | Facility: CLINIC | Age: 73
End: 2023-02-13
Payer: COMMERCIAL

## 2023-02-13 DIAGNOSIS — K58.0 IRRITABLE BOWEL SYNDROME WITH DIARRHEA: Primary | ICD-10-CM

## 2023-02-13 PROCEDURE — 99421 OL DIG E/M SVC 5-10 MIN: CPT | Performed by: INTERNAL MEDICINE

## 2023-02-14 ENCOUNTER — HOSPITAL ENCOUNTER (OUTPATIENT)
Dept: PHYSICAL THERAPY | Facility: CLINIC | Age: 73
Discharge: HOME | End: 2023-02-14
Payer: COMMERCIAL

## 2023-02-14 DIAGNOSIS — M48.061 LUMBAR FORAMINAL STENOSIS: ICD-10-CM

## 2023-02-14 DIAGNOSIS — M54.50 CHRONIC BILATERAL LOW BACK PAIN WITHOUT SCIATICA: Primary | ICD-10-CM

## 2023-02-14 DIAGNOSIS — M54.16 LEFT LUMBAR RADICULOPATHY: ICD-10-CM

## 2023-02-14 DIAGNOSIS — G89.29 CHRONIC BILATERAL LOW BACK PAIN WITHOUT SCIATICA: Primary | ICD-10-CM

## 2023-02-14 PROCEDURE — 97110 THERAPEUTIC EXERCISES: CPT | Mod: GP | Performed by: PHYSICAL THERAPIST

## 2023-02-14 NOTE — PATIENT INSTRUCTIONS
Thank you for choosing us for your care.  Please follow the care advice I've provided and use the over the counter medications to help relieve your symptoms.   View your full visit summary for details by clicking on the link below.     If you're not feeling better within 2-3 days, please respond to this message and we can consider if a prescription is needed.  You can schedule an appointment right here in [facility], or call [phone]  If the visit is for the same symptoms as your eVisit, we'll refund the cost of your eVisit if seen within seven days.

## 2023-02-14 NOTE — PROGRESS NOTES
Subjective:   Last week I was in a lot of the pain.  Not sure if it was the test or being too busy.  [place] like I was back to the beginning with my pain. I am now back to my baseline.  I slept over 11 hours the other night and that helped. Going to water aerobics again after my session today.    Daily Assessment: Alysha is presenting for her 5th week in the MedX program and worked to a good fatigue level in both MedX.  She was in more pain the past week but feels everything is back to her baseline the past couple days.  She felt the reformer is helpful in loosening up her back.  She did not experience any pain while exercising today.  Feel pt will continue to benefit from physical therapy to work on functional limitations.     Lumbar MedX 4-week Re-test  2/7/23 Initial testing   1/10/23   AROM (full=  0-72  lumbar 3-51 3-51   Max Extension Torque  360# 218#   Flex: ext ratio (ideal 1.4:1) 2.67:1 3.69:1         Date 2/14/23 2/7/23 2/3/2023  1/31/23 1/27/2023  1/23/22 1/19/23 1/13/2023  1/10/23   Lumbar Parameters            Top Dead Center (TDC) 18 18 18 18 18 18 18 18 18   Counterbalance (CB) 508 508 508 508 508 [phone]   Seat Pad 1 1 1 1 1 1 1 1 1   Femur Restraint 5 5 5 5 (PT held up per patient request) this helps!    5 (PT held up per patient request) this helps! 5 (PT held up per patient request) 6 6 6   Week/Visit            Enter Week/Visit # Wk 5 Wk 4 v 2 Wk 4 V 1  Wk 3 V 2 Wk 3 V 1 wk2 v2 Wk 2 V1 Wk 1 V 2 Wk1 V1   Weight (lbs) 129# 123# 120# 117# 112# 108# 106# 103# 100#   Reps (#) 26 30 30 30 30 31 26 27 30   Time 140s 187 s 240s 175s 170 184 180 195 174s   ROM (degrees) 0-51 0-51 0-51 0-51 0-51 0-51 0-51 0-51 23-51   Pain            Flex:Ext ratio              Date 2/14/23 2/7/23 2/3/2023  1/31/23 1/27/2023  1/23/22 1/19/23 1/13/2023  1/10/23   Exercise            Supine QL and piriformis below 90        Reviewed and patient to not pull too far over with the piriformis below 90 Hold 30 seconds  "  treadmill 3 min X 3 min (warm from swimming) 4 min  4 minutes  4 minutes     Rotary torso 90 sec each way 46# to R No time due to re-test 44# to L 40# to R 36# to L 32# to R  32# to L 28#    Tennis ball for home STM      rev  Add to HEP PRN    prone hip extension        5- 10x    Prone knee flexion        5- 10x    Cat/cow and child's pose        Cont (does periodically)    Standing HF stretch      2x10\"       Reformer, supine, leg press DL, all springs  X 20  DL all springs x 20 DL X 20   all springs        Reformer, supine, bridge-no carriage mvmt [place]  X 20  x20 all springs X 15  All springs        Reformer-prancer   Tried again today, did ok Did not like so will not do        Reformer, supine, lat pull down    2R X 15        Reformer, kneeling scapular row pt did not like exercise             Current ex: 45 min of water aerobics with a lot of stretches.  Other stretches: SKC, a version of piriformis, engaging core (pelvic tilt), LTR, sitting piriformis  "

## 2023-02-21 ENCOUNTER — HOSPITAL ENCOUNTER (OUTPATIENT)
Dept: PHYSICAL THERAPY | Facility: CLINIC | Age: 73
Discharge: HOME | End: 2023-02-21
Payer: COMMERCIAL

## 2023-02-21 DIAGNOSIS — G89.29 CHRONIC BILATERAL LOW BACK PAIN WITHOUT SCIATICA: Primary | ICD-10-CM

## 2023-02-21 DIAGNOSIS — M54.16 LEFT LUMBAR RADICULOPATHY: ICD-10-CM

## 2023-02-21 DIAGNOSIS — M48.061 LUMBAR FORAMINAL STENOSIS: ICD-10-CM

## 2023-02-21 DIAGNOSIS — M54.50 CHRONIC BILATERAL LOW BACK PAIN WITHOUT SCIATICA: Primary | ICD-10-CM

## 2023-02-21 PROCEDURE — 97140 MANUAL THERAPY 1/> REGIONS: CPT | Mod: GP | Performed by: PHYSICAL THERAPIST

## 2023-02-21 PROCEDURE — 97110 THERAPEUTIC EXERCISES: CPT | Mod: GP | Performed by: PHYSICAL THERAPIST

## 2023-02-21 NOTE — PROGRESS NOTES
Subjective:   I have been dealing with a family member passing so I have not had as much time to do my ex.  I did do a lot of walking when I was away.  I did many 5 minute walks in the facility and I did not have as much numbness in my leg.       Daily Assessment: Alysha is presenting for her 6th week in the MedX program and worked to a good fatigue level in both MedX. She has achieved one of her goals and is working toward the other.  Her walking goal is much improved even though it is not achieved yet.  Even though she did not have any pain, she felt her muscles were tight and needed the manual therapy to help loosen to get through the day.  She took pictures of the MedX to take to her gym to talk to a  to see if they have something comparable.  If they do she may try to transition out of the program within the next few visits.  If not, she will continue the program to gain the max strength to help decrease symptoms.  Feel pt will continue to benefit from physical therapy to work on functional limitations.     Lumbar MedX 4-week Re-test  2/7/23 Initial testing   1/10/23   AROM (full=  0-72  lumbar 3-51 3-51   Max Extension Torque  360# 218#   Flex: ext ratio (ideal 1.4:1) 2.67:1 3.69:1         Date 2/21/23 2/14/23 2/7/23 2/3/2023  1/31/23 1/27/2023  1/23/22 1/19/23 1/13/2023  1/10/23   Lumbar Parameters             Top Dead Center (TDC) 18 18 18 18 18 18 18 18 18 18   Counterbalance (CB) 508 508 508 508 508 508 [phone] 508   Seat Pad 1 1 1 1 1 1 1 1 1 1   Femur Restraint 5 5 5 5 5 (PT held up per patient request) this helps!    5 (PT held up per patient request) this helps! 5 (PT held up per patient request) 6 6 6   Week/Visit             Enter Week/Visit # Wk 6 Wk 5 Wk 4 v 2 Wk 4 V 1  Wk 3 V 2 Wk 3 V 1 wk2 v2 Wk 2 V1 Wk 1 V 2 Wk1 V1   Weight (lbs) 138# 129# 123# 120# 117# 112# 108# 106# 103# 100#   Reps (#) 30 26 30 30 30 30 31 26 27 30   Time 152s 140s 187 s 240s 175s 170 184 180 195 174s   ROM  "(degrees) 0-51 0-51 0-51 0-51 0-51 0-51 0-51 0-51 0-51 23-51   Pain             Flex:Ext ratio               Date 2/21/23 2/14/23 2/7/23 2/3/2023  1/31/23 1/27/2023  1/23/22 1/19/23 1/13/2023  1/10/23   Exercise             Supine QL and piriformis below 90         Reviewed and patient to not pull too far over with the piriformis below 90 Hold 30 seconds   treadmill 3 min 3 min X 3 min (warm from swimming) 4 min  4 minutes  4 minutes     Rotary torso 90 sec each way 48# to L 46# to R No time due to re-test 44# to L 40# to R 36# to L 32# to R  32# to L 28#    Tennis ball for home STM       rev  Add to HEP PRN    prone hip extension         5- 10x    Prone knee flexion         5- 10x    Cat/cow and child's pose         Cont (does periodically)    Standing HF stretch       2x10\"       Reformer, supine, leg press  DL, all springs  X 20  DL all springs x 20 DL X 20   all springs        Reformer, supine, bridge-no carriage mvmt  All springs  X 20  x20 all springs X 15  All springs        Reformer-prancer    Tried again today, did ok Did not like so will not do        Reformer, supine, lat pull down     2R X 15        Reformer, kneeling scapular row  pt did not like exercise             Current ex: 45 min of water aerobics with a lot of stretches.  Other stretches: SKC, a version of piriformis, engaging core (pelvic tilt), LTR, sitting piriformis  "

## 2023-03-08 ENCOUNTER — OFFICE VISIT (OUTPATIENT)
Dept: AUDIOLOGY | Facility: CLINIC | Age: 73
End: 2023-03-08
Attending: INTERNAL MEDICINE
Payer: COMMERCIAL

## 2023-03-08 DIAGNOSIS — H90.3 SENSORINEURAL HEARING LOSS, ASYMMETRICAL: Primary | ICD-10-CM

## 2023-03-08 DIAGNOSIS — H91.90 HEARING DIFFICULTY, UNSPECIFIED LATERALITY: ICD-10-CM

## 2023-03-08 PROCEDURE — 92565 STENGER TEST PURE TONE: CPT | Performed by: AUDIOLOGIST

## 2023-03-08 PROCEDURE — 92550 TYMPANOMETRY & REFLEX THRESH: CPT | Performed by: AUDIOLOGIST

## 2023-03-08 PROCEDURE — 92557 COMPREHENSIVE HEARING TEST: CPT | Performed by: AUDIOLOGIST

## 2023-03-08 NOTE — PROGRESS NOTES
AUDIOLOGY REPORT    SUBJECTIVE:  Alysha Youngblood is a 72 year old female who was seen in the Audiology Clinic at the [facility] for audiologic evaluation, referred by Danii Burks M.D.. The patient reports a gradual decrease in hearing status bilaterally but she does not feel that it is yet at the point where hearing aids are needed. She reports her son had some concerns regarding her hearing status. The patient denies  bilateral tinnitus, bilateral otalgia, bilateral drainage, bilateral aural fullness and dizziness. No history of ear surgeries or noise exposure reported.  The patient notes difficulty with communication in a variety of listening situations.    OBJECTIVE:  Abuse Screening:  Do you feel unsafe at home or work/school? No  Do you feel threatened by someone? No  Does anyone try to keep you from having contact with others, or doing things outside of your home? No  Physical signs of abuse present? No     Fall Risk Screen:  1. Have you fallen two or more times in the past year? No  2. Have you fallen and had an injury in the past year? No    Timed Up and Go Score (in seconds): not tested  Is patient a fall risk? No  Referral initiated: No  Fall Risk Assessment Completed by Audiology    Otoscopic exam indicates non-occluding cerumen bilaterally, could visualize more than 80% of eardrum bilaterally.    Pure Tone Thresholds assessed using conventional audiometry with good  reliability from 250-8000 Hz bilaterally using insert earphones and circumaural headphones     RIGHT:  Largely normal to mild sensorineural hearing loss with a moderate hearing loss at 8 kHz    LEFT:    Largely normal to mild sensorineural hearing loss with a moderate to moderately-severe hearing loss 6-8 kHz.    Tympanogram:    RIGHT: normal eardrum mobility    LEFT:   normal eardrum mobility    Reflexes (reported by stimulus ear):  RIGHT: Ipsilateral is present at normal levels  RIGHT:  Contralateral is present at normal levels  LEFT:   Ipsilateral is present at normal levels  LEFT:   Contralateral is present at normal levels    Speech Reception Threshold:    RIGHT: 30 dB HL    LEFT:   20 dB HL    Word Recognition Score:     RIGHT: 100% at 65 dB HL using NU-6 recorded word list.    LEFT:   100% at 60 dB HL using NU-6 recorded word list.      ASSESSMENT:     ICD-10-CM    1. Hearing difficulty, unspecified laterality  H91.90 Adult Audiology  Referral          Today's results revealed a bilateral asymmetric sensorineural hearing loss. Today s results were discussed with the patient in detail.     PLAN:  Patient was counseled regarding hearing loss and impact on communication.  While there is a hearing loss, the patient is a very borderline candidate for amplification at this time. Because she is not experiencing difficulties hearing and is not interested in hearing aids at this time, a trial will not be completed. It is recommended that she repeat the hearing evaluation in 1-2 years, sooner if concerns. It is recommended that the patient follow-up with ENT regarding the significant ear difference from 6-8 kHz, this appointment was scheduled prior to her leaving today.  Please call this clinic with questions regarding these results or recommendations.        Vane Machado  Audiologist  [place] License  #[id number]

## 2023-03-10 ENCOUNTER — HOSPITAL ENCOUNTER (OUTPATIENT)
Dept: PHYSICAL THERAPY | Facility: CLINIC | Age: 73
Discharge: HOME | End: 2023-03-10
Payer: COMMERCIAL

## 2023-03-10 DIAGNOSIS — M48.061 LUMBAR FORAMINAL STENOSIS: ICD-10-CM

## 2023-03-10 DIAGNOSIS — M54.50 CHRONIC BILATERAL LOW BACK PAIN WITHOUT SCIATICA: Primary | ICD-10-CM

## 2023-03-10 DIAGNOSIS — M54.16 LEFT LUMBAR RADICULOPATHY: ICD-10-CM

## 2023-03-10 DIAGNOSIS — M51.369 DDD (DEGENERATIVE DISC DISEASE), LUMBAR: ICD-10-CM

## 2023-03-10 DIAGNOSIS — G89.29 CHRONIC BILATERAL LOW BACK PAIN WITHOUT SCIATICA: Primary | ICD-10-CM

## 2023-03-10 DIAGNOSIS — M43.16 SPONDYLOLISTHESIS OF LUMBAR REGION: ICD-10-CM

## 2023-03-10 PROCEDURE — 97110 THERAPEUTIC EXERCISES: CPT | Mod: GP | Performed by: PHYSICAL THERAPIST

## 2023-03-10 NOTE — PROGRESS NOTES
Subjective:   Feeling fairly good, did find machines at the gym that she can use. No pain or numbness in the leg right now. Does water aerobics 6 days a week. Feels that helps a lot.    Daily Assessment: Alysha is presenting for her 7th week in the MedX program and worked to a good fatigue level in both MedX. Patient is very active and is making good progress with PT. She has not been doing as much walking as it is winter. Continues to do her water aerboics, but has found good benefit of coming to PT to learn about core strengthening and use back machine. She plans to schedule one further visit that she missed, then will likely transition to exercise at the gym.     Lumbar MedX 4-week Re-test  2/7/23 Initial testing   1/10/23   AROM (full=  0-72  lumbar 3-51 3-51   Max Extension Torque  360# 218#   Flex: ext ratio (ideal 1.4:1) 2.67:1 3.69:1         Date 3/10/2023 2/21/23 2/14/23 2/7/23 2/3/2023  1/31/23 1/27/2023  1/23/22 1/19/23 1/13/2023  1/10/23   Lumbar Parameters              Top Dead Center (TDC) 18 18 18 18 18 18 18 18 18 18 18   Counterbalance (CB) 508 508 508 508 508 508 508 [phone] 508   Seat Pad 1 1 1 1 1 1 1 1 1 1 1   Femur Restraint 5 5 5 5 5 5 (PT held up per patient request) this helps!    5 (PT held up per patient request) this helps! 5 (PT held up per patient request) 6 6 6   Week/Visit              Enter Week/Visit # Wk 7 Wk 6 Wk 5 Wk 4 v 2 Wk 4 V 1  Wk 3 V 2 Wk 3 V 1 wk2 v2 Wk 2 V1 Wk 1 V 2 Wk1 V1   Weight (lbs) 141# 138# 129# 123# 120# 117# 112# 108# 106# 103# 100#   Reps (#) 23 30 26 30 30 30 30 31 26 27 30   Time 116 152s 140s 187 s 240s 175s 170 184 180 195 174s   ROM (degrees) 0-51 0-51 0-51 0-51 0-51 0-51 0-51 0-51 0-51 0-51 23-51   Pain              Flex:Ext ratio                Date 3/10/2023 2/21/23 2/14/23 2/7/23 2/3/2023  1/31/23 1/27/2023  1/23/22 1/19/23 1/13/2023  1/10/23   Exercise              Supine QL and piriformis below 90          Reviewed and patient to not pull too far  "over with the piriformis below 90 Hold 30 seconds   treadmill 3 min  3 min 3 min X 3 min (warm from swimming) 4 min  4 minutes  4 minutes     Rotary torso 90 sec each way 48#'s to R 48# to L 46# to R No time due to re-test 44# to L 40# to R 36# to L 32# to R  32# to L 28#    Tennis ball for home STM        rev  Add to HEP PRN    prone hip extension          5- 10x    Prone knee flexion          5- 10x    Cat/cow and child's pose          Cont (does periodically)    Standing HF stretch        2x10\"       Reformer, supine, leg press DL all springs X 20  DL, all springs  X 20  DL all springs x 20 DL X 20   all springs        Reformer, supine, bridge-no carriage mvmt [place]  X 20  [place]  X 20  x20 all springs X 15  All springs        Reformer-prancer X 15 all springs    Tried again today, did ok Did not like so will not do        Reformer, supine, lat pull down      2R X 15        Reformer, kneeling scapular row   pt did not like exercise             Current ex: 45 min of water aerobics with a lot of stretches.  Other stretches: SKC, a version of piriformis, engaging core (pelvic tilt), LTR, sitting piriformis  "

## 2023-03-28 ENCOUNTER — OFFICE VISIT (OUTPATIENT)
Dept: OTOLARYNGOLOGY | Facility: CLINIC | Age: 73
End: 2023-03-28
Payer: COMMERCIAL

## 2023-03-28 VITALS
WEIGHT: 228 LBS | TEMPERATURE: 98 F | HEIGHT: 67 IN | DIASTOLIC BLOOD PRESSURE: 91 MMHG | HEART RATE: 86 BPM | SYSTOLIC BLOOD PRESSURE: 147 MMHG | OXYGEN SATURATION: 95 % | BODY MASS INDEX: 35.79 KG/M2

## 2023-03-28 DIAGNOSIS — H90.3 ASYMMETRICAL SENSORINEURAL HEARING LOSS: ICD-10-CM

## 2023-03-28 DIAGNOSIS — H90.3 BILATERAL SENSORINEURAL HEARING LOSS: Primary | ICD-10-CM

## 2023-03-28 PROCEDURE — 99214 OFFICE O/P EST MOD 30 MIN: CPT | Performed by: REGISTERED NURSE

## 2023-03-28 ASSESSMENT — PAIN SCALES - GENERAL: PAINLEVEL: NO PAIN (0)

## 2023-03-28 NOTE — PROGRESS NOTES
Otolaryngology Clinic  March 28, 2023    Chief Complaint:   Hearing loss      History of Present Illness:   Alysha Youngblood is a 72 year old female who presents today for evaluation of asymmetric hearing loss. Patient had audiogram on 3/8/23 due to concerns with gradual decline in hearing. Audiogram demonstrated normal to mild hearing loss with moderate to moderately severe SNHL in the highest frequencies. There was a 15-20 dB asymmetry at 6&8 kHz with left worse than right. Recommended follow up with ENT.    Today, patient states that she is doing well. No concerns with hearing. Son had recommended that patient have her hearing checked because she could not hear him whispering at their last visit. Patient denies any dizziness, tinnitus, hearing loss, facial numbness/weakness, history of frequent ear infections, or ear surgeries. Patient does report an episode of inner ear infection causing vertigo about 15-20 years ago, she believes this was in her left ear. No vertigo episodes since that time.     Past Medical History:  Past Medical History:   Diagnosis Date     Atrial fibrillation (H)     ablated 1/12     Ex-smoker      Hashimoto's disease      Hyperlipidaemia      Hypertension      ISABELA (obstructive sleep apnea) 3/12    AHI 7     PAC (premature atrial contraction)      PVC (premature ventricular contraction)        Past Surgical History:  Past Surgical History:   Procedure Laterality Date     APPENDECTOMY       ENT SURGERY       H ABLATION FOCAL AFIB  6/24/2011    [facility].      H ABLATION FOCAL AFIB  1/12/2012    [facility]. afib and aflutter ablations.        Medications:  Current Outpatient Medications   Medication Sig Dispense Refill     amLODIPine (NORVASC) 10 MG tablet Take 1 tablet (10 mg) by mouth daily 90 tablet 3     apixaban ANTICOAGULANT (ELIQUIS ANTICOAGULANT) 5 MG tablet Take 1 tablet (5 mg) by mouth 2 times daily 180 tablet 3     Calcium-Vitamin D-Vitamin K (VIACTIV PO) Take by mouth 2 times daily         fish oil-omega-3 fatty acids (FISH OIL) 1000 MG capsule One capsule daily.       hydrochlorothiazide (HYDRODIURIL) 25 MG tablet Take 1 tablet (25 mg) by mouth daily 90 tablet 3     loratadine (CLARITIN) 10 MG tablet Take 10 mg by mouth daily       losartan (COZAAR) 100 MG tablet Take 1 tablet (100 mg) by mouth daily 90 tablet 3     lovastatin (MEVACOR) 40 MG tablet Take 1 tablet (40 mg) by mouth At Bedtime 90 tablet 3     metoprolol succinate ER (TOPROL-XL) 50 MG 24 hr tablet TAKE 1 AND 1/2 TABLETS(75 MG) BY MOUTH DAILY 135 tablet 3     metoprolol tartrate (LOPRESSOR) 25 MG tablet TAKE 1 TABLET BY MOUTH AS DIRECTED UP TO 2 TABLETS BY MOUTH EVERY DAY WITH ONSET OF ARRHYTHMIA 30 tablet 0     bisacodyl (DULCOLAX) 5 MG EC tablet Take as directed in mychart patient instructions 4 tablet 0     Doxylamine Succinate, Sleep, (SLEEP AID PO)        FLUZONE HIGH-DOSE 0.5 ML injection ADM 0.5ML IM UTD  0     gabapentin (NEURONTIN) 300 MG capsule Take 1 capsule (300 mg) by mouth 3 times daily 90 capsule 3     polyethylene glycol (GOLYTELY) 236 g suspension Take as directed in mychart patient instructions 4000 mL 0     valACYclovir (VALTREX) 1000 mg tablet TAKE 1 TABLET BY MOUTH THREE TIMES DAILY       Zolpidem Tartrate (AMBIEN PO) Take 5 mg by mouth nightly as needed for sleep         Allergies:  Allergies   Allergen Reactions     Hmg-Coa-R Inhibitors      Made her lips tingle, so they put her on another statin instead.     Ibuprofen Other (See Comments)     Nsaids GI Disturbance     Liquid Adhesive Rash     After wearing patches for > 8 days  After wearing patches for > 8 days        Social History:  Social History     Tobacco Use     Smoking status: Former     Types: Cigarettes     Quit date:      Years since quittin.2     Smokeless tobacco: Never   Substance Use Topics     Alcohol use: Yes     Alcohol/week: 14.0 standard drinks     Types: 14 Shots of liquor per week     Drug use: No       ROS: 10 point ROS neg  "other than the symptoms noted above in the HPI.    Physical Exam:    BP (!) 147/91 (BP Location: Right arm, Patient Position: Sitting, Cuff Size: Adult Large)   Pulse 86   Temp 98  F (36.7  C) (Temporal)   Ht 1.689 m (5' 6.5\")   Wt 103.4 kg (228 lb)   SpO2 95%   BMI 36.25 kg/m       Constitutional:  The patient was unaccompanied, well-groomed, and in no acute distress.     Neurologic: Alert and oriented x 3.  CN's III-XII within normal limits.  Voice normal.   Psychiatric: The patient's affect was calm, cooperative, and appropriate.    Communication:  Normal; communicates verbally, normal voice quality.   Respiratory: Breathing comfortably without stridor or exertion of accessory muscles.    Ears: Pinnae and tragus non-tender.  EAC's and TM's were clear.       Audiogram: 3/28/2023- data independently reviewed  Normal to mild to moderate/moderately-severe SNHL bilaterally (5-20 dB asymmetry at 6&8 kHz with left worse than right)  % at 65 dB bilaterally  Acoustic Reflexes: present in all conditions  Tympanograms: type A bilaterally     Assessment and Plan:  Reviewed audiogram with patient today which shows an asymmetric hearing loss. Discussed investigating this more with a MRI to rule out a retrocochlear lesion. Asymmetry is not consistent across all frequencies. This may be due to previous inner ear infection. Recommend following up in 1 year with audiogram. I can chart check this audiogram and contact patient with results.     Patient will follow up in 1 year with audiogram.    Chaya Quiñonez DNP, APRN, CNP  Otolaryngology  Head & Neck Surgery  [phone]    30 minutes spent by me on the date of the encounter doing chart review, history and exam, documentation and further activities per the note    "

## 2023-03-28 NOTE — NURSING NOTE
"Chief Complaint   Patient presents with     RECHECK     Ear asymmetry        Blood pressure (!) 147/91, pulse 86, temperature 98  F (36.7  C), temperature source Temporal, height 1.689 m (5' 6.5\"), weight 103.4 kg (228 lb), SpO2 95 %, not currently breastfeeding.    Stacie Machado, EMT    "

## 2023-03-28 NOTE — LETTER
3/28/2023       RE: Alysha Youngblood  [facility] [address] [facility] [id number]     Dear Colleague,    Thank you for referring your patient, Alysha Youngblood, to the [facility] EAR NOSE AND [facility]RO[facility]IC [place] at [facility]. Please see a copy of my visit note below.      Otolaryngology Clinic  March 28, 2023    Chief Complaint:   Hearing loss      History of Present Illness:   Alysha Youngblood is a 72 year old female who presents today for evaluation of asymmetric hearing loss. Patient had audiogram on 3/8/23 due to concerns with gradual decline in hearing. Audiogram demonstrated normal to mild hearing loss with moderate to moderately severe SNHL in the highest frequencies. There was a 15-20 dB asymmetry at 6&8 kHz with left worse than right. Recommended follow up with ENT.    Today, patient states that she is doing well. No concerns with hearing. Son had recommended that patient have her hearing checked because she could not hear him whispering at their last visit. Patient denies any dizziness, tinnitus, hearing loss, facial numbness/weakness, history of frequent ear infections, or ear surgeries. Patient does report an episode of inner ear infection causing vertigo about 15-20 years ago, she believes this was in her left ear. No vertigo episodes since that time.     Past Medical History:  Past Medical History:   Diagnosis Date     Atrial fibrillation (H)     ablated 1/12     Ex-smoker      Hashimoto's disease      Hyperlipidaemia      Hypertension      ISABELA (obstructive sleep apnea) 3/12    AHI 7     PAC (premature atrial contraction)      PVC (premature ventricular contraction)        Past Surgical History:  Past Surgical History:   Procedure Laterality Date     APPENDECTOMY       ENT SURGERY       H ABLATION FOCAL AFIB  6/24/2011    [facility].      H ABLATION FOCAL AFIB  1/12/2012    [facility]. afib and aflutter ablations.        Medications:  Current Outpatient  Medications   Medication Sig Dispense Refill     amLODIPine (NORVASC) 10 MG tablet Take 1 tablet (10 mg) by mouth daily 90 tablet 3     apixaban ANTICOAGULANT (ELIQUIS ANTICOAGULANT) 5 MG tablet Take 1 tablet (5 mg) by mouth 2 times daily 180 tablet 3     Calcium-Vitamin D-Vitamin K (VIACTIV PO) Take by mouth 2 times daily        fish oil-omega-3 fatty acids (FISH OIL) 1000 MG capsule One capsule daily.       hydrochlorothiazide (HYDRODIURIL) 25 MG tablet Take 1 tablet (25 mg) by mouth daily 90 tablet 3     loratadine (CLARITIN) 10 MG tablet Take 10 mg by mouth daily       losartan (COZAAR) 100 MG tablet Take 1 tablet (100 mg) by mouth daily 90 tablet 3     lovastatin (MEVACOR) 40 MG tablet Take 1 tablet (40 mg) by mouth At Bedtime 90 tablet 3     metoprolol succinate ER (TOPROL-XL) 50 MG 24 hr tablet TAKE 1 AND 1/2 TABLETS(75 MG) BY MOUTH DAILY 135 tablet 3     metoprolol tartrate (LOPRESSOR) 25 MG tablet TAKE 1 TABLET BY MOUTH AS DIRECTED UP TO 2 TABLETS BY MOUTH EVERY DAY WITH ONSET OF ARRHYTHMIA 30 tablet 0     bisacodyl (DULCOLAX) 5 MG EC tablet Take as directed in mychart patient instructions 4 tablet 0     Doxylamine Succinate, Sleep, (SLEEP AID PO)        FLUZONE HIGH-DOSE 0.5 ML injection ADM 0.5ML IM UTD  0     gabapentin (NEURONTIN) 300 MG capsule Take 1 capsule (300 mg) by mouth 3 times daily 90 capsule 3     polyethylene glycol (GOLYTELY) 236 g suspension Take as directed in mychart patient instructions 4000 mL 0     valACYclovir (VALTREX) 1000 mg tablet TAKE 1 TABLET BY MOUTH THREE TIMES DAILY       Zolpidem Tartrate (AMBIEN PO) Take 5 mg by mouth nightly as needed for sleep         Allergies:  Allergies   Allergen Reactions     Hmg-Coa-R Inhibitors      Made her lips tingle, so they put her on another statin instead.     Ibuprofen Other (See Comments)     Nsaids GI Disturbance     Liquid Adhesive Rash     After wearing patches for > 8 days  After wearing patches for > 8 days        Social  "History:  Social History     Tobacco Use     Smoking status: Former     Types: Cigarettes     Quit date:      Years since quittin.2     Smokeless tobacco: Never   Substance Use Topics     Alcohol use: Yes     Alcohol/week: 14.0 standard drinks     Types: 14 Shots of liquor per week     Drug use: No       ROS: 10 point ROS neg other than the symptoms noted above in the HPI.    Physical Exam:    BP (!) 147/91 (BP Location: Right arm, Patient Position: Sitting, Cuff Size: Adult Large)   Pulse 86   Temp 98  F (36.7  C) (Temporal)   Ht 1.689 m (5' 6.5\")   Wt 103.4 kg (228 lb)   SpO2 95%   BMI 36.25 kg/m       Constitutional:  The patient was unaccompanied, well-groomed, and in no acute distress.     Neurologic: Alert and oriented x 3.  CN's III-XII within normal limits.  Voice normal.   Psychiatric: The patient's affect was calm, cooperative, and appropriate.    Communication:  Normal; communicates verbally, normal voice quality.   Respiratory: Breathing comfortably without stridor or exertion of accessory muscles.    Ears: Pinnae and tragus non-tender.  EAC's and TM's were clear.       Audiogram: 3/28/2023- data independently reviewed  Normal to mild to moderate/moderately-severe SNHL bilaterally (5-20 dB asymmetry at 6&8 kHz with left worse than right)  % at 65 dB bilaterally  Acoustic Reflexes: present in all conditions  Tympanograms: type A bilaterally     Assessment and Plan:  Reviewed audiogram with patient today which shows an asymmetric hearing loss. Discussed investigating this more with a MRI to rule out a retrocochlear lesion. Asymmetry is not consistent across all frequencies. This may be due to previous inner ear infection. Recommend following up in 1 year with audiogram. I can chart check this audiogram and contact patient with results.     Patient will follow up in 1 year with audiogram.    Chaya Quiñonez DNP, APRN, CNP  Otolaryngology  Head & Neck Surgery  [phone]    30 minutes " spent by me on the date of the encounter doing chart review, history and exam, documentation and further activities per the note

## 2023-04-17 ENCOUNTER — TELEPHONE (OUTPATIENT)
Dept: CARDIOLOGY | Facility: CLINIC | Age: 73
End: 2023-04-17

## 2023-04-17 ENCOUNTER — VIRTUAL VISIT (OUTPATIENT)
Dept: CARDIOLOGY | Facility: CLINIC | Age: 73
End: 2023-04-17
Payer: COMMERCIAL

## 2023-04-17 DIAGNOSIS — I48.0 PAROXYSMAL ATRIAL FIBRILLATION (H): Primary | ICD-10-CM

## 2023-04-17 DIAGNOSIS — I48.92 ATRIAL FLUTTER, UNSPECIFIED TYPE (H): ICD-10-CM

## 2023-04-17 DIAGNOSIS — I48.4 ATYPICAL ATRIAL FLUTTER (H): ICD-10-CM

## 2023-04-17 PROCEDURE — 99214 OFFICE O/P EST MOD 30 MIN: CPT | Mod: VID | Performed by: INTERNAL MEDICINE

## 2023-04-17 RX ORDER — METOPROLOL TARTRATE 25 MG/1
TABLET, FILM COATED ORAL
Qty: 30 TABLET | Refills: 1 | Status: SHIPPED | OUTPATIENT
Start: 2023-04-17 | End: 2024-05-20

## 2023-04-17 RX ORDER — METOPROLOL SUCCINATE 50 MG/1
TABLET, EXTENDED RELEASE ORAL
Qty: 135 TABLET | Refills: 3 | Status: SHIPPED | OUTPATIENT
Start: 2023-04-17 | End: 2024-04-26

## 2023-04-17 NOTE — LETTER
4/17/2023      RE: Alysha Youngblood  [address] [place] [id number]       Dear Colleague,    Thank you for the opportunity to participate in the care of your patient, Alysha Youngblood, at the [facility] HEART CLINIC [facility]. Please see a copy of my visit note below.    Video start time: 11:34 AM  Video end time: 11:45 AM     Originating Location (pt. 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 The patient is status post ablation for paroxysmal atrial fibrillation in 06/2011 as well as another AFib ablation and atrial flutter ablation in 01/2012.  Both ablations were done at [facility].  The patient's last visit with me was in April 2022.    In the last 1 year, patient has been doing well from an atrial fibrillation standpoint.  She does water aerobics 6 days a week.  She did not report any symptoms of prolonged palpitations, exertional dyspnea, exertional angina, frequent lightheadedness, presyncope or syncope.  Her main symptoms are related to her back that have prevented her from walking long distances.    PAST MEDICAL HISTORY:  Past Medical History:   Diagnosis Date     Atrial fibrillation (H)     ablated 1/12     Ex-smoker      Hashimoto's disease      Hyperlipidaemia      Hypertension      ISABELA (obstructive sleep apnea) 3/12    AHI 7     PAC (premature atrial contraction)      PVC (premature ventricular contraction)        CURRENT MEDICATIONS:  Current Outpatient Medications   Medication Sig Dispense Refill     amLODIPine (NORVASC) 10 MG tablet Take 1 tablet (10 mg) by mouth daily 90 tablet 3     apixaban ANTICOAGULANT (ELIQUIS ANTICOAGULANT) 5 MG tablet Take 1 tablet (5  mg) by mouth 2 times daily 180 tablet 3     Calcium-Vitamin D-Vitamin K (VIACTIV PO) Take by mouth 2 times daily        fish oil-omega-3 fatty acids (FISH OIL) 1000 MG capsule One capsule daily.       hydrochlorothiazide (HYDRODIURIL) 25 MG tablet Take 1 tablet (25 mg) by mouth daily 90 tablet 3     loratadine (CLARITIN) 10 MG tablet Take 10 mg by mouth daily       losartan (COZAAR) 100 MG tablet Take 1 tablet (100 mg) by mouth daily 90 tablet 3     lovastatin (MEVACOR) 40 MG tablet Take 1 tablet (40 mg) by mouth At Bedtime 90 tablet 3     metoprolol succinate ER (TOPROL-XL) 50 MG 24 hr tablet TAKE 1 AND 1/2 TABLETS(75 MG) BY MOUTH DAILY 135 tablet 3     metoprolol tartrate (LOPRESSOR) 25 MG tablet TAKE 1 TABLET BY MOUTH AS DIRECTED UP TO 2 TABLETS BY MOUTH EVERY DAY WITH ONSET OF ARRHYTHMIA 30 tablet 0       PAST SURGICAL HISTORY:  Past Surgical History:   Procedure Laterality Date     APPENDECTOMY       ENT SURGERY       H ABLATION FOCAL AFIB  2011    [facility].      H ABLATION FOCAL AFIB  2012    [facility]. afib and aflutter ablations.        ALLERGIES:     Allergies   Allergen Reactions     Hmg-Coa-R Inhibitors      Made her lips tingle, so they put her on another statin instead.     Ibuprofen Other (See Comments)     Nsaids GI Disturbance     Liquid Adhesive Rash     After wearing patches for > 8 days  After wearing patches for > 8 days       FAMILY HISTORY:  - Premature coronary artery disease  - Atrial fibrillation  - Sudden cardiac death     SOCIAL HISTORY:  Social History     Tobacco Use     Smoking status: Former     Types: Cigarettes     Quit date:      Years since quittin.2     Smokeless tobacco: Never   Substance Use Topics     Alcohol use: Yes     Alcohol/week: 14.0 standard drinks of alcohol     Types: 14 Shots of liquor per week     Drug use: No       ROS:   Constitutional: No fever, chills, or sweats. Weight stable.   ENT: No visual disturbance, ear ache, epistaxis, sore throat.    Cardiovascular: As per HPI.   Respiratory: No cough, hemoptysis.    GI: No nausea, vomiting, hematemesis, melena, or hematochezia.   : No hematuria.   Integument: Negative.   Psychiatric: Negative.   Hematologic:  Easy bruising, no easy bleeding.  Neuro: Negative.   Endocrinology: No significant heat or cold intolerance   Musculoskeletal: No myalgia.    Exam:  There were no vitals taken for this visit.  GENERAL APPEARANCE: healthy, alert and no distress    Labs:  CBC RESULTS:   Lab Results   Component Value Date    WBC 6.0 12/29/2019    RBC 5.26 (H) 12/29/2019    HGB 15.6 12/29/2019    HCT 47.5 (H) 12/29/2019    MCV 90 12/29/2019    MCH 29.7 12/29/2019    MCHC 32.8 12/29/2019    RDW 12.4 12/29/2019     12/29/2019       BMP RESULTS:  Lab Results   Component Value Date     05/04/2022     12/29/2019    POTASSIUM 3.5 05/04/2022    POTASSIUM 3.8 12/29/2019    CHLORIDE 102 05/04/2022    CHLORIDE 102 12/29/2019    CO2 30 05/04/2022    CO2 30 12/29/2019    ANIONGAP 8 05/04/2022    ANIONGAP 4 12/29/2019     (H) 05/04/2022     (H) 12/29/2019    BUN 15 05/04/2022    BUN 24 12/29/2019    CR 0.80 05/04/2022    CR 0.77 12/29/2019    GFRESTIMATED 79 05/04/2022    GFRESTIMATED 79 12/29/2019    GFRESTBLACK >90 12/29/2019    JANAE 9.2 05/04/2022    JANAE 9.5 12/29/2019        INR RESULTS:  Lab Results   Component Value Date    INR 1.19 (H) 07/07/2018    INR 0.94 02/28/2011    INR 0.90 02/27/2011       Procedures:      Assessment and Plan:   Paroxysmal atrial fibrillation status post ablation  Status post ablation for atrial flutter     It is encouraging that the patient has been doing well from an atrial arrhythmia standpoint.  We will continue current medications.  We will send her a 30 tablet supply of metoprolol to tartrate 25 mg to be taken when she has prolonged palpitation episodes.    Her last echocardiogram was in 2016.  We will obtain a transthoracic echocardiogram to define the structure and  function of the heart.  We will communicate the results to the patient.    We will see patient again by video visit in approximately 1 year.  All questions and concerns were addressed and the patient was happy with the plan.    CC  Patient Care Team:  Danii Burks MD as PCP - General (Internal Medicine)  Kiesha Cohen MD as MD (Internal Medicine)  Lanre Ramirez MD as MD (Dermapathology)  Topher Mealra MD as MD (Cardiology)  Faina Peace MD as MD (Internal Medicine)  Danii Ocampo MD (Inactive) as MD (OB/Gyn)  Lanre Ramirez MD as MD (Dermapathology)  Faina Rowell APRN CNP as Nurse Practitioner  Slim Herrera MD as MD (Family Practice)  Livier Land RN as Specialty Care Coordinator (Cardiology)  Topher Melara MD as Assigned Heart and Vascular Provider  Tico Ibarra DO as Assigned Musculoskeletal Provider  Danii Burks MD as Assigned PCP  Miki Baer MD as MD (Ophthalmology)  TOPHER MELARA      Please do not hesitate to contact me if you have any questions/concerns.     Sincerely,     Topher Melara MD

## 2023-04-17 NOTE — NURSING NOTE
Alysha is a 72 year old who is being evaluated via a billable video visit.       How would you like to obtain your AVS? MyChart  If the video visit is dropped, the invitation should be resent by: Text to cell phone: [phone]  Will anyone else be joining your video visit? No       Video-Visit Details     Originating Location (pt. Location): Home     Distant Location (provider location):  Off-site     Platform used for Video Visit: [facility]    Blood Pressure: 125/69 mm/Hg  Pulse: unknown bpm  Weight: 224 lbs  Height: 5 ft 6.5 in    Ashley Aldana CMA ([facility])

## 2023-04-17 NOTE — PROGRESS NOTES
Video start time: 11:34 AM  Video end time: 11:45 AM     Originating Location (pt. 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 The patient is status post ablation for paroxysmal atrial fibrillation in 06/2011 as well as another AFib ablation and atrial flutter ablation in 01/2012.  Both ablations were done at [facility].  The patient's last visit with me was in April 2022.    In the last 1 year, patient has been doing well from an atrial fibrillation standpoint.  She does water aerobics 6 days a week.  She did not report any symptoms of prolonged palpitations, exertional dyspnea, exertional angina, frequent lightheadedness, presyncope or syncope.  Her main symptoms are related to her back that have prevented her from walking long distances.    PAST MEDICAL HISTORY:  Past Medical History:   Diagnosis Date     Atrial fibrillation (H)     ablated 1/12     Ex-smoker      Hashimoto's disease      Hyperlipidaemia      Hypertension      ISABELA (obstructive sleep apnea) 3/12    AHI 7     PAC (premature atrial contraction)      PVC (premature ventricular contraction)        CURRENT MEDICATIONS:  Current Outpatient Medications   Medication Sig Dispense Refill     amLODIPine (NORVASC) 10 MG tablet Take 1 tablet (10 mg) by mouth daily 90 tablet 3     apixaban ANTICOAGULANT (ELIQUIS ANTICOAGULANT) 5 MG tablet Take 1 tablet (5 mg) by mouth 2 times daily 180 tablet 3     Calcium-Vitamin D-Vitamin K (VIACTIV PO) Take by mouth 2 times daily        fish oil-omega-3 fatty acids (FISH OIL) 1000 MG capsule One capsule daily.       hydrochlorothiazide (HYDRODIURIL) 25 MG tablet Take 1 tablet (25 mg) by mouth daily 90 tablet 3     loratadine (CLARITIN) 10 MG tablet Take 10  mg by mouth daily       losartan (COZAAR) 100 MG tablet Take 1 tablet (100 mg) by mouth daily 90 tablet 3     lovastatin (MEVACOR) 40 MG tablet Take 1 tablet (40 mg) by mouth At Bedtime 90 tablet 3     metoprolol succinate ER (TOPROL-XL) 50 MG 24 hr tablet TAKE 1 AND 1/2 TABLETS(75 MG) BY MOUTH DAILY 135 tablet 3     metoprolol tartrate (LOPRESSOR) 25 MG tablet TAKE 1 TABLET BY MOUTH AS DIRECTED UP TO 2 TABLETS BY MOUTH EVERY DAY WITH ONSET OF ARRHYTHMIA 30 tablet 0       PAST SURGICAL HISTORY:  Past Surgical History:   Procedure Laterality Date     APPENDECTOMY       ENT SURGERY       H ABLATION FOCAL AFIB  2011    [place].      H ABLATION FOCAL AFIB  2012    [place]. afib and aflutter ablations.        ALLERGIES:     Allergies   Allergen Reactions     Hmg-Coa-R Inhibitors      Made her lips tingle, so they put her on another statin instead.     Ibuprofen Other (See Comments)     Nsaids GI Disturbance     Liquid Adhesive Rash     After wearing patches for > 8 days  After wearing patches for > 8 days       FAMILY HISTORY:  - Premature coronary artery disease  - Atrial fibrillation  - Sudden cardiac death     SOCIAL HISTORY:  Social History     Tobacco Use     Smoking status: Former     Types: Cigarettes     Quit date:      Years since quittin.2     Smokeless tobacco: Never   Substance Use Topics     Alcohol use: Yes     Alcohol/week: 14.0 standard drinks of alcohol     Types: 14 Shots of liquor per week     Drug use: No       ROS:   Constitutional: No fever, chills, or sweats. Weight stable.   ENT: No visual disturbance, ear ache, epistaxis, sore throat.   Cardiovascular: As per HPI.   Respiratory: No cough, hemoptysis.    GI: No nausea, vomiting, hematemesis, melena, or hematochezia.   : No hematuria.   Integument: Negative.   Psychiatric: Negative.   Hematologic:  Easy bruising, no easy bleeding.  Neuro: Negative.   Endocrinology: No significant heat or cold intolerance   Musculoskeletal: No  myalgia.    Exam:  There were no vitals taken for this visit.  GENERAL APPEARANCE: healthy, alert and no distress    Labs:  CBC RESULTS:   Lab Results   Component Value Date    WBC 6.0 12/29/2019    RBC 5.26 (H) 12/29/2019    HGB 15.6 12/29/2019    HCT 47.5 (H) 12/29/2019    MCV 90 12/29/2019    MCH 29.7 12/29/2019    MCHC 32.8 12/29/2019    RDW 12.4 12/29/2019     12/29/2019       BMP RESULTS:  Lab Results   Component Value Date     05/04/2022     12/29/2019    POTASSIUM 3.5 05/04/2022    POTASSIUM 3.8 12/29/2019    CHLORIDE 102 05/04/2022    CHLORIDE 102 12/29/2019    CO2 30 05/04/2022    CO2 30 12/29/2019    ANIONGAP 8 05/04/2022    ANIONGAP 4 12/29/2019     (H) 05/04/2022     (H) 12/29/2019    BUN 15 05/04/2022    BUN 24 12/29/2019    CR 0.80 05/04/2022    CR 0.77 12/29/2019    GFRESTIMATED 79 05/04/2022    GFRESTIMATED 79 12/29/2019    GFRESTBLACK >90 12/29/2019    JANAE 9.2 05/04/2022    JANAE 9.5 12/29/2019        INR RESULTS:  Lab Results   Component Value Date    INR 1.19 (H) 07/07/2018    INR 0.94 02/28/2011    INR 0.90 02/27/2011       Procedures:      Assessment and Plan:   Paroxysmal atrial fibrillation status post ablation  Status post ablation for atrial flutter     It is encouraging that the patient has been doing well from an atrial arrhythmia standpoint.  We will continue current medications.  We will send her a 30 tablet supply of metoprolol to tartrate 25 mg to be taken when she has prolonged palpitation episodes.    Her last echocardiogram was in 2016.  We will obtain a transthoracic echocardiogram to define the structure and function of the heart.  We will communicate the results to the patient.    We will see patient again by video visit in approximately 1 year.  All questions and concerns were addressed and the patient was happy with the plan.    CC  Patient Care Team:  Danii Burks MD as PCP - General (Internal Medicine)  Kiesha Cohen MD as MD (Internal  Medicine)  Lanre Ramirez MD as MD (Dermapathology)  Topher Melara MD as MD (Cardiology)  Faina Peace MD as MD (Internal Medicine)  Danii Ocampo MD (Inactive) as MD (OB/Gyn)  Lanre Ramirez MD as MD (Dermapathology)  Faina Rowell, APRN CNP as Nurse Practitioner  Slim Herrera MD as MD (Family Practice)  Livier Land, RN as Specialty Care Coordinator (Cardiology)  Topher Melara MD as Assigned Heart and Vascular Provider  Tico Ibarra DO as Assigned Musculoskeletal Provider  Danii Burks MD as Assigned PCP  Miki Baer MD as MD (Ophthalmology)  TOPHER MELARA

## 2023-04-17 NOTE — PATIENT INSTRUCTIONS
Thank you for coming to the [facility] at [place]; please note the following instructions:    1. Metoprolol succinate 50 mg tablet has been refilled    2.  Metoprolol tartrate 25 mg tablet to be taken on an as needed basis has been refilled.    3.  Echocardiogram to evaluate heart structure and heart pumping function    4.  Dr. Tyra Norris recommends to follow up in 1 year.  The cardiology team will contact you to schedule when the time gets closer.          If you have any questions regarding your visit, please contact your care team:     CARDIOLOGY  TELEPHONE NUMBER   Meka ABDIAngela, Registered Nurse  La HICKEY, Registered Nurse  Rosy FRIAS, Registered Nurse  Kiesha CRAIG, Registered Medical Assistant  Ashley ALSTON, Certified Medical Assistant  Naomi DENIS, Visit Facilitator [phone] (select option 1)    *After hours: [phone]   For Scheduling Appts:     [phone] (select option 1)    *After hours: [phone]   For the Device Clinic (Pacemakers and ICD's)  Amara JOSE, Registered Nurse   During business hours: [phone]    *After business hours:  [phone] (select option 4)      Normal test result notifications will be released via [facility] or mailed within 7 business days.  All other test results, will be communicated via telephone once reviewed by your cardiologist.    If you need a medication refill, please contact your pharmacy.  Please allow 3 business days for your refill to be completed.    As always, thank you for trusting us with your health care needs!

## 2023-04-17 NOTE — TELEPHONE ENCOUNTER
Called patient to assist in scheduling echocardiogram. Patient stated she was in a meeting and would like to be called back at another time to schedule.    ASHLEY Perales

## 2023-04-19 NOTE — TELEPHONE ENCOUNTER
Patient responded to WiMi5[employer] message and is scheduled for May 22nd at 11 am in [place].    ASHLEY Perales

## 2023-04-24 NOTE — ADDENDUM NOTE
Encounter addended by: Nayely Galindo, PT on: 4/24/2023 7:14 AM   Actions taken: Episode resolved, Clinical Note Signed

## 2023-04-24 NOTE — PROGRESS NOTES
[facility]    Outpatient Physical Therapy Discharge Note  Patient: Alysha Youngblood  : 1950    Beginning/End Dates of Reporting Period:  1/10/23 to 3/10/23    Referring Provider: Dr. Burks    Therapy Diagnosis: Chronic Bilateral Low Back pain     Client Self Report: Feeling fairly good, did find machines at the gym that she can use. No pain or numbness in the leg right now. Does water aerobics 6 days a week. Feels that helps a lot.    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  Pt has been able to stand through a class or as long as she would like     Goal Identifier     Goal Description     Target Date     Date Met      Progress (detail required for progress note):       Goal Identifier     Goal Description     Target Date     Date Met      Progress (detail required for progress note):       Goal Identifier     Goal Description     Target Date     Date Met      Progress (detail required for progress note):       Goal Identifier     Goal Description     Target Date     Date Met      Progress (detail required for progress note):       Goal Identifier     Goal Description     Target Date     Date Met      Progress (detail required for progress note):        Goal Identifier     Goal Description     Target Date     Date Met      Progress (detail required for progress note):             Plan:  Discharge from therapy.    Discharge:    Reason for Discharge: Pt has not been seen in clinic in 6 weeks so is discharged from caseload    Equipment Issued:     Discharge Plan: Patient to continue home program.

## 2023-05-06 DIAGNOSIS — I10 ESSENTIAL HYPERTENSION: ICD-10-CM

## 2023-05-06 DIAGNOSIS — I48.91 ATRIAL FIBRILLATION (H): Primary | ICD-10-CM

## 2023-05-06 DIAGNOSIS — I49.3 PVC (PREMATURE VENTRICULAR CONTRACTION): ICD-10-CM

## 2023-05-06 DIAGNOSIS — E78.2 MIXED HYPERLIPIDEMIA: ICD-10-CM

## 2023-05-10 ENCOUNTER — MYC MEDICAL ADVICE (OUTPATIENT)
Dept: CARDIOLOGY | Facility: CLINIC | Age: 73
End: 2023-05-10
Payer: COMMERCIAL

## 2023-05-10 RX ORDER — HYDROCHLOROTHIAZIDE 25 MG/1
25 TABLET ORAL DAILY
Qty: 90 TABLET | Refills: 0 | Status: SHIPPED | OUTPATIENT
Start: 2023-05-10 | End: 2023-06-26

## 2023-05-10 RX ORDER — LOVASTATIN 40 MG
40 TABLET ORAL AT BEDTIME
Qty: 90 TABLET | Refills: 0 | Status: SHIPPED | OUTPATIENT
Start: 2023-05-10 | End: 2023-06-26

## 2023-05-10 NOTE — TELEPHONE ENCOUNTER
Last Clinic Visit: 4/17/2023 [facility] [place]    Lab(s)- Platelets, Creatinine due.  Imani refill of Eliquis was sent for a 90 day supply and FYI to Cardiology [place].

## 2023-05-10 NOTE — TELEPHONE ENCOUNTER
4/4/2022  [facility] Internal Medicine Danii Yip MD  Internal Medicine     Lab and appt due. 90 day dianne refill.  Can refill x 3 months if BP greater than or equal to 140/90, and refer to PCP for follow up.

## 2023-05-11 NOTE — TELEPHONE ENCOUNTER
Dr. Norris, can patient take Ibuprofen as needed?  She is on Eliquis.    Please advise.      Meka Lopez, RN  Cardiology Care Coordinator  [facility]  [phone] option 1

## 2023-05-12 DIAGNOSIS — I10 ESSENTIAL HYPERTENSION: ICD-10-CM

## 2023-05-12 RX ORDER — LOSARTAN POTASSIUM 100 MG/1
100 TABLET ORAL DAILY
Qty: 90 TABLET | Refills: 3 | OUTPATIENT
Start: 2023-05-12

## 2023-05-16 NOTE — TELEPHONE ENCOUNTER
See MyChart encounter - patient requested to have labs drawn at appointment with PCP, Danii Burks MD, in July. Writer added message to appointment note to please draw Dr. Norris labs at appointment in July.    ASHLEY Perales

## 2023-05-22 ENCOUNTER — ANCILLARY PROCEDURE (OUTPATIENT)
Dept: CARDIOLOGY | Facility: CLINIC | Age: 73
End: 2023-05-22
Attending: INTERNAL MEDICINE
Payer: COMMERCIAL

## 2023-05-22 DIAGNOSIS — I48.0 PAROXYSMAL ATRIAL FIBRILLATION (H): ICD-10-CM

## 2023-05-22 DIAGNOSIS — I48.4 ATYPICAL ATRIAL FLUTTER (H): ICD-10-CM

## 2023-05-22 DIAGNOSIS — I48.92 ATRIAL FLUTTER, UNSPECIFIED TYPE (H): ICD-10-CM

## 2023-05-22 LAB — LVEF ECHO: NORMAL

## 2023-05-22 PROCEDURE — 93306 TTE W/DOPPLER COMPLETE: CPT | Mod: GC | Performed by: INTERNAL MEDICINE

## 2023-05-24 ENCOUNTER — APPOINTMENT (OUTPATIENT)
Dept: URGENT CARE | Facility: CLINIC | Age: 73
End: 2023-05-24
Payer: COMMERCIAL

## 2023-06-14 ENCOUNTER — OFFICE VISIT (OUTPATIENT)
Dept: PHYSICAL MEDICINE AND REHAB | Facility: CLINIC | Age: 73
End: 2023-06-14
Payer: COMMERCIAL

## 2023-06-14 VITALS
WEIGHT: 227 LBS | BODY MASS INDEX: 36.48 KG/M2 | SYSTOLIC BLOOD PRESSURE: 141 MMHG | OXYGEN SATURATION: 96 % | HEIGHT: 66 IN | DIASTOLIC BLOOD PRESSURE: 75 MMHG | HEART RATE: 84 BPM

## 2023-06-14 DIAGNOSIS — M54.16 LEFT LUMBAR RADICULOPATHY: ICD-10-CM

## 2023-06-14 DIAGNOSIS — M43.16 SPONDYLOLISTHESIS OF LUMBAR REGION: ICD-10-CM

## 2023-06-14 DIAGNOSIS — M54.42 CHRONIC BILATERAL LOW BACK PAIN WITH LEFT-SIDED SCIATICA: Primary | ICD-10-CM

## 2023-06-14 DIAGNOSIS — G89.29 CHRONIC BILATERAL LOW BACK PAIN WITH LEFT-SIDED SCIATICA: Primary | ICD-10-CM

## 2023-06-14 DIAGNOSIS — M51.369 DDD (DEGENERATIVE DISC DISEASE), LUMBAR: ICD-10-CM

## 2023-06-14 DIAGNOSIS — M48.061 LUMBAR FORAMINAL STENOSIS: ICD-10-CM

## 2023-06-14 PROCEDURE — 99214 OFFICE O/P EST MOD 30 MIN: CPT | Performed by: NURSE PRACTITIONER

## 2023-06-14 ASSESSMENT — ENCOUNTER SYMPTOMS
MUSCLE WEAKNESS: 0
NECK PAIN: 0
STIFFNESS: 1
MUSCLE CRAMPS: 0
JOINT SWELLING: 0
ARTHRALGIAS: 0
BACK PAIN: 1
MYALGIAS: 1

## 2023-06-14 ASSESSMENT — PAIN SCALES - GENERAL: PAINLEVEL: MILD PAIN (3)

## 2023-06-14 NOTE — PROGRESS NOTES
Assessment:     Diagnoses and all orders for this visit:  Chronic bilateral low back pain with left-sided sciatica  -     PAIN Transforaminal TAD Inj Lumbosacral Left; Future  Left lumbar radiculopathy  -     PAIN Transforaminal TAD Inj Lumbosacral Left; Future  Lumbar foraminal stenosis  -     PAIN Transforaminal TAD Inj Lumbosacral Left; Future  DDD (degenerative disc disease), lumbar  Spondylolisthesis of lumbar region     Alysha Youngblood is a 72 year old y.o. female with past medical history significant for atrial fibrillation/flutter, morbid obesity, knee osteoarthritis, goiter who presents today for follow-up regarding:    -Chronic bilateral low back pain with left lumbar radiculopathy L4 dermatomal pattern with paresthesias.     Plan:     A shared decision making plan was used. The patient's values and choices were respected. Prior medical records were reviewed today. The following represents what was discussed and decided upon by the provider and the patient.        -DIAGNOSTIC TESTS: Images were personally reviewed and interpreted.   -- Lumbar spine MRI 10/16/2022 with minimal anterolisthesis L4-5 with facet arthropathy with mild right and mild to moderate left foraminal stenosis.  L5-S1 mild to moderate right and mild left foraminal stenosis.  --Lumbar CT abdomen pelvis 6/28/2021 does show normal alignment lumbar spine with degenerative [place] is greatest at L4-5 with significant left foraminal stenosis.    -INTERVENTIONS: Ordered left L4-5 transforaminal epidural steroid injection see if we get further relief of left lumbar radiculopathy.  If no benefit with this injection we could consider a left L5-S1 TFESI.  She does have foraminal stenosis that is mild to moderate on the left L4-5 and mild at L5-S1.  -[place] the cabin 7[place]2023 and would like to get in for the injection before this date.    -MEDICATIONS: No changes to medications today.  Discussed side effects of medications and proper  use. Patient verbalized understanding.    -PHYSICAL THERAPY: Advised patient continue with home exercises from prior physical therapy sessions as well as pool aerobics which she does on a regular basis weekly.  Discussed the importance of core strengthening, ROM, stretching exercises with the patient and how each of these entities is important in decreasing pain.  Explained to the patient that the purpose of physical therapy is to teach the patient a home exercise program.  These exercises need to be performed every day in order to decrease pain and prevent future occurrences of pain.        -PATIENT EDUCATION:  Total time of 32 minutes, on the day of service, spent with the patient, reviewing the chart, placing orders, and documenting.   -Today we also discussed the issues related to the current COVID-19 pandemic, the pros and cons of the current treatment plan, the CDC guidelines such as social distancing, washing the hands, and covering the cough.    -FOLLOW UP: Follow-up for injection with Dr. Bishop  Advised to contact clinic if symptoms worsen or change.    Subjective:     Alysha Youngblood is a 72 year old female who presents today for follow-up regarding ongoing midline low back pain that radiates to the left with pain into the left posterior thigh and anterior thigh with associated numbness and tingling with progressive walking and that usually stops at the knee.  Currently pain is a 3/10 up to an 8 at its worst, also for worse first thing in the morning and typically she is able to walk 1-2 blocks without the numbness and tingling occurring.  Does improve with rest.  Denies any lower extremity weakness, denies any recent trips or falls or balance changes.  Denies bowel or bladder loss control.     -Treatment to Date: No prior spinal surgery or spinal injection.  History of physical therapy for LBP, continues with home exercises.  Physical therapy Lumbar [facility] program x11 sessions 3/10/2023     History of  right knee steroid injection 2021 with significant benefit     -Medications:  Acetaminophen    -Gabapentin prescribed previously 10/12/2022 however patient decided not to trial this medication as she did not want to trial medication that would give her brain fog potentially.    Patient Active Problem List   Diagnosis     Elbow fracture     Atrial fibrillation (H)     PVC (premature ventricular contraction)     Goiter     Atrial flutter (H)     Osteoarthritis of right knee, unspecified osteoarthritis type     Morbid obesity (H)       Current Outpatient Medications   Medication     apixaban ANTICOAGULANT (ELIQUIS ANTICOAGULANT) 5 MG tablet     amLODIPine (NORVASC) 10 MG tablet     Calcium-Vitamin D-Vitamin K (VIACTIV PO)     fish oil-omega-3 fatty acids (FISH OIL) 1000 MG capsule     hydrochlorothiazide (HYDRODIURIL) 25 MG tablet     loratadine (CLARITIN) 10 MG tablet     losartan (COZAAR) 100 MG tablet     lovastatin (MEVACOR) 40 MG tablet     metoprolol succinate ER (TOPROL XL) 50 MG 24 hr tablet     metoprolol tartrate (LOPRESSOR) 25 MG tablet     Current Facility-Administered Medications   Medication     lidocaine (PF) (XYLOCAINE) 1 % injection 4 mL     triamcinolone (KENALOG-40) injection 40 mg       Allergies   Allergen Reactions     Ibuprofen Other (See Comments)     Nsaids GI Disturbance     Statins      Made her lips tingle, so they put her on another statin instead.     Liquid Adhesive Rash     After wearing patches for > 8 days  After wearing patches for > 8 days       Past Medical History:   Diagnosis Date     Atrial fibrillation (H)     ablated 1/12     Ex-smoker      Hashimoto's disease      Hyperlipidaemia      Hypertension      ISABELA (obstructive sleep apnea) 3/12    AHI 7     PAC (premature atrial contraction)      PVC (premature ventricular contraction)         Review of Systems  ROS:   Specifically negative for bowel/bladder dysfunction, balance changes, headache, dizziness, foot drop, fevers, chills,  "appetite changes, nausea/vomiting, unexplained weight loss. Otherwise 13 systems reviewed are negative. Please see the patient's intake questionnaire from today for details.  Answers for HPI/ROS submitted by the patient on 6/14/2023  General Symptoms: No  Skin Symptoms: No  HENT Symptoms: No  EYE SYMPTOMS: No  HEART SYMPTOMS: No  LUNG SYMPTOMS: No  INTESTINAL SYMPTOMS: No  URINARY SYMPTOMS: No  GYNECOLOGIC SYMPTOMS: No  BREAST SYMPTOMS: No  SKELETAL SYMPTOMS: Yes  BLOOD SYMPTOMS: No  NERVOUS SYSTEM SYMPTOMS: No  MENTAL HEALTH SYMPTOMS: No  Back pain: Yes  Muscle aches: Yes  Neck pain: No  Swollen joints: No  Joint pain: No  Bone pain: No  Muscle cramps: No  Muscle weakness: No  Joint stiffness: Yes  Bone fracture: No    Reviewed Social, Family, Past Medical and Past Surgical history with patient, no significant changes noted since prior visit.     Objective:     BP (!) 141/75 (BP Location: Right arm, Patient Position: Sitting)   Pulse 84   Ht 5' 6\" (1.676 m)   Wt 227 lb (103 kg)   SpO2 96%   BMI 36.64 kg/m      PHYSICAL EXAMINATION:    --CONSTITUTIONAL: Well developed, well nourished, healthy appearing individual.  --PSYCHIATRIC: Appropriate mood and affect. No difficulty interacting due to temper, social withdrawal, or memory issues.  --SKIN: Lumbar region is dry and intact.   --RESPIRATORY: Normal rhythm and effort. No abnormal accessory muscle breathing patterns noted.   --MUSCULOSKELETAL:  Normal lumbar lordosis noted, no lateral shift.  --GROSS MOTOR: Easily arises from a seated position. Gait is non-antalgic  --LUMBAR SPINE:  Inspection reveals no evidence of deformity.     RESULTS:   Imaging: Spine imaging was reviewed today. The images were shown to the patient and the findings were explained using a spine model.      Lumbar spine MRI reviewed.                      "

## 2023-06-14 NOTE — PATIENT INSTRUCTIONS
~Please call our [facility] Nurse Navigation line [phone] with any questions or concerns about your treatment plan, if symptoms worsen and you would like to be seen urgently, or if you have problems controlling bladder and bowel function.  ~Please note that any My Chart messages may take multiple days for a response due to the high volume of patients seen in clinic.   Anything sent Thursday night or after will be answered the following week when able, as Laurel Wynn CNP does not work in clinic on Fridays.   ~Laurel Wynn CNP is at the [facility] on the first and third Tuesdays of the month only, otherwise primarily at the [facility].        Importance of specialized Physical Therapy: Discussed the importance of core strengthening, ROM, stretching exercises with the patient and how each of these entities is important in decreasing pain.  Explained to the patient that the purpose of physical therapy is to teach the patient a home exercise program individualized to them and their specific health concerns.  These exercises need to be performed every day in order to decrease pain and prevent future occurrences of pain.           An injection has been ordered today to potentially help with your pain symptoms. These injections do not fix what is going on in your back, therefore they typically do not take away the pain completely, however they can many times help improve symptoms. Injections should always be completed along with other modalities such as physical therapy for the best long term outcomes. If injections alone are done, then pain will likely return.     [facility] Injection Requirements    A  is required for all fluoroscopically-guided injections.  Injection appointments may be cancelled if there are signs/symptoms of an active infection or if the patient is being actively treated with antibiotics for a diagnosed infection.  Patients  may have their steroid injection cancelled if they have had another steroid injection within 2 weeks.  Diabetic patients will have their blood glucose levels checked the day of their injection and the appointment will be rescheduled if the blood glucose level is 300 or higher.  Patients with allergies to cortisone, local anesthetics, iodine, or contrast dye should contact the [facility] to further discuss these considerations.  Patients scheduled for medial branch block diagnostic injections should refrain from taking pain medication the day of the procedure.  The medial branch block injection appointment will be rescheduled if the patient's pain rating is not 5/10 or greater at the time of the procedure.  Patients taking warfarin/Coumadin will have their INR checked the day of the procedure and the procedure may be rescheduled if the INR is greater than 3.0.  Please contact the [facility] Center (#[phone]) if you are taking any prescription blood-thinning medications (warfarin, Plavix, Lovenox, Eliquis, Brilinta, Effient, etc.) as special dosing adjustments may need to be made depending on the type of injection you are scheduled to receive.  It is recommended that you delay having your steroid injection if you have received a flu shot or shingles vaccine within 2 weeks.

## 2023-06-14 NOTE — LETTER
6/14/2023         RE: Alysha Youngblood  [address] [place] [id number]        Dear Colleague,    Thank you for referring your patient, Alysha Youngblood, to the [facility]. Please see a copy of my visit note below.      Assessment:     Diagnoses and all orders for this visit:  Chronic bilateral low back pain with left-sided sciatica  -     PAIN Transforaminal TAD Inj Lumbosacral Left; Future  Left lumbar radiculopathy  -     PAIN Transforaminal TAD Inj Lumbosacral Left; Future  Lumbar foraminal stenosis  -     PAIN Transforaminal TAD Inj Lumbosacral Left; Future  DDD (degenerative disc disease), lumbar  Spondylolisthesis of lumbar region     Alysha Youngblood is a 72 year old y.o. female with past medical history significant for atrial fibrillation/flutter, morbid obesity, knee osteoarthritis, goiter who presents today for follow-up regarding:    -Chronic bilateral low back pain with left lumbar radiculopathy L4 dermatomal pattern with paresthesias.     Plan:     A shared decision making plan was used. The patient's values and choices were respected. Prior medical records were reviewed today. The following represents what was discussed and decided upon by the provider and the patient.        -DIAGNOSTIC TESTS: Images were personally reviewed and interpreted.   -- Lumbar spine MRI 10/16/2022 with minimal anterolisthesis L4-5 with facet arthropathy with mild right and mild to moderate left foraminal stenosis.  L5-S1 mild to moderate right and mild left foraminal stenosis.  --Lumbar CT abdomen pelvis 6/28/2021 does show normal alignment lumbar spine with degenerative [place] is greatest at L4-5 with significant left foraminal stenosis.    -INTERVENTIONS: Ordered left L4-5 transforaminal epidural steroid injection see if we get further relief of left lumbar radiculopathy.  If no benefit with this injection we could consider a left L5-S1 TFESI.  She does have foraminal stenosis that is  mild to moderate on the left L4-5 and mild at L5-S1.  -Patient is leaving for the cabin 7/8/2023 and would like to get in for the injection before this date.    -MEDICATIONS: No changes to medications today.  Discussed side effects of medications and proper use. Patient verbalized understanding.    -PHYSICAL THERAPY: Advised patient continue with home exercises from prior physical therapy sessions as well as pool aerobics which she does on a regular basis weekly.  Discussed the importance of core strengthening, ROM, stretching exercises with the patient and how each of these entities is important in decreasing pain.  Explained to the patient that the purpose of physical therapy is to teach the patient a home exercise program.  These exercises need to be performed every day in order to decrease pain and prevent future occurrences of pain.        -PATIENT EDUCATION:  Total time of 32 minutes, on the day of service, spent with the patient, reviewing the chart, placing orders, and documenting.   -Today we also discussed the issues related to the current COVID-19 pandemic, the pros and cons of the current treatment plan, the CDC guidelines such as social distancing, washing the hands, and covering the cough.    -FOLLOW UP: Follow-up for injection with Dr. Bishop  Advised to contact clinic if symptoms worsen or change.    Subjective:     Alysha Youngblood is a 72 year old female who presents today for follow-up regarding ongoing midline low back pain that radiates to the left with pain into the left posterior thigh and anterior thigh with associated numbness and tingling with progressive walking and that usually stops at the knee.  Currently pain is a 3/10 up to an 8 at its worst, also for worse first thing in the morning and typically she is able to walk 1-2 blocks without the numbness and tingling occurring.  Does improve with rest.  Denies any lower extremity weakness, denies any recent trips or falls or balance  changes.  Denies bowel or bladder loss control.     -Treatment to Date: No prior spinal surgery or spinal injection.  History of physical therapy for LBP, continues with home exercises.  Physical therapy Lumbar MedX program x11 sessions 3/10/2023     History of right knee steroid injection 2021 with significant benefit     -Medications:  Acetaminophen    -Gabapentin prescribed previously 10/12/2022 however patient decided not to trial this medication as she did not want to trial medication that would give her brain fog potentially.    Patient Active Problem List   Diagnosis     Elbow fracture     Atrial fibrillation (H)     PVC (premature ventricular contraction)     Goiter     Atrial flutter (H)     Osteoarthritis of right knee, unspecified osteoarthritis type     Morbid obesity (H)       Current Outpatient Medications   Medication     apixaban ANTICOAGULANT (ELIQUIS ANTICOAGULANT) 5 MG tablet     amLODIPine (NORVASC) 10 MG tablet     Calcium-Vitamin D-Vitamin K (VIACTIV PO)     fish oil-omega-3 fatty acids (FISH OIL) 1000 MG capsule     hydrochlorothiazide (HYDRODIURIL) 25 MG tablet     loratadine (CLARITIN) 10 MG tablet     losartan (COZAAR) 100 MG tablet     lovastatin (MEVACOR) 40 MG tablet     metoprolol succinate ER (TOPROL XL) 50 MG 24 hr tablet     metoprolol tartrate (LOPRESSOR) 25 MG tablet     Current Facility-Administered Medications   Medication     lidocaine (PF) (XYLOCAINE) 1 % injection 4 mL     triamcinolone (KENALOG-40) injection 40 mg       Allergies   Allergen Reactions     Ibuprofen Other (See Comments)     Nsaids GI Disturbance     Statins      Made her lips tingle, so they put her on another statin instead.     Liquid Adhesive Rash     After wearing patches for > 8 days  After wearing patches for > 8 days       Past Medical History:   Diagnosis Date     Atrial fibrillation (H)     ablated 1/12     Ex-smoker      Hashimoto's disease      Hyperlipidaemia      Hypertension      ISABELA (obstructive  "sleep apnea) 3/12    AHI 7     PAC (premature atrial contraction)      PVC (premature ventricular contraction)         Review of Systems  ROS:   Specifically negative for bowel/bladder dysfunction, balance changes, headache, dizziness, foot drop, fevers, chills, appetite changes, nausea/vomiting, unexplained weight loss. Otherwise 13 systems reviewed are negative. Please see the patient's intake questionnaire from today for details.  Answers for HPI/ROS submitted by the patient on 6/14/2023  General Symptoms: No  Skin Symptoms: No  HENT Symptoms: No  EYE SYMPTOMS: No  HEART SYMPTOMS: No  LUNG SYMPTOMS: No  INTESTINAL SYMPTOMS: No  URINARY SYMPTOMS: No  GYNECOLOGIC SYMPTOMS: No  BREAST SYMPTOMS: No  SKELETAL SYMPTOMS: Yes  BLOOD SYMPTOMS: No  NERVOUS SYSTEM SYMPTOMS: No  MENTAL HEALTH SYMPTOMS: No  Back pain: Yes  Muscle aches: Yes  Neck pain: No  Swollen joints: No  Joint pain: No  Bone pain: No  Muscle cramps: No  Muscle weakness: No  Joint stiffness: Yes  Bone fracture: No    Reviewed Social, Family, Past Medical and Past Surgical history with patient, no significant changes noted since prior visit.     Objective:     BP (!) 141/75 (BP Location: Right arm, Patient Position: Sitting)   Pulse 84   Ht 5' 6\" (1.676 m)   Wt 227 lb (103 kg)   SpO2 96%   BMI 36.64 kg/m      PHYSICAL EXAMINATION:    --CONSTITUTIONAL: Well developed, well nourished, healthy appearing individual.  --PSYCHIATRIC: Appropriate mood and affect. No difficulty interacting due to temper, social withdrawal, or memory issues.  --SKIN: Lumbar region is dry and intact.   --RESPIRATORY: Normal rhythm and effort. No abnormal accessory muscle breathing patterns noted.   --MUSCULOSKELETAL:  Normal lumbar lordosis noted, no lateral shift.  --GROSS MOTOR: Easily arises from a seated position. Gait is non-antalgic  --LUMBAR SPINE:  Inspection reveals no evidence of deformity.     RESULTS:   Imaging: Spine imaging was reviewed today. The images were " shown to the patient and the findings were explained using a spine model.      Lumbar spine MRI reviewed.                          Again, thank you for allowing me to participate in the care of your patient.        Sincerely,        Laurel Wynn, CNP

## 2023-06-21 PROBLEM — I49.3 PVC (PREMATURE VENTRICULAR CONTRACTION): Status: ACTIVE | Noted: 2018-08-31

## 2023-06-21 PROBLEM — R91.8 PULMONARY NODULES/LESIONS, MULTIPLE: Status: ACTIVE | Noted: 2017-05-05

## 2023-06-21 PROBLEM — Z87.891 HISTORY OF TOBACCO ABUSE: Status: ACTIVE | Noted: 2021-06-23

## 2023-06-24 ENCOUNTER — HEALTH MAINTENANCE LETTER (OUTPATIENT)
Age: 73
End: 2023-06-24

## 2023-06-26 ENCOUNTER — RADIOLOGY INJECTION OFFICE VISIT (OUTPATIENT)
Dept: PHYSICAL MEDICINE AND REHAB | Facility: CLINIC | Age: 73
End: 2023-06-26
Attending: NURSE PRACTITIONER
Payer: COMMERCIAL

## 2023-06-26 ENCOUNTER — E-VISIT (OUTPATIENT)
Dept: INTERNAL MEDICINE | Facility: CLINIC | Age: 73
End: 2023-06-26

## 2023-06-26 VITALS
HEART RATE: 80 BPM | OXYGEN SATURATION: 96 % | RESPIRATION RATE: 16 BRPM | SYSTOLIC BLOOD PRESSURE: 128 MMHG | TEMPERATURE: 98.1 F | DIASTOLIC BLOOD PRESSURE: 72 MMHG

## 2023-06-26 DIAGNOSIS — M54.16 LEFT LUMBAR RADICULOPATHY: ICD-10-CM

## 2023-06-26 DIAGNOSIS — G89.29 CHRONIC BILATERAL LOW BACK PAIN WITH LEFT-SIDED SCIATICA: ICD-10-CM

## 2023-06-26 DIAGNOSIS — R73.01 IMPAIRED FASTING GLUCOSE: Primary | ICD-10-CM

## 2023-06-26 DIAGNOSIS — E78.2 MIXED HYPERLIPIDEMIA: ICD-10-CM

## 2023-06-26 DIAGNOSIS — I10 ESSENTIAL HYPERTENSION: ICD-10-CM

## 2023-06-26 DIAGNOSIS — M54.42 CHRONIC BILATERAL LOW BACK PAIN WITH LEFT-SIDED SCIATICA: ICD-10-CM

## 2023-06-26 DIAGNOSIS — M48.061 LUMBAR FORAMINAL STENOSIS: ICD-10-CM

## 2023-06-26 PROCEDURE — 64483 NJX AA&/STRD TFRM EPI L/S 1: CPT | Mod: LT | Performed by: PAIN MEDICINE

## 2023-06-26 PROCEDURE — 99207 PR NON-BILLABLE SERV PER CHARTING: CPT | Performed by: INTERNAL MEDICINE

## 2023-06-26 RX ORDER — HYDROCHLOROTHIAZIDE 25 MG/1
25 TABLET ORAL DAILY
Qty: 90 TABLET | Refills: 0 | Status: SHIPPED | OUTPATIENT
Start: 2023-06-26 | End: 2023-10-30

## 2023-06-26 RX ORDER — LIDOCAINE HYDROCHLORIDE 10 MG/ML
INJECTION, SOLUTION EPIDURAL; INFILTRATION; INTRACAUDAL; PERINEURAL
Status: COMPLETED | OUTPATIENT
Start: 2023-06-26 | End: 2023-06-26

## 2023-06-26 RX ORDER — LOVASTATIN 40 MG
40 TABLET ORAL AT BEDTIME
Qty: 90 TABLET | Refills: 0 | Status: SHIPPED | OUTPATIENT
Start: 2023-06-26 | End: 2023-09-18

## 2023-06-26 RX ORDER — AMLODIPINE BESYLATE 10 MG/1
10 TABLET ORAL DAILY
Qty: 90 TABLET | Refills: 0 | Status: SHIPPED | OUTPATIENT
Start: 2023-06-26 | End: 2023-10-30

## 2023-06-26 RX ORDER — LOSARTAN POTASSIUM 100 MG/1
100 TABLET ORAL DAILY
Qty: 90 TABLET | Refills: 0 | Status: SHIPPED | OUTPATIENT
Start: 2023-06-26 | End: 2023-10-30

## 2023-06-26 RX ORDER — DEXAMETHASONE SODIUM PHOSPHATE 10 MG/ML
INJECTION, SOLUTION INTRAMUSCULAR; INTRAVENOUS
Status: COMPLETED | OUTPATIENT
Start: 2023-06-26 | End: 2023-06-26

## 2023-06-26 RX ADMIN — LIDOCAINE HYDROCHLORIDE 2 ML: 10 INJECTION, SOLUTION EPIDURAL; INFILTRATION; INTRACAUDAL; PERINEURAL at 11:41

## 2023-06-26 RX ADMIN — DEXAMETHASONE SODIUM PHOSPHATE 10 MG: 10 INJECTION, SOLUTION INTRAMUSCULAR; INTRAVENOUS at 11:41

## 2023-06-26 ASSESSMENT — PAIN SCALES - GENERAL
PAINLEVEL: MILD PAIN (3)
PAINLEVEL: NO PAIN (1)

## 2023-06-26 NOTE — PATIENT INSTRUCTIONS
Thank you for choosing us for your care. I have placed an order for a prescription so that you can start treatment. View your full visit summary for details by clicking on the link below. Your pharmacist will able to address any questions you may have about the medication.     If you're not feeling better within 5-7 days, please schedule an appointment.  You can schedule an appointment right here in [employer], or call [phone]  If the visit is for the same symptoms as your eVisit, we'll refund the cost of your eVisit if seen within seven days.    Thank you for choosing us for your care. Given your symptoms, I would like you to do a lab-only visit to determine what is causing them.  I have placed the orders.  Please schedule an appointment with the lab right here in [employer], or call [phone].  I will let you know when the results are back and next steps to take.

## 2023-06-26 NOTE — PATIENT INSTRUCTIONS
DISCHARGE INSTRUCTIONS    During office hours (8:00 a.m.- 4:00 p.m.) questions or concerns may be answered  by calling Spine Center Navigation Nurses at  [phone].  Messages received after hours will be returned the following business day.      In the case of an emergency, please dial 911 or seek assistance at the nearest Emergency Room/Urgent Care facility.     All Patients:    You may experience an increase in your symptoms for the first 2 days (It may take anywhere between 2 days- 2 weeks for the steroid to have maximum effect).    You may use ice on the injection site, as frequently as 20 minutes each hour if needed.    You may take your pain medicine.    You may continue taking your regular medication after your injection. If you have had a Medial Branch Block you may resume pain medication once your pain diary is completed.    You may shower. No swimming, tub bath or hot tub for 48 hours.  You may remove your bandaid/bandage as soon as you are home.    You may resume light activities, as tolerated.    Resume your usual diet as tolerated.    It is strongly advised that you do not drive for 1-3 hours post injection.    If you have had oral sedation:  Do not drive for 8 hours post injection.      If you have had IV sedation:  Do not drive for 24 hours post injection.  Do not operate hazardous machinery or make important personal/business decisions for 24 hours.      POSSIBLE STEROID SIDE EFFECTS (If steroid/cortisone was used for your procedure)    -If you experience these symptoms, it should only last for a short period    Swelling of the legs              Skin redness (flushing)     Mouth (oral) irritation   Blood sugar (glucose) levels            Sweats                    Mood changes  Headache  Sleeplessness  Weakened immune system for up to 14 days, which could increase the risk of gentry the COVID-19 virus and/or experiencing more severe symptoms of the disease, if exposed.  Decreased  effectiveness of the flu vaccine if given within 2 weeks of the steroid.         POSSIBLE PROCEDURE SIDE EFFECTS  -Call the Spine Center if you are concerned  Increased Pain           Increased numbness/tingling      Nausea/Vomiting          Bruising/bleeding at site      Redness or swelling                                              Difficulty walking      Weakness           Fever greater than 100.5    *In the event of a severe headache after an epidural steroid injection that is relieved by lying down, please call the [facility] to speak with a clinical staff member*

## 2023-07-01 ENCOUNTER — LAB (OUTPATIENT)
Dept: LAB | Facility: CLINIC | Age: 73
End: 2023-07-01
Payer: COMMERCIAL

## 2023-07-01 DIAGNOSIS — I49.3 PVC (PREMATURE VENTRICULAR CONTRACTION): ICD-10-CM

## 2023-07-01 DIAGNOSIS — R73.01 IMPAIRED FASTING GLUCOSE: ICD-10-CM

## 2023-07-01 DIAGNOSIS — E78.2 MIXED HYPERLIPIDEMIA: ICD-10-CM

## 2023-07-01 DIAGNOSIS — I48.91 ATRIAL FIBRILLATION (H): ICD-10-CM

## 2023-07-01 LAB
ALBUMIN SERPL BCG-MCNC: 4.5 G/DL (ref 3.5–5.2)
ALP SERPL-CCNC: 81 U/L (ref 35–104)
ALT SERPL W P-5'-P-CCNC: 55 U/L (ref 0–50)
ANION GAP SERPL CALCULATED.3IONS-SCNC: 11 MMOL/L (ref 7–15)
AST SERPL W P-5'-P-CCNC: 30 U/L (ref 0–45)
BILIRUB SERPL-MCNC: 0.5 MG/DL
BUN SERPL-MCNC: 15.8 MG/DL (ref 8–23)
CALCIUM SERPL-MCNC: 10.1 MG/DL (ref 8.8–10.2)
CHLORIDE SERPL-SCNC: 102 MMOL/L (ref 98–107)
CHOLEST SERPL-MCNC: 227 MG/DL
CREAT SERPL-MCNC: 0.72 MG/DL (ref 0.51–0.95)
DEPRECATED HCO3 PLAS-SCNC: 27 MMOL/L (ref 22–29)
ERYTHROCYTE [DISTWIDTH] IN BLOOD BY AUTOMATED COUNT: 12.6 % (ref 10–15)
GFR SERPL CREATININE-BSD FRML MDRD: 89 ML/MIN/1.73M2
GLUCOSE SERPL-MCNC: 122 MG/DL (ref 70–99)
HBA1C MFR BLD: 5.9 %
HCT VFR BLD AUTO: 46.7 % (ref 35–47)
HDLC SERPL-MCNC: 83 MG/DL
HGB BLD-MCNC: 15.5 G/DL (ref 11.7–15.7)
LDLC SERPL CALC-MCNC: 92 MG/DL
MCH RBC QN AUTO: 30 PG (ref 26.5–33)
MCHC RBC AUTO-ENTMCNC: 33.2 G/DL (ref 31.5–36.5)
MCV RBC AUTO: 90 FL (ref 78–100)
NONHDLC SERPL-MCNC: 144 MG/DL
PLATELET # BLD AUTO: 261 10E3/UL (ref 150–450)
POTASSIUM SERPL-SCNC: 4.2 MMOL/L (ref 3.4–5.3)
PROT SERPL-MCNC: 7 G/DL (ref 6.4–8.3)
RBC # BLD AUTO: 5.17 10E6/UL (ref 3.8–5.2)
SODIUM SERPL-SCNC: 140 MMOL/L (ref 136–145)
TRIGL SERPL-MCNC: 260 MG/DL
WBC # BLD AUTO: 6.9 10E3/UL (ref 4–11)

## 2023-07-01 PROCEDURE — 36415 COLL VENOUS BLD VENIPUNCTURE: CPT | Performed by: PATHOLOGY

## 2023-07-01 PROCEDURE — 80061 LIPID PANEL: CPT | Performed by: PATHOLOGY

## 2023-07-01 PROCEDURE — 85027 COMPLETE CBC AUTOMATED: CPT | Performed by: PATHOLOGY

## 2023-07-01 PROCEDURE — 99000 SPECIMEN HANDLING OFFICE-LAB: CPT | Performed by: PATHOLOGY

## 2023-07-01 PROCEDURE — 83036 HEMOGLOBIN GLYCOSYLATED A1C: CPT | Performed by: INTERNAL MEDICINE

## 2023-07-01 PROCEDURE — 80053 COMPREHEN METABOLIC PANEL: CPT | Performed by: PATHOLOGY

## 2023-07-30 DIAGNOSIS — I48.91 ATRIAL FIBRILLATION (H): ICD-10-CM

## 2023-08-01 ASSESSMENT — ENCOUNTER SYMPTOMS
PALPITATIONS: 1
CONSTIPATION: 0
HEARTBURN: 0
RECTAL PAIN: 0
JAUNDICE: 0
LEG PAIN: 1
NAUSEA: 0
VOMITING: 0
ORTHOPNEA: 0
SLEEP DISTURBANCES DUE TO BREATHING: 0
EXERCISE INTOLERANCE: 0
BLOATING: 0
DIARRHEA: 1
HYPERTENSION: 1
ABDOMINAL PAIN: 1
LIGHT-HEADEDNESS: 0
BLOOD IN STOOL: 0
SYNCOPE: 0
HYPOTENSION: 0

## 2023-08-02 NOTE — TELEPHONE ENCOUNTER
Last Clinic Visit: 4/17/2023 [facility]

## 2023-08-03 ENCOUNTER — OFFICE VISIT (OUTPATIENT)
Dept: INTERNAL MEDICINE | Facility: CLINIC | Age: 73
End: 2023-08-03
Payer: COMMERCIAL

## 2023-08-03 VITALS
SYSTOLIC BLOOD PRESSURE: 134 MMHG | HEIGHT: 66 IN | BODY MASS INDEX: 35.98 KG/M2 | WEIGHT: 223.9 LBS | DIASTOLIC BLOOD PRESSURE: 84 MMHG | HEART RATE: 87 BPM | OXYGEN SATURATION: 96 %

## 2023-08-03 DIAGNOSIS — R73.01 IMPAIRED FASTING GLUCOSE: ICD-10-CM

## 2023-08-03 DIAGNOSIS — K75.81 NASH (NONALCOHOLIC STEATOHEPATITIS): ICD-10-CM

## 2023-08-03 DIAGNOSIS — K58.0 IRRITABLE BOWEL SYNDROME WITH DIARRHEA: ICD-10-CM

## 2023-08-03 DIAGNOSIS — Z13.220 SCREENING FOR HYPERLIPIDEMIA: ICD-10-CM

## 2023-08-03 DIAGNOSIS — Z12.31 ENCOUNTER FOR SCREENING MAMMOGRAM FOR BREAST CANCER: ICD-10-CM

## 2023-08-03 DIAGNOSIS — Z78.0 ASYMPTOMATIC POSTMENOPAUSAL STATUS: ICD-10-CM

## 2023-08-03 DIAGNOSIS — E06.3 HASHIMOTO'S THYROIDITIS: ICD-10-CM

## 2023-08-03 DIAGNOSIS — E78.2 MIXED HYPERLIPIDEMIA: Primary | ICD-10-CM

## 2023-08-03 PROCEDURE — 99397 PER PM REEVAL EST PAT 65+ YR: CPT | Performed by: INTERNAL MEDICINE

## 2023-08-03 NOTE — PROGRESS NOTES
History of Present Illness:  Ms. Youngblood is a 72 year old female who presents for  Chief Complaint   Patient presents with    Physical     PMH of HTN, HLD, afib s/p ablation, ISABELA not on CPAP, colon polyps, benign pulmonary nodules, benign thyroid nodules, OA, trigger finger, presents for physical.    Elected to [employer] board  In phased [facility] plan, visited cabin this summer and recent trip to [place]    Weight: Does WW, feels confident about losing weight but needs accountability  Lipids: cannot tolerate other statins  Sugars: A1c 5.9, would like to lose weight  Diarrhea: intermittent, lactose intolerant? Uses splenda, coffee in AM, no pain/RUQ pain, no bloody stools, no mucus  Back pain: s/p Transforaminal TAD lumbosacral L  Afib: rare afib, does get episodes of aflutter, no recent cardioversions, she takes eliquis and metoprolol.    Doing water aerobics 6 times per week  Doesn't use CPAP machine    Routine Health Maintenence:  Lung Ca Screening (>20 pk age 50-80):  3/21 IMPRESSION:  1. Unchanged pulmonary nodules since at least 2017. Lung-RADS 2:  recommend resume annual CT lung cancer screening.  2. Stable numerous calcified thyroid nodules.  Colonoscopy (45-75 yrs):  3/21, several sessile polyps, rec 3 year f/u, due 3/24  Dexa (>65W or 70M yrs):  3/21 most negative and valid T-score of -2.1 at the level of the left femoral neck and right femoral neck  Mammogram (40-75 yrs): 6/21, due 6/22  Pap (21-65 yrs): no abnormal  Lab Results   Component Value Date    PAP NIL 03/01/2016         Answers submitted by the patient for this visit:  Symptoms you have experienced in the last 30 days (Submitted on 8/1/2023)  General Symptoms: No  Skin Symptoms: No  HENT Symptoms: No  EYE SYMPTOMS: No  HEART SYMPTOMS: Yes  LUNG SYMPTOMS: No  INTESTINAL SYMPTOMS: Yes  URINARY SYMPTOMS: No  GYNECOLOGIC SYMPTOMS: No  BREAST SYMPTOMS: No  SKELETAL SYMPTOMS: No  BLOOD SYMPTOMS: No  NERVOUS SYSTEM  SYMPTOMS: No  MENTAL HEALTH SYMPTOMS: No  Please answer the questions below to tell us what condition you are experiencing: (Submitted on 8/1/2023)  Chest pain or pressure: No  Fast or irregular heartbeat: Yes  Pain in legs with walking: Yes  Trouble breathing while lying down: No  Fingers or toes appear blue: No  High blood pressure: Yes  Low blood pressure: No  Fainting: No  Murmurs: No  Pacemaker: No  Varicose veins: No  Edema or swelling: Yes  Wake up at night with shortness of breath: No  Light-headedness: No  Exercise intolerance: No  Please answer the questions below to tell us what conditions you are experiencing: (Submitted on 8/1/2023)  Heart burn or indigestion: No  Nausea: No  Vomiting: No  Abdominal pain: Yes  Bloating: No  Constipation: No  Diarrhea: Yes  Blood in stool: No  Black stools: No  Rectal or Anal pain: No  Yellowing of skin or eyes: No  Vomit with blood: No  Change in stools: No      Review of external notes as documented above                   A detailed Review of Systems was performed, verified and is negative except as documented in the HPI.  All health questionnaires were reviewed, verified and relevant information documented above.    Past Medical History:  Past Medical History:   Diagnosis Date    Atrial fibrillation (H)     ablated 1/12    Ex-smoker     Hashimoto's disease     Hyperlipidaemia     Hypertension     ISABLEA (obstructive sleep apnea) 3/12    AHI 7    PAC (premature atrial contraction)     PVC (premature ventricular contraction)        Past Surgical History:  Past Surgical History:   Procedure Laterality Date    APPENDECTOMY      ENT SURGERY      H ABLATION FOCAL AFIB  6/24/2011    [facility].     H ABLATION FOCAL AFIB  1/12/2012    [facility]. afib and aflutter ablations.        Active Meds:  Current Outpatient Medications   Medication    amLODIPine (NORVASC) 10 MG tablet    apixaban ANTICOAGULANT (ELIQUIS ANTICOAGULANT) 5 MG tablet    Calcium-Vitamin D-Vitamin K (VIACTIV PO)     "fish oil-omega-3 fatty acids (FISH OIL) 1000 MG capsule    hydrochlorothiazide (HYDRODIURIL) 25 MG tablet    loratadine (CLARITIN) 10 MG tablet    losartan (COZAAR) 100 MG tablet    lovastatin (MEVACOR) 40 MG tablet    metoprolol succinate ER (TOPROL XL) 50 MG 24 hr tablet    metoprolol tartrate (LOPRESSOR) 25 MG tablet     Current Facility-Administered Medications   Medication    lidocaine (PF) (XYLOCAINE) 1 % injection 4 mL    triamcinolone (KENALOG-40) injection 40 mg        Allergies:  Ibuprofen, Nsaids, Statins, and Liquid adhesive    Family History:  family history includes Breast Cancer in her sister; Cerebrovascular Disease in her maternal grandmother and paternal grandmother; Cervical Cancer in her maternal grandmother; Depression in her mother; Heart Disease in her paternal grandmother; Lung Cancer in her father.    Social History:  Social History     Tobacco Use    Smoking status: Former     Types: Cigarettes     Quit date:      Years since quittin.5    Smokeless tobacco: Never   Substance Use Topics    Alcohol use: Yes     Alcohol/week: 14.0 standard drinks of alcohol     Types: 14 Shots of liquor per week    Drug use: No       Physical Exam:  Vitals: /84 (BP Location: Right arm, Patient Position: Sitting, Cuff Size: Adult Regular)   Pulse 87   Ht 1.676 m (5' 5.98\")   Wt 101.6 kg (223 lb 14.4 oz)   SpO2 96%   BMI 36.16 kg/m    Constitutional: Alert, oriented, pleasant, no acute distress  Head: Normocephalic, atraumatic  Eyes: Extra-ocular movements intact, pupils equally round and reactive bilaterally, no scleral icterus  ENT: Oropharynx clear, moist mucus membranes  Neck: Supple, no lymphadenopathy  Cardiovascular: Regular rate and rhythm, no murmurs, rubs or gallops, peripheral pulses full/symmetric  Respiratory: Good air movement bilaterally, lungs clear, no wheezes/rales/rhonchi  GI: Abdomen soft, bowel sounds present, nondistended, nontender, no organomegaly or masses, no " rebound/guarding  Musculoskeletal: No edema, normal muscle tone, normal gait  Neurologic: Alert and oriented, cranial nerves 2-12 intact, grossly non-focal  Skin: No rashes/lesions  Psychiatric: normal mentation, affect and mood      Diagnostics:  Labs reviewed in Epic          Assessment and Plan:  Alysha was seen today for physical.    Diagnoses and all orders for this visit:    Routine General Medical Exam  Routine health care reviewed and updated as appropriate.    Mixed hyperlipidemia  -     Hepatic panel (Albumin, ALT, AST, Bili, Alk Phos, TP); Future    Irritable bowel syndrome with diarrhea  Discussed limiting lactose, caffeine, artificial sweeteners, food journal, continue fiber, can increase imodium as needed    Screening for breast cancer  -     MA Screen Bilateral w/Renaldo; Future    BMI 36.0-36.9,adult  She would like to lose weight, plans to do WW, will check in 3 months for accountability, consider weight loss medications if not successful  -     Hepatic panel (Albumin, ALT, AST, Bili, Alk Phos, TP); Future    Asymptomatic postmenopausal status  -     DX Hip/Pelvis/Spine; Future    Hashimoto's thyroiditis  -     Adult Endocrinology  Referral; Future    Impaired fasting glucose  -     **Hemoglobin A1c FUTURE 3mo; Future    CUMMINGS (nonalcoholic steatohepatitis)  -     Hepatic panel (Albumin, ALT, AST, Bili, Alk Phos, TP); Future            Danii Burks MD  Internal Medicine

## 2023-08-03 NOTE — NURSING NOTE
"Alysha Youngblood is a 72 year old female patient that presents today in clinic for the following:    Chief Complaint   Patient presents with    Physical     The patient's allergies and medications were reviewed as noted. A set of vitals were recorded as noted without incident: /84 (BP Location: Right arm, Patient Position: Sitting, Cuff Size: Adult Regular)   Pulse 87   Ht 1.676 m (5' 5.98\")   Wt 101.6 kg (223 lb 14.4 oz)   SpO2 96%   BMI 36.16 kg/m  . The patient does not have any other questions for the provider.    Melvin Delgado, EMT 11:40 AM on 8/3/2023   "

## 2023-09-14 DIAGNOSIS — E78.2 MIXED HYPERLIPIDEMIA: ICD-10-CM

## 2023-09-18 RX ORDER — LOVASTATIN 40 MG
40 TABLET ORAL AT BEDTIME
Qty: 90 TABLET | Refills: 3 | Status: SHIPPED | OUTPATIENT
Start: 2023-09-18

## 2023-09-18 NOTE — TELEPHONE ENCOUNTER
lovastatin (MEVACOR) 40 MG tablet     Statins Protocol Passed   Danii Burks MD  Internal Medicine    8/3/2023  [facility] Internal Medicine [place]

## 2023-09-29 ENCOUNTER — OFFICE VISIT (OUTPATIENT)
Dept: ENDOCRINOLOGY | Facility: CLINIC | Age: 73
End: 2023-09-29
Payer: COMMERCIAL

## 2023-09-29 VITALS
OXYGEN SATURATION: 95 % | HEIGHT: 65 IN | BODY MASS INDEX: 36.82 KG/M2 | HEART RATE: 79 BPM | SYSTOLIC BLOOD PRESSURE: 154 MMHG | DIASTOLIC BLOOD PRESSURE: 91 MMHG | WEIGHT: 221 LBS

## 2023-09-29 DIAGNOSIS — E04.2 MULTIPLE THYROID NODULES: Primary | ICD-10-CM

## 2023-09-29 DIAGNOSIS — R73.03 PRE-DIABETES: ICD-10-CM

## 2023-09-29 PROCEDURE — 99214 OFFICE O/P EST MOD 30 MIN: CPT | Performed by: INTERNAL MEDICINE

## 2023-09-29 ASSESSMENT — PAIN SCALES - GENERAL: PAINLEVEL: NO PAIN (0)

## 2023-09-29 NOTE — LETTER
9/29/2023       RE: Alysha Youngblood  [address] [place] [id number]     Dear Colleague,    Thank you for referring your patient, Alysha Youngblood, to the [facility]OCRINOLOG[facility] [place] [facility]. Please see a copy of my visit note below.    This 72  year old woman returns for f/u of her multinodular goiter.  This was first diagnoses in the mid 1990s. She has had repeat biopsy x2 , most recently in 11/2012, which have all been benign.  Of note, previous biopsy noted Hurthle cells on background of lymphocytes indicative of Hashimoto's thyroiditis.  Review of past thyroid hormone tests are always euthyroid.  She doesn't think her thyroid gland has changed in size since her last visit. She denies dysphagia or neck pain.      Alysha also wants to discuss her rising A1c.  She has a strong family history of type 2 diabetes and is concerned that she is now in the pre diabetes range.  She is now in phased [facility] and has started doing exercise at the  many days a week.  She is trying to lose weight.  She has no polys    Current Outpatient Medications   Medication    amLODIPine (NORVASC) 10 MG tablet    apixaban ANTICOAGULANT (ELIQUIS ANTICOAGULANT) 5 MG tablet    Calcium-Vitamin D-Vitamin K (VIACTIV PO)    fish oil-omega-3 fatty acids (FISH OIL) 1000 MG capsule    hydrochlorothiazide (HYDRODIURIL) 25 MG tablet    loratadine (CLARITIN) 10 MG tablet    losartan (COZAAR) 100 MG tablet    lovastatin (MEVACOR) 40 MG tablet    metoprolol succinate ER (TOPROL XL) 50 MG 24 hr tablet    metoprolol tartrate (LOPRESSOR) 25 MG tablet     Current Facility-Administered Medications   Medication    lidocaine (PF) (XYLOCAINE) 1 % injection 4 mL    triamcinolone (KENALOG-40) injection 40 mg       Patient Active Problem List   Diagnosis    Elbow fracture    Atrial fibrillation (H)    PVC (premature ventricular contraction)    Goiter    Atrial flutter (H)     "Osteoarthritis of right knee, unspecified osteoarthritis type    Morbid obesity (H)    Sleep apnea    Pulmonary nodules/lesions, multiple    Premature atrial contraction    Multiple thyroid nodules    Hyperlipidemia    Left atrial dilatation    History of tobacco abuse    Hashimoto's disease    Family history of cardiovascular disease    Essential (primary) hypertension    Esophageal reflux    Colon polyposis    Atrial fibrillation and flutter (H)     BP (!) 154/91 (BP Location: Right arm, Patient Position: Sitting, Cuff Size: Adult Regular)   Pulse 79   Ht 1.66 m (5' 5.35\")   Wt 100.2 kg (221 lb)   SpO2 95%   BMI 36.38 kg/m      VSS  NAD  Eyes - no periorbital edema, conjunctival injection, scleral icterus  Neck - left lobe about 3-4 times normal in size with at least 2 soft nodules, right lobe twice normal is size   Skin - normal texture          Latest Ref Rng 7/7/2018  8:52 AM 3/3/2021  9:02 AM 5/4/2022  6:46 AM   ENDO THYROID LABS-[facility]       TSH 0.40 - 4.00 mU/L 0.92   1.42    TSH (External) 0.40 - 4.00 mU/L  1.49 (E)    Triiodothyronine (T3) 60 - 181 ng/dL      T4 5.0 - 11.0 ug/dL      FREE T4 0.70 - 1.85 ng/dL         Legend:  (E) External lab result    Assessment/Plan:     Multinodular goiter.  Exam is unchanged.  Will repeat exam in 1-2 years.  Appears euthyroid.  2. Pre diabetes .  Discussed risk of developing diabetes and how to avoid this.  Encourage weight loss and exercise.  She is not interested in metformin and I don't know that it is indicated at this time.  Her CVD risk factors have been addressed.    F/up 1-2 years.    Faina Peace MD      "

## 2023-09-29 NOTE — PROGRESS NOTES
This 72  year old woman returns for f/u of her multinodular goiter.  This was first diagnoses in the mid 1990s. She has had repeat biopsy x2 , most recently in 11/2012, which have all been benign.  Of note, previous biopsy noted Hurthle cells on background of lymphocytes indicative of Hashimoto's thyroiditis.  Review of past thyroid hormone tests are always euthyroid.  She doesn't think her thyroid gland has changed in size since her last visit. She denies dysphagia or neck pain.      Alysha also wants to discuss her rising A1c.  She has a strong family history of type 2 diabetes and is concerned that she is now in the pre diabetes range.  She is now in [facility] and [facility]  She is trying to lose weight.  She has no polys    Current Outpatient Medications   Medication    amLODIPine (NORVASC) 10 MG tablet    apixaban ANTICOAGULANT (ELIQUIS ANTICOAGULANT) 5 MG tablet    Calcium-Vitamin D-Vitamin K (VIACTIV PO)    fish oil-omega-3 fatty acids (FISH OIL) 1000 MG capsule    hydrochlorothiazide (HYDRODIURIL) 25 MG tablet    loratadine (CLARITIN) 10 MG tablet    losartan (COZAAR) 100 MG tablet    lovastatin (MEVACOR) 40 MG tablet    metoprolol succinate ER (TOPROL XL) 50 MG 24 hr tablet    metoprolol tartrate (LOPRESSOR) 25 MG tablet     Current Facility-Administered Medications   Medication    lidocaine (PF) (XYLOCAINE) 1 % injection 4 mL    triamcinolone (KENALOG-40) injection 40 mg       Patient Active Problem List   Diagnosis    Elbow fracture    Atrial fibrillation (H)    PVC (premature ventricular contraction)    Goiter    Atrial flutter (H)    Osteoarthritis of right knee, unspecified osteoarthritis type    Morbid obesity (H)    Sleep apnea    Pulmonary nodules/lesions, multiple    Premature atrial contraction    Multiple thyroid nodules    Hyperlipidemia    Left atrial dilatation    History of tobacco abuse    Hashimoto's disease    Family history of  "cardiovascular disease    Essential (primary) hypertension    Esophageal reflux    Colon polyposis    Atrial fibrillation and flutter (H)     BP (!) 154/91 (BP Location: Right arm, Patient Position: Sitting, Cuff Size: Adult Regular)   Pulse 79   Ht 1.66 m (5' 5.35\")   Wt 100.2 kg (221 lb)   SpO2 95%   BMI 36.38 kg/m      VSS  NAD  Eyes - no periorbital edema, conjunctival injection, scleral icterus  Neck - left lobe about 3-4 times normal in size with at least 2 soft nodules, right lobe twice normal is size   Skin - normal texture          Latest Ref Rng 7/7/2018  8:52 AM 3/3/2021  9:02 AM 5/4/2022  6:46 AM   ENDO THYROID LABS-[facility]       TSH 0.40 - 4.00 mU/L 0.92   1.42    TSH (External) 0.40 - 4.00 mU/L  1.49 (E)    Triiodothyronine (T3) 60 - 181 ng/dL      T4 5.0 - 11.0 ug/dL      FREE T4 0.70 - 1.85 ng/dL         Legend:  (E) External lab result    Assessment/Plan:     Multinodular goiter.  Exam is unchanged.  Will repeat exam in 1-2 years.  Appears euthyroid.  2. Pre diabetes .  Discussed risk of developing diabetes and how to avoid this.  Encourage weight loss and exercise.  She is not interested in metformin and I don't know that it is indicated at this time.  Her CVD risk factors have been addressed.    F/up 1-2 years.    Faina Peace MD      "

## 2023-09-29 NOTE — NURSING NOTE
"Chief Complaint   Patient presents with    Thyroid Problem     Vital signs:      BP: (!) 154/91 Pulse: 79     SpO2: 95 %     Height: 166 cm (5' 5.35\") Weight: 100.2 kg (221 lb)  Estimated body mass index is 36.38 kg/m  as calculated from the following:    Height as of this encounter: 1.66 m (5' 5.35\").    Weight as of this encounter: 100.2 kg (221 lb).        "

## 2023-10-30 DIAGNOSIS — I10 ESSENTIAL HYPERTENSION: ICD-10-CM

## 2023-10-31 RX ORDER — AMLODIPINE BESYLATE 10 MG/1
10 TABLET ORAL DAILY
Qty: 90 TABLET | Refills: 1 | Status: SHIPPED | OUTPATIENT
Start: 2023-10-31 | End: 2024-04-30

## 2023-10-31 RX ORDER — HYDROCHLOROTHIAZIDE 25 MG/1
25 TABLET ORAL DAILY
Qty: 90 TABLET | Refills: 1 | Status: SHIPPED | OUTPATIENT
Start: 2023-10-31 | End: 2024-04-30

## 2023-10-31 RX ORDER — LOSARTAN POTASSIUM 100 MG/1
100 TABLET ORAL DAILY
Qty: 90 TABLET | Refills: 1 | Status: SHIPPED | OUTPATIENT
Start: 2023-10-31 | End: 2024-04-25

## 2023-10-31 NOTE — TELEPHONE ENCOUNTER
Last Clinic Visit: 8/3/2023  [facility] [place]    Scheduled for follow-up and BP check 11/14/23

## 2023-11-10 ENCOUNTER — ANCILLARY PROCEDURE (OUTPATIENT)
Dept: MAMMOGRAPHY | Facility: CLINIC | Age: 73
End: 2023-11-10
Attending: INTERNAL MEDICINE
Payer: COMMERCIAL

## 2023-11-10 ENCOUNTER — ANCILLARY PROCEDURE (OUTPATIENT)
Dept: BONE DENSITY | Facility: CLINIC | Age: 73
End: 2023-11-10
Attending: INTERNAL MEDICINE
Payer: COMMERCIAL

## 2023-11-10 DIAGNOSIS — Z78.0 ASYMPTOMATIC POSTMENOPAUSAL STATUS: ICD-10-CM

## 2023-11-10 DIAGNOSIS — Z12.31 ENCOUNTER FOR SCREENING MAMMOGRAM FOR BREAST CANCER: ICD-10-CM

## 2023-11-10 PROCEDURE — 77080 DXA BONE DENSITY AXIAL: CPT

## 2023-11-10 PROCEDURE — 77063 BREAST TOMOSYNTHESIS BI: CPT | Mod: GC | Performed by: RADIOLOGY

## 2023-11-10 PROCEDURE — 77067 SCR MAMMO BI INCL CAD: CPT | Mod: GC | Performed by: RADIOLOGY

## 2023-11-14 ENCOUNTER — VIRTUAL VISIT (OUTPATIENT)
Dept: INTERNAL MEDICINE | Facility: CLINIC | Age: 73
End: 2023-11-14
Payer: COMMERCIAL

## 2023-11-14 DIAGNOSIS — Z12.11 SCREENING FOR COLON CANCER: ICD-10-CM

## 2023-11-14 DIAGNOSIS — M85.9 LOW BONE DENSITY: ICD-10-CM

## 2023-11-14 DIAGNOSIS — K58.0 IRRITABLE BOWEL SYNDROME WITH DIARRHEA: Primary | ICD-10-CM

## 2023-11-14 PROCEDURE — 99442 PR PHYSICIAN TELEPHONE EVALUATION 11-20 MIN: CPT | Mod: 93 | Performed by: INTERNAL MEDICINE

## 2023-11-14 RX ORDER — RISEDRONATE SODIUM 150 MG/1
150 TABLET, FILM COATED ORAL
Qty: 4 TABLET | Refills: 3 | Status: SHIPPED | OUTPATIENT
Start: 2023-11-14

## 2023-11-14 NOTE — PATIENT INSTRUCTIONS
Thank you for visiting the Primary Care Center today at the [facility]! The following is some information about our clinic:     Primary Care Center Frequently-Asked Questions    (1) How do I schedule appointments at the [facility]?     Primary Care--to schedule or make changes to an existing appointment, please call our primary care line at [phone].    Labs--to schedule a lab appointment at the [facility] you can use [employer] or call [phone]. If you have a [facility] location that is closer to home, you can reach out to that location for scheduling options.     Imaging--if you need to schedule a CT, X-ray, MRI, ultrasound, or other imaging study you can call [phone] to schedule at the [facility] or any other [facility] imaging location.     Referrals--if a referral to another specialty was ordered you can expect a phone call from their scheduling team. If you have not heard from them in a week, please call us or send us a [employer] message to check the status or get a scheduling number. Please note that this only applies to internal [facility] referrals. If the referral is external you would need to contact their office for scheduling.     (2) I have a question about my visit, who do I contact?     You can call us at the primary care line at [phone] to ask questions about your visit. You can also send a secure message through [employer], which is reviewed by clinic staff. Please note that [employer] messages have a twenty-four to forty-eight business hour turnaround time and should not be used for urgent concerns.    (3) How will I get the results of my tests?    If you are signed up for [employer] all tests will be released to you within twenty-four hours of resulting. Please allow three to five days for your doctor to review your results and place a note interpreting the results. If you do not have [employer] you will receive your  results through mail seven to ten business days following the return of the tests. Please note that if there should be any urgent or concerning results that your doctor or their registered nurse will reach out to you the same day as the tests come back. If you have follow up questions about your results or would like to discuss the results in detail please schedule a follow up with your provider either in person or virtually.     (4) How do I get refills of my prescriptions?     You should always first contact your pharmacy for refills of your medications. If submitting a refill request on [employer], please be sure to submit the request only once--repeat requests can cause delays in refill. If you are requesting a NEW medication or a medication related to new symptoms you will need to schedule an appointment with a provider prior to approval. Please note: Routine medication refills have up to one to three business day turnaround whereas controlled substances refills have up to five to seven business day turnaround.    (5) I have new symptoms, what do I do?     If you are having an immediate medical emergency, you should dial 911 for assistance.   For anything urgent that needs to be seen within a few hours to one day you should visit a local urgent care for assistance.  For non-urgent symptoms that need to be seen within a few days to a week you can schedule with an available provider in primary care by going to [employer] or calling [phone].   If you are not sure how serious your symptoms are or you would like to receive medical advice you can always call [phone] to speak with a triage nurse.

## 2023-11-14 NOTE — NURSING NOTE
Is the patient currently in the state of [place]? YES    Visit mode:VIDEO    If the visit is dropped, the patient can be reconnected by: TELEPHONE VISIT: Phone number:   Telephone Information:   Mobile [phone]       Will anyone else be joining the visit? NO  (If patient encounters technical issues they should call [phone]    How would you like to obtain your AVS? MyChart    Are changes needed to the allergy or medication list? Pt stated no changes to allergies and Pt stated no med changes    Reason for visit: NAY SANDERSON

## 2023-11-14 NOTE — PROGRESS NOTES
"    Alysha Youngblood is a 73 year old female who is being evaluated via a billable telephone visit for the following health issues:    Chief Complaint   Patient presents with    RECHECK    Pt wants to discuss colonoscopy       HPI    PMH of HTN, HLD, afib s/p ablation, ISABELA not on CPAP, colon polyps, benign pulmonary nodules, benign thyroid nodules, OA, trigger finger, presents for physical.    Elected to [employer] board  In phased shelter plan, visited cabin this summer and recent trip to [facility]    She met with Dr. Peace re: A1c  She states she lost a \"few pounds\", still doing WW  She is troubled by IBS, has loose stools, daily, states metamucil helps, stress is a trigger, has been longstanding, 3 tablespoons, has used imodium prn, she still has gb, neg celiac ab in past    Doing water aerobics 6 times per week  Doesn't use CPAP machine    Routine Health Maintenence:  Lung Ca Screening (>20 pk age 50-80):  3/21 IMPRESSION:  1. Unchanged pulmonary nodules since at least 2017. Lung-RADS 2:  recommend resume annual CT lung cancer screening.  2. Stable numerous calcified thyroid nodules.  Colonoscopy (45-75 yrs):  3/21, several sessile polyps, rec 3 year f/u, due 3/24  Dexa (>65W or 70M yrs):  3/21 most negative and valid T-score of -2.1 at the level of the left femoral neck and right femoral neck  Mammogram (40-75 yrs): 6/21, due 6/22  Pap (21-65 yrs): no abnormal  Lab Results   Component Value Date    PAP NIL 03/01/2016                 Reviewed and updated as needed this visit by Provider    Review of Systems   Comprehensive review of systems negative unless indicated in the HPI.       Physical Exam   Reported vitals:  There were no vitals taken for this visit.   Wt Readings from Last 2 Encounters:   09/29/23 100.2 kg (221 lb)   08/03/23 101.6 kg (223 lb 14.4 oz)    Estimated body mass index is 36.38 kg/m  as calculated from the following:    Height as of 9/29/23: 1.66 m (5' " "5.35\").    Weight as of 9/29/23: 100.2 kg (221 lb).   Gen: healthy, alert, no distress and cooperative  Psych: Alert and oriented times 3; coherent speech, normal   rate and volume, able to articulate logical thoughts, able   to abstract reason, no tangential thoughts, no hallucinations   or delusions, affect is normal/bright.  Respiratory: Speaking in full sentences, unlabored, no audible wheezes or cough.  Remainder of exam unable to be completed due to telephone visits    Diagnostic Test Results:  Labs reviewed in Epic      Assessment/Plan:  Alysha was seen today for recheck and pt wants to discuss colonoscopy.    Diagnoses and all orders for this visit:    Irritable bowel syndrome with diarrhea  Discussed strategies for management regarding diet, offered GI referral but she would like to defer for now    Screening for colon cancer  -     Colonoscopy Screening  Referral; Future    Low bone density  Elevated FRAX, no contraindications to BP, discussed r/b/a, she is agreeable to start, continue Ca/D  -     RISEdronate (ACTONEL) 150 MG tablet; Take 1 tablet (150 mg) by mouth every 30 days Take with full glass of water on empty stomach, don't lay down for 30 min            Phone call duration: 8:17 AM - 8:38 AM 20 minutes      Danii Burks MD  Internal Medicine     "

## 2023-12-06 ENCOUNTER — TELEPHONE (OUTPATIENT)
Dept: GASTROENTEROLOGY | Facility: CLINIC | Age: 73
End: 2023-12-06
Payer: COMMERCIAL

## 2023-12-06 NOTE — TELEPHONE ENCOUNTER
"Endoscopy Scheduling Screen    Have you had a positive Covid test in the last 14 days?  No    Are you active on MyChart?   Yes    What insurance is in the chart?  Other:  med[employer]     Ordering/Referring Provider:     BENITO LORENZ       (If ordering provider performs procedure, schedule with ordering provider unless otherwise instructed. )    BMI: Estimated body mass index is 36.38 kg/m  as calculated from the following:    Height as of 9/29/23: 1.66 m (5' 5.35\").    Weight as of 9/29/23: 100.2 kg (221 lb).     Sedation Ordered  moderate sedation.   If patient BMI > 50 do not schedule in [facility].    If patient BMI > 45 do not schedule at [facility].    Are you taking methadone or Suboxone?  No    Are you taking any prescription medications for pain 3 or more times per week?   No    Do you have a history of malignant hyperthermia or adverse reaction to anesthesia?  No    (Females) Are you currently pregnant?   No     Have you been diagnosed or told you have pulmonary hypertension?   No    Do you have an LVAD?  No    Have you been told you have moderate to severe sleep apnea?  No    Have you been told you have COPD, asthma, or any other lung disease?  No    Do you have any heart conditions?  Yes  - Afib     In the past 6 months, have you had any hospitalizations for heart related issues including cardiomyopathy, heart attack, or stent placement?  No    Do you have any implantable devices in your body (pacemaker, ICD)?  No    Do you take nitroglycerine?  No    Have you ever had an organ transplant?   No    Have you ever had or are you awaiting a heart or lung transplant?   No    Have you had a stroke or transient ischemic attack (TIA aka \"mini stroke\" in the last 6 months?   No    Have you been diagnosed with or been told you have cirrhosis of the liver?   No    Are you currently on dialysis?   No    Do you need assistance transferring?   No    BMI: Estimated body mass index is 36.38 kg/m  as calculated from the following:    " "Height as of 9/29/23: 1.66 m (5' 5.35\").    Weight as of 9/29/23: 100.2 kg (221 lb).     Is patients BMI > 40 and scheduling location [facility]?  No    Do you take an injectable medication for weight loss or diabetes (excluding insulin)?  No    Do you take the medication Naltrexone?  No    Do you take blood thinners?  Yes  - eliquist     Are you taking Effient/Prasugrel?  No, you must contact your prescribing provider for direction on holding or bridging with a different medication.       Prep   Are you currently on dialysis or do you have chronic kidney disease?  No    Do you have a diagnosis of diabetes?  No    Do you have a diagnosis of cystic fibrosis (CF)?  No    On a regular basis do you go 3 -5 days between bowel movements?  No    BMI > 40?  No    Preferred Pharmacy:    [id number] [address] AT [address] [address]  Phone: [phone] Fax: [phone]      Final Scheduling Details   Colonoscopy prep sent?  Standard MiraLAX    Procedure scheduled  Colonoscopy    Surgeon:  Javier      Date of procedure:  04/12/2023     Pre-OP / PAC:   No - Not required for this site.    Location  [facility] - [facility] - Per order.    Sedation   MAC/Deep Sedation - Patient preference.      Patient Reminders:   You will receive a call from a Nurse to review instructions and health history.  This assessment must be completed prior to your procedure.  Failure to complete the Nurse assessment may result in the procedure being cancelled.      On the day of your procedure, please designate an adult(s) who can drive you home stay with you for the next 24 hours. The medicines used in the exam will make you sleepy. You will not be able to drive.      You cannot take public transportation, ride share services, or non-medical taxi service without a responsible caregiver.  Medical transport services are allowed with the requirement that a responsible caregiver will receive you at " your destination.  We require that drivers and caregivers are confirmed prior to your procedure.

## 2024-03-13 ENCOUNTER — MYC MEDICAL ADVICE (OUTPATIENT)
Dept: PHYSICAL MEDICINE AND REHAB | Facility: CLINIC | Age: 74
End: 2024-03-13
Payer: COMMERCIAL

## 2024-03-13 DIAGNOSIS — M43.16 SPONDYLOLISTHESIS OF LUMBAR REGION: ICD-10-CM

## 2024-03-13 DIAGNOSIS — M48.061 LUMBAR FORAMINAL STENOSIS: ICD-10-CM

## 2024-03-13 DIAGNOSIS — M54.16 LEFT LUMBAR RADICULOPATHY: Primary | ICD-10-CM

## 2024-03-20 NOTE — TELEPHONE ENCOUNTER
"PSP:  Laurel Wynn, CNP   Last clinic visit:  6/14/23 OV; 6/26/23 Left L4-L5 TFESI  Reason for call: Transferred from  to inquire about injection again.   Clinical information:  Reports last injection \"really helped. I was able to walk without the leg going numb and me having to stop and rest. I would say it was 60-65% relief for about 6 months. The last 2 months have been horribly painful.\" Reports her symptoms are just as they were before the last injection. I am still doing the exercises I was given from PT last year. I also am doing pool therapy 1-2 hours a day, 5 days a week.   Advice given to patient: Explained PSP would be updated on response to last injection as well as request for another injection. She will be called with how PSP would like to proceed.   Provider to address: Please advise   "

## 2024-03-20 NOTE — TELEPHONE ENCOUNTER
Phone call to patient to inform [facility]SP has approved that she have the repeat injection at this time. Injection requirements reviewed in full with patient. Discussed steroids have immunosuppressant properties which could potentially put patient at a higher risk for a worsened course of any infection including COVID. Stated understanding. .1t

## 2024-03-29 ENCOUNTER — TELEPHONE (OUTPATIENT)
Dept: GASTROENTEROLOGY | Facility: CLINIC | Age: 74
End: 2024-03-29
Payer: COMMERCIAL

## 2024-03-29 NOTE — TELEPHONE ENCOUNTER
Pre assessment completed for upcoming procedure.   (Please see previous telephone encounter notes for complete details)      Procedure details:    Arrival time and facility location reviewed.    Pre op exam needed? N/A    Designated  policy reviewed. Instructed to have someone stay 24  hours post procedure.       Medication review:    Medications reviewed. Please see supporting documentation below. Holding recommendations discussed (if applicable).       Prep for procedure:     Procedure prep instructions reviewed.        Any additional information needed:  N/A      Patient  verbalized understanding and had no questions or concerns at this time.      Lourdes Dalton RN  Endoscopy Procedure Pre Assessment RN  [phone] option 4

## 2024-03-29 NOTE — TELEPHONE ENCOUNTER
Pre visit planning completed.      Procedure details:    Patient scheduled for Colonoscopy  on 4/12/24.     Arrival time: 0630. Procedure time 0730    Facility location: [facility]; [address] [place] [address], [address] Floor, [place] [address]. Check in location: 5th Floor.    Sedation type: MAC    Pre op exam needed? N/A    Indication for procedure: screening      Chart review:     Electronic implanted devices? No    Recent diagnosis of diverticulitis within the last 6 weeks? No    Diabetic? No      Medication review:    Anticoagulants? Yes Apixaban (Eliquis): Recommended HOLD 2 days before procedure.  Consult with your managing provider.    NSAIDS? No    Other medication HOLDING recommendations:  N/A      Prep for procedure:     Bowel prep recommendation: Standard Miralax  Due to: standard bowel prep.    Prep instructions sent via [employer]         Lourdes Dalton RN  Endoscopy Procedure Pre Assessment RN  [phone] option 4

## 2024-04-11 ENCOUNTER — ANESTHESIA EVENT (OUTPATIENT)
Dept: SURGERY | Facility: AMBULATORY SURGERY CENTER | Age: 74
End: 2024-04-11
Payer: COMMERCIAL

## 2024-04-11 ASSESSMENT — ENCOUNTER SYMPTOMS: DYSRHYTHMIAS: 1

## 2024-04-11 NOTE — ANESTHESIA PREPROCEDURE EVALUATION
Anesthesia Pre-Procedure Evaluation    Patient: Alysha Youngblood   MRN: [id number] : 1950        Procedure : Procedure(s):  Colonoscopy          Past Medical History:   Diagnosis Date     Atrial fibrillation (H)     ablated      Ex-smoker      Hashimoto's disease      Hyperlipidaemia      Hypertension      ISABELA (obstructive sleep apnea) 3/12    AHI 7     PAC (premature atrial contraction)      PVC (premature ventricular contraction)       Past Surgical History:   Procedure Laterality Date     APPENDECTOMY       ENT SURGERY       H ABLATION FOCAL AFIB  2011    [facility].      H ABLATION FOCAL AFIB  2012    [facility]. afib and aflutter ablations.       Allergies   Allergen Reactions     Ibuprofen Other (See Comments)     Nsaids GI Disturbance     Statins      Made her lips tingle, so they put her on another statin instead.     Liquid Adhesive Rash     After wearing patches for > 8 days  After wearing patches for > 8 days      Social History     Tobacco Use     Smoking status: Former     Current packs/day: 0.00     Types: Cigarettes     Quit date:      Years since quittin.2     Smokeless tobacco: Never   Substance Use Topics     Alcohol use: Yes     Alcohol/week: 14.0 standard drinks of alcohol     Types: 14 Shots of liquor per week      Wt Readings from Last 1 Encounters:   23 100.2 kg (221 lb)        Anesthesia Evaluation            ROS/MED HX  ENT/Pulmonary:     (+) sleep apnea, uses CPAP,                                      Neurologic:  - neg neurologic ROS     Cardiovascular: Comment: PAF s/p ablation    (+) Dyslipidemia hypertension- -   -  - -                        dysrhythmias, a-fib,             METS/Exercise Tolerance: >4 METS Comment: Does water aerobics several times per week.   Hematologic:  - neg hematologic  ROS     Musculoskeletal:  - neg musculoskeletal ROS     GI/Hepatic:     (+)        bowel prep,            Renal/Genitourinary:  - neg Renal ROS     Endo:  - neg endo ROS    (+)               Obesity,       Psychiatric/Substance Use:  - neg psychiatric ROS     Infectious Disease:  - neg infectious disease ROS     Malignancy:  - neg malignancy ROS     Other:  - neg other ROS          Physical Exam    Airway        Mallampati: II   TM distance: > 3 FB   Neck ROM: full   Mouth opening: > 3 cm    Respiratory Devices and Support         Dental       (+) Modest Abnormalities - crowns, retainers, 1 or 2 missing teeth      Cardiovascular   cardiovascular exam normal       Rhythm and rate: regular and normal     Pulmonary   pulmonary exam normal        breath sounds clear to auscultation       OUTSIDE LABS:  CBC:   Lab Results   Component Value Date    WBC 6.9 07/01/2023    WBC 6.0 12/29/2019    HGB 15.5 07/01/2023    HGB 15.6 12/29/2019    HCT 46.7 07/01/2023    HCT 47.5 (H) 12/29/2019     07/01/2023     12/29/2019     BMP:   Lab Results   Component Value Date     07/01/2023     05/04/2022    POTASSIUM 4.2 07/01/2023    POTASSIUM 3.5 05/04/2022    CHLORIDE 102 07/01/2023    CHLORIDE 102 05/04/2022    CO2 27 07/01/2023    CO2 30 05/04/2022    BUN 15.8 07/01/2023    BUN 15 05/04/2022    CR 0.72 07/01/2023    CR 0.80 05/04/2022     (H) 07/01/2023     (H) 05/04/2022     COAGS:   Lab Results   Component Value Date    PTT 36 07/07/2018    INR 1.19 (H) 07/07/2018     POC:   Lab Results   Component Value Date     (H) 03/08/2011     HEPATIC:   Lab Results   Component Value Date    ALBUMIN 4.5 07/01/2023    PROTTOTAL 7.0 07/01/2023    ALT 55 (H) 07/01/2023    AST 30 07/01/2023    ALKPHOS 81 07/01/2023    BILITOTAL 0.5 07/01/2023     OTHER:   Lab Results   Component Value Date    A1C 5.9 (H) 07/01/2023    JANAE 10.1 07/01/2023    MAG 2.0 12/29/2019    TSH 1.42 05/04/2022    T4 0.99 08/09/2013    T3 96 11/17/2011       Anesthesia Plan    ASA Status:  3    NPO Status:  NPO Appropriate    Anesthesia Type: MAC.     - Reason for MAC: chronic cardiopulmonary  disease, immobility needed              Consents    Anesthesia Plan(s) and associated risks, benefits, and realistic alternatives discussed. Questions answered and patient/representative(s) expressed understanding.     - Discussed: Risks, Benefits and Alternatives for BOTH SEDATION and the PROCEDURE were discussed     - Discussed with:  Patient      - Extended Intubation/Ventilatory Support Discussed: No.      - Patient is DNR/DNI Status: No     Use of blood products discussed: No .     Postoperative Care       PONV prophylaxis: Background Propofol Infusion     Comments:               Faith Doll MD    I have reviewed the pertinent notes and labs in the chart from the past 30 days and (re)examined the patient.  Any updates or changes from those notes are reflected in this note.

## 2024-04-12 ENCOUNTER — HOSPITAL ENCOUNTER (OUTPATIENT)
Facility: AMBULATORY SURGERY CENTER | Age: 74
Discharge: HOME | End: 2024-04-12
Attending: INTERNAL MEDICINE
Payer: COMMERCIAL

## 2024-04-12 ENCOUNTER — ANESTHESIA (OUTPATIENT)
Dept: SURGERY | Facility: AMBULATORY SURGERY CENTER | Age: 74
End: 2024-04-12
Payer: COMMERCIAL

## 2024-04-12 VITALS
HEART RATE: 58 BPM | BODY MASS INDEX: 36.32 KG/M2 | SYSTOLIC BLOOD PRESSURE: 127 MMHG | RESPIRATION RATE: 16 BRPM | WEIGHT: 226 LBS | TEMPERATURE: 97.1 F | HEIGHT: 66 IN | OXYGEN SATURATION: 95 % | DIASTOLIC BLOOD PRESSURE: 75 MMHG

## 2024-04-12 VITALS — HEART RATE: 64 BPM

## 2024-04-12 LAB — COLONOSCOPY: NORMAL

## 2024-04-12 PROCEDURE — 88305 TISSUE EXAM BY PATHOLOGIST: CPT | Mod: 26 | Performed by: PATHOLOGY

## 2024-04-12 PROCEDURE — 45385 COLONOSCOPY W/LESION REMOVAL: CPT | Mod: 33 | Performed by: INTERNAL MEDICINE

## 2024-04-12 PROCEDURE — 88305 TISSUE EXAM BY PATHOLOGIST: CPT | Mod: TC | Performed by: INTERNAL MEDICINE

## 2024-04-12 PROCEDURE — 99100 ANES PT EXTEME AGE<1 YR&>70: CPT | Performed by: NURSE ANESTHETIST, CERTIFIED REGISTERED

## 2024-04-12 PROCEDURE — 45385 COLONOSCOPY W/LESION REMOVAL: CPT | Performed by: NURSE ANESTHETIST, CERTIFIED REGISTERED

## 2024-04-12 PROCEDURE — 45385 COLONOSCOPY W/LESION REMOVAL: CPT | Performed by: ANESTHESIOLOGY

## 2024-04-12 PROCEDURE — 99100 ANES PT EXTEME AGE<1 YR&>70: CPT | Performed by: ANESTHESIOLOGY

## 2024-04-12 PROCEDURE — 45380 COLONOSCOPY AND BIOPSY: CPT | Mod: 59,33 | Performed by: INTERNAL MEDICINE

## 2024-04-12 RX ORDER — SODIUM CHLORIDE, SODIUM LACTATE, POTASSIUM CHLORIDE, CALCIUM CHLORIDE 600; 310; 30; 20 MG/100ML; MG/100ML; MG/100ML; MG/100ML
INJECTION, SOLUTION INTRAVENOUS CONTINUOUS PRN
Status: DISCONTINUED | OUTPATIENT
Start: 2024-04-12 | End: 2024-04-12

## 2024-04-12 RX ORDER — PROPOFOL 10 MG/ML
INJECTION, EMULSION INTRAVENOUS CONTINUOUS PRN
Status: DISCONTINUED | OUTPATIENT
Start: 2024-04-12 | End: 2024-04-12

## 2024-04-12 RX ORDER — NALOXONE HYDROCHLORIDE 0.4 MG/ML
0.2 INJECTION, SOLUTION INTRAMUSCULAR; INTRAVENOUS; SUBCUTANEOUS
Status: DISCONTINUED | OUTPATIENT
Start: 2024-04-12 | End: 2024-04-13 | Stop reason: HOSPADM

## 2024-04-12 RX ORDER — ONDANSETRON 4 MG/1
4 TABLET, ORALLY DISINTEGRATING ORAL EVERY 6 HOURS PRN
Status: DISCONTINUED | OUTPATIENT
Start: 2024-04-12 | End: 2024-04-13 | Stop reason: HOSPADM

## 2024-04-12 RX ORDER — LIDOCAINE HYDROCHLORIDE 20 MG/ML
INJECTION, SOLUTION INFILTRATION; PERINEURAL PRN
Status: DISCONTINUED | OUTPATIENT
Start: 2024-04-12 | End: 2024-04-12

## 2024-04-12 RX ORDER — NALOXONE HYDROCHLORIDE 0.4 MG/ML
0.4 INJECTION, SOLUTION INTRAMUSCULAR; INTRAVENOUS; SUBCUTANEOUS
Status: DISCONTINUED | OUTPATIENT
Start: 2024-04-12 | End: 2024-04-13 | Stop reason: HOSPADM

## 2024-04-12 RX ORDER — PROCHLORPERAZINE MALEATE 5 MG
5 TABLET ORAL EVERY 6 HOURS PRN
Status: DISCONTINUED | OUTPATIENT
Start: 2024-04-12 | End: 2024-04-13 | Stop reason: HOSPADM

## 2024-04-12 RX ORDER — LIDOCAINE 40 MG/G
CREAM TOPICAL
Status: DISCONTINUED | OUTPATIENT
Start: 2024-04-12 | End: 2024-04-12 | Stop reason: HOSPADM

## 2024-04-12 RX ORDER — ONDANSETRON 2 MG/ML
4 INJECTION INTRAMUSCULAR; INTRAVENOUS EVERY 6 HOURS PRN
Status: DISCONTINUED | OUTPATIENT
Start: 2024-04-12 | End: 2024-04-13 | Stop reason: HOSPADM

## 2024-04-12 RX ORDER — FLUMAZENIL 0.1 MG/ML
0.2 INJECTION, SOLUTION INTRAVENOUS
Status: ACTIVE | OUTPATIENT
Start: 2024-04-12 | End: 2024-04-12

## 2024-04-12 RX ORDER — ONDANSETRON 2 MG/ML
4 INJECTION INTRAMUSCULAR; INTRAVENOUS
Status: DISCONTINUED | OUTPATIENT
Start: 2024-04-12 | End: 2024-04-12 | Stop reason: HOSPADM

## 2024-04-12 RX ADMIN — LIDOCAINE HYDROCHLORIDE 60 MG: 20 INJECTION, SOLUTION INFILTRATION; PERINEURAL at 07:17

## 2024-04-12 RX ADMIN — PROPOFOL 350 MCG/KG/MIN: 10 INJECTION, EMULSION INTRAVENOUS at 07:17

## 2024-04-12 RX ADMIN — SODIUM CHLORIDE, SODIUM LACTATE, POTASSIUM CHLORIDE, CALCIUM CHLORIDE: 600; 310; 30; 20 INJECTION, SOLUTION INTRAVENOUS at 07:14

## 2024-04-12 NOTE — ANESTHESIA POSTPROCEDURE EVALUATION
Patient: Alysha Youngblood    Procedure: Procedure(s):  COLONOSCOPY, WITH POLYPECTOMY       Anesthesia Type:  MAC    Note:  Disposition: Outpatient   Postop Pain Control: Uneventful            Sign Out: Well controlled pain   PONV: No   Neuro/Psych: Uneventful            Sign Out: Acceptable/Baseline neuro status   Airway/Respiratory: Uneventful            Sign Out: Acceptable/Baseline resp. status   CV/Hemodynamics: Uneventful            Sign Out: Acceptable CV status; No obvious hypovolemia; No obvious fluid overload   Other NRE: NONE   DID A NON-ROUTINE EVENT OCCUR? No       Last vitals:  Vitals Value Taken Time   /75 04/12/24 0805   Temp 36.2  C (97.1  F) 04/12/24 0805   Pulse 58 04/12/24 0805   Resp 16 04/12/24 0805   SpO2 95 % 04/12/24 0805       Electronically Signed By: Faith Doll MD  April 12, 2024  8:19 AM

## 2024-04-12 NOTE — H&P
Alysha Youngblood  [id number]  female  73 year old      Reason for procedure/surgery: History of polyps    Patient Active Problem List   Diagnosis    Elbow fracture    Atrial fibrillation (H)    PVC (premature ventricular contraction)    Goiter    Atrial flutter (H)    Osteoarthritis of right knee, unspecified osteoarthritis type    Morbid obesity (H)    Sleep apnea    Pulmonary nodules/lesions, multiple    Premature atrial contraction    Multiple thyroid nodules    Hyperlipidemia    Left atrial dilatation    History of tobacco abuse    Hashimoto's disease    Family history of cardiovascular disease    Essential (primary) hypertension    Esophageal reflux    Colon polyposis    Atrial fibrillation and flutter (H)    Pre-diabetes       Past Surgical History:    Past Surgical History:   Procedure Laterality Date    APPENDECTOMY      ENT SURGERY      H ABLATION FOCAL AFIB  2011    [facility].     H ABLATION FOCAL AFIB  2012    [facility]. afib and aflutter ablations.        Past Medical History:   Past Medical History:   Diagnosis Date    Atrial fibrillation (H)     ablated     Ex-smoker     Hashimoto's disease     Hyperlipidaemia     Hypertension     ISABELA (obstructive sleep apnea) 3/12    AHI 7    PAC (premature atrial contraction)     PVC (premature ventricular contraction)        Social History:   Social History     Tobacco Use    Smoking status: Former     Current packs/day: 0.00     Types: Cigarettes     Quit date:      Years since quittin.2    Smokeless tobacco: Never   Substance Use Topics    Alcohol use: Yes     Alcohol/week: 14.0 standard drinks of alcohol     Types: 14 Shots of liquor per week       Family History:   Family History   Problem Relation Age of Onset    Depression Mother     Lung Cancer Father         tobacco use    Breast Cancer Sister     Cervical Cancer Maternal Grandmother     Cerebrovascular Disease Maternal Grandmother     Cerebrovascular Disease Paternal Grandmother     Heart  Disease Paternal Grandmother     Cancer No family hx of         No known family hx of skin cancer       Allergies:   Allergies   Allergen Reactions    Ibuprofen Other (See Comments)    Nsaids GI Disturbance    Statins      Made her lips tingle, so they put her on another statin instead.    Liquid Adhesive Rash     After wearing patches for > 8 days  After wearing patches for > 8 days       Active Medications:   Current Outpatient Medications   Medication Sig Dispense Refill    amLODIPine (NORVASC) 10 MG tablet Take 1 tablet (10 mg) by mouth daily 90 tablet 1    apixaban ANTICOAGULANT (ELIQUIS ANTICOAGULANT) 5 MG tablet Take 1 tablet (5 mg) by mouth 2 times daily 180 tablet 2    Calcium-Vitamin D-Vitamin K (VIACTIV PO) Take by mouth 2 times daily       fish oil-omega-3 fatty acids (FISH OIL) 1000 MG capsule One capsule daily.      hydrochlorothiazide (HYDRODIURIL) 25 MG tablet Take 1 tablet (25 mg) by mouth daily 90 tablet 1    loratadine (CLARITIN) 10 MG tablet Take 10 mg by mouth daily      losartan (COZAAR) 100 MG tablet Take 1 tablet (100 mg) by mouth daily 90 tablet 1    lovastatin (MEVACOR) 40 MG tablet Take 1 tablet (40 mg) by mouth At Bedtime 90 tablet 3    metoprolol succinate ER (TOPROL XL) 50 MG 24 hr tablet TAKE 1 AND 1/2 TABLETS(75 MG) BY MOUTH DAILY 135 tablet 3    metoprolol tartrate (LOPRESSOR) 25 MG tablet TAKE 1 TABLET BY MOUTH AS DIRECTED UP TO 2 TABLETS BY MOUTH EVERY DAY WITH ONSET OF ARRHYTHMIA 30 tablet 1    RISEdronate (ACTONEL) 150 MG tablet Take 1 tablet (150 mg) by mouth every 30 days Take with full glass of water on empty stomach, don't lay down for 30 min 4 tablet 3       Systemic Review:   CONSTITUTIONAL: NEGATIVE for fever, chills, change in weight  ENT/MOUTH: NEGATIVE for ear, mouth and throat problems  RESP: NEGATIVE for significant cough or SOB  CV: NEGATIVE for chest pain, palpitations or peripheral edema    Physical Examination:   Vital Signs: BP (!) 141/94 (BP Location: Right arm)  "  Pulse 75   Temp 97.5  F (36.4  C) (Temporal)   Resp 16   Ht 1.664 m (5' 5.5\")   Wt 102.5 kg (226 lb)   SpO2 94%   BMI 37.04 kg/m    GENERAL: healthy, alert and no distress  NECK: no adenopathy, no asymmetry, masses, or scars  RESP: lungs clear to auscultation - no rales, rhonchi or wheezes  CV: regular rate and rhythm, normal S1 S2, no S3 or S4, no murmur, click or rub, no peripheral edema and peripheral pulses strong  ABDOMEN: soft, nontender, no hepatosplenomegaly, no masses and bowel sounds normal  MS: no gross musculoskeletal defects noted, no edema    Plan: Appropriate to proceed as scheduled.      Naldo Herrera MD  4/12/2024    PCP:  Danii Burks    "

## 2024-04-12 NOTE — ANESTHESIA CARE TRANSFER NOTE
Patient: Alysha Youngblood    Procedure: Procedure(s):  COLONOSCOPY, WITH POLYPECTOMY       Diagnosis: Screening for colon cancer [Z12.11]  Diagnosis Additional Information: No value filed.    Anesthesia Type:   MAC     Note:    Oropharynx: spontaneously breathing  Level of Consciousness: awake  Oxygen Supplementation: room air    Independent Airway: airway patency satisfactory and stable  Dentition: dentition unchanged  Vital Signs Stable: post-procedure vital signs reviewed and stable  Report to RN Given: handoff report given  Patient transferred to: Phase II  Comments: Resps easy and regular. Report to Phase II RN. See flowsheet for vitals.   Handoff Report: Identifed the Patient, Identified the Reponsible Provider, Reviewed the pertinent medical history, Discussed the surgical course, Reviewed Intra-OP anesthesia mangement and issues during anesthesia, Set expectations for post-procedure period and Allowed opportunity for questions and acknowledgement of understanding      Vitals:  Vitals Value Taken Time   BP     Temp     Pulse     Resp     SpO2         Electronically Signed By: EUSEBIA FOY CRNA  April 12, 2024  7:52 AM

## 2024-04-16 LAB
PATH REPORT.COMMENTS IMP SPEC: NORMAL
PATH REPORT.COMMENTS IMP SPEC: NORMAL
PATH REPORT.FINAL DX SPEC: NORMAL
PATH REPORT.GROSS SPEC: NORMAL
PATH REPORT.MICROSCOPIC SPEC OTHER STN: NORMAL
PATH REPORT.RELEVANT HX SPEC: NORMAL
PHOTO IMAGE: NORMAL

## 2024-04-21 DIAGNOSIS — I10 ESSENTIAL HYPERTENSION: ICD-10-CM

## 2024-04-23 DIAGNOSIS — I10 ESSENTIAL HYPERTENSION: ICD-10-CM

## 2024-04-23 DIAGNOSIS — I48.4 ATYPICAL ATRIAL FLUTTER (H): ICD-10-CM

## 2024-04-23 DIAGNOSIS — I48.91 ATRIAL FIBRILLATION (H): ICD-10-CM

## 2024-04-23 DIAGNOSIS — I48.0 PAROXYSMAL ATRIAL FIBRILLATION (H): ICD-10-CM

## 2024-04-25 DIAGNOSIS — I10 ESSENTIAL HYPERTENSION: ICD-10-CM

## 2024-04-25 RX ORDER — LOSARTAN POTASSIUM 100 MG/1
100 TABLET ORAL DAILY
Qty: 90 TABLET | Refills: 1 | Status: SHIPPED | OUTPATIENT
Start: 2024-04-25

## 2024-04-25 NOTE — TELEPHONE ENCOUNTER
Received 2nd request via fax for refills of Eliquis and Metoprolol. Sending to RN pool for further review.     Ashley Aldana CMA (AAMA)

## 2024-04-25 NOTE — TELEPHONE ENCOUNTER
LVD:  11/14/2023  [facility] Internal [facility] [place]     Danii Burks MD  Internal Medicine     Refilled per protocol.

## 2024-04-26 RX ORDER — METOPROLOL SUCCINATE 50 MG/1
TABLET, EXTENDED RELEASE ORAL
Qty: 135 TABLET | Refills: 0 | Status: SHIPPED | OUTPATIENT
Start: 2024-04-26 | End: 2024-07-26

## 2024-04-26 RX ORDER — APIXABAN 5 MG/1
5 TABLET, FILM COATED ORAL 2 TIMES DAILY
Qty: 180 TABLET | Refills: 0 | Status: SHIPPED | OUTPATIENT
Start: 2024-04-26 | End: 2024-07-25

## 2024-04-26 NOTE — TELEPHONE ENCOUNTER
Signed Prescriptions:                        Disp   Refills    metoprolol succinate ER (TOPROL XL) 50 MG *135 ta*0        Sig: TAKE 1 AND 1/2 TABLETS(75 MG) BY MOUTH DAILY  Authorizing Provider: TOPHER MELARA  Ordering User: MEKA LOPEZ    apixaban ANTICOAGULANT (ELIQUIS ANTICOAGUL*180 ta*0        Sig: TAKE 1 TABLET(5 MG) BY MOUTH TWICE DAILY  Authorizing Provider: TOPHER MELARA  Ordering User: MEKA LOPEZ    Patient is due for follow-up with Dr. Melara with labs prior.  First available is fine.  Labs can be done any time.    Meka Lopez RN  Cardiology Care Coordinator  [facility]  [phone] option 1

## 2024-04-28 RX ORDER — LOSARTAN POTASSIUM 100 MG/1
100 TABLET ORAL DAILY
Qty: 90 TABLET | Refills: 1 | OUTPATIENT
Start: 2024-04-28

## 2024-04-28 NOTE — TELEPHONE ENCOUNTER
losartan (COZAAR) 100 MG tablet 90 tablet 1 4/25/2024     Should have refills on file. Pharmacy sent message. Rx refill denied    Afshan Rushing RN  P Red Flag Triage/MRT

## 2024-04-30 RX ORDER — HYDROCHLOROTHIAZIDE 25 MG/1
25 TABLET ORAL DAILY
Qty: 90 TABLET | Refills: 0 | Status: SHIPPED | OUTPATIENT
Start: 2024-04-30 | End: 2024-07-26

## 2024-04-30 RX ORDER — AMLODIPINE BESYLATE 10 MG/1
10 TABLET ORAL DAILY
Qty: 90 TABLET | Refills: 0 | Status: SHIPPED | OUTPATIENT
Start: 2024-04-30 | End: 2024-07-26

## 2024-04-30 NOTE — TELEPHONE ENCOUNTER
hydrochlorothiazide (HYDRODIURIL) 25 MG tablet 90 tablet 1 10/31/2023 -- No   Sig - Route: Take 1 tablet (25 mg) by mouth daily     amLODIPine (NORVASC) 10 MG tablet 90 tablet 1 10/31/2023 -- No   Sig - Route: Take 1 tablet (10 mg) by mouth daily     ----------------------  Last Office Visit : 11/14/2023  [facility] Internal Medicine Danii Yip MD     Future Office visit:  0  ----------------------    Refill decision: hydrochlorothiazide and amlodipine, 90 day dianne refill given. Repeat BMP due July 2024, labs ordered.      BP Readings from Last 3 Encounters:   04/12/24 127/75   09/29/23 (!) 154/91   08/03/23 134/84     Last Comprehensive Metabolic Panel:  Lab Results   Component Value Date     07/01/2023    POTASSIUM 4.2 07/01/2023    CHLORIDE 102 07/01/2023    CO2 27 07/01/2023    ANIONGAP 11 07/01/2023     (H) 07/01/2023    BUN 15.8 07/01/2023    CR 0.72 07/01/2023    GFRESTIMATED 89 07/01/2023    JANAE 10.1 07/01/2023           Pass/Fail Protocol Criteria:  Diuretics (Including Combos) Protocol [id number]/30/2024 02:14 PM   Protocol Details Blood pressure under 140/90 in past 12 months    Medication is active on med list    Medication indicated for associated diagnosis    Has GFR on file in past 12 months and most recent value is normal    Recent (12 mo) or future (90 days) visit within the authorizing provider's specialty    Patient is age 18 or older    No active pregancy on record    No positive pregnancy test in past 12 months     Calcium Channel Blockers Protocol  [id number]/30/2024 02:14 PM   Protocol Details Blood pressure under 140/90 in past 12 months    Recent (12 mo) or future (30 days) visit within the authorizing provider's specialty    Medication is active on med list    Patient is age 18 or older    No active pregnancy on record    Normal serum creatinine on file in past 12 months    No positive pregnancy test in past 12 months

## 2024-05-01 ENCOUNTER — RADIOLOGY INJECTION OFFICE VISIT (OUTPATIENT)
Dept: PHYSICAL MEDICINE AND REHAB | Facility: CLINIC | Age: 74
End: 2024-05-01
Attending: NURSE PRACTITIONER
Payer: COMMERCIAL

## 2024-05-01 VITALS
SYSTOLIC BLOOD PRESSURE: 142 MMHG | RESPIRATION RATE: 16 BRPM | OXYGEN SATURATION: 98 % | DIASTOLIC BLOOD PRESSURE: 82 MMHG | HEART RATE: 81 BPM | TEMPERATURE: 97 F

## 2024-05-01 DIAGNOSIS — M43.16 SPONDYLOLISTHESIS OF LUMBAR REGION: ICD-10-CM

## 2024-05-01 DIAGNOSIS — M48.061 LUMBAR FORAMINAL STENOSIS: ICD-10-CM

## 2024-05-01 DIAGNOSIS — M54.16 LEFT LUMBAR RADICULOPATHY: ICD-10-CM

## 2024-05-01 PROCEDURE — 64483 NJX AA&/STRD TFRM EPI L/S 1: CPT | Mod: LT | Performed by: PAIN MEDICINE

## 2024-05-01 RX ORDER — LIDOCAINE HYDROCHLORIDE 10 MG/ML
INJECTION, SOLUTION EPIDURAL; INFILTRATION; INTRACAUDAL; PERINEURAL
Status: COMPLETED | OUTPATIENT
Start: 2024-05-01 | End: 2024-05-01

## 2024-05-01 RX ORDER — DEXAMETHASONE SODIUM PHOSPHATE 10 MG/ML
INJECTION, SOLUTION INTRAMUSCULAR; INTRAVENOUS
Status: COMPLETED | OUTPATIENT
Start: 2024-05-01 | End: 2024-05-01

## 2024-05-01 RX ADMIN — DEXAMETHASONE SODIUM PHOSPHATE 10 MG: 10 INJECTION, SOLUTION INTRAMUSCULAR; INTRAVENOUS at 08:42

## 2024-05-01 RX ADMIN — LIDOCAINE HYDROCHLORIDE 2 ML: 10 INJECTION, SOLUTION EPIDURAL; INFILTRATION; INTRACAUDAL; PERINEURAL at 08:42

## 2024-05-01 ASSESSMENT — PAIN SCALES - GENERAL
PAINLEVEL: MODERATE PAIN (4)
PAINLEVEL: MILD PAIN (3)

## 2024-05-01 NOTE — PATIENT INSTRUCTIONS
Follow-up visit with Laurel Wynn CNP in 2 weeks to discuss injection outcome and determine care plan going forward.    You will need a  for all future injections at the Spine Center.       DISCHARGE INSTRUCTIONS    During office hours (8:00 a.m.- 4:00 p.m.) questions or concerns may be answered  by calling Spine Center Navigation Nurses at  [phone].  Messages received after hours will be returned the following business day.      In the case of an emergency, please dial 911 or seek assistance at the nearest Emergency Room/Urgent Care facility.     All Patients:    You may experience an increase in your symptoms for the first 2 days (It may take anywhere between 2 days- 2 weeks for the steroid to have maximum effect).    You may use ice on the injection site, as frequently as 20 minutes each hour if needed.    You may take your pain medicine.    You may continue taking your regular medication after your injection. If you have had a Medial Branch Block you may resume pain medication once your pain diary is completed.    You may shower. No swimming, tub bath or hot tub for 48 hours.  You may remove your bandaid/bandage as soon as you are home.    You may resume light activities, as tolerated.    Resume your usual diet as tolerated.    It is strongly advised that you do not drive for 1-3 hours post injection.    If you have had oral sedation:  Do not drive for 8 hours post injection.      If you have had IV sedation:  Do not drive for 24 hours post injection.  Do not operate hazardous machinery or make important personal/business decisions for 24 hours.      POSSIBLE STEROID SIDE EFFECTS (If steroid/cortisone was used for your procedure)    -If you experience these symptoms, it should only last for a short period    Swelling of the legs              Skin redness (flushing)     Mouth (oral) irritation   Blood sugar (glucose) levels            Sweats                    Mood  changes  Headache  Sleeplessness  Weakened immune system for up to 14 days, which could increase the risk of gentry the COVID-19 virus and/or experiencing more severe symptoms of the disease, if exposed.  Decreased effectiveness of the flu vaccine if given within 2 weeks of the steroid.         POSSIBLE PROCEDURE SIDE EFFECTS  -Call the Spine Center if you are concerned  Increased Pain           Increased numbness/tingling      Nausea/Vomiting          Bruising/bleeding at site      Redness or swelling                                              Difficulty walking      Weakness           Fever greater than 100.5    *In the event of a severe headache after an epidural steroid injection that is relieved by lying down, please call the [facility] to speak with a clinical staff member*

## 2024-05-14 ENCOUNTER — LAB (OUTPATIENT)
Dept: LAB | Facility: CLINIC | Age: 74
End: 2024-05-14
Payer: COMMERCIAL

## 2024-05-14 DIAGNOSIS — I48.91 ATRIAL FIBRILLATION (H): ICD-10-CM

## 2024-05-14 DIAGNOSIS — I48.4 ATYPICAL ATRIAL FLUTTER (H): ICD-10-CM

## 2024-05-14 DIAGNOSIS — I48.0 PAROXYSMAL ATRIAL FIBRILLATION (H): ICD-10-CM

## 2024-05-14 LAB
ALBUMIN SERPL BCG-MCNC: 4.2 G/DL (ref 3.5–5.2)
ALP SERPL-CCNC: 69 U/L (ref 40–150)
ALT SERPL W P-5'-P-CCNC: 30 U/L (ref 0–50)
ANION GAP SERPL CALCULATED.3IONS-SCNC: 13 MMOL/L (ref 7–15)
AST SERPL W P-5'-P-CCNC: 24 U/L (ref 0–45)
BILIRUB SERPL-MCNC: 0.6 MG/DL
BUN SERPL-MCNC: 16.1 MG/DL (ref 8–23)
CALCIUM SERPL-MCNC: 9.3 MG/DL (ref 8.8–10.2)
CHLORIDE SERPL-SCNC: 99 MMOL/L (ref 98–107)
CREAT SERPL-MCNC: 0.77 MG/DL (ref 0.51–0.95)
DEPRECATED HCO3 PLAS-SCNC: 24 MMOL/L (ref 22–29)
EGFRCR SERPLBLD CKD-EPI 2021: 82 ML/MIN/1.73M2
ERYTHROCYTE [DISTWIDTH] IN BLOOD BY AUTOMATED COUNT: 12.2 % (ref 10–15)
GLUCOSE SERPL-MCNC: 135 MG/DL (ref 70–99)
HCT VFR BLD AUTO: 42.7 % (ref 35–47)
HGB BLD-MCNC: 14.3 G/DL (ref 11.7–15.7)
MCH RBC QN AUTO: 30.3 PG (ref 26.5–33)
MCHC RBC AUTO-ENTMCNC: 33.5 G/DL (ref 31.5–36.5)
MCV RBC AUTO: 91 FL (ref 78–100)
PLATELET # BLD AUTO: 208 10E3/UL (ref 150–450)
POTASSIUM SERPL-SCNC: 3.9 MMOL/L (ref 3.4–5.3)
PROT SERPL-MCNC: 6.6 G/DL (ref 6.4–8.3)
RBC # BLD AUTO: 4.72 10E6/UL (ref 3.8–5.2)
SODIUM SERPL-SCNC: 136 MMOL/L (ref 135–145)
WBC # BLD AUTO: 6.6 10E3/UL (ref 4–11)

## 2024-05-14 PROCEDURE — 36415 COLL VENOUS BLD VENIPUNCTURE: CPT | Performed by: PATHOLOGY

## 2024-05-14 PROCEDURE — 80053 COMPREHEN METABOLIC PANEL: CPT | Performed by: PATHOLOGY

## 2024-05-14 PROCEDURE — 85027 COMPLETE CBC AUTOMATED: CPT | Performed by: PATHOLOGY

## 2024-05-14 NOTE — PROGRESS NOTES
Assessment:     Diagnoses and all orders for this visit:  Left lumbar radiculopathy  Right lumbar radiculopathy  -     PAIN Transforaminal TAD Inj Lumbosacral Right; Future  Chronic bilateral low back pain with bilateral sciatica  -     PAIN Transforaminal TAD Inj Lumbosacral Right; Future  Lumbar foraminal stenosis  -     PAIN Transforaminal TAD Inj Lumbosacral Right; Future  Spondylolisthesis of lumbar region  -     PAIN Transforaminal TAD Inj Lumbosacral Right; Future  DDD (degenerative disc disease), lumbar     Alysha Youngblood is a 73 year old y.o. female with past medical history significant for atrial fibrillation/flutter, morbid obesity, knee osteoarthritis, goiter who presents today for follow-up regarding:    -Chronic bilateral low back pain with left lumbar radiculopathy with paresthesias with 100% relief on the left post Left L4-5 TFESI 5/1/2024      -Worsening right lumbar radiculopathy.    Plan:     A shared decision making plan was used. The patient's values and choices were respected. Prior medical records were reviewed today. The following represents what was discussed and decided upon by the provider and the patient.        -DIAGNOSTIC TESTS: Images were personally reviewed and interpreted.   --Lumbar spine MRI 10/16/2022 with minimal anterolisthesis L4-5 with facet arthropathy with mild right and mild to moderate left foraminal stenosis.  L5-S1 mild to moderate right and mild left foraminal stenosis.  --Lumbar CT abdomen pelvis 6/28/2021 does show normal alignment lumbar spine with degenerative [place] is greatest at L4-5 with significant left foraminal stenosis.    -INTERVENTIONS: Order placed for right L4-5 transforaminal epidural steroid injection to see if we can improve right lumbar radiculopathy.  She does have spondylolisthesis with foraminal stenosis bilaterally at L4-5.    -MEDICATIONS: No changes in medications today.  Discussed side effects of medications and proper use. Patient  verbalized understanding.    -PHYSICAL THERAPY: Did advise patient continue with home exercises from recent physical therapy sessions which she is diligent about doing on a daily basis and part of a home physical therapy program.  Discussed the importance of core strengthening, ROM, stretching exercises with the patient and how each of these entities is important in decreasing pain.  Explained to the patient that the purpose of physical therapy is to teach the patient a home exercise program.  These exercises need to be performed every day in order to decrease pain and prevent future occurrences of pain.        -PATIENT EDUCATION:  Total time of 32 minutes, on the day of service, spent with the patient, reviewing the chart, placing orders, and documenting.     -FOLLOW UP: Follow-up for injection, she is hoping to get in next week before she leaves on 5/27/2024 to [place].  Advised to contact clinic if symptoms worsen or change.    Subjective:     Alysha Youngblood is a 73 year old female who presents today for follow-up regarding chronic bilateral low back pain with previously significant left low back pain with radiation of pain into the anterior thighs, she does report that she got 100% relief on the left side and denies pain on the left but still having pain in the right low back and right anterior thigh that is quite bothersome today.  Currently pain is a 6/10, 9 at its worst, 3 at its best.  Aggravated with standing and walking, does improve with sitting in her water aerobics which she is diligent about.    -Treatment to Date: No prior spinal surgery or spinal injection.  History of physical therapy for LBP, continues with home exercises.  Physical therapy Lumbar [facility] program x11 sessions 3/10/2023     History of right knee steroid injection 2021 with significant benefit  Left L4-5 TFESI 6/26/2023 with significant relief.  Left L4-5 TFESI 5/1/2024 with 100% relief of symptoms on the left.       -Medications:  Acetaminophen     -Gabapentin prescribed previously 10/12/2022 however patient decided not to trial this medication as she did not want to trial medication that would give her brain fog potentially.    Patient Active Problem List   Diagnosis    Elbow fracture    Atrial fibrillation (H)    PVC (premature ventricular contraction)    Goiter    Atrial flutter (H)    Osteoarthritis of right knee, unspecified osteoarthritis type    Morbid obesity (H)    Sleep apnea    Pulmonary nodules/lesions, multiple    Premature atrial contraction    Multiple thyroid nodules    Hyperlipidemia    Left atrial dilatation    History of tobacco abuse    Hashimoto's disease    Family history of cardiovascular disease    Essential (primary) hypertension    Esophageal reflux    Colon polyposis    Atrial fibrillation and flutter (H)    Pre-diabetes       Current Outpatient Medications   Medication Sig Dispense Refill    apixaban ANTICOAGULANT (ELIQUIS ANTICOAGULANT) 5 MG tablet TAKE 1 TABLET(5 MG) BY MOUTH TWICE DAILY 180 tablet 0    amLODIPine (NORVASC) 10 MG tablet Take 1 tablet (10 mg) by mouth daily **Labs needed for further refills. Call [phone].** 90 tablet 0    Calcium-Vitamin D-Vitamin K (VIACTIV PO) Take by mouth 2 times daily       fish oil-omega-3 fatty acids (FISH OIL) 1000 MG capsule One capsule daily.      hydrochlorothiazide (HYDRODIURIL) 25 MG tablet Take 1 tablet (25 mg) by mouth daily **Labs needed for further refills. Call [phone].** 90 tablet 0    loratadine (CLARITIN) 10 MG tablet Take 10 mg by mouth daily      losartan (COZAAR) 100 MG tablet TAKE 1 TABLET(100 MG) BY MOUTH DAILY 90 tablet 1    lovastatin (MEVACOR) 40 MG tablet Take 1 tablet (40 mg) by mouth At Bedtime 90 tablet 3    metoprolol succinate ER (TOPROL XL) 50 MG 24 hr tablet TAKE 1 AND 1/2 TABLETS(75 MG) BY MOUTH DAILY 135 tablet 0    metoprolol tartrate (LOPRESSOR) 25 MG tablet TAKE 1 TABLET BY MOUTH AS DIRECTED UP TO 2 TABLETS BY  "MOUTH EVERY DAY WITH ONSET OF ARRHYTHMIA 30 tablet 1    RISEdronate (ACTONEL) 150 MG tablet Take 1 tablet (150 mg) by mouth every 30 days Take with full glass of water on empty stomach, don't lay down for 30 min 4 tablet 3     Current Facility-Administered Medications   Medication Dose Route Frequency Provider Last Rate Last Admin    lidocaine (PF) (XYLOCAINE) 1 % injection 4 mL  4 mL   Tico Ibarra, DO   4 mL at 04/01/22 1658    triamcinolone (KENALOG-40) injection 40 mg  40 mg   Tico Ibarra, DO   40 mg at 04/01/22 1658       Allergies   Allergen Reactions    Ibuprofen Other (See Comments)    Nsaids GI Disturbance    Statins      Made her lips tingle, so they put her on another statin instead.    Liquid Adhesive Rash     After wearing patches for > 8 days  After wearing patches for > 8 days       Past Medical History:   Diagnosis Date    Atrial fibrillation (H)     ablated 1/12    Ex-smoker     Hashimoto's disease     Hyperlipidaemia     Hypertension     ISABELA (obstructive sleep apnea) 3/12    AHI 7    PAC (premature atrial contraction)     PVC (premature ventricular contraction)         Review of Systems  ROS:  Specifically negative for bowel/bladder dysfunction, balance changes, headache, dizziness, foot drop, fevers, chills, appetite changes, nausea/vomiting, unexplained weight loss. Otherwise 13 systems reviewed are negative. Please see the patient's intake questionnaire from today for details.    Reviewed Social, Family, Past Medical and Past Surgical history with patient, no significant changes noted since prior visit.     Objective:     BP (!) 143/70 (BP Location: Left arm, Patient Position: Sitting, Cuff Size: Adult Large)   Pulse 84   Ht 5' 5.5\" (1.664 m)   Wt 226 lb (102.5 kg)   BMI 37.04 kg/m      PHYSICAL EXAMINATION:    --CONSTITUTIONAL: Well developed, well nourished, healthy appearing individual.  --PSYCHIATRIC: Appropriate mood and affect. No difficulty interacting due to temper, social " withdrawal, or memory issues.  --SKIN: Lumbar region is dry and intact.   --RESPIRATORY: Normal rhythm and effort. No abnormal accessory muscle breathing patterns noted.   --MUSCULOSKELETAL:  Normal lumbar lordosis noted, no lateral shift.  --GROSS MOTOR: Easily arises from a seated position. Gait is non-antalgic  --LUMBAR SPINE:  Inspection reveals no evidence of deformity.    Tenderness to palpation lower lumbar spine L4-5 and L5-S1 region.  Increased pain with lumbar extension and lateral rotation simultaneously bilaterally with facet loading.    RESULTS:   Imaging: Spine imaging was reviewed today. The images were shown to the patient and the findings were explained using a spine model.      Lumbar spine MRI reviewed

## 2024-05-15 ENCOUNTER — OFFICE VISIT (OUTPATIENT)
Dept: PHYSICAL MEDICINE AND REHAB | Facility: CLINIC | Age: 74
End: 2024-05-15
Payer: COMMERCIAL

## 2024-05-15 VITALS
DIASTOLIC BLOOD PRESSURE: 70 MMHG | HEIGHT: 66 IN | SYSTOLIC BLOOD PRESSURE: 143 MMHG | HEART RATE: 84 BPM | WEIGHT: 226 LBS | BODY MASS INDEX: 36.32 KG/M2

## 2024-05-15 DIAGNOSIS — M54.16 LEFT LUMBAR RADICULOPATHY: Primary | ICD-10-CM

## 2024-05-15 DIAGNOSIS — M54.41 CHRONIC BILATERAL LOW BACK PAIN WITH BILATERAL SCIATICA: ICD-10-CM

## 2024-05-15 DIAGNOSIS — M54.16 RIGHT LUMBAR RADICULOPATHY: ICD-10-CM

## 2024-05-15 DIAGNOSIS — M51.369 DDD (DEGENERATIVE DISC DISEASE), LUMBAR: ICD-10-CM

## 2024-05-15 DIAGNOSIS — M54.42 CHRONIC BILATERAL LOW BACK PAIN WITH BILATERAL SCIATICA: ICD-10-CM

## 2024-05-15 DIAGNOSIS — M48.061 LUMBAR FORAMINAL STENOSIS: ICD-10-CM

## 2024-05-15 DIAGNOSIS — G89.29 CHRONIC BILATERAL LOW BACK PAIN WITH BILATERAL SCIATICA: ICD-10-CM

## 2024-05-15 DIAGNOSIS — M43.16 SPONDYLOLISTHESIS OF LUMBAR REGION: ICD-10-CM

## 2024-05-15 PROCEDURE — 99214 OFFICE O/P EST MOD 30 MIN: CPT | Performed by: NURSE PRACTITIONER

## 2024-05-15 ASSESSMENT — PAIN SCALES - GENERAL: PAINLEVEL: SEVERE PAIN (6)

## 2024-05-15 NOTE — PATIENT INSTRUCTIONS
~Spine Center Scheduling #[phone].  ~Please call our [facility] Spine Nurse Navigation #[phone] with any questions or concerns about your treatment plan, if symptoms worsen and you would like to be seen urgently, or if you have problems controlling bladder and bowel function.  ~For any future flareups or new symptoms, recommend follow-up in clinic or contact the nurse navigator line.  ~Please note that any My Chart messages may take multiple days for a response due to the high volume of patients seen in clinic.  Anything sent Thursday night or after will be answered the following week when able, as Laurel Wynn CNP does not work in clinic on Fridays.   ~Laurel Wynn CNP is at the [facility] on Tuesdays, otherwise primarily at the [facility].        Importance of specialized Physical Therapy: Discussed the importance of core strengthening, ROM, stretching exercises with the patient and how each of these entities is important in decreasing pain.  Explained to the patient that the purpose of physical therapy is to teach the patient a home exercise program individualized to them and their specific health concerns.  These exercises need to be performed every day in order to decrease pain and prevent future occurrences of pain.           An injection has been ordered today to potentially help with your pain symptoms. These injections do not fix what is going on in your back, therefore they typically do not take away the pain completely, however they can many times help improve symptoms. Injections should always be completed along with other modalities such as physical therapy for the best long term outcomes. If injections alone are done, then pain will likely return.     [facility] Injection Requirements    A  is required for all fluoroscopically-guided injections.  Injection appointments may be cancelled if there are signs/symptoms of an  active infection or if the patient is being actively treated with antibiotics for a diagnosed infection.  Patients may have their steroid injection cancelled if they have had another steroid injection within 2 weeks.  Diabetic patients will have their blood glucose levels checked the day of their injection and the appointment will be rescheduled if the blood glucose level is 300 or higher.  Patients with allergies to cortisone, local anesthetics, iodine, or contrast dye should contact the Spine Center to further discuss these considerations.  Patients scheduled for medial branch block diagnostic injections should refrain from taking pain medication the day of the procedure.  The medial branch block injection appointment will be rescheduled if the patient's pain rating is not 5/10 or greater at the time of the procedure.  Patients taking warfarin/Coumadin will have their INR checked the day of the procedure and the procedure may be rescheduled if the INR is greater than 3.0.  Please contact the Spine Center (#[phone]) if you are taking any prescription blood-thinning medications (warfarin, Plavix, Lovenox, Eliquis, Brilinta, Effient, etc.) as special dosing adjustments may need to be made depending on the type of injection you are scheduled to receive.  It is recommended that you delay having your steroid injection if you have received a flu shot or shingles vaccine within 2 weeks.

## 2024-05-15 NOTE — LETTER
5/15/2024         RE: Alysha Youngblood  [address] [place] [id number]        Dear Colleague,    Thank you for referring your patient, Alysha Youngblood, to the [facility]. Please see a copy of my visit note below.      Assessment:     Diagnoses and all orders for this visit:  Left lumbar radiculopathy  Right lumbar radiculopathy  -     PAIN Transforaminal ATD Inj Lumbosacral Right; Future  Chronic bilateral low back pain with bilateral sciatica  -     PAIN Transforaminal TAD Inj Lumbosacral Right; Future  Lumbar foraminal stenosis  -     PAIN Transforaminal TAD Inj Lumbosacral Right; Future  Spondylolisthesis of lumbar region  -     PAIN Transforaminal TAD Inj Lumbosacral Right; Future  DDD (degenerative disc disease), lumbar     Alysha Youngblood is a 73 year old y.o. female with past medical history significant for atrial fibrillation/flutter, morbid obesity, knee osteoarthritis, goiter who presents today for follow-up regarding:    -Chronic bilateral low back pain with left lumbar radiculopathy with paresthesias with 100% relief on the left post Left L4-5 TFESI 5/1/2024      -Worsening right lumbar radiculopathy.    Plan:     A shared decision making plan was used. The patient's values and choices were respected. Prior medical records were reviewed today. The following represents what was discussed and decided upon by the provider and the patient.        -DIAGNOSTIC TESTS: Images were personally reviewed and interpreted.   --Lumbar spine MRI 10/16/2022 with minimal anterolisthesis L4-5 with facet arthropathy with mild right and mild to moderate left foraminal stenosis.  L5-S1 mild to moderate right and mild left foraminal stenosis.  --Lumbar CT abdomen pelvis 6/28/2021 does show normal alignment lumbar spine with degenerative [place] is greatest at L4-5 with significant left foraminal stenosis.    -INTERVENTIONS: Order placed for right L4-5 transforaminal epidural steroid  injection to see if we can improve right lumbar radiculopathy.  She does have spondylolisthesis with foraminal stenosis bilaterally at L4-5.    -MEDICATIONS: No changes in medications today.  Discussed side effects of medications and proper use. Patient verbalized understanding.    -PHYSICAL THERAPY: Did advise patient continue with home exercises from recent physical therapy sessions which she is diligent about doing on a daily basis and part of a home physical therapy program.  Discussed the importance of core strengthening, ROM, stretching exercises with the patient and how each of these entities is important in decreasing pain.  Explained to the patient that the purpose of physical therapy is to teach the patient a home exercise program.  These exercises need to be performed every day in order to decrease pain and prevent future occurrences of pain.        -PATIENT EDUCATION:  Total time of 32 minutes, on the day of service, spent with the patient, reviewing the chart, placing orders, and documenting.     -FOLLOW UP: Follow-up for injection, she is hoping to get in next week before she leaves on 5/27/2024 to [place].  Advised to contact clinic if symptoms worsen or change.    Subjective:     Alysha Youngblood is a 73 year old female who presents today for follow-up regarding chronic bilateral low back pain with previously significant left low back pain with radiation of pain into the anterior thighs, she does report that she got 100% relief on the left side and denies pain on the left but still having pain in the right low back and right anterior thigh that is quite bothersome today.  Currently pain is a 6/10, 9 at its worst, 3 at its best.  Aggravated with standing and walking, does improve with sitting in her water aerobics which she is diligent about.    -Treatment to Date: No prior spinal surgery or spinal injection.  History of physical therapy for LBP, continues with home exercises.  Physical therapy Lumbar  [facility] program x11 sessions 3/10/2023     History of right knee steroid injection 2021 with significant benefit  Left L4-5 TFESI 6/26/2023 with significant relief.  Left L4-5 TFESI 5/1/2024 with 100% relief of symptoms on the left.      -Medications:  Acetaminophen     -Gabapentin prescribed previously 10/12/2022 however patient decided not to trial this medication as she did not want to trial medication that would give her brain fog potentially.    Patient Active Problem List   Diagnosis     Elbow fracture     Atrial fibrillation (H)     PVC (premature ventricular contraction)     Goiter     Atrial flutter (H)     Osteoarthritis of right knee, unspecified osteoarthritis type     Morbid obesity (H)     Sleep apnea     Pulmonary nodules/lesions, multiple     Premature atrial contraction     Multiple thyroid nodules     Hyperlipidemia     Left atrial dilatation     History of tobacco abuse     Hashimoto's disease     Family history of cardiovascular disease     Essential (primary) hypertension     Esophageal reflux     Colon polyposis     Atrial fibrillation and flutter (H)     Pre-diabetes       Current Outpatient Medications   Medication Sig Dispense Refill     apixaban ANTICOAGULANT (ELIQUIS ANTICOAGULANT) 5 MG tablet TAKE 1 TABLET(5 MG) BY MOUTH TWICE DAILY 180 tablet 0     amLODIPine (NORVASC) 10 MG tablet Take 1 tablet (10 mg) by mouth daily **Labs needed for further refills. Call [phone].** 90 tablet 0     Calcium-Vitamin D-Vitamin K (VIACTIV PO) Take by mouth 2 times daily        fish oil-omega-3 fatty acids (FISH OIL) 1000 MG capsule One capsule daily.       hydrochlorothiazide (HYDRODIURIL) 25 MG tablet Take 1 tablet (25 mg) by mouth daily **Labs needed for further refills. Call [phone].** 90 tablet 0     loratadine (CLARITIN) 10 MG tablet Take 10 mg by mouth daily       losartan (COZAAR) 100 MG tablet TAKE 1 TABLET(100 MG) BY MOUTH DAILY 90 tablet 1     lovastatin (MEVACOR) 40 MG tablet Take 1  tablet (40 mg) by mouth At Bedtime 90 tablet 3     metoprolol succinate ER (TOPROL XL) 50 MG 24 hr tablet TAKE 1 AND 1/2 TABLETS(75 MG) BY MOUTH DAILY 135 tablet 0     metoprolol tartrate (LOPRESSOR) 25 MG tablet TAKE 1 TABLET BY MOUTH AS DIRECTED UP TO 2 TABLETS BY MOUTH EVERY DAY WITH ONSET OF ARRHYTHMIA 30 tablet 1     RISEdronate (ACTONEL) 150 MG tablet Take 1 tablet (150 mg) by mouth every 30 days Take with full glass of water on empty stomach, don't lay down for 30 min 4 tablet 3     Current Facility-Administered Medications   Medication Dose Route Frequency Provider Last Rate Last Admin     lidocaine (PF) (XYLOCAINE) 1 % injection 4 mL  4 mL   Tico Ibarra, DO   4 mL at 04/01/22 1658     triamcinolone (KENALOG-40) injection 40 mg  40 mg   Tico Ibarra DO   40 mg at 04/01/22 1658       Allergies   Allergen Reactions     Ibuprofen Other (See Comments)     Nsaids GI Disturbance     Statins      Made her lips tingle, so they put her on another statin instead.     Liquid Adhesive Rash     After wearing patches for > 8 days  After wearing patches for > 8 days       Past Medical History:   Diagnosis Date     Atrial fibrillation (H)     ablated 1/12     Ex-smoker      Hashimoto's disease      Hyperlipidaemia      Hypertension      ISABELA (obstructive sleep apnea) 3/12    AHI 7     PAC (premature atrial contraction)      PVC (premature ventricular contraction)         Review of Systems  ROS:  Specifically negative for bowel/bladder dysfunction, balance changes, headache, dizziness, foot drop, fevers, chills, appetite changes, nausea/vomiting, unexplained weight loss. Otherwise 13 systems reviewed are negative. Please see the patient's intake questionnaire from today for details.    Reviewed Social, Family, Past Medical and Past Surgical history with patient, no significant changes noted since prior visit.     Objective:     BP (!) 143/70 (BP Location: Left arm, Patient Position: Sitting, Cuff Size: Adult Large)    "Pulse 84   Ht 5' 5.5\" (1.664 m)   Wt 226 lb (102.5 kg)   BMI 37.04 kg/m      PHYSICAL EXAMINATION:    --CONSTITUTIONAL: Well developed, well nourished, healthy appearing individual.  --PSYCHIATRIC: Appropriate mood and affect. No difficulty interacting due to temper, social withdrawal, or memory issues.  --SKIN: Lumbar region is dry and intact.   --RESPIRATORY: Normal rhythm and effort. No abnormal accessory muscle breathing patterns noted.   --MUSCULOSKELETAL:  Normal lumbar lordosis noted, no lateral shift.  --GROSS MOTOR: Easily arises from a seated position. Gait is non-antalgic  --LUMBAR SPINE:  Inspection reveals no evidence of deformity.     RESULTS:   Imaging: Spine imaging was reviewed today. The images were shown to the patient and the findings were explained using a spine model.      Lumbar spine MRI reviewed                  Again, thank you for allowing me to participate in the care of your patient.        Sincerely,        Laurel Wynn, CNP  "

## 2024-05-17 ENCOUNTER — RADIOLOGY INJECTION OFFICE VISIT (OUTPATIENT)
Dept: PHYSICAL MEDICINE AND REHAB | Facility: CLINIC | Age: 74
End: 2024-05-17
Attending: NURSE PRACTITIONER
Payer: COMMERCIAL

## 2024-05-17 VITALS
HEIGHT: 66 IN | OXYGEN SATURATION: 96 % | HEART RATE: 70 BPM | SYSTOLIC BLOOD PRESSURE: 112 MMHG | RESPIRATION RATE: 16 BRPM | TEMPERATURE: 98 F | DIASTOLIC BLOOD PRESSURE: 68 MMHG | WEIGHT: 226 LBS | BODY MASS INDEX: 36.32 KG/M2

## 2024-05-17 DIAGNOSIS — M54.41 CHRONIC BILATERAL LOW BACK PAIN WITH BILATERAL SCIATICA: ICD-10-CM

## 2024-05-17 DIAGNOSIS — M43.16 SPONDYLOLISTHESIS OF LUMBAR REGION: ICD-10-CM

## 2024-05-17 DIAGNOSIS — G89.29 CHRONIC BILATERAL LOW BACK PAIN WITH BILATERAL SCIATICA: ICD-10-CM

## 2024-05-17 DIAGNOSIS — M54.42 CHRONIC BILATERAL LOW BACK PAIN WITH BILATERAL SCIATICA: ICD-10-CM

## 2024-05-17 DIAGNOSIS — M54.16 RIGHT LUMBAR RADICULOPATHY: ICD-10-CM

## 2024-05-17 DIAGNOSIS — M48.061 LUMBAR FORAMINAL STENOSIS: ICD-10-CM

## 2024-05-17 PROCEDURE — 64483 NJX AA&/STRD TFRM EPI L/S 1: CPT | Mod: RT | Performed by: STUDENT IN AN ORGANIZED HEALTH CARE EDUCATION/TRAINING PROGRAM

## 2024-05-17 RX ORDER — DEXAMETHASONE SODIUM PHOSPHATE 10 MG/ML
INJECTION, SOLUTION INTRAMUSCULAR; INTRAVENOUS
Status: COMPLETED | OUTPATIENT
Start: 2024-05-17 | End: 2024-05-17

## 2024-05-17 RX ORDER — LIDOCAINE HYDROCHLORIDE 10 MG/ML
INJECTION, SOLUTION EPIDURAL; INFILTRATION; INTRACAUDAL; PERINEURAL
Status: COMPLETED | OUTPATIENT
Start: 2024-05-17 | End: 2024-05-17

## 2024-05-17 RX ORDER — BUPIVACAINE HYDROCHLORIDE 2.5 MG/ML
INJECTION, SOLUTION EPIDURAL; INFILTRATION; INTRACAUDAL
Status: COMPLETED | OUTPATIENT
Start: 2024-05-17 | End: 2024-05-17

## 2024-05-17 RX ADMIN — BUPIVACAINE HYDROCHLORIDE 1 ML: 2.5 INJECTION, SOLUTION EPIDURAL; INFILTRATION; INTRACAUDAL at 08:08

## 2024-05-17 RX ADMIN — LIDOCAINE HYDROCHLORIDE 2 ML: 10 INJECTION, SOLUTION EPIDURAL; INFILTRATION; INTRACAUDAL; PERINEURAL at 08:08

## 2024-05-17 RX ADMIN — DEXAMETHASONE SODIUM PHOSPHATE 10 MG: 10 INJECTION, SOLUTION INTRAMUSCULAR; INTRAVENOUS at 08:08

## 2024-05-17 ASSESSMENT — PAIN SCALES - GENERAL
PAINLEVEL: MODERATE PAIN (4)
PAINLEVEL: MODERATE PAIN (4)

## 2024-05-17 NOTE — PATIENT INSTRUCTIONS
DISCHARGE INSTRUCTIONS    During office hours (8:00 a.m.- 4:00 p.m.) questions or concerns may be answered  by calling Spine Center Navigation Nurses at  [phone].  Messages received after hours will be returned the following business day.      In the case of an emergency, please dial 911 or seek assistance at the nearest Emergency Room/Urgent Care facility.     All Patients:    You may experience an increase in your symptoms for the first 2 days (It may take anywhere between 2 days- 2 weeks for the steroid to have maximum effect).    You may use ice on the injection site, as frequently as 20 minutes each hour if needed.    You may take your pain medicine.    You may continue taking your regular medication after your injection. If you have had a Medial Branch Block you may resume pain medication once your pain diary is completed.    You may shower. No swimming, tub bath or hot tub for 48 hours.  You may remove your bandaid/bandage as soon as you are home.    You may resume light activities, as tolerated.    Resume your usual diet as tolerated.    It is strongly advised that you do not drive for 1-3 hours post injection.    If you have had oral sedation:  Do not drive for 8 hours post injection.      If you have had IV sedation:  Do not drive for 24 hours post injection.  Do not operate hazardous machinery or make important personal/business decisions for 24 hours.      POSSIBLE STEROID SIDE EFFECTS (If steroid/cortisone was used for your procedure)    -If you experience these symptoms, it should only last for a short period    Swelling of the legs              Skin redness (flushing)     Mouth (oral) irritation   Blood sugar (glucose) levels            Sweats                    Mood changes  Headache  Sleeplessness  Weakened immune system for up to 14 days, which could increase the risk of gentry the COVID-19 virus and/or experiencing more severe symptoms of the disease, if exposed.  Decreased  effectiveness of the flu vaccine if given within 2 weeks of the steroid.         POSSIBLE PROCEDURE SIDE EFFECTS  -Call the Spine Center if you are concerned  Increased Pain           Increased numbness/tingling      Nausea/Vomiting          Bruising/bleeding at site      Redness or swelling                                              Difficulty walking      Weakness           Fever greater than 100.5    *In the event of a severe headache after an epidural steroid injection that is relieved by lying down, please call the [facility] to speak with a clinical staff member*

## 2024-05-20 ENCOUNTER — MYC REFILL (OUTPATIENT)
Dept: CARDIOLOGY | Facility: CLINIC | Age: 74
End: 2024-05-20
Payer: COMMERCIAL

## 2024-05-20 DIAGNOSIS — I48.0 PAROXYSMAL ATRIAL FIBRILLATION (H): ICD-10-CM

## 2024-05-20 DIAGNOSIS — I48.4 ATYPICAL ATRIAL FLUTTER (H): ICD-10-CM

## 2024-05-22 RX ORDER — METOPROLOL TARTRATE 25 MG/1
TABLET, FILM COATED ORAL
Qty: 30 TABLET | Refills: 1 | Status: SHIPPED | OUTPATIENT
Start: 2024-05-22 | End: 2024-09-20

## 2024-05-22 NOTE — TELEPHONE ENCOUNTER
metoprolol tartrate (LOPRESSOR) 25 MG tablet 30 tablet 1 4/17/2023     TAKE 1 TABLET BY MOUTH AS DIRECTED UP TO 2 TABLETS BY MOUTH EVERY DAY WITH ONSET OF ARRHYTHMIA     Last Written Prescription Date:  4/17/23  Last Fill Quantity: 30,   # refills: 1  Last Office Visit : 4/17/23  Future Office visit:  9/16/24      [place] for follow-up   dianne refill sent to the pharmacy -

## 2024-06-03 ENCOUNTER — VIRTUAL VISIT (OUTPATIENT)
Dept: PHYSICAL MEDICINE AND REHAB | Facility: CLINIC | Age: 74
End: 2024-06-03
Payer: COMMERCIAL

## 2024-06-03 VITALS — WEIGHT: 226 LBS | BODY MASS INDEX: 36.32 KG/M2 | HEIGHT: 66 IN

## 2024-06-03 DIAGNOSIS — G89.29 CHRONIC BILATERAL LOW BACK PAIN WITHOUT SCIATICA: Primary | ICD-10-CM

## 2024-06-03 DIAGNOSIS — M54.50 CHRONIC BILATERAL LOW BACK PAIN WITHOUT SCIATICA: Primary | ICD-10-CM

## 2024-06-03 DIAGNOSIS — M48.061 LUMBAR FORAMINAL STENOSIS: ICD-10-CM

## 2024-06-03 DIAGNOSIS — M43.16 SPONDYLOLISTHESIS OF LUMBAR REGION: ICD-10-CM

## 2024-06-03 DIAGNOSIS — M47.816 LUMBAR FACET ARTHROPATHY: ICD-10-CM

## 2024-06-03 DIAGNOSIS — M51.369 DDD (DEGENERATIVE DISC DISEASE), LUMBAR: ICD-10-CM

## 2024-06-03 PROCEDURE — 99214 OFFICE O/P EST MOD 30 MIN: CPT | Mod: 95 | Performed by: NURSE PRACTITIONER

## 2024-06-03 ASSESSMENT — PAIN SCALES - GENERAL: PAINLEVEL: MODERATE PAIN (4)

## 2024-06-03 NOTE — PATIENT INSTRUCTIONS
~Spine Center Scheduling #[phone].  ~Please call our [facility] Spine Nurse Navigation #[phone] with any questions or concerns about your treatment plan, if symptoms worsen and you would like to be seen urgently, or if you have problems controlling bladder and bowel function.  ~For any future flareups or new symptoms, recommend follow-up in clinic or contact the nurse navigator line.  ~Please note that any My Chart messages may take multiple days for a response due to the high volume of patients seen in clinic.  Anything sent Thursday night or after will be answered the following week when able, as Laurel Wynn CNP does not work in clinic on Fridays.   ~Laurel Wynn CNP is at the [facility] on Tuesdays, otherwise primarily at the [facility].        ~You have been referred for Physical Therapy to [facility]. They will call you to schedule an appointment.      Scheduling phone number is [phone] for [facility], or [place] location.  If you have not heard from the scheduling office within 2 business days, please call [phone] for ALL other locations.    Discussed the importance of core strengthening, ROM, stretching exercises and how each of these entities is important in decreasing pain and improving long term spine health.  The purpose of physical therapy is to teach you an individualized home exercise program.  These exercises need to be performed every day in order to decrease pain and prevent future occurrences of pain.           An injection has been ordered today to potentially help with your pain symptoms. These injections do not fix what is going on in your back, therefore they typically do not take away the pain completely, however they can many times help improve symptoms. Injections should always be completed along with other modalities such as physical therapy for the best long term outcomes. If  injections alone are done, then pain will likely return.     [facility] Injection Requirements    A  is required for all fluoroscopically-guided injections.  Injection appointments may be cancelled if there are signs/symptoms of an active infection or if the patient is being actively treated with antibiotics for a diagnosed infection.  Patients may have their steroid injection cancelled if they have had another steroid injection within 2 weeks.  Diabetic patients will have their blood glucose levels checked the day of their injection and the appointment will be rescheduled if the blood glucose level is 300 or higher.  Patients with allergies to cortisone, local anesthetics, iodine, or contrast dye should contact the Spine Center to further discuss these considerations.  Patients scheduled for medial branch block diagnostic injections should refrain from taking pain medication the day of the procedure.  The medial branch block injection appointment will be rescheduled if the patient's pain rating is not 5/10 or greater at the time of the procedure.  Patients taking warfarin/Coumadin will have their INR checked the day of the procedure and the procedure may be rescheduled if the INR is greater than 3.0.  Please contact the Spine Center (#[phone]) if you are taking any prescription blood-thinning medications (warfarin, Plavix, Lovenox, Eliquis, Brilinta, Effient, etc.) as special dosing adjustments may need to be made depending on the type of injection you are scheduled to receive.  It is recommended that you delay having your steroid injection if you have received a flu shot or shingles vaccine within 2 weeks.

## 2024-06-03 NOTE — LETTER
6/3/2024         RE: Alysha Youngblood  [address] [place] [id number]        Dear Colleague,    Thank you for referring your patient, Alysha Youngblood, to the [facility]. Please see a copy of my visit note below.    Alysha Youngblood is a 73 year old female who is being evaluated via a billable video visit.      How would you like to obtain your AVS? [employer]        Video Start Time: 3:10 PM      Video-Visit Details    Type of service:  Video Visit    Video End Time (time video stopped): 3:34 PM  Originating Location (pt. Location): Home    Distant Location (provider location):  [facility]     Platform used for Video Visit: [facility] [facility]    Assessment:     Diagnoses and all orders for this visit:  Chronic bilateral low back pain without sciatica  -     Physical Therapy  Referral; Future  -     Pain Medial Branch Block Lumbar Two Lvls Rey; Future  Lumbar facet arthropathy  -     Physical Therapy  Referral; Future  -     Pain Medial Branch Block Lumbar Two Lvls Rey; Future  Lumbar foraminal stenosis  -     Physical Therapy  Referral; Future  Spondylolisthesis of lumbar region  -     Physical Therapy  Referral; Future  DDD (degenerative disc disease), lumbar  -     Physical Therapy  Referral; Future     Alysha Youngblood is a 73 year old y.o. female with past medical history significant for atrial fibrillation/flutter, morbid obesity, knee osteoarthritis, goiter who presents today for follow-up regarding:    -Chronic bilateral low back pain   100% relief with left lumbar radiculopathy post Left L4-5 TFESI 5/1/2024.  Significant relief post right lumbar radiculopathy post Right L4-5 TFESI 5/17/2024, however ongoing bilateral low back pain     Plan:     A shared decision making plan was used. The patient's values and choices were respected. Prior medical records were reviewed today. The following represents what was  discussed and decided upon by the provider and the patient.        -DIAGNOSTIC TESTS: Images were personally reviewed and interpreted.   --Lumbar spine MRI 10/16/2022 with minimal anterolisthesis L4-5 with facet arthropathy with mild right and mild to moderate left foraminal stenosis.  L5-S1 mild to moderate right and mild left foraminal stenosis.  --Lumbar CT abdomen pelvis 6/28/2021 does show normal alignment lumbar spine with degenerative Grange is greatest at L4-5 with significant left foraminal stenosis.    -INTERVENTIONS: Orders placed for bilateral L3, L4, L5 medial branch block to diagnostically determine how much of her back pain is facet mediated.  She does have facet arthropathy at L4-5 and L5-S1.  If she has a positive response she would be a good candidate moving forward with radiofrequency ablation and would be open to this.  If no benefit post injection we could trial a bilateral L5-S1 TFESI.    -MEDICATIONS: No changes in medications today  Discussed side effects of medications and proper use. Patient verbalized understanding.    -PHYSICAL THERAPY: Referral to physical therapy placed to readdress home physical therapy program.  She is part of a home PT program at this time as well but we will readdress this.  Discussed the importance of core strengthening, ROM, stretching exercises with the patient and how each of these entities is important in decreasing pain.  Explained to the patient that the purpose of physical therapy is to teach the patient a home exercise program.  These exercises need to be performed every day in order to decrease pain and prevent future occurrences of pain.        -PATIENT EDUCATION:  Total time of 32 minutes, on the day of service, spent with the patient, reviewing the chart, placing orders, and documenting.     -FOLLOW UP: Follow-up for medial branch block test injection  Advised to contact clinic if symptoms worsen or change.    Subjective:     Alysha Youngblood is a 73 year  old female who presents today for follow-up regarding ongoing chronic bilateral low back pain lower lumbar spine that is still most intense with walking, does improve with sitting.  She does report that back pain today is a 4/10, and 8 at its worst, 1 at its best.  She did recently have a right L4-5 TFESI and notes complete resolution of right leg numbness and tingling and pain.  Currently she denies lower extremity pain at all.  Denies lower extremity weakness.  Denies any recent trips or falls or balance changes.  Denies bowel or bladder loss control.    -Treatment to Date: No prior spinal surgery or spinal injection.  History of physical therapy for LBP, continues with home exercises.  Physical therapy Lumbar MedX program x11 sessions 3/10/2023     History of right knee steroid injection 2021 with significant benefit  Left L4-5 TFESI 6/26/2023 with significant relief.  Left L4-5 TFESI 5/1/2024 with 100% relief of symptoms on the left.   Right L4-5 TFESI 5/17/2024 with 100% relief of right leg pain, no relief with back pain..     -Medications:  Acetaminophen     -Gabapentin prescribed previously 10/12/2022 however patient decided not to trial this medication as she did not want to trial medication that would give her brain fog potentially.    Patient Active Problem List   Diagnosis     Elbow fracture     Atrial fibrillation (H)     PVC (premature ventricular contraction)     Goiter     Atrial flutter (H)     Osteoarthritis of right knee, unspecified osteoarthritis type     Morbid obesity (H)     Sleep apnea     Pulmonary nodules/lesions, multiple     Premature atrial contraction     Multiple thyroid nodules     Hyperlipidemia     Left atrial dilatation     History of tobacco abuse     Hashimoto's disease     Family history of cardiovascular disease     Essential (primary) hypertension     Esophageal reflux     Colon polyposis     Atrial fibrillation and flutter (H)     Pre-diabetes       Current Outpatient  Medications   Medication Sig Dispense Refill     apixaban ANTICOAGULANT (ELIQUIS ANTICOAGULANT) 5 MG tablet TAKE 1 TABLET(5 MG) BY MOUTH TWICE DAILY 180 tablet 0     amLODIPine (NORVASC) 10 MG tablet Take 1 tablet (10 mg) by mouth daily **Labs needed for further refills. Call [phone].** 90 tablet 0     Calcium-Vitamin D-Vitamin K (VIACTIV PO) Take by mouth 2 times daily        fish oil-omega-3 fatty acids (FISH OIL) 1000 MG capsule One capsule daily.       hydrochlorothiazide (HYDRODIURIL) 25 MG tablet Take 1 tablet (25 mg) by mouth daily **Labs needed for further refills. Call [phone].** 90 tablet 0     loratadine (CLARITIN) 10 MG tablet Take 10 mg by mouth daily       losartan (COZAAR) 100 MG tablet TAKE 1 TABLET(100 MG) BY MOUTH DAILY 90 tablet 1     lovastatin (MEVACOR) 40 MG tablet Take 1 tablet (40 mg) by mouth At Bedtime 90 tablet 3     metoprolol succinate ER (TOPROL XL) 50 MG 24 hr tablet TAKE 1 AND 1/2 TABLETS(75 MG) BY MOUTH DAILY 135 tablet 0     metoprolol tartrate (LOPRESSOR) 25 MG tablet TAKE 1 TABLET BY MOUTH AS DIRECTED UP TO 2 TABLETS BY MOUTH EVERY DAY WITH ONSET OF ARRHYTHMIA 30 tablet 1     RISEdronate (ACTONEL) 150 MG tablet Take 1 tablet (150 mg) by mouth every 30 days Take with full glass of water on empty stomach, don't lay down for 30 min 4 tablet 3     Current Facility-Administered Medications   Medication Dose Route Frequency Provider Last Rate Last Admin     lidocaine (PF) (XYLOCAINE) 1 % injection 4 mL  4 mL   Tico Ibarra, DO   4 mL at 04/01/22 1658     triamcinolone (KENALOG-40) injection 40 mg  40 mg   Tico Ibarra, DO   40 mg at 04/01/22 1658       Allergies   Allergen Reactions     Ibuprofen Other (See Comments)     Nsaids GI Disturbance     Statins      Made her lips tingle, so they put her on another statin instead.     Liquid Adhesive Rash     After wearing patches for > 8 days  After wearing patches for > 8 days       Past Medical History:   Diagnosis Date      "Atrial fibrillation (H)     ablated 1/12     Ex-smoker      Hashimoto's disease      Hyperlipidaemia      Hypertension      ISABELA (obstructive sleep apnea) 3/12    AHI 7     PAC (premature atrial contraction)      PVC (premature ventricular contraction)         Review of Systems  ROS:  Specifically negative for bowel/bladder dysfunction, balance changes, headache, dizziness, foot drop, fevers, chills, appetite changes, nausea/vomiting, unexplained weight loss. Otherwise 13 systems reviewed are negative. Please see the patient's intake questionnaire from today for details.    Reviewed Social, Family, Past Medical and Past Surgical history with patient, no significant changes noted since prior visit.     Objective:     Ht 5' 5.5\" (1.664 m)   Wt 226 lb (102.5 kg)   BMI 37.04 kg/m      PHYSICAL EXAMINATION:    --CONSTITUTIONAL: Well developed, well nourished, healthy appearing individual.  --PSYCHIATRIC: Appropriate mood and affect. No difficulty interacting due to temper, social withdrawal, or memory issues.      RESULTS:   Imaging: Spine imaging was reviewed today. The images were shown to the patient and the findings were explained using a spine model.      Lumbar spine MRI reviewed                      Again, thank you for allowing me to participate in the care of your patient.        Sincerely,        Laurel Wynn, CNP  "

## 2024-06-03 NOTE — PROGRESS NOTES
Alysha Youngblood is a 73 year old female who is being evaluated via a billable video visit.      How would you like to obtain your AVS? [employer]        Video Start Time: 3:10 PM      Video-Visit Details    Type of service:  Video Visit    Video End Time (time video stopped): 3:34 PM  Originating Location (pt. Location): Home    Distant Location (provider location):  [facility]     Platform used for Video Visit: [facility]    Assessment:     Diagnoses and all orders for this visit:  Chronic bilateral low back pain without sciatica  -     Physical Therapy  Referral; Future  -     Pain Medial Branch Block Lumbar Two Lvls Rey; Future  Lumbar facet arthropathy  -     Physical Therapy  Referral; Future  -     Pain Medial Branch Block Lumbar Two Lvls Rey; Future  Lumbar foraminal stenosis  -     Physical Therapy  Referral; Future  Spondylolisthesis of lumbar region  -     Physical Therapy  Referral; Future  DDD (degenerative disc disease), lumbar  -     Physical Therapy  Referral; Future     Alysha Youngblood is a 73 year old y.o. female with past medical history significant for atrial fibrillation/flutter, morbid obesity, knee osteoarthritis, goiter who presents today for follow-up regarding:    -Chronic bilateral low back pain   100% relief with left lumbar radiculopathy post Left L4-5 TFESI 5/1/2024.  Significant relief post right lumbar radiculopathy post Right L4-5 TFESI 5/17/2024, however ongoing bilateral low back pain     Plan:     A shared decision making plan was used. The patient's values and choices were respected. Prior medical records were reviewed today. The following represents what was discussed and decided upon by the provider and the patient.        -DIAGNOSTIC TESTS: Images were personally reviewed and interpreted.   --Lumbar spine MRI 10/16/2022 with minimal anterolisthesis L4-5 with facet arthropathy with mild right and mild to  moderate left foraminal stenosis.  L5-S1 mild to moderate right and mild left foraminal stenosis.  --Lumbar CT abdomen pelvis 6/28/2021 does show normal alignment lumbar spine with degenerative Grange is greatest at L4-5 with significant left foraminal stenosis.    -INTERVENTIONS: Orders placed for bilateral L3, L4, L5 medial branch block to diagnostically determine how much of her back pain is facet mediated.  She does have facet arthropathy at L4-5 and L5-S1.  If she has a positive response she would be a good candidate moving forward with radiofrequency ablation and would be open to this.  If no benefit post injection we could trial a bilateral L5-S1 TFESI.    -MEDICATIONS: No changes in medications today  Discussed side effects of medications and proper use. Patient verbalized understanding.    -PHYSICAL THERAPY: Referral to physical therapy placed to readdress home physical therapy program.  She is part of a home PT program at this time as well but we will readdress this.  Discussed the importance of core strengthening, ROM, stretching exercises with the patient and how each of these entities is important in decreasing pain.  Explained to the patient that the purpose of physical therapy is to teach the patient a home exercise program.  These exercises need to be performed every day in order to decrease pain and prevent future occurrences of pain.        -PATIENT EDUCATION:  Total time of 32 minutes, on the day of service, spent with the patient, reviewing the chart, placing orders, and documenting.     -FOLLOW UP: Follow-up for medial branch block test injection  Advised to contact clinic if symptoms worsen or change.    Subjective:     Alysha Youngblood is a 73 year old female who presents today for follow-up regarding ongoing chronic bilateral low back pain lower lumbar spine that is still most intense with walking, does improve with sitting.  She does report that back pain today is a 4/10, and 8 at its worst, 1  at its best.  She did recently have a right L4-5 TFESI and notes complete resolution of right leg numbness and tingling and pain.  Currently she denies lower extremity pain at all.  Denies lower extremity weakness.  Denies any recent trips or falls or balance changes.  Denies bowel or bladder loss control.    -Treatment to Date: No prior spinal surgery or spinal injection.  History of physical therapy for LBP, continues with home exercises.  Physical therapy Lumbar MedX program x11 sessions 3/10/2023     History of right knee steroid injection 2021 with significant benefit  Left L4-5 TFESI 6/26/2023 with significant relief.  Left L4-5 TFESI 5/1/2024 with 100% relief of symptoms on the left.   Right L4-5 TFESI 5/17/2024 with 100% relief of right leg pain, no relief with back pain..     -Medications:  Acetaminophen     -Gabapentin prescribed previously 10/12/2022 however patient decided not to trial this medication as she did not want to trial medication that would give her brain fog potentially.    Patient Active Problem List   Diagnosis    Elbow fracture    Atrial fibrillation (H)    PVC (premature ventricular contraction)    Goiter    Atrial flutter (H)    Osteoarthritis of right knee, unspecified osteoarthritis type    Morbid obesity (H)    Sleep apnea    Pulmonary nodules/lesions, multiple    Premature atrial contraction    Multiple thyroid nodules    Hyperlipidemia    Left atrial dilatation    History of tobacco abuse    Hashimoto's disease    Family history of cardiovascular disease    Essential (primary) hypertension    Esophageal reflux    Colon polyposis    Atrial fibrillation and flutter (H)    Pre-diabetes       Current Outpatient Medications   Medication Sig Dispense Refill    apixaban ANTICOAGULANT (ELIQUIS ANTICOAGULANT) 5 MG tablet TAKE 1 TABLET(5 MG) BY MOUTH TWICE DAILY 180 tablet 0    amLODIPine (NORVASC) 10 MG tablet Take 1 tablet (10 mg) by mouth daily **Labs needed for further refills. Call  [phone].** 90 tablet 0    Calcium-Vitamin D-Vitamin K (VIACTIV PO) Take by mouth 2 times daily       fish oil-omega-3 fatty acids (FISH OIL) 1000 MG capsule One capsule daily.      hydrochlorothiazide (HYDRODIURIL) 25 MG tablet Take 1 tablet (25 mg) by mouth daily **Labs needed for further refills. Call [phone].** 90 tablet 0    loratadine (CLARITIN) 10 MG tablet Take 10 mg by mouth daily      losartan (COZAAR) 100 MG tablet TAKE 1 TABLET(100 MG) BY MOUTH DAILY 90 tablet 1    lovastatin (MEVACOR) 40 MG tablet Take 1 tablet (40 mg) by mouth At Bedtime 90 tablet 3    metoprolol succinate ER (TOPROL XL) 50 MG 24 hr tablet TAKE 1 AND 1/2 TABLETS(75 MG) BY MOUTH DAILY 135 tablet 0    metoprolol tartrate (LOPRESSOR) 25 MG tablet TAKE 1 TABLET BY MOUTH AS DIRECTED UP TO 2 TABLETS BY MOUTH EVERY DAY WITH ONSET OF ARRHYTHMIA 30 tablet 1    RISEdronate (ACTONEL) 150 MG tablet Take 1 tablet (150 mg) by mouth every 30 days Take with full glass of water on empty stomach, don't lay down for 30 min 4 tablet 3     Current Facility-Administered Medications   Medication Dose Route Frequency Provider Last Rate Last Admin    lidocaine (PF) (XYLOCAINE) 1 % injection 4 mL  4 mL   Tico Ibarra, DO   4 mL at 04/01/22 1658    triamcinolone (KENALOG-40) injection 40 mg  40 mg   Tico Ibarra, DO   40 mg at 04/01/22 1658       Allergies   Allergen Reactions    Ibuprofen Other (See Comments)    Nsaids GI Disturbance    Statins      Made her lips tingle, so they put her on another statin instead.    Liquid Adhesive Rash     After wearing patches for > 8 days  After wearing patches for > 8 days       Past Medical History:   Diagnosis Date    Atrial fibrillation (H)     ablated 1/12    Ex-smoker     Hashimoto's disease     Hyperlipidaemia     Hypertension     ISABELA (obstructive sleep apnea) 3/12    AHI 7    PAC (premature atrial contraction)     PVC (premature ventricular contraction)         Review of Systems  ROS:  Specifically  "negative for bowel/bladder dysfunction, balance changes, headache, dizziness, foot drop, fevers, chills, appetite changes, nausea/vomiting, unexplained weight loss. Otherwise 13 systems reviewed are negative. Please see the patient's intake questionnaire from today for details.    Reviewed Social, Family, Past Medical and Past Surgical history with patient, no significant changes noted since prior visit.     Objective:     Ht 5' 5.5\" (1.664 m)   Wt 226 lb (102.5 kg)   BMI 37.04 kg/m      PHYSICAL EXAMINATION:    --CONSTITUTIONAL: Well developed, well nourished, healthy appearing individual.  --PSYCHIATRIC: Appropriate mood and affect. No difficulty interacting due to temper, social withdrawal, or memory issues.      RESULTS:   Imaging: Spine imaging was reviewed today. The images were shown to the patient and the findings were explained using a spine model.      Lumbar spine MRI reviewed                    "

## 2024-06-21 ENCOUNTER — MYC MEDICAL ADVICE (OUTPATIENT)
Dept: INTERNAL MEDICINE | Facility: CLINIC | Age: 74
End: 2024-06-21
Payer: COMMERCIAL

## 2024-07-02 ENCOUNTER — OFFICE VISIT (OUTPATIENT)
Dept: INTERNAL MEDICINE | Facility: CLINIC | Age: 74
End: 2024-07-02
Payer: COMMERCIAL

## 2024-07-02 VITALS
OXYGEN SATURATION: 96 % | WEIGHT: 227.5 LBS | BODY MASS INDEX: 37.9 KG/M2 | DIASTOLIC BLOOD PRESSURE: 83 MMHG | HEART RATE: 96 BPM | HEIGHT: 65 IN | SYSTOLIC BLOOD PRESSURE: 127 MMHG | TEMPERATURE: 97.8 F | RESPIRATION RATE: 16 BRPM

## 2024-07-02 DIAGNOSIS — D17.30 LIPOMA OF SKIN AND SUBCUTANEOUS TISSUE: Primary | ICD-10-CM

## 2024-07-02 PROCEDURE — 99213 OFFICE O/P EST LOW 20 MIN: CPT | Mod: GC

## 2024-07-02 NOTE — PROGRESS NOTES
"  Assessment & Plan     Lipoma of skin and subcutaneous tissue  Freely mobile superficial, non-tender, non-erythematous mass slightly distal to R antecubital fossa. Non-pulsatile, pt reports possibly has had a lipoma on her back in the past. No acute intervention today, pt informed that if mass becomes painful, red, or begins growing to rtc. Otherwise, will continue to monitor  -US R upper extremity, non-vascular.      This patient has been staffed with Dr. Kimmie Ta  PGY-1  Internal Medicine  [facility]      Attending Addendum:  Patient seen and examined with resident in clinic today.  Pertinent portions of history and exam were independently verified by myself.  I agree with the exam and plan as outlined above with the following modifications: none.  Danii Burks MD  Internal Medicine      BMI  Estimated body mass index is 37.86 kg/m  as calculated from the following:    Height as of this encounter: 1.651 m (5' 5\").    Weight as of this encounter: 103.2 kg (227 lb 8 oz).       No follow-ups on file.    Prasanna Encarnacion is a 73 year old, presenting for the following health issues:  Mass (Right elbow, noticed it about a week ago, denies pain and tenderness)      7/2/2024     7:18 AM   Additional Questions   Roomed by claritza     Pt here reporting a newly found mass slightly distal to her R antecubital fossa. States she found it about 1.5 weeks ago, no insect bites, injuries, or recent illnesses at that time. No recent change in activity (pt regularly does water aerobics). States the mass is painless and denies any erythema or palpable heat around mass. No known environmental allergies with skin manifestations (pt does have environmental allergies). Pt denies any history of masses similar to this.     History of Present Illness       Reason for visit:  Lump on arm  Symptom onset:  1-2 weeks ago  Symptoms include:  Lump on inner side of my right elbow  Symptom intensity:  Mild  Symptom progression:  Staying " "the same  Had these symptoms before:  No  What makes it worse:  No  What makes it better:  No    She eats 2-3 servings of fruits and vegetables daily.She consumes 0 sweetened beverage(s) daily.She exercises with enough effort to increase her heart rate 60 or more minutes per day.  She exercises with enough effort to increase her heart rate 5 days per week.   She is taking medications regularly.             Objective    /83 (BP Location: Right arm, Patient Position: Sitting, Cuff Size: Adult Regular)   Pulse 96   Temp 97.8  F (36.6  C) (Oral)   Resp 16   Ht 1.651 m (5' 5\")   Wt 103.2 kg (227 lb 8 oz)   SpO2 96%   Breastfeeding No   BMI 37.86 kg/m    Body mass index is 37.86 kg/m .  Physical Exam  Constitutional:       Appearance: Normal appearance.   HENT:      Head: Normocephalic and atraumatic.      Nose: Nose normal.   Eyes:      Extraocular Movements: Extraocular movements intact.      Conjunctiva/sclera: Conjunctivae normal.      Pupils: Pupils are equal, round, and reactive to light.   Cardiovascular:      Rate and Rhythm: Normal rate and regular rhythm.      Pulses: Normal pulses.   Pulmonary:      Effort: Pulmonary effort is normal.      Breath sounds: Normal breath sounds.   Skin:     General: Skin is warm and dry.      Capillary Refill: Capillary refill takes less than 2 seconds.      Findings: Lesion present.      Comments: Freely mobile superficial mass of 2.5 cm diameter slightly distal to R antecubital fossa. No tenderness, erythema, or palpable warmth.   Neurological:      Mental Status: She is alert.              Signed Electronically by: Angelo Ta MD    "

## 2024-07-05 ENCOUNTER — TELEPHONE (OUTPATIENT)
Dept: INTERNAL MEDICINE | Facility: CLINIC | Age: 74
End: 2024-07-05
Payer: COMMERCIAL

## 2024-07-05 NOTE — TELEPHONE ENCOUNTER
Left Voicemail (1st Attempt) for the patient to call back and schedule the following:    Appointment type: US-   Provider: per PCP  Return date: Per Pt  Specialty phone number: [phone]  Additional appointment(s) needed: -  Additonal Notes: CHRISTOPHER w imaging number for patient to return call schedule US order placed

## 2024-07-08 ENCOUNTER — VIRTUAL VISIT (OUTPATIENT)
Dept: URGENT CARE | Facility: CLINIC | Age: 74
End: 2024-07-08
Payer: COMMERCIAL

## 2024-07-08 DIAGNOSIS — U07.1 INFECTION DUE TO 2019 NOVEL CORONAVIRUS: Primary | ICD-10-CM

## 2024-07-08 PROCEDURE — 99213 OFFICE O/P EST LOW 20 MIN: CPT | Mod: 95

## 2024-07-08 NOTE — PROGRESS NOTES
"Alysha is a 73 year old who is being evaluated via a billable video visit.    How would you like to obtain your AVS? MyChart  If the video visit is dropped, the invitation should be resent by: Text to cell phone: [phone]  Will anyone else be joining your video visit? No      Assessment & Plan   Problem List Items Addressed This Visit    None  Visit Diagnoses       Infection due to 2019 novel coronavirus    -  Primary    Relevant Medications    nirmatrelvir and ritonavir (PAXLOVID) 300 mg/100 mg therapy pack                  BMI  Estimated body mass index is 37.86 kg/m  as calculated from the following:    Height as of 7/2/24: 1.651 m (5' 5\").    Weight as of 7/2/24: 103.2 kg (227 lb 8 oz).       COVID-19 positive patient.  Encounter for consideration of medication intervention. Patient does qualify for a prescription. Full discussion with patient including medication options, risks and benefits. Potential drug interactions reviewed with patient.     Treatment Planned Paxlovid, Rx sent to [facility]    Temporary change to home medications: hold statin, reduce eliquis and norvasc by 50%    Estimated body mass index is 37.86 kg/m  as calculated from the following:    Height as of 7/2/24: 1.651 m (5' 5\").    Weight as of 7/2/24: 103.2 kg (227 lb 8 oz).  GFR Estimate   Date Value Ref Range Status   05/14/2024 82 >60 mL/min/1.73m2 Final   12/29/2019 79 >60 mL/min/[1.73_m2] Final     Comment:     Non  GFR Calc  Starting 12/18/2018, serum creatinine based estimated GFR (eGFR) will be   calculated using the Chronic Kidney Disease Epidemiology Collaboration   (CKD-EPI) equation.       Lab Results   Component Value Date    [id number]  03/27/2021     Test received-See reflex to IDDL test SARS CoV2 (COVID-19) Virus RT-PCR    [id number] NEGATIVE 03/27/2021       No follow-ups on file.      Subjective   Alysha is a 73 year old, presenting for the following health issues:  No chief complaint on file.       "  7/2/2024     7:18 AM   Additional Questions   Roomed by claritza RAMOSID-19 Symptom Review  How many days ago did these symptoms start? Today     Are any of the following symptoms significant for you?  New or worsening difficulty breathing? No  Worsening cough? No  Fever or chills? No  Headache: No  Sore throat: No  Chest pain: No  Diarrhea: No  Body aches? No    What treatments has patient tried?    Does patient live in a nursing home, group home, or shelter? No  Does patient have a way to get food/medications during quarantined? Yes, I have a friend or family member who can help me.                Review of Systems  Constitutional, HEENT, cardiovascular, pulmonary, GI, , musculoskeletal, neuro, skin, endocrine and psych systems are negative, except as otherwise noted.      Objective           Vitals:  No vitals were obtained today due to virtual visit.    Physical Exam   GENERAL: alert and no distress  EYES: Eyes grossly normal to inspection.  No discharge or erythema, or obvious scleral/conjunctival abnormalities.  RESP: No audible wheeze, cough, or visible cyanosis.    SKIN: Visible skin clear. No significant rash, abnormal pigmentation or lesions.  NEURO: Cranial nerves grossly intact.  Mentation and speech appropriate for age.  PSYCH: Appropriate affect, tone, and pace of words    Current Outpatient Medications:     amLODIPine (NORVASC) 10 MG tablet, Take 1 tablet (10 mg) by mouth daily **Labs needed for further refills. Call [phone].**, Disp: 90 tablet, Rfl: 0    apixaban ANTICOAGULANT (ELIQUIS ANTICOAGULANT) 5 MG tablet, TAKE 1 TABLET(5 MG) BY MOUTH TWICE DAILY, Disp: 180 tablet, Rfl: 0    Calcium-Vitamin D-Vitamin K (VIACTIV PO), Take by mouth 2 times daily , Disp: , Rfl:     fish oil-omega-3 fatty acids (FISH OIL) 1000 MG capsule, One capsule daily., Disp: , Rfl:     hydrochlorothiazide (HYDRODIURIL) 25 MG tablet, Take 1 tablet (25 mg) by mouth daily **Labs needed for further refills. Call  [phone].**, Disp: 90 tablet, Rfl: 0    loratadine (CLARITIN) 10 MG tablet, Take 10 mg by mouth daily, Disp: , Rfl:     losartan (COZAAR) 100 MG tablet, TAKE 1 TABLET(100 MG) BY MOUTH DAILY, Disp: 90 tablet, Rfl: 1    lovastatin (MEVACOR) 40 MG tablet, Take 1 tablet (40 mg) by mouth At Bedtime, Disp: 90 tablet, Rfl: 3    metoprolol succinate ER (TOPROL XL) 50 MG 24 hr tablet, TAKE 1 AND 1/2 TABLETS(75 MG) BY MOUTH DAILY, Disp: 135 tablet, Rfl: 0    metoprolol tartrate (LOPRESSOR) 25 MG tablet, TAKE 1 TABLET BY MOUTH AS DIRECTED UP TO 2 TABLETS BY MOUTH EVERY DAY WITH ONSET OF ARRHYTHMIA, Disp: 30 tablet, Rfl: 1    RISEdronate (ACTONEL) 150 MG tablet, Take 1 tablet (150 mg) by mouth every 30 days Take with full glass of water on empty stomach, don't lay down for 30 min, Disp: 4 tablet, Rfl: 3    Current Facility-Administered Medications:     lidocaine (PF) (XYLOCAINE) 1 % injection 4 mL, 4 mL, , , Tico Ibarra DO, 4 mL at 04/01/22 1658    triamcinolone (KENALOG-40) injection 40 mg, 40 mg, , , Tico Ibarra DO, 40 mg at 04/01/22 1658  GFR Estimate   Date Value Ref Range Status   05/14/2024 82 >60 mL/min/1.73m2 Final   12/29/2019 79 >60 mL/min/[1.73_m2] Final     Comment:     Non  GFR Calc  Starting 12/18/2018, serum creatinine based estimated GFR (eGFR) will be   calculated using the Chronic Kidney Disease Epidemiology Collaboration   (CKD-EPI) equation.               Video-Visit Details    Type of service:  Video Visit   Originating Location (pt. Location): Home    Distant Location (provider location):  Off-site  Platform used for Video Visit: Alyssa  Signed Electronically by: Virtual Urgent Care  The patient has been notified of following:

## 2024-07-22 DIAGNOSIS — I48.0 PAROXYSMAL ATRIAL FIBRILLATION (H): Primary | ICD-10-CM

## 2024-07-22 DIAGNOSIS — I48.91 ATRIAL FIBRILLATION (H): ICD-10-CM

## 2024-07-22 DIAGNOSIS — I48.4 ATYPICAL ATRIAL FLUTTER (H): ICD-10-CM

## 2024-07-22 DIAGNOSIS — I10 ESSENTIAL HYPERTENSION: ICD-10-CM

## 2024-07-25 NOTE — TELEPHONE ENCOUNTER
metoprolol succinate ER (TOPROL XL) 50 MG 24 hr tablet   135 tablet 0 4/26/2024     Last Office Visit : 4-  Future Office visit:  9-    Beta-Blockers Protocol [id number]/25/2024 10:17 AM   Protocol Details Recent (12 mo) or future (90 days) visit within the authorizing provider's specialty         Anticoagulant   apixaban ANTICOAGULANT (ELIQUIS ANTICOAGULANT) 5 MG tablet   180 tablet 0 4/26/2024     Last Office Visit : 4-  Future Office visit:  9-

## 2024-07-26 RX ORDER — AMLODIPINE BESYLATE 10 MG/1
10 TABLET ORAL DAILY
Qty: 90 TABLET | Refills: 2 | Status: SHIPPED | OUTPATIENT
Start: 2024-07-26

## 2024-07-26 RX ORDER — METOPROLOL SUCCINATE 50 MG/1
TABLET, EXTENDED RELEASE ORAL
Qty: 135 TABLET | Refills: 0 | Status: SHIPPED | OUTPATIENT
Start: 2024-07-26 | End: 2024-09-16

## 2024-07-26 RX ORDER — HYDROCHLOROTHIAZIDE 25 MG/1
25 TABLET ORAL DAILY
Qty: 90 TABLET | Refills: 2 | Status: SHIPPED | OUTPATIENT
Start: 2024-07-26

## 2024-07-26 NOTE — TELEPHONE ENCOUNTER
Prescriptions already sent in for patient.    Rosy Gomez, RN, BSN  Cardiology RN Care Coordinator   [place]   Phone: [phone]  Fax: [phone] ([place]) [phone] ([place])

## 2024-07-26 NOTE — TELEPHONE ENCOUNTER
amLODIPine (NORVASC) 10 MG tablet       Last Written Prescription Date:  4/30/24  Last Fill Quantity: 90,   # refills: 0  Last Office Visit : 7/2/24  Future Office visit:  None    Refilled per protocol      hydrochlorothiazide (HYDRODIURIL) 25 MG tablet            Last Written Prescription Date:  4/30/24  Last Fill Quantity: 90,   # refills: 0  Last Office Visit : 7/2/24  Future Office visit:  None      Refilled per protocol

## 2024-07-30 ENCOUNTER — MYC MEDICAL ADVICE (OUTPATIENT)
Dept: INTERNAL MEDICINE | Facility: CLINIC | Age: 74
End: 2024-07-30
Payer: COMMERCIAL

## 2024-08-06 ENCOUNTER — LAB (OUTPATIENT)
Dept: LAB | Facility: CLINIC | Age: 74
End: 2024-08-06
Payer: COMMERCIAL

## 2024-08-06 DIAGNOSIS — E78.2 MIXED HYPERLIPIDEMIA: ICD-10-CM

## 2024-08-06 DIAGNOSIS — K75.81 NASH (NONALCOHOLIC STEATOHEPATITIS): ICD-10-CM

## 2024-08-06 LAB
ALBUMIN SERPL BCG-MCNC: 4.1 G/DL (ref 3.5–5.2)
ALP SERPL-CCNC: 69 U/L (ref 40–150)
ALT SERPL W P-5'-P-CCNC: 41 U/L (ref 0–50)
AST SERPL W P-5'-P-CCNC: 31 U/L (ref 0–45)
BILIRUB DIRECT SERPL-MCNC: <0.2 MG/DL (ref 0–0.3)
BILIRUB SERPL-MCNC: 0.5 MG/DL
PROT SERPL-MCNC: 6.7 G/DL (ref 6.4–8.3)

## 2024-08-06 PROCEDURE — 80076 HEPATIC FUNCTION PANEL: CPT | Performed by: PATHOLOGY

## 2024-08-06 PROCEDURE — 36415 COLL VENOUS BLD VENIPUNCTURE: CPT | Performed by: PATHOLOGY

## 2024-08-07 ENCOUNTER — ANCILLARY PROCEDURE (OUTPATIENT)
Dept: ULTRASOUND IMAGING | Facility: CLINIC | Age: 74
End: 2024-08-07
Attending: INTERNAL MEDICINE
Payer: COMMERCIAL

## 2024-08-07 DIAGNOSIS — D17.30 LIPOMA OF SKIN AND SUBCUTANEOUS TISSUE: ICD-10-CM

## 2024-08-07 PROCEDURE — 76882 US LMTD JT/FCL EVL NVASC XTR: CPT | Mod: RT | Performed by: RADIOLOGY

## 2024-09-16 ENCOUNTER — VIRTUAL VISIT (OUTPATIENT)
Dept: CARDIOLOGY | Facility: CLINIC | Age: 74
End: 2024-09-16
Payer: COMMERCIAL

## 2024-09-16 VITALS — WEIGHT: 225 LBS | HEIGHT: 66 IN | BODY MASS INDEX: 36.16 KG/M2

## 2024-09-16 DIAGNOSIS — I48.4 ATYPICAL ATRIAL FLUTTER (H): ICD-10-CM

## 2024-09-16 DIAGNOSIS — I48.0 PAROXYSMAL ATRIAL FIBRILLATION (H): Primary | ICD-10-CM

## 2024-09-16 PROCEDURE — 99213 OFFICE O/P EST LOW 20 MIN: CPT | Mod: 95 | Performed by: INTERNAL MEDICINE

## 2024-09-16 RX ORDER — METOPROLOL TARTRATE 25 MG/1
TABLET, FILM COATED ORAL
Qty: 30 TABLET | Refills: 1 | Status: CANCELLED | OUTPATIENT
Start: 2024-09-16

## 2024-09-16 RX ORDER — METOPROLOL SUCCINATE 50 MG/1
TABLET, EXTENDED RELEASE ORAL
Qty: 135 TABLET | Refills: 3 | Status: SHIPPED | OUTPATIENT
Start: 2024-09-16

## 2024-09-16 ASSESSMENT — PAIN SCALES - GENERAL: PAINLEVEL: MODERATE PAIN (4)

## 2024-09-16 NOTE — NURSING NOTE
Current patient location: [address] [place] [id number]    Is the patient currently in the state of [place]? YES    Visit mode:VIDEO    If the visit is dropped, the patient can be reconnected by: VIDEO VISIT: Text to cell phone:   Telephone Information:   Mobile [phone]       Will anyone else be joining the visit? NO  (If patient encounters technical issues they should call [phone]    How would you like to obtain your AVS? MyChart    Are changes needed to the allergy or medication list? No    Are refills needed on medications prescribed by this physician? YES    Rooming Documentation:  Questionnaire(s) completed      Reason for visit: NAY SANDERSON

## 2024-09-16 NOTE — PATIENT INSTRUCTIONS
Thank you for coming to the [facility] [place]; please note the following instructions:    1. Dr. Tyra Norris recommends to follow up in 1 year virtually.  The cardiology team will contact you to schedule when the time gets closer.          If you have any questions regarding your visit please contact your care team:     Cardiology  Telephone Number   Meka JIN., RN  Rosy FRIAS, RN  Eboni GONSALEZ, RN  Yen MEJIA, RMA  Kiesha CRAIG, RMBRE DENIS, Visit Facilitator  Ashley [facility]STON [facility] [phone] (option 1)   For scheduling appts:     [phone] (select option 1)       For the Device Clinic (Pacemakers and ICD's)  RN's :  Amara Marquis   During business hours: [phone]    *After business hours:  [phone] (select option 4)      Normal test result notifications will be released via e[employer] or mailed within 7 business days.  All other test results, will be communicated via telephone once reviewed by your cardiologist.    If you need a medication refill please contact your pharmacy.  Please allow 3 business days for your refill to be completed.    As always, thank you for trusting us with your health care needs!

## 2024-09-16 NOTE — LETTER
9/16/2024      RE: Alysha Youngblood  [address] [place] [id number]       Dear Colleague,    Thank you for the opportunity to participate in the care of your patient, Alysha Youngblood, at the [facility]. Please see a copy of my visit note below.    Virtual Visit Details    Type of service:  Video Visit     Originating Location (pt. 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 The patient is status post ablation for paroxysmal atrial fibrillation in 06/2011 as well as another AFib ablation and atrial flutter ablation in 01/2012.  Both ablations were done at [facility].  The patient's last visit with me was in April 2023.     In the last 1 year, patient has been doing well from an atrial fibrillation standpoint.  She does water aerobics several days a week.  She did not report any symptoms of prolonged palpitations, exertional dyspnea, exertional angina, frequent lightheadedness, presyncope or syncope.  There have been occasions where she felt some symptoms but they are likely due to dehydration from not maintaining adequate hydration before her water aerobics exercise.    There was also 1 episode at the end of August this year when she felt a higher heart rate of 98 to 100 bpm.  This episode was 1-2 metoprolol tartrate tablets.  Overall, she is satisfied with the control of her atrial fibrillation and atrial flutter.      PAST MEDICAL HISTORY:  Past Medical History:   Diagnosis Date     Atrial fibrillation (H)     ablated 1/12     Ex-smoker      Hashimoto's disease      Hyperlipidaemia      Hypertension       ISABELA (obstructive sleep apnea) 3/12    AHI 7     PAC (premature atrial contraction)      PVC (premature ventricular contraction)        CURRENT MEDICATIONS:  Current Outpatient Medications   Medication Sig Dispense Refill     amLODIPine (NORVASC) 10 MG tablet TAKE 1 TABLET BY MOUTH DAILY 90 tablet 2     apixaban ANTICOAGULANT (ELIQUIS ANTICOAGULANT) 5 MG tablet Take 1 tablet (5 mg) by mouth 2 times daily 180 tablet 0     Calcium-Vitamin D-Vitamin K (VIACTIV PO) Take by mouth 2 times daily        fish oil-omega-3 fatty acids (FISH OIL) 1000 MG capsule One capsule daily.       hydrochlorothiazide (HYDRODIURIL) 25 MG tablet TAKE 1 TABLET BY MOUTH DAILY 90 tablet 2     loratadine (CLARITIN) 10 MG tablet Take 10 mg by mouth daily       losartan (COZAAR) 100 MG tablet TAKE 1 TABLET(100 MG) BY MOUTH DAILY 90 tablet 1     lovastatin (MEVACOR) 40 MG tablet Take 1 tablet (40 mg) by mouth At Bedtime 90 tablet 3     metoprolol succinate ER (TOPROL XL) 50 MG 24 hr tablet TAKE 1 AND 1/2 TABLETS(75 MG) BY MOUTH DAILY 135 tablet 0     metoprolol tartrate (LOPRESSOR) 25 MG tablet TAKE 1 TABLET BY MOUTH AS DIRECTED UP TO 2 TABLETS BY MOUTH EVERY DAY WITH ONSET OF ARRHYTHMIA 30 tablet 1     RISEdronate (ACTONEL) 150 MG tablet Take 1 tablet (150 mg) by mouth every 30 days Take with full glass of water on empty stomach, don't lay down for 30 min 4 tablet 3       PAST SURGICAL HISTORY:  Past Surgical History:   Procedure Laterality Date     APPENDECTOMY       COLONOSCOPY N/A 4/12/2024    Procedure: COLONOSCOPY, WITH POLYPECTOMY;  Surgeon: Naldo Herrera MD;  Location: [facility] OR     ENT SURGERY       H ABLATION FOCAL AFIB  6/24/2011    [facility].      H ABLATION FOCAL AFIB  1/12/2012    [facility]. afib and aflutter ablations.        ALLERGIES:     Allergies   Allergen Reactions     Ibuprofen Other (See Comments)     Nsaids GI Disturbance     Statins      Made her lips tingle, so they put her on another statin instead.     Liquid Adhesive  "Rash     After wearing patches for > 8 days  After wearing patches for > 8 days       FAMILY HISTORY:  - Premature coronary artery disease  - Atrial fibrillation  - Sudden cardiac death     SOCIAL HISTORY:  Social History     Tobacco Use     Smoking status: Former     Current packs/day: 0.00     Types: Cigarettes     Quit date:      Years since quittin.7     Passive exposure: Never     Smokeless tobacco: Never   Vaping Use     Vaping status: Never Used   Substance Use Topics     Alcohol use: Yes     Alcohol/week: 14.0 standard drinks of alcohol     Types: 14 Shots of liquor per week     Drug use: No       ROS:   Constitutional: No fever, chills, or sweats. Weight stable.   ENT: No visual disturbance, ear ache, epistaxis, sore throat.   Cardiovascular: As per HPI.   Respiratory: No cough, hemoptysis.    GI: No nausea, vomiting, hematemesis, melena, or hematochezia.   : No hematuria.   Integument: Negative.   Psychiatric: Negative.   Hematologic:  Easy bruising, no easy bleeding.  Neuro: Negative.   Endocrinology: No significant heat or cold intolerance   Musculoskeletal: No myalgia.    Exam:  Ht 1.676 m (5' 6\")   Wt 102.1 kg (225 lb)   BMI 36.32 kg/m    GENERAL APPEARANCE: healthy, alert and no distress  HEENT: no icterus, no xanthelasmas, normal pupil size and reaction, normal palate, mucosa moist, no central cyanosis  NECK: no adenopathy, no asymmetry, masses, or scars, thyroid normal to palpation and no bruits, JVP not elevated  RESPIRATORY: lungs clear to auscultation - no rales, rhonchi or wheezes, no use of accessory muscles, no retractions, respirations are unlabored, normal respiratory rate  CARDIOVASCULAR: regular rhythm, normal S1 with physiologic split S2, no S3 or S4 and no murmur, click or rub, precordium quiet with normal PMI.  ABDOMEN: soft, non tender, without hepatosplenomegaly, no masses palpable, bowel sounds normal, aorta not enlarged by palpation, no abdominal bruits  EXTREMITIES: " peripheral pulses normal, no edema, no bruits  NEURO: alert and oriented to person/place/time, normal speech, gait and affect  VASC: Radial, femoral, dorsalis pedis and posterior tibialis pulses are normal in volumes and symmetric bilaterally. No bruits are heard.  SKIN: no ecchymoses, no rashes    Labs:  CBC RESULTS:   Lab Results   Component Value Date    WBC 6.6 05/14/2024    WBC 6.0 12/29/2019    RBC 4.72 05/14/2024    RBC 5.26 (H) 12/29/2019    HGB 14.3 05/14/2024    HGB 15.6 12/29/2019    HCT 42.7 05/14/2024    HCT 47.5 (H) 12/29/2019    MCV 91 05/14/2024    MCV 90 12/29/2019    MCH 30.3 05/14/2024    MCH 29.7 12/29/2019    MCHC 33.5 05/14/2024    MCHC 32.8 12/29/2019    RDW 12.2 05/14/2024    RDW 12.4 12/29/2019     05/14/2024     12/29/2019       BMP RESULTS:  Lab Results   Component Value Date     05/14/2024     12/29/2019    POTASSIUM 3.9 05/14/2024    POTASSIUM 3.5 05/04/2022    POTASSIUM 3.8 12/29/2019    CHLORIDE 99 05/14/2024    CHLORIDE 102 05/04/2022    CHLORIDE 102 12/29/2019    CO2 24 05/14/2024    CO2 30 05/04/2022    CO2 30 12/29/2019    ANIONGAP 13 05/14/2024    ANIONGAP 8 05/04/2022    ANIONGAP 4 12/29/2019     (H) 05/14/2024     (H) 05/04/2022     (H) 12/29/2019    BUN 16.1 05/14/2024    BUN 15 05/04/2022    BUN 24 12/29/2019    CR 0.77 05/14/2024    CR 0.77 12/29/2019    GFRESTIMATED 82 05/14/2024    GFRESTIMATED 79 12/29/2019    GFRESTBLACK >90 12/29/2019    JANAE 9.3 05/14/2024    JANAE 9.5 12/29/2019        INR RESULTS:  Lab Results   Component Value Date    INR 1.19 (H) 07/07/2018    INR 0.94 02/28/2011    INR 0.90 02/27/2011       Procedures:      Assessment and Plan:   Paroxysmal atrial fibrillation status post ablation  Status post ablation for atrial flutter     It is encouraging that the patient has been doing well from an atrial arrhythmia standpoint.  We will continue current medications.      We will see patient again by video in  approximately 1 year.  All questions and concerns were addressed and patient was happy with the plan.    CC  Patient Care Team:  Danii Burks MD as PCP - General (Internal Medicine)  Kiesha Kamara MD as MD (Internal Medicine)  Lanre Ramirez MD as MD (Dermapathology)  Tyra Norris MD as MD (Cardiology)  Faina Peace MD as MD (Internal Medicine)  Danii Ocampo MD (Inactive) as MD (OB/Gyn)  Lanre Ramirez MD as MD (Dermapathology)  Faina Rowell APRN CNP as Nurse Practitioner  Slim Herrera MD as MD (Family Practice)  Livier Land, RN as Specialty Care Coordinator (Cardiology)  Tyra Norris MD as Assigned Heart and Vascular Provider  Danii Burks MD as Assigned PCP  Miki Baer MD as MD (Ophthalmology)  Hanna Quiñonez NP as Assigned Surgical Provider  Laurel Wynn CNP as Assigned Neuroscience Provider  Angelo Ta MD as Resident (Internal Medicine)  KIESHA KAMARA        Please do not hesitate to contact me if you have any questions/concerns.     Sincerely,     Tyra Norris MD

## 2024-09-16 NOTE — PROGRESS NOTES
Virtual Visit Details    Type of service:  Video Visit     Originating Location (pt. 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 The patient is status post ablation for paroxysmal atrial fibrillation in 06/2011 as well as another AFib ablation and atrial flutter ablation in 01/2012.  Both ablations were done at [facility].  The patient's last visit with me was in April 2023.     In the last 1 year, patient has been doing well from an atrial fibrillation standpoint.  She does water aerobics several days a week.  She did not report any symptoms of prolonged palpitations, exertional dyspnea, exertional angina, frequent lightheadedness, presyncope or syncope.  There have been occasions where she felt some symptoms but they are likely due to dehydration from not maintaining adequate hydration before her water aerobics exercise.    There was also 1 episode at the end of August this year when she felt a higher heart rate of 98 to 100 bpm.  This episode was 1-2 metoprolol tartrate tablets.  Overall, she is satisfied with the control of her atrial fibrillation and atrial flutter.      PAST MEDICAL HISTORY:  Past Medical History:   Diagnosis Date    Atrial fibrillation (H)     ablated 1/12    Ex-smoker     Hashimoto's disease     Hyperlipidaemia     Hypertension     ISABELA (obstructive sleep apnea) 3/12    AHI 7    PAC (premature atrial contraction)     PVC (premature ventricular contraction)        CURRENT MEDICATIONS:  Current Outpatient Medications   Medication Sig Dispense Refill    amLODIPine (NORVASC) 10 MG tablet TAKE 1 TABLET BY MOUTH DAILY 90 tablet 2    apixaban ANTICOAGULANT (ELIQUIS ANTICOAGULANT) 5  MG tablet Take 1 tablet (5 mg) by mouth 2 times daily 180 tablet 0    Calcium-Vitamin D-Vitamin K (VIACTIV PO) Take by mouth 2 times daily       fish oil-omega-3 fatty acids (FISH OIL) 1000 MG capsule One capsule daily.      hydrochlorothiazide (HYDRODIURIL) 25 MG tablet TAKE 1 TABLET BY MOUTH DAILY 90 tablet 2    loratadine (CLARITIN) 10 MG tablet Take 10 mg by mouth daily      losartan (COZAAR) 100 MG tablet TAKE 1 TABLET(100 MG) BY MOUTH DAILY 90 tablet 1    lovastatin (MEVACOR) 40 MG tablet Take 1 tablet (40 mg) by mouth At Bedtime 90 tablet 3    metoprolol succinate ER (TOPROL XL) 50 MG 24 hr tablet TAKE 1 AND 1/2 TABLETS(75 MG) BY MOUTH DAILY 135 tablet 0    metoprolol tartrate (LOPRESSOR) 25 MG tablet TAKE 1 TABLET BY MOUTH AS DIRECTED UP TO 2 TABLETS BY MOUTH EVERY DAY WITH ONSET OF ARRHYTHMIA 30 tablet 1    RISEdronate (ACTONEL) 150 MG tablet Take 1 tablet (150 mg) by mouth every 30 days Take with full glass of water on empty stomach, don't lay down for 30 min 4 tablet 3       PAST SURGICAL HISTORY:  Past Surgical History:   Procedure Laterality Date    APPENDECTOMY      COLONOSCOPY N/A 2024    Procedure: COLONOSCOPY, WITH POLYPECTOMY;  Surgeon: Naldo Herrera MD;  Location: [facility] OR    ENT SURGERY      H ABLATION FOCAL AFIB  2011    [place].     H ABLATION FOCAL AFIB  2012    [place]. afib and aflutter ablations.        ALLERGIES:     Allergies   Allergen Reactions    Ibuprofen Other (See Comments)    Nsaids GI Disturbance    Statins      Made her lips tingle, so they put her on another statin instead.    Liquid Adhesive Rash     After wearing patches for > 8 days  After wearing patches for > 8 days       FAMILY HISTORY:  - Premature coronary artery disease  - Atrial fibrillation  - Sudden cardiac death     SOCIAL HISTORY:  Social History     Tobacco Use    Smoking status: Former     Current packs/day: 0.00     Types: Cigarettes     Quit date:      Years since quittin.7      "Passive exposure: Never    Smokeless tobacco: Never   Vaping Use    Vaping status: Never Used   Substance Use Topics    Alcohol use: Yes     Alcohol/week: 14.0 standard drinks of alcohol     Types: 14 Shots of liquor per week    Drug use: No       ROS:   Constitutional: No fever, chills, or sweats. Weight stable.   ENT: No visual disturbance, ear ache, epistaxis, sore throat.   Cardiovascular: As per HPI.   Respiratory: No cough, hemoptysis.    GI: No nausea, vomiting, hematemesis, melena, or hematochezia.   : No hematuria.   Integument: Negative.   Psychiatric: Negative.   Hematologic:  Easy bruising, no easy bleeding.  Neuro: Negative.   Endocrinology: No significant heat or cold intolerance   Musculoskeletal: No myalgia.    Exam:  Ht 1.676 m (5' 6\")   Wt 102.1 kg (225 lb)   BMI 36.32 kg/m    GENERAL APPEARANCE: healthy, alert and no distress  HEENT: no icterus, no xanthelasmas, normal pupil size and reaction, normal palate, mucosa moist, no central cyanosis  NECK: no adenopathy, no asymmetry, masses, or scars, thyroid normal to palpation and no bruits, JVP not elevated  RESPIRATORY: lungs clear to auscultation - no rales, rhonchi or wheezes, no use of accessory muscles, no retractions, respirations are unlabored, normal respiratory rate  CARDIOVASCULAR: regular rhythm, normal S1 with physiologic split S2, no S3 or S4 and no murmur, click or rub, precordium quiet with normal PMI.  ABDOMEN: soft, non tender, without hepatosplenomegaly, no masses palpable, bowel sounds normal, aorta not enlarged by palpation, no abdominal bruits  EXTREMITIES: peripheral pulses normal, no edema, no bruits  NEURO: alert and oriented to person/place/time, normal speech, gait and affect  VASC: Radial, femoral, dorsalis pedis and posterior tibialis pulses are normal in volumes and symmetric bilaterally. No bruits are heard.  SKIN: no ecchymoses, no rashes    Labs:  CBC RESULTS:   Lab Results   Component Value Date    WBC 6.6 " 05/14/2024    WBC 6.0 12/29/2019    RBC 4.72 05/14/2024    RBC 5.26 (H) 12/29/2019    HGB 14.3 05/14/2024    HGB 15.6 12/29/2019    HCT 42.7 05/14/2024    HCT 47.5 (H) 12/29/2019    MCV 91 05/14/2024    MCV 90 12/29/2019    MCH 30.3 05/14/2024    MCH 29.7 12/29/2019    MCHC 33.5 05/14/2024    MCHC 32.8 12/29/2019    RDW 12.2 05/14/2024    RDW 12.4 12/29/2019     05/14/2024     12/29/2019       BMP RESULTS:  Lab Results   Component Value Date     05/14/2024     12/29/2019    POTASSIUM 3.9 05/14/2024    POTASSIUM 3.5 05/04/2022    POTASSIUM 3.8 12/29/2019    CHLORIDE 99 05/14/2024    CHLORIDE 102 05/04/2022    CHLORIDE 102 12/29/2019    CO2 24 05/14/2024    CO2 30 05/04/2022    CO2 30 12/29/2019    ANIONGAP 13 05/14/2024    ANIONGAP 8 05/04/2022    ANIONGAP 4 12/29/2019     (H) 05/14/2024     (H) 05/04/2022     (H) 12/29/2019    BUN 16.1 05/14/2024    BUN 15 05/04/2022    BUN 24 12/29/2019    CR 0.77 05/14/2024    CR 0.77 12/29/2019    GFRESTIMATED 82 05/14/2024    GFRESTIMATED 79 12/29/2019    GFRESTBLACK >90 12/29/2019    JANAE 9.3 05/14/2024    JANAE 9.5 12/29/2019        INR RESULTS:  Lab Results   Component Value Date    INR 1.19 (H) 07/07/2018    INR 0.94 02/28/2011    INR 0.90 02/27/2011       Procedures:      Assessment and Plan:   Paroxysmal atrial fibrillation status post ablation  Status post ablation for atrial flutter     It is encouraging that the patient has been doing well from an atrial arrhythmia standpoint.  We will continue current medications.      We will see patient again by video in approximately 1 year.  All questions and concerns were addressed and patient was happy with the plan.    CC  Patient Care Team:  Danii Burks MD as PCP - General (Internal Medicine)  Kiesha Cohen MD as MD (Internal Medicine)  Lanre Ramirez MD as MD (Dermapathology)  Tyra Norris MD as MD (Cardiology)  Faina Peace MD as MD (Internal Medicine)  Damaris  Danii Sampson MD (Inactive) as MD (OB/Gyn)  Lanre Ramirez MD as MD (Dermapathology)  Faina Rowell APRN CNP as Nurse Practitioner  Slim Herrera MD as MD (Family Practice)  Livier Land, RN as Specialty Care Coordinator (Cardiology)  Tyra Norris MD as Assigned Heart and Vascular Provider  Danii Burks MD as Assigned PCP  Miki Baer MD as MD (Ophthalmology)  Hanna Quiñonez NP as Assigned Surgical Provider  Laurel Wynn CNP as Assigned Neuroscience Provider  Angelo Ta MD as Resident (Internal Medicine)  SHELLEY KAMARA

## 2024-09-19 ENCOUNTER — MYC MEDICAL ADVICE (OUTPATIENT)
Dept: CARDIOLOGY | Facility: CLINIC | Age: 74
End: 2024-09-19
Payer: COMMERCIAL

## 2024-09-19 DIAGNOSIS — I48.4 ATYPICAL ATRIAL FLUTTER (H): ICD-10-CM

## 2024-09-19 DIAGNOSIS — I48.0 PAROXYSMAL ATRIAL FIBRILLATION (H): ICD-10-CM

## 2024-09-20 NOTE — TELEPHONE ENCOUNTER
Pending Prescriptions:                       Disp   Refills    metoprolol tartrate (LOPRESSOR) 25 MG tab*30 tab*1            Sig: TAKE 1 TABLET BY MOUTH AS DIRECTED UP TO 2           TABLETS BY MOUTH EVERY DAY WITH ONSET OF           ARRHYTHMIA

## 2024-09-20 NOTE — TELEPHONE ENCOUNTER
metoprolol tartrate (LOPRESSOR) 25 MG tablet         Sig: TAKE 1 TABLET BY MOUTH AS DIRECTED UP TO 2 TABLETS BY MOUTH EVERY DAY WITH ONSET OF ARRHYTHMIA    Disp: 30 tablet    Refills: 1    Start: 9/20/2024    Class: E-Prescribe    For: Paroxysmal atrial fibrillation, Atypical atrial flutter    Last ordered: 4 months ago (5/22/2024) by Tyra Norris MD    Beta-Blockers Protocol [id number]/2024 05:14 PM   Protocol Details Blood pressure under 140/90 in past 12 months    Patient is age 6 or older    Medication is active on med list    Medication indicated for associated diagnosis    Recent (12 mo) or future (90 days) visit within the authorizing provider's specialty      To be filled at: None     Patient saw Dr. Norris on 9/16/24. Patient was to continue current medications. Patient was to follow up in 1 year. Confirming pharmacy for patient. [employer] message sent to patient.    Rosy Gomez, RN, BSN  Cardiology RN Care Coordinator   [place]   Phone: [phone]  Fax: [phone] ([place]) [phone] ([place])

## 2024-09-23 RX ORDER — METOPROLOL TARTRATE 25 MG/1
TABLET, FILM COATED ORAL
Qty: 30 TABLET | Refills: 1 | Status: SHIPPED | OUTPATIENT
Start: 2024-09-23

## 2024-10-19 DIAGNOSIS — I10 ESSENTIAL HYPERTENSION: ICD-10-CM

## 2024-10-25 RX ORDER — LOSARTAN POTASSIUM 100 MG/1
100 TABLET ORAL DAILY
Qty: 90 TABLET | Refills: 2 | Status: SHIPPED | OUTPATIENT
Start: 2024-10-25

## 2024-10-25 NOTE — TELEPHONE ENCOUNTER
losartan (COZAAR) 100 MG tablet 90 tablet 1 4/25/2024     Last Office Visit: 9/16/24  Future Office visit:   none    Afshan Rushing RN  P Central Nursing/Red Flag Triage & Med Refill Team

## 2024-10-26 ENCOUNTER — HEALTH MAINTENANCE LETTER (OUTPATIENT)
Age: 74
End: 2024-10-26

## 2024-12-17 ENCOUNTER — MYC MEDICAL ADVICE (OUTPATIENT)
Dept: CARDIOLOGY | Facility: CLINIC | Age: 74
End: 2024-12-17
Payer: COMMERCIAL

## 2024-12-23 NOTE — TELEPHONE ENCOUNTER
I do not know the answer.  Regarding intolerance to statin, please refer patient to Dr. Gonzalez.     Meka Lopez, Tyra Cano MD5 days ago     KH  Pt wants to start COQ10.  On lovastatin-primary care follows but patient is wanting your recommendation.  Meka Carl message sent to patient.    Rosy Gomez RN, BSN  Cardiology RN Care Coordinator   [place]   Phone: [phone]  Fax: [phone] ([facility]) [phone] ([place])

## 2024-12-26 ENCOUNTER — MYC REFILL (OUTPATIENT)
Dept: INTERNAL MEDICINE | Facility: CLINIC | Age: 74
End: 2024-12-26
Payer: COMMERCIAL

## 2024-12-26 DIAGNOSIS — E78.2 MIXED HYPERLIPIDEMIA: ICD-10-CM

## 2024-12-26 DIAGNOSIS — E78.5 HYPERLIPIDEMIA: ICD-10-CM

## 2024-12-26 RX ORDER — LOVASTATIN 40 MG/1
40 TABLET ORAL AT BEDTIME
Qty: 90 TABLET | Refills: 3 | Status: SHIPPED | OUTPATIENT
Start: 2024-12-26

## 2024-12-31 ENCOUNTER — LAB (OUTPATIENT)
Dept: LAB | Facility: CLINIC | Age: 74
End: 2024-12-31
Payer: COMMERCIAL

## 2024-12-31 DIAGNOSIS — E78.5 HYPERLIPIDEMIA: ICD-10-CM

## 2024-12-31 LAB
CHOLEST SERPL-MCNC: 212 MG/DL
FASTING STATUS PATIENT QL REPORTED: YES
HDLC SERPL-MCNC: 74 MG/DL
LDLC SERPL CALC-MCNC: 111 MG/DL
NONHDLC SERPL-MCNC: 138 MG/DL
TRIGL SERPL-MCNC: 134 MG/DL

## 2024-12-31 PROCEDURE — 80061 LIPID PANEL: CPT

## 2024-12-31 PROCEDURE — 36415 COLL VENOUS BLD VENIPUNCTURE: CPT

## 2025-02-17 DIAGNOSIS — M85.9 LOW BONE DENSITY: ICD-10-CM

## 2025-02-17 RX ORDER — RISEDRONATE SODIUM 150 MG/1
150 TABLET, FILM COATED ORAL
Qty: 4 TABLET | Refills: 0 | Status: SHIPPED | OUTPATIENT
Start: 2025-02-17

## 2025-02-17 NOTE — TELEPHONE ENCOUNTER
Last Written Prescription:  RISEdronate (ACTONEL) 150 MG tablet 4 tablet 3 11/14/2023     ----------------------  Last Visit Date: 7/2/24  Future Visit Date: none  ----------------------      [x]  Refill decision: Medication refilled per  Medication Refill in Ambulatory Care  policy.     [x]  Supervision: no future appointment scheduled. Scheduling has been notified to contact the pt for appointment.    Afshan Rushing RN  [facility] Central Nursing/Red Flag Triage & Med Refill Team             Request from pharmacy:  Requested Prescriptions   Pending Prescriptions Disp Refills    RISEdronate (ACTONEL) 150 MG tablet 4 tablet 3     Sig: Take 1 tablet (150 mg) by mouth every 30 days. Take with full glass of water on empty stomach, don't lay down for 30 min       Bisphosphonates Passed - 2/17/2025 12:22 PM        Passed - Dexa scan completed in the past 48-months     Please review last Dexa result.           Passed - Medication is active on med list and the sig matches. RN to manually verify dose and sig if red X/fail.     If the protocol passes (green check), you do not need to verify med dose and sig.    A prescription matches if they are the same clinical intention.    For Example: once daily and every morning are the same.    For all fails (red x), verify dose and sig.    If the refill does match what is on file, the RN can still proceed to approve the refill request.     If they do not match, route to the appropriate provider.             Passed - Medication indicated for associated diagnosis     The medication is prescribed for one or more of the following conditions:     Osteoporosis   Osteitis Deformans (Paget's Disease)   Postmenopausal    Osteopenia   Arthroplasty   Crohn's Disease   Cystic Fibrosis   Fibrous Dysplasia of bone   Growth Hormone Deficiency   Hypercalcemia   Juvenile Osteoporosis   Hypogonadism          Passed - Recent (12 mo) or future (90 days) visit within the authorizing provider's specialty      The patient must have completed an in-person or virtual visit within the past 12 months or has a future visit scheduled within the next 90 days with the authorizing provider s specialty.  Urgent care and e-visits do not qualify as an office visit for this protocol.          Passed - Most recent Creatinine Clearance in last 12 months >35        Passed - Patient is age 18 or older

## 2025-02-17 NOTE — TELEPHONE ENCOUNTER
Patient confirmed scheduled appointment:  Date: 5/5  Time: 11:30am  Visit type: Physical   Provider: PCP  Location: [facility]

## 2025-03-11 ENCOUNTER — VIRTUAL VISIT (OUTPATIENT)
Dept: URGENT CARE | Facility: CLINIC | Age: 75
End: 2025-03-11
Payer: COMMERCIAL

## 2025-03-11 DIAGNOSIS — M77.9 TENDONITIS: Primary | ICD-10-CM

## 2025-03-11 PROCEDURE — 98005 SYNCH AUDIO-VIDEO EST LOW 20: CPT

## 2025-03-11 RX ORDER — METHYLPREDNISOLONE 4 MG/1
TABLET ORAL
Qty: 21 TABLET | Refills: 0 | Status: SHIPPED | OUTPATIENT
Start: 2025-03-11

## 2025-03-11 NOTE — PROGRESS NOTES
"Alysha is a 74 year old who is being evaluated via a billable video visit.          Assessment & Plan     Tendonitis    - methylPREDNISolone (MEDROL DOSEPAK) 4 MG tablet therapy pack; Follow Package Directions    She cannot use NSAIDs due to being on Eliquis.  Tylenol and home remedies are not relieving her discomfort.  Will use a Medrol Dosepak to help with inflammation and discomfort.      BMI  Estimated body mass index is 36.32 kg/m  as calculated from the following:    Height as of 9/16/24: 1.676 m (5' 6\").    Weight as of 9/16/24: 102.1 kg (225 lb).         Return in about 1 week (around 3/18/2025), or if symptoms worsen or fail to improve.    Subjective   Alysha is a 74 year old, presenting for the following health issues:  No chief complaint on file.    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  Came home lifted a heavy pot and had pain and swelling in the left thumb.  Has been using a thumb spica splint she got at the drugstore, ice and Tylenol.  Cannot use NSAIDs since she is on Eliquis.  Denies trauma.  Has a history of osteoarthritis          Review of Systems  Constitutional, HEENT, cardiovascular, pulmonary, gi and gu systems are negative, except as otherwise noted.      Objective           Vitals:  No vitals were obtained today due to virtual visit.    Physical Exam   GENERAL: alert and no distress  PSYCH: Appropriate affect, tone, and pace of words  Left thumb and hand in thumb spica splint.         Video-Visit Details    Type of service:  Video Visit   Originating Location (pt. Location): Home    Distant Location (provider location):  Off-site  Platform used for Video Visit: Alyssa  Signed Electronically by: [facility]    "

## 2025-03-11 NOTE — PATIENT INSTRUCTIONS
Take the steroids as directed with food.  If symptoms worsen or do not improve within a week, please see your primary care provider.  Try heat instead of ice to help relax the muscles and tendons.  Continue to use the brace

## 2025-03-16 ENCOUNTER — TELEPHONE (OUTPATIENT)
Dept: NURSING | Facility: CLINIC | Age: 75
End: 2025-03-16
Payer: COMMERCIAL

## 2025-03-16 NOTE — TELEPHONE ENCOUNTER
A week ago Friday, she was at a rally. 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 That evening joint began to swell. Iced, elevated. Monday had an E visit and they prescribed steroids and a brace, ice off and on. Finished steroids, whole hand is swollen now. In [place] should she be seen in ER? She said she is worse. I told her she should be seen in [place] and the ER would be appropriate since she is worse and has done all she was advised to do. She will do that.  Sonia Wills RN  [facility]

## 2025-03-17 DIAGNOSIS — M79.642 LEFT HAND PAIN: Primary | ICD-10-CM

## 2025-03-17 NOTE — TELEPHONE ENCOUNTER
DIAGNOSIS: swollen thumb joint. chronic deep pain.    APPOINTMENT DATE: 3/18/25   NOTES STATUS DETAILS   OFFICE NOTE from referring provider N/A Self Referral    OFFICE NOTE from other specialist Internal 10/23/21 - Tico Ibarra DO    DISCHARGE REPORT from the ER CE 3/16/25 - [place] [facility]   MEDICATION LIST Internal    IMAGES     XRAYS (IMAGES & REPORTS) PACS 10/23/21 - XR Hand Rt

## 2025-03-17 NOTE — PROGRESS NOTES
ASSESSMENT/PLAN:    (M79.89) Swelling of left thumb  (primary encounter diagnosis)  Comment: this is likely a flare of underlying OA of MCP joint; swelling and erythema have gone down with combo of medrol dose matt and Abx; will remain on Abx; pred taper prescribed; precautions given; f/up scheduled in 1 wk   Plan: predniSONE (DELTASONE) 20 MG tablet    Attestation:  This patient has been seen and evaluated by me, Navin Mejia MD with [employer] student and the care team. I agree with the findings and plan of care as documented in this note.          Navin Mejia MD  March 18, 2025  9:54 AM        Pt is a 74 year old female here today for:     HPI:   L Hand pain:   Location: thumb   Duration:1 week   Trauma/ Fall? No ->  pain after holding sign for 2 hours at a protest   Swelling? YES and erythema   Numbness/ Tingling? No   Weakness? YES   Imaging? 3/16/25:  FINDINGS:     No displaced fracture or traumatic malalignment. Small osteophytes are present at the first carpometacarpal joint. Joint spaces are preserved. Faint chondrocalcinosis in the area of the triangular fibrocartilage complex.    Treatment? 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  Came home lifted a heavy pot and had pain and swelling in the left thumb.  Has been using a thumb spica splint she got at the drugstore, ice and Tylenol.  Cannot use NSAIDs since she is on Eliquis.  Denies trauma.  Has a history of osteoarthritis  Tendonitis   - methylPREDNISolone (MEDROL DOSEPAK) 4 MG tablet therapy pack; Follow Package Directions   She cannot use NSAIDs due to being on Eliquis.  Tylenol and home remedies are not relieving her discomfort.  Will use a Medrol Dosepak to help with inflammation and discomfort.    Past Medical History:   Diagnosis Date    Atrial fibrillation (H)      ablated 1/12    Ex-smoker     Hashimoto's disease     Hyperlipidaemia     Hypertension     ISABELA (obstructive sleep apnea) 3/12    AHI 7    PAC (premature atrial contraction)     PVC (premature ventricular contraction)       Past Surgical History:   Procedure Laterality Date    APPENDECTOMY      COLONOSCOPY N/A 4/12/2024    Procedure: COLONOSCOPY, WITH POLYPECTOMY;  Surgeon: Naldo Herrera MD;  Location: [facility] OR    ENT SURGERY      H ABLATION FOCAL AFIB  6/24/2011    [place].     H ABLATION FOCAL AFIB  1/12/2012    [place]. afib and aflutter ablations.       Current Outpatient Medications   Medication Sig Dispense Refill    amLODIPine (NORVASC) 10 MG tablet TAKE 1 TABLET BY MOUTH DAILY 90 tablet 2    apixaban ANTICOAGULANT (ELIQUIS ANTICOAGULANT) 5 MG tablet Take 1 tablet (5 mg) by mouth 2 times daily. 180 tablet 3    Calcium-Vitamin D-Vitamin K (VIACTIV PO) Take by mouth 2 times daily       fish oil-omega-3 fatty acids (FISH OIL) 1000 MG capsule One capsule daily.      hydrochlorothiazide (HYDRODIURIL) 25 MG tablet TAKE 1 TABLET BY MOUTH DAILY 90 tablet 2    loratadine (CLARITIN) 10 MG tablet Take 10 mg by mouth daily      losartan (COZAAR) 100 MG tablet Take 1 tablet (100 mg) by mouth daily. 90 tablet 2    lovastatin (MEVACOR) 40 MG tablet Take 1 tablet (40 mg) by mouth at bedtime. 90 tablet 3    methylPREDNISolone (MEDROL DOSEPAK) 4 MG tablet therapy pack Follow Package Directions 21 tablet 0    metoprolol succinate ER (TOPROL XL) 50 MG 24 hr tablet TAKE 1 AND 1/2 TABLETS(75 MG) BY MOUTH DAILY 135 tablet 3    metoprolol tartrate (LOPRESSOR) 25 MG tablet TAKE 1 TABLET BY MOUTH AS DIRECTED UP TO 2 TABLETS BY MOUTH EVRYDAY WITH ONSET OF ARRHYTHMIA 30 tablet 1    RISEdronate (ACTONEL) 150 MG tablet Take 1 tablet (150 mg) by mouth every 30 days. Take with full glass of water on empty stomach, don't lay down for 30 min 4 tablet 0      Allergies   Allergen Reactions    Ibuprofen Other (See Comments)    Nsaids GI  Disturbance    Statins      Made her lips tingle, so they put her on another statin instead.    Liquid Adhesive Rash     After wearing patches for > 8 days  After wearing patches for > 8 days      ROS:   Gen- no fevers/chills   Rheum - no morning stiffness   Derm - no rash/ + redness - see HPI  Neuro - no numbness, no tingling   Remainder of ROS negative.     Exam:   There were no vitals taken for this visit.       L HAND:   Inspection: Swelling - YES; Atrophy - No; see image   Sensation: intact in median, radial, ulnar distribution   ROM:   Hand: thumb limited in all planes  Bony tenderness:   Wrist: Carpal bones: NO; Snuffbox: NO   Hand: Metacarpals: Yes - at MCP; Phalanges: NO   Tendons: FDS/FDP/Extensor mechanism intact   Maneuvers: deferred   Other: No nodules palpated in palmar aspect.           Xray of L hand on March 18, 2025 at [facility] location - films personally reviewed with patient at time of visit     My impression: mild degenerative changed at 1st CMC and MCP

## 2025-03-18 ENCOUNTER — PRE VISIT (OUTPATIENT)
Dept: ORTHOPEDICS | Facility: CLINIC | Age: 75
End: 2025-03-18

## 2025-03-18 ENCOUNTER — ANCILLARY PROCEDURE (OUTPATIENT)
Dept: GENERAL RADIOLOGY | Facility: CLINIC | Age: 75
End: 2025-03-18
Attending: FAMILY MEDICINE
Payer: COMMERCIAL

## 2025-03-18 ENCOUNTER — OFFICE VISIT (OUTPATIENT)
Dept: ORTHOPEDICS | Facility: CLINIC | Age: 75
End: 2025-03-18
Payer: COMMERCIAL

## 2025-03-18 DIAGNOSIS — M79.89 SWELLING OF LEFT THUMB: Primary | ICD-10-CM

## 2025-03-18 DIAGNOSIS — M79.642 LEFT HAND PAIN: ICD-10-CM

## 2025-03-18 DIAGNOSIS — M77.9 TENDONITIS: ICD-10-CM

## 2025-03-18 PROCEDURE — 99204 OFFICE O/P NEW MOD 45 MIN: CPT | Mod: GC | Performed by: FAMILY MEDICINE

## 2025-03-18 PROCEDURE — 73130 X-RAY EXAM OF HAND: CPT | Mod: LT | Performed by: RADIOLOGY

## 2025-03-18 RX ORDER — PREDNISONE 20 MG/1
TABLET ORAL
Qty: 30 TABLET | Refills: 0 | Status: SHIPPED | OUTPATIENT
Start: 2025-03-18 | End: 2025-04-02

## 2025-03-18 RX ORDER — PREDNISONE 20 MG/1
TABLET ORAL
Qty: 30 TABLET | Refills: 0 | Status: SHIPPED | OUTPATIENT
Start: 2025-03-18 | End: 2025-03-18

## 2025-03-18 RX ORDER — SULFAMETHOXAZOLE AND TRIMETHOPRIM 800; 160 MG/1; MG/1
1 TABLET ORAL
COMMUNITY
Start: 2025-03-16 | End: 2025-03-24

## 2025-03-18 RX ORDER — METHYLPREDNISOLONE 4 MG/1
TABLET ORAL
Qty: 21 TABLET | Refills: 0 | Status: SHIPPED | OUTPATIENT
Start: 2025-03-18 | End: 2025-03-18

## 2025-03-18 NOTE — LETTER
3/18/2025      RE: Alysha Youngblood  [address] [place] [id number]     Dear Colleague,    Thank you for referring your patient, Alysha Youngblood, to the [facility] [place]. Please see a copy of my visit note below.    ASSESSMENT/PLAN:    (M79.89) Swelling of left thumb  (primary encounter diagnosis)  Comment: this is likely a flare of underlying OA of MCP joint; swelling and erythema have gone down with combo of medrol dose matt and Abx; will remain on Abx; pred taper prescribed; precautions given; f/up scheduled in 1 wk   Plan: predniSONE (DELTASONE) 20 MG tablet    Attestation:  This patient has been seen and evaluated by me, Navin Mejia MD with [employer] student and the care team. I agree with the findings and plan of care as documented in this note.          Navin Mejia MD  March 18, 2025  9:54 AM        Pt is a 74 year old female here today for:     HPI:   L Hand pain:   Location: thumb   Duration:1 week   Trauma/ Fall? No ->  pain after holding sign for 2 hours at a protest   Swelling? YES and erythema   Numbness/ Tingling? No   Weakness? YES   Imaging? 3/16/25:  FINDINGS:     No displaced fracture or traumatic malalignment. Small osteophytes are present at the first carpometacarpal joint. Joint spaces are preserved. Faint chondrocalcinosis in the area of the triangular fibrocartilage complex.    Treatment? Medrol dose matt -> helped; thumb brace; is on Bactrim -> has stabilized     SH: Professor at the [employer]     Per urgent care virtual visit on 3/11/25:  HPI    Was holding a sign for over 2 and half hours at a rally.  Came home lifted a heavy pot and had pain and swelling in the left thumb.  Has been using a thumb spica splint she got at the drugstore, ice and Tylenol.  Cannot use NSAIDs since she is on Eliquis.  Denies trauma.  Has a history of osteoarthritis  Tendonitis   - methylPREDNISolone (MEDROL DOSEPAK) 4 MG tablet therapy pack; Follow Package  Directions   She cannot use NSAIDs due to being on Eliquis.  Tylenol and home remedies are not relieving her discomfort.  Will use a Medrol Dosepak to help with inflammation and discomfort.    Past Medical History:   Diagnosis Date     Atrial fibrillation (H)     ablated 1/12     Ex-smoker      Hashimoto's disease      Hyperlipidaemia      Hypertension      ISABELA (obstructive sleep apnea) 3/12    AHI 7     PAC (premature atrial contraction)      PVC (premature ventricular contraction)       Past Surgical History:   Procedure Laterality Date     APPENDECTOMY       COLONOSCOPY N/A 4/12/2024    Procedure: COLONOSCOPY, WITH POLYPECTOMY;  Surgeon: Naldo Herrera MD;  Location: [facility] OR     ENT SURGERY       H ABLATION FOCAL AFIB  6/24/2011    [facility].      H ABLATION FOCAL AFIB  1/12/2012    [facility]. afib and aflutter ablations.       Current Outpatient Medications   Medication Sig Dispense Refill     amLODIPine (NORVASC) 10 MG tablet TAKE 1 TABLET BY MOUTH DAILY 90 tablet 2     apixaban ANTICOAGULANT (ELIQUIS ANTICOAGULANT) 5 MG tablet Take 1 tablet (5 mg) by mouth 2 times daily. 180 tablet 3     Calcium-Vitamin D-Vitamin K (VIACTIV PO) Take by mouth 2 times daily        fish oil-omega-3 fatty acids (FISH OIL) 1000 MG capsule One capsule daily.       hydrochlorothiazide (HYDRODIURIL) 25 MG tablet TAKE 1 TABLET BY MOUTH DAILY 90 tablet 2     loratadine (CLARITIN) 10 MG tablet Take 10 mg by mouth daily       losartan (COZAAR) 100 MG tablet Take 1 tablet (100 mg) by mouth daily. 90 tablet 2     lovastatin (MEVACOR) 40 MG tablet Take 1 tablet (40 mg) by mouth at bedtime. 90 tablet 3     methylPREDNISolone (MEDROL DOSEPAK) 4 MG tablet therapy pack Follow Package Directions 21 tablet 0     metoprolol succinate ER (TOPROL XL) 50 MG 24 hr tablet TAKE 1 AND 1/2 TABLETS(75 MG) BY MOUTH DAILY 135 tablet 3     metoprolol tartrate (LOPRESSOR) 25 MG tablet TAKE 1 TABLET BY MOUTH AS DIRECTED UP TO 2 TABLETS BY MOUTH EVRYDAY WITH ONSET  OF ARRHYTHMIA 30 tablet 1     RISEdronate (ACTONEL) 150 MG tablet Take 1 tablet (150 mg) by mouth every 30 days. Take with full glass of water on empty stomach, don't lay down for 30 min 4 tablet 0      Allergies   Allergen Reactions     Ibuprofen Other (See Comments)     Nsaids GI Disturbance     Statins      Made her lips tingle, so they put her on another statin instead.     Liquid Adhesive Rash     After wearing patches for > 8 days  After wearing patches for > 8 days      ROS:   Gen- no fevers/chills   Rheum - no morning stiffness   Derm - no rash/ + redness - see HPI  Neuro - no numbness, no tingling   Remainder of ROS negative.     Exam:   There were no vitals taken for this visit.       L HAND:   Inspection: Swelling - YES; Atrophy - No; see image   Sensation: intact in median, radial, ulnar distribution   ROM:   Hand: thumb limited in all planes  Bony tenderness:   Wrist: Carpal bones: NO; Snuffbox: NO   Hand: Metacarpals: Yes - at MCP; Phalanges: NO   Tendons: FDS/FDP/Extensor mechanism intact   Maneuvers: deferred   Other: No nodules palpated in palmar aspect.           Xray of L hand on March 18, 2025 at [facility] [place] location - films personally reviewed with patient at time of visit     My impression: mild degenerative changed at 1st CMC and MCP       Again, thank you for allowing me to participate in the care of your patient.      Sincerely,    Navin Mejia MD

## 2025-03-27 ENCOUNTER — OFFICE VISIT (OUTPATIENT)
Dept: ORTHOPEDICS | Facility: CLINIC | Age: 75
End: 2025-03-27
Payer: COMMERCIAL

## 2025-03-27 DIAGNOSIS — M79.89 SWELLING OF LEFT THUMB: Primary | ICD-10-CM

## 2025-03-27 NOTE — PROGRESS NOTES
"ASSESSMENT/PLAN:    (M79.89) Swelling of left thumb  (primary encounter diagnosis)  Comment: unclear etiology of refractory swelling at thumb MCP; check stat MRI and virtual follow-up after   Plan: MR Finger Left w/o Contrast          Navin Mejia MD  March 27, 2025  1:22 PM      Pt is a 74 year old female last seen on 3/18/25 here for follow up of:     L thumb - she still has swelling and warmth of the left thumb today despite taking the medrol dose matt and Abx. Her skin is peeling around the area.   Taking brace on and off \"hurts like hell\"      Per my last note:  (M79.89) Swelling of left thumb  (primary encounter diagnosis)  Comment: this is likely a flare of underlying OA of MCP joint; swelling and erythema have gone down with combo of medrol dose matt and Abx; will remain on Abx; pred taper prescribed; precautions given; f/up scheduled in 1 wk   Plan: predniSONE (DELTASONE) 20 MG tablet    Past Medical History:   Diagnosis Date    Atrial fibrillation (H)     ablated 1/12    Ex-smoker     Hashimoto's disease     Hyperlipidaemia     Hypertension     ISABELA (obstructive sleep apnea) 3/12    AHI 7    PAC (premature atrial contraction)     PVC (premature ventricular contraction)       Current Outpatient Medications   Medication Sig Dispense Refill    amLODIPine (NORVASC) 10 MG tablet TAKE 1 TABLET BY MOUTH DAILY 90 tablet 2    apixaban ANTICOAGULANT (ELIQUIS ANTICOAGULANT) 5 MG tablet Take 1 tablet (5 mg) by mouth 2 times daily. 180 tablet 3    Calcium-Vitamin D-Vitamin K (VIACTIV PO) Take by mouth 2 times daily       fish oil-omega-3 fatty acids (FISH OIL) 1000 MG capsule One capsule daily.      hydrochlorothiazide (HYDRODIURIL) 25 MG tablet TAKE 1 TABLET BY MOUTH DAILY 90 tablet 2    loratadine (CLARITIN) 10 MG tablet Take 10 mg by mouth daily      losartan (COZAAR) 100 MG tablet Take 1 tablet (100 mg) by mouth daily. 90 tablet 2    lovastatin (MEVACOR) 40 MG tablet Take 1 tablet (40 mg) by mouth at bedtime. 90 " tablet 3    metoprolol succinate ER (TOPROL XL) 50 MG 24 hr tablet TAKE 1 AND 1/2 TABLETS(75 MG) BY MOUTH DAILY 135 tablet 3    metoprolol tartrate (LOPRESSOR) 25 MG tablet TAKE 1 TABLET BY MOUTH AS DIRECTED UP TO 2 TABLETS BY MOUTH EVRYDAY WITH ONSET OF ARRHYTHMIA 30 tablet 1    predniSONE (DELTASONE) 20 MG tablet Take 3 tablets (60 mg) by mouth daily for 5 days, THEN 2 tablets (40 mg) daily for 5 days, THEN 1 tablet (20 mg) daily for 5 days. 30 tablet 0    RISEdronate (ACTONEL) 150 MG tablet Take 1 tablet (150 mg) by mouth every 30 days. Take with full glass of water on empty stomach, don't lay down for 30 min 4 tablet 0      Allergies   Allergen Reactions    Ibuprofen Other (See Comments)    Nsaids GI Disturbance    Statins      Made her lips tingle, so they put her on another statin instead.    Liquid Adhesive Rash     After wearing patches for > 8 days  After wearing patches for > 8 days      ROS:   Gen- no fevers/chills   Rheum - no morning stiffness   Derm - no rash/ redness   Neuro - no numbness, no tingling   Remainder of ROS negative.     Exam:   Minimal change since last visit  +mild erythema warmth but significant soft tissue swelling and tenderness circumferentially around 1st MCP  Flexor/ extensor tendons intact  No pain w/ passive motion; significant pain w/ active motion

## 2025-03-27 NOTE — LETTER
"  3/27/2025      RE: Alysha Youngblood  [address] [place] [phone]     Dear Colleague,    Thank you for referring your patient, Alysha Youngblood, to the [facility] [place]. Please see a copy of my visit note below.    ASSESSMENT/PLAN:    (M79.89) Swelling of left thumb  (primary encounter diagnosis)  Comment: unclear etiology of refractory swelling at thumb MCP; check stat MRI and virtual follow-up after   Plan: MR Finger Left w/o Contrast          Navin Mejia MD  March 27, 2025  1:22 PM      Pt is a 74 year old female last seen on 3/18/25 here for follow up of:     L thumb - she still has swelling and warmth of the left thumb today despite taking the medrol dose matt and Abx. Her skin is peeling around the area.   Taking brace on and off \"hurts like hell\"      Per my last note:  (M79.89) Swelling of left thumb  (primary encounter diagnosis)  Comment: this is likely a flare of underlying OA of MCP joint; swelling and erythema have gone down with combo of medrol dose matt and Abx; will remain on Abx; pred taper prescribed; precautions given; f/up scheduled in 1 wk   Plan: predniSONE (DELTASONE) 20 MG tablet    Past Medical History:   Diagnosis Date     Atrial fibrillation (H)     ablated 1/12     Ex-smoker      Hashimoto's disease      Hyperlipidaemia      Hypertension      ISABELA (obstructive sleep apnea) 3/12    AHI 7     PAC (premature atrial contraction)      PVC (premature ventricular contraction)       Current Outpatient Medications   Medication Sig Dispense Refill     amLODIPine (NORVASC) 10 MG tablet TAKE 1 TABLET BY MOUTH DAILY 90 tablet 2     apixaban ANTICOAGULANT (ELIQUIS ANTICOAGULANT) 5 MG tablet Take 1 tablet (5 mg) by mouth 2 times daily. 180 tablet 3     Calcium-Vitamin D-Vitamin K (VIACTIV PO) Take by mouth 2 times daily        fish oil-omega-3 fatty acids (FISH OIL) 1000 MG capsule One capsule daily.       hydrochlorothiazide (HYDRODIURIL) 25 MG tablet TAKE 1 " TABLET BY MOUTH DAILY 90 tablet 2     loratadine (CLARITIN) 10 MG tablet Take 10 mg by mouth daily       losartan (COZAAR) 100 MG tablet Take 1 tablet (100 mg) by mouth daily. 90 tablet 2     lovastatin (MEVACOR) 40 MG tablet Take 1 tablet (40 mg) by mouth at bedtime. 90 tablet 3     metoprolol succinate ER (TOPROL XL) 50 MG 24 hr tablet TAKE 1 AND 1/2 TABLETS(75 MG) BY MOUTH DAILY 135 tablet 3     metoprolol tartrate (LOPRESSOR) 25 MG tablet TAKE 1 TABLET BY MOUTH AS DIRECTED UP TO 2 TABLETS BY MOUTH EVRYDAY WITH ONSET OF ARRHYTHMIA 30 tablet 1     predniSONE (DELTASONE) 20 MG tablet Take 3 tablets (60 mg) by mouth daily for 5 days, THEN 2 tablets (40 mg) daily for 5 days, THEN 1 tablet (20 mg) daily for 5 days. 30 tablet 0     RISEdronate (ACTONEL) 150 MG tablet Take 1 tablet (150 mg) by mouth every 30 days. Take with full glass of water on empty stomach, don't lay down for 30 min 4 tablet 0      Allergies   Allergen Reactions     Ibuprofen Other (See Comments)     Nsaids GI Disturbance     Statins      Made her lips tingle, so they put her on another statin instead.     Liquid Adhesive Rash     After wearing patches for > 8 days  After wearing patches for > 8 days      ROS:   Gen- no fevers/chills   Rheum - no morning stiffness   Derm - no rash/ redness   Neuro - no numbness, no tingling   Remainder of ROS negative.     Exam:   Minimal change since last visit  +mild erythema warmth but significant soft tissue swelling and tenderness circumferentially around 1st MCP  Flexor/ extensor tendons intact  No pain w/ passive motion; significant pain w/ active motion       Again, thank you for allowing me to participate in the care of your patient.      Sincerely,    Navin Mejia MD

## 2025-03-28 ENCOUNTER — ANCILLARY PROCEDURE (OUTPATIENT)
Dept: MRI IMAGING | Facility: CLINIC | Age: 75
End: 2025-03-28
Attending: FAMILY MEDICINE
Payer: COMMERCIAL

## 2025-03-28 DIAGNOSIS — M79.89 SWELLING OF LEFT THUMB: ICD-10-CM

## 2025-03-28 LAB — RADIOLOGIST FLAGS: ABNORMAL

## 2025-03-28 PROCEDURE — 73221 MRI JOINT UPR EXTREM W/O DYE: CPT | Mod: FA | Performed by: RADIOLOGY

## 2025-03-29 ENCOUNTER — HOSPITAL ENCOUNTER (EMERGENCY)
Facility: CLINIC | Age: 75
Discharge: HOME | End: 2025-03-29
Attending: INTERNAL MEDICINE | Admitting: INTERNAL MEDICINE
Payer: COMMERCIAL

## 2025-03-29 VITALS
SYSTOLIC BLOOD PRESSURE: 138 MMHG | RESPIRATION RATE: 20 BRPM | TEMPERATURE: 97.7 F | DIASTOLIC BLOOD PRESSURE: 74 MMHG | OXYGEN SATURATION: 97 %

## 2025-03-29 DIAGNOSIS — M79.89 SWELLING OF LEFT HAND: ICD-10-CM

## 2025-03-29 LAB
ALBUMIN SERPL BCG-MCNC: 3.6 G/DL (ref 3.5–5.2)
ALP SERPL-CCNC: 69 U/L (ref 40–150)
ALT SERPL W P-5'-P-CCNC: 43 U/L (ref 0–50)
ANION GAP SERPL CALCULATED.3IONS-SCNC: 12 MMOL/L (ref 7–15)
AST SERPL W P-5'-P-CCNC: 25 U/L (ref 0–45)
BASE EXCESS BLDV CALC-SCNC: 3 MMOL/L (ref -3–3)
BASOPHILS # BLD AUTO: 0.1 10E3/UL (ref 0–0.2)
BASOPHILS NFR BLD AUTO: 1 %
BILIRUB SERPL-MCNC: 0.5 MG/DL
BUN SERPL-MCNC: 21.5 MG/DL (ref 8–23)
CALCIUM SERPL-MCNC: 9.2 MG/DL (ref 8.8–10.4)
CHLORIDE SERPL-SCNC: 98 MMOL/L (ref 98–107)
CREAT SERPL-MCNC: 0.85 MG/DL (ref 0.51–0.95)
CRP SERPL-MCNC: 3.01 MG/L
EGFRCR SERPLBLD CKD-EPI 2021: 72 ML/MIN/1.73M2
EOSINOPHIL # BLD AUTO: 0.1 10E3/UL (ref 0–0.7)
EOSINOPHIL NFR BLD AUTO: 1 %
ERYTHROCYTE [DISTWIDTH] IN BLOOD BY AUTOMATED COUNT: 13 % (ref 10–15)
ERYTHROCYTE [SEDIMENTATION RATE] IN BLOOD BY WESTERGREN METHOD: 9 MM/HR (ref 0–30)
GLUCOSE SERPL-MCNC: 155 MG/DL (ref 70–99)
HCO3 BLDV-SCNC: 26 MMOL/L (ref 21–28)
HCO3 SERPL-SCNC: 24 MMOL/L (ref 22–29)
HCT VFR BLD AUTO: 40.5 % (ref 35–47)
HGB BLD-MCNC: 13.2 G/DL (ref 11.7–15.7)
IMM GRANULOCYTES # BLD: 0.5 10E3/UL
IMM GRANULOCYTES NFR BLD: 5 %
INR PPP: 1.05 (ref 0.85–1.15)
LACTATE BLD-SCNC: 1.7 MMOL/L (ref 0.7–2)
LYMPHOCYTES # BLD AUTO: 1.2 10E3/UL (ref 0.8–5.3)
LYMPHOCYTES NFR BLD AUTO: 12 %
MCH RBC QN AUTO: 30.1 PG (ref 26.5–33)
MCHC RBC AUTO-ENTMCNC: 32.6 G/DL (ref 31.5–36.5)
MCV RBC AUTO: 93 FL (ref 78–100)
MONOCYTES # BLD AUTO: 0.8 10E3/UL (ref 0–1.3)
MONOCYTES NFR BLD AUTO: 8 %
NEUTROPHILS # BLD AUTO: 7.3 10E3/UL (ref 1.6–8.3)
NEUTROPHILS NFR BLD AUTO: 73 %
NRBC # BLD AUTO: 0 10E3/UL
NRBC BLD AUTO-RTO: 0 /100
PCO2 BLDV: 36 MM HG (ref 40–50)
PH BLDV: 7.47 [PH] (ref 7.32–7.43)
PLATELET # BLD AUTO: 239 10E3/UL (ref 150–450)
PO2 BLDV: 70 MM HG (ref 25–47)
POTASSIUM SERPL-SCNC: 3.7 MMOL/L (ref 3.4–5.3)
PROCALCITONIN SERPL IA-MCNC: 0.05 NG/ML
PROT SERPL-MCNC: 5.9 G/DL (ref 6.4–8.3)
RBC # BLD AUTO: 4.38 10E6/UL (ref 3.8–5.2)
SAO2 % BLDV: 95 % (ref 70–75)
SODIUM SERPL-SCNC: 134 MMOL/L (ref 135–145)
URATE SERPL-MCNC: 4.9 MG/DL (ref 2.4–5.7)
WBC # BLD AUTO: 10 10E3/UL (ref 4–11)

## 2025-03-29 PROCEDURE — 84145 PROCALCITONIN (PCT): CPT | Performed by: INTERNAL MEDICINE

## 2025-03-29 PROCEDURE — 99284 EMERGENCY DEPT VISIT MOD MDM: CPT | Mod: 25 | Performed by: INTERNAL MEDICINE

## 2025-03-29 PROCEDURE — 84075 ASSAY ALKALINE PHOSPHATASE: CPT | Performed by: INTERNAL MEDICINE

## 2025-03-29 PROCEDURE — 96374 THER/PROPH/DIAG INJ IV PUSH: CPT | Performed by: INTERNAL MEDICINE

## 2025-03-29 PROCEDURE — 99207 PR NO CHARGE LOS: CPT | Performed by: ORTHOPAEDIC SURGERY

## 2025-03-29 PROCEDURE — 84550 ASSAY OF BLOOD/URIC ACID: CPT | Performed by: INTERNAL MEDICINE

## 2025-03-29 PROCEDURE — 85652 RBC SED RATE AUTOMATED: CPT | Performed by: INTERNAL MEDICINE

## 2025-03-29 PROCEDURE — 96375 TX/PRO/DX INJ NEW DRUG ADDON: CPT | Performed by: INTERNAL MEDICINE

## 2025-03-29 PROCEDURE — 82435 ASSAY OF BLOOD CHLORIDE: CPT | Performed by: INTERNAL MEDICINE

## 2025-03-29 PROCEDURE — 86140 C-REACTIVE PROTEIN: CPT | Performed by: INTERNAL MEDICINE

## 2025-03-29 PROCEDURE — 36415 COLL VENOUS BLD VENIPUNCTURE: CPT | Performed by: INTERNAL MEDICINE

## 2025-03-29 PROCEDURE — 83605 ASSAY OF LACTIC ACID: CPT

## 2025-03-29 PROCEDURE — 85025 COMPLETE CBC W/AUTO DIFF WBC: CPT | Performed by: INTERNAL MEDICINE

## 2025-03-29 PROCEDURE — 82803 BLOOD GASES ANY COMBINATION: CPT

## 2025-03-29 PROCEDURE — 250N000011 HC RX IP 250 OP 636: Mod: JZ | Performed by: INTERNAL MEDICINE

## 2025-03-29 PROCEDURE — 87040 BLOOD CULTURE FOR BACTERIA: CPT | Performed by: INTERNAL MEDICINE

## 2025-03-29 PROCEDURE — 99284 EMERGENCY DEPT VISIT MOD MDM: CPT | Performed by: INTERNAL MEDICINE

## 2025-03-29 PROCEDURE — 85610 PROTHROMBIN TIME: CPT | Performed by: INTERNAL MEDICINE

## 2025-03-29 RX ORDER — OXYCODONE HYDROCHLORIDE 5 MG/1
5 TABLET ORAL EVERY 6 HOURS PRN
Qty: 12 TABLET | Refills: 0 | Status: SHIPPED | OUTPATIENT
Start: 2025-03-29 | End: 2025-04-02

## 2025-03-29 RX ORDER — LIDOCAINE HYDROCHLORIDE AND EPINEPHRINE 10; 10 MG/ML; UG/ML
10 INJECTION, SOLUTION INFILTRATION; PERINEURAL ONCE
Status: DISCONTINUED | OUTPATIENT
Start: 2025-03-29 | End: 2025-03-29 | Stop reason: HOSPADM

## 2025-03-29 RX ORDER — HYDROMORPHONE HYDROCHLORIDE 1 MG/ML
0.5 INJECTION, SOLUTION INTRAMUSCULAR; INTRAVENOUS; SUBCUTANEOUS ONCE
Status: COMPLETED | OUTPATIENT
Start: 2025-03-29 | End: 2025-03-29

## 2025-03-29 RX ORDER — KETOROLAC TROMETHAMINE 15 MG/ML
15 INJECTION, SOLUTION INTRAMUSCULAR; INTRAVENOUS ONCE
Status: COMPLETED | OUTPATIENT
Start: 2025-03-29 | End: 2025-03-29

## 2025-03-29 RX ADMIN — HYDROMORPHONE HYDROCHLORIDE 0.5 MG: 1 INJECTION, SOLUTION INTRAMUSCULAR; INTRAVENOUS; SUBCUTANEOUS at 11:48

## 2025-03-29 RX ADMIN — KETOROLAC TROMETHAMINE 15 MG: 15 INJECTION, SOLUTION INTRAMUSCULAR; INTRAVENOUS at 11:48

## 2025-03-29 ASSESSMENT — ACTIVITIES OF DAILY LIVING (ADL)
ADLS_ACUITY_SCORE: 41

## 2025-03-29 ASSESSMENT — COLUMBIA-SUICIDE SEVERITY RATING SCALE - C-SSRS
1. IN THE PAST MONTH, HAVE YOU WISHED YOU WERE DEAD OR WISHED YOU COULD GO TO SLEEP AND NOT WAKE UP?: NO
6. HAVE YOU EVER DONE ANYTHING, STARTED TO DO ANYTHING, OR PREPARED TO DO ANYTHING TO END YOUR LIFE?: NO
2. HAVE YOU ACTUALLY HAD ANY THOUGHTS OF KILLING YOURSELF IN THE PAST MONTH?: NO

## 2025-03-29 NOTE — ED TRIAGE NOTES
Pt arrives ambulatory to triage with c/o infected left thumb, with noted redness and swelling. Pt was told to come in by provider seen yesterday.

## 2025-03-29 NOTE — ED PROVIDER NOTES
[place] EMERGENCY DEPARTMENT ([facility])    3/29/25       ED PROVIDER NOTE   Vertical Triage A  History     Chief Complaint   Patient presents with    Wound Infection     The history is provided by the patient and medical records.     Alysha Youngblood is a 74 year old female with history of left thumb tendinitis who presents to the emergency department for further evaluation of left thumb swelling and abnormal MRI findings concerning for septic joint.  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  She developed some swelling to the lower base of the left thumb.  She went to urgent care for this on 3/11/2025, was given a prescription for Medrol Dosepak.  5 days later she presented to [facility] on 3/16/2025 with increased left thumb swelling and decreased range of motion.  They took a picture of this as well as an x-ray which is negative for any acute osseous abnormalities.  She was treated with Tylenol and Bactrim.  She completed this course and presented to sports medicine clinic where she was seen by Dr. 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  She was given a prescription for prednisone.  She returned for follow-up 9 days later on 3/27/2025, left thumb MRI was ordered.  That was performed yesterday, results are concerning for septic arthritis of the first MCP joint.  She was given a prescription for Bactrim and Cipro, but her thumb swelling and pain keeps getting worse.  She was unable to sleep last night due to the pain and so presents to the emergency department for evaluation.  She did eat breakfast this morning at 6 AM but is otherwise willing to stay n.p.o.  No history of diabetes.  She would like an ice pack to help with her pain.    Photo of left thumb from 3/16/2025:      Past Medical History  Past Medical History:   Diagnosis Date    Atrial fibrillation (H)     ablated 1/12     [facility]  [id number] [place] [id number]  Phone: [phone]    October 7, 2019        Adalberto Carmona  [address]  [facility] [address]          To whom it may concern:    RE: Adalberto Carmona    Patient was seen and treated today at our clinic and missed school due to acute viral infection may return to school on Tues. 10/8/19 if symptoms of fever are not present.     Please contact me for questions or concerns.      Sincerely,        PRINCE Mcgraw CNP   Ex-smoker     Hashimoto's disease     Hyperlipidaemia     Hypertension     ISABELA (obstructive sleep apnea) 3/12    AHI 7    PAC (premature atrial contraction)     PVC (premature ventricular contraction)      Past Surgical History:   Procedure Laterality Date    APPENDECTOMY      COLONOSCOPY N/A 4/12/2024    Procedure: COLONOSCOPY, WITH POLYPECTOMY;  Surgeon: Naldo Herrera MD;  Location: [facility] OR    ENT SURGERY      H ABLATION FOCAL AFIB  6/24/2011    [facility].     H ABLATION FOCAL AFIB  1/12/2012    [facility]. afib and aflutter ablations.      oxyCODONE (ROXICODONE) 5 MG tablet  amLODIPine (NORVASC) 10 MG tablet  apixaban ANTICOAGULANT (ELIQUIS ANTICOAGULANT) 5 MG tablet  Calcium-Vitamin D-Vitamin K (VIACTIV PO)  ciprofloxacin (CIPRO) 500 MG tablet  fish oil-omega-3 fatty acids (FISH OIL) 1000 MG capsule  hydrochlorothiazide (HYDRODIURIL) 25 MG tablet  loratadine (CLARITIN) 10 MG tablet  losartan (COZAAR) 100 MG tablet  lovastatin (MEVACOR) 40 MG tablet  metoprolol succinate ER (TOPROL XL) 50 MG 24 hr tablet  metoprolol tartrate (LOPRESSOR) 25 MG tablet  predniSONE (DELTASONE) 20 MG tablet  RISEdronate (ACTONEL) 150 MG tablet  sulfamethoxazole-trimethoprim (BACTRIM DS) 800-160 MG tablet      Allergies   Allergen Reactions    Ibuprofen Other (See Comments)    Nsaids GI Disturbance    Statins      Made her lips tingle, so they put her on another statin instead.    Liquid Adhesive Rash     After wearing patches for > 8 days  After wearing patches for > 8 days     Family History  Family History   Problem Relation Age of Onset    Depression Mother     Lung Cancer Father         tobacco use    Breast Cancer Sister     Cervical Cancer Maternal Grandmother     Cerebrovascular Disease Maternal Grandmother     Cerebrovascular Disease Paternal Grandmother     Heart Disease Paternal Grandmother     Cancer No family hx of         No known family hx of skin cancer     Social History   Social History     Tobacco Use    Smoking  status: Former     Current packs/day: 0.00     Types: Cigarettes     Quit date:      Years since quittin.2     Passive exposure: Never    Smokeless tobacco: Never   Vaping Use    Vaping status: Never Used   Substance Use Topics    Alcohol use: Yes     Alcohol/week: 14.0 standard drinks of alcohol     Types: 14 Shots of liquor per week    Drug use: No      A medically appropriate review of systems was performed with pertinent positives and negatives noted in the HPI, and all other systems negative.    Physical Exam   BP: 138/74  Temp: 97.7  F (36.5  C)  Resp: 20  SpO2: 97 %    Physical Exam  Vitals and nursing note reviewed.   Constitutional:       General: She is not in acute distress.     Appearance: She is not diaphoretic.   HENT:      Head: Normocephalic and atraumatic.   Eyes:      Extraocular Movements: Extraocular movements intact.      Conjunctiva/sclera: Conjunctivae normal.   Cardiovascular:      Rate and Rhythm: Normal rate and regular rhythm.      Heart sounds: Normal heart sounds. No murmur heard.     No friction rub. No gallop.   Pulmonary:      Effort: Pulmonary effort is normal. No respiratory distress.      Breath sounds: Normal breath sounds. No stridor. No wheezing, rhonchi or rales.   Chest:      Chest wall: No tenderness.   Abdominal:      General: Abdomen is flat. Bowel sounds are normal. There is no distension.      Palpations: Abdomen is soft. There is no mass.      Tenderness: There is no abdominal tenderness. There is no right CVA tenderness, left CVA tenderness, guarding or rebound.      Hernia: No hernia is present.   Musculoskeletal:      Right hand: Normal.      Left hand: Swelling and tenderness present. No deformity, lacerations or bony tenderness. Decreased range of motion. Normal strength. Normal sensation. There is no disruption of two-point discrimination. Normal capillary refill. Normal pulse.        Arms:       Cervical back: Normal range of motion and neck supple.    Skin:     General: Skin is warm.      Findings: No rash.       ED Course, Procedures, & Data     ED Course as of 03/29/25 1611   Sat Mar 29, 2025   1008 Case discussed with orthopedic   1108 Orthopedics here to see patient, discussed case with attending.  Patient does have scheduled follow-up with hand surgeon on Tuesday.  Ortho recommend Toradol and Dilaudid, see if she feels better     Procedures         Results for orders placed or performed during the hospital encounter of 03/29/25   INR     Status: Normal   Result Value Ref Range    INR 1.05 0.85 - 1.15   Comprehensive metabolic panel     Status: Abnormal   Result Value Ref Range    Sodium 134 (L) 135 - 145 mmol/L    Potassium 3.7 3.4 - 5.3 mmol/L    Carbon Dioxide (CO2) 24 22 - 29 mmol/L    Anion Gap 12 7 - 15 mmol/L    Urea Nitrogen 21.5 8.0 - 23.0 mg/dL    Creatinine 0.85 0.51 - 0.95 mg/dL    GFR Estimate 72 >60 mL/min/1.73m2    Calcium 9.2 8.8 - 10.4 mg/dL    Chloride 98 98 - 107 mmol/L    Glucose 155 (H) 70 - 99 mg/dL    Alkaline Phosphatase 69 40 - 150 U/L    AST 25 0 - 45 U/L    ALT 43 0 - 50 U/L    Protein Total 5.9 (L) 6.4 - 8.3 g/dL    Albumin 3.6 3.5 - 5.2 g/dL    Bilirubin Total 0.5 <=1.2 mg/dL   Procalcitonin     Status: Normal   Result Value Ref Range    Procalcitonin 0.05 <0.50 ng/mL   Erythrocyte sedimentation rate auto     Status: Normal   Result Value Ref Range    Erythrocyte Sedimentation Rate 9 0 - 30 mm/hr   CRP inflammation     Status: Normal   Result Value Ref Range    CRP Inflammation 3.01 <5.00 mg/L   CBC with platelets and differential     Status: Abnormal   Result Value Ref Range    WBC Count 10.0 4.0 - 11.0 10e3/uL    RBC Count 4.38 3.80 - 5.20 10e6/uL    Hemoglobin 13.2 11.7 - 15.7 g/dL    Hematocrit 40.5 35.0 - 47.0 %    MCV 93 78 - 100 fL    MCH 30.1 26.5 - 33.0 pg    MCHC 32.6 31.5 - 36.5 g/dL    RDW 13.0 10.0 - 15.0 %    Platelet Count 239 150 - 450 10e3/uL    % Neutrophils 73 %    % Lymphocytes 12 %    % Monocytes 8 %    %  Eosinophils 1 %    % Basophils 1 %    % Immature Granulocytes 5 %    NRBCs per 100 WBC 0 <1 /100    Absolute Neutrophils 7.3 1.6 - 8.3 10e3/uL    Absolute Lymphocytes 1.2 0.8 - 5.3 10e3/uL    Absolute Monocytes 0.8 0.0 - 1.3 10e3/uL    Absolute Eosinophils 0.1 0.0 - 0.7 10e3/uL    Absolute Basophils 0.1 0.0 - 0.2 10e3/uL    Absolute Immature Granulocytes 0.5 (H) <=0.4 10e3/uL    Absolute NRBCs 0.0 10e3/uL   iStat Gases (lactate) venous, POCT     Status: Abnormal   Result Value Ref Range    Lactic Acid POCT 1.7 0.7 - 2.0 mmol/L    Bicarbonate Venous POCT 26 21 - 28 mmol/L    O2 Sat, Venous POCT 95 (H) 70 - 75 %    pCO2 Venous POCT 36 (L) 40 - 50 mm Hg    pH Venous POCT 7.47 (H) 7.32 - 7.43    pO2 Venous POCT 70 (H) 25 - 47 mm Hg    Base Excess/Deficit (+/-) POCT 3.0 -3.0 - 3.0 mmol/L   Uric acid     Status: Normal   Result Value Ref Range    Uric Acid 4.9 2.4 - 5.7 mg/dL   CBC with platelets differential     Status: Abnormal    Narrative    The following orders were created for panel order CBC with platelets differential.  Procedure                               Abnormality         Status                     ---------                               -----------         ------                     CBC with platelets and ...[[id number]]  Abnormal            Final result                 Please view results for these tests on the individual orders.     Medications   HYDROmorphone (PF) (DILAUDID) injection 0.5 mg (0.5 mg Intravenous $Given 3/29/25 1148)   ketorolac (TORADOL) injection 15 mg (15 mg Intravenous $Given 3/29/25 1148)     Labs Ordered and Resulted from Time of ED Arrival to Time of ED Departure   COMPREHENSIVE METABOLIC PANEL - Abnormal       Result Value    Sodium 134 (*)     Potassium 3.7      Carbon Dioxide (CO2) 24      Anion Gap 12      Urea Nitrogen 21.5      Creatinine 0.85      GFR Estimate 72      Calcium 9.2      Chloride 98      Glucose 155 (*)     Alkaline Phosphatase 69      AST 25      ALT 43       Protein Total 5.9 (*)     Albumin 3.6      Bilirubin Total 0.5     CBC WITH PLATELETS AND DIFFERENTIAL - Abnormal    WBC Count 10.0      RBC Count 4.38      Hemoglobin 13.2      Hematocrit 40.5      MCV 93      MCH 30.1      MCHC 32.6      RDW 13.0      Platelet Count 239      % Neutrophils 73      % Lymphocytes 12      % Monocytes 8      % Eosinophils 1      % Basophils 1      % Immature Granulocytes 5      NRBCs per 100 WBC 0      Absolute Neutrophils 7.3      Absolute Lymphocytes 1.2      Absolute Monocytes 0.8      Absolute Eosinophils 0.1      Absolute Basophils 0.1      Absolute Immature Granulocytes 0.5 (*)     Absolute NRBCs 0.0     ISTAT GASES LACTATE VENOUS POCT - Abnormal    Lactic Acid POCT 1.7      Bicarbonate Venous POCT 26      O2 Sat, Venous POCT 95 (*)     pCO2 Venous POCT 36 (*)     pH Venous POCT 7.47 (*)     pO2 Venous POCT 70 (*)     Base Excess/Deficit (+/-) POCT 3.0     INR - Normal    INR 1.05     PROCALCITONIN - Normal    Procalcitonin 0.05     ERYTHROCYTE SEDIMENTATION RATE AUTO - Normal    Erythrocyte Sedimentation Rate 9     CRP INFLAMMATION - Normal    CRP Inflammation 3.01     URIC ACID - Normal    Uric Acid 4.9     BLOOD CULTURE   BLOOD CULTURE     POC US SOFT TISSUE    (Results Pending)          Critical care was not performed.     Medical Decision Making  The patient's presentation was of moderate complexity (an acute illness with systemic symptoms).    The patient's evaluation involved:  ordering and/or review of 3+ test(s) in this encounter (see separate area of note for details)    The patient's management necessitated moderate risk (prescription drug management including medications given in the ED).    Assessment & Plan    Alysha Youngblood is a 74 year old female with history of left thumb tendinitis who presents to the emergency department for further evaluation of left thumb swelling and abnormal MRI findings concerning for septic joint.  Ortho consult -in view of very normal  inflammatory markers, not clear etiology, requested uric acid, no intervention at this time but to continue cipro and bactrim and pain control with oxycodon, follow up with hand surgery Tuesday.     Aspiration attempted under US but not enough sample for analysis.    I have reviewed the nursing notes. I have reviewed the findings, diagnosis, plan and need for follow up with the patient.    Discharge Medication List as of 3/29/2025 12:56 PM        START taking these medications    Details   oxyCODONE (ROXICODONE) 5 MG tablet Take 1 tablet (5 mg) by mouth every 6 hours as needed for severe pain., Disp-12 tablet, R-0, E-Prescribe             Final diagnoses:   Swelling of left hand     I, Argenis Ramírez, am serving as a trained medical scribe to document services personally performed by Yifan Hyatt MD based on the provider's statements to me on 3/29/25.  This document has been checked and approved by the attending provider.    I, Yifan Hyatt MD, was physically present and have reviewed and verified the accuracy of this note documented by Argenis Ramírez, medical scribe.      Yifan Hyatt MD   [facility] EMERGENCY DEPARTMENT  3/29/2025     Yifan Hyatt MD  03/29/25 1383

## 2025-03-29 NOTE — CONSULTS
[facility]  ORTHOPAEDIC SURGERY CONSULT - HISTORY AND PHYSICAL    DATE OF CONSULT: 3/29/2025 11:41 AM    REQUESTING PROVIDER: Yifan Hyatt MD, MD - N Staff.    CC: Left thumb pain    DATE OF INJURY: ~2 weeks ago    HISTORY OF PRESENT ILLNESS:   The orthopaedic surgery service was consulted by Yifan Kaur MD for evaluation and treatment recommendations of left thumb pain and swelling.    Alysha Youngblood is a 74 year old right-hand-dominant female who presents with about 2 weeks of worsening left thumb pain.  The patient notes that about 2 weeks ago she was at a [facility] where she was holding a cardboard sign and later that evening noticed pain in her left thumb.  This later developed into worsening swelling and erythema which prompted evaluation at an outside facility.  Given the concern for possible infection she was started on a 1 week course of Bactrim.  She was also given a Medrol dose pack during this time.  Antibiotic course was completed about 6 days ago but had no significant improvement and given continued pain was restarted on Bactrim and ciprofloxacin yesterday.  She underwent an MRI which demonstrated joint effusion and synovitis within the first MCP joint with involvement of surrounding myositis as well as possible involvement of the extensor tendons.  Initially, she had plans to be seen by the hand specialist on 4/1, however given that overnight she had worsening pain and erythema elected to present to the emergency department for expedited evaluation.    Currently, does not feel like the inflammatory process has extended beyond the original area that was hurting her however does feel that her pain is gotten worse within that area.  She is still able to passively and actively range all of the joints of the thumb but has significant pain with active extension at the thumb MCP joint.  Has not had any systemic symptoms such as fevers or chills or noticed any drainage from the  area.  Does feel that the erythema is slightly worse than it was a few days ago.  Denies any other prior episodes like this.  Denies any history of gout.  Does not feel like she has had changes in sensory or motor function distal to the area of concern.  Has a strong preference to avoid admission to the hospital if at all possible.    PAST MEDICAL HISTORY:   Past Medical History:   Diagnosis Date    Atrial fibrillation (H)     ablated 1/12    Ex-smoker     Hashimoto's disease     Hyperlipidaemia     Hypertension     ISABELA (obstructive sleep apnea) 3/12    AHI 7    PAC (premature atrial contraction)     PVC (premature ventricular contraction)      [Patient denies any personal history of bleeding disorders, clotting disorders, or adverse reactions to anesthesia].    PAST SURGICAL HISTORY:    Past Surgical History:   Procedure Laterality Date    APPENDECTOMY      COLONOSCOPY N/A 4/12/2024    Procedure: COLONOSCOPY, WITH POLYPECTOMY;  Surgeon: Naldo Herrera MD;  Location: [facility] OR    ENT SURGERY      H ABLATION FOCAL AFIB  6/24/2011    [facility].     H ABLATION FOCAL AFIB  1/12/2012    [facility]. afib and aflutter ablations.        MEDICATIONS:   Prior to Admission medications    Medication Sig Last Dose Taking? Auth Provider Long Term End Date   amLODIPine (NORVASC) 10 MG tablet TAKE 1 TABLET BY MOUTH DAILY   Danii Burks MD Yes    apixaban ANTICOAGULANT (ELIQUIS ANTICOAGULANT) 5 MG tablet Take 1 tablet (5 mg) by mouth 2 times daily.   Tyra Norris MD     Calcium-Vitamin D-Vitamin K (VIACTIV PO) Take by mouth 2 times daily    Reported, Patient     ciprofloxacin (CIPRO) 500 MG tablet Take 1 tablet (500 mg) by mouth 2 times daily for 5 days.   Navin Mejia MD  4/2/25   fish oil-omega-3 fatty acids (FISH OIL) 1000 MG capsule One capsule daily.        hydrochlorothiazide (HYDRODIURIL) 25 MG tablet TAKE 1 TABLET BY MOUTH DAILY   Danii Burks MD Yes    loratadine (CLARITIN) 10 MG tablet Take 10 mg by mouth daily    Reported, Patient     losartan (COZAAR) 100 MG tablet Take 1 tablet (100 mg) by mouth daily.   Danii Burks MD Yes    lovastatin (MEVACOR) 40 MG tablet Take 1 tablet (40 mg) by mouth at bedtime.   Danii Burks MD Yes    metoprolol succinate ER (TOPROL XL) 50 MG 24 hr tablet TAKE 1 AND 1/2 TABLETS(75 MG) BY MOUTH DAILY   Tyra Norris MD Yes    metoprolol tartrate (LOPRESSOR) 25 MG tablet TAKE 1 TABLET BY MOUTH AS DIRECTED UP TO 2 TABLETS BY MOUTH EVRYDAY WITH ONSET OF ARRHYTHMIA   Tyra Norris MD Yes    predniSONE (DELTASONE) 20 MG tablet Take 3 tablets (60 mg) by mouth daily for 5 days, THEN 2 tablets (40 mg) daily for 5 days, THEN 1 tablet (20 mg) daily for 5 days.   Navin Mejia MD  25   RISEdronate (ACTONEL) 150 MG tablet Take 1 tablet (150 mg) by mouth every 30 days. Take with full glass of water on empty stomach, don't lay down for 30 min   Danii Burks MD Yes    sulfamethoxazole-trimethoprim (BACTRIM DS) 800-160 MG tablet Take 1 tablet by mouth 2 times daily for 10 days.   Navin Mejia MD  25       ALLERGIES:   Ibuprofen, Nsaids, Statins, and Liquid adhesive    SOCIAL HISTORY:   Social History     Socioeconomic History    Marital status:      Spouse name: Not on file    Number of children: Not on file    Years of education: Not on file    Highest education level: Not on file   Occupational History    Not on file   Tobacco Use    Smoking status: Former     Current packs/day: 0.00     Types: Cigarettes     Quit date:      Years since quittin.2     Passive exposure: Never    Smokeless tobacco: Never   Vaping Use    Vaping status: Never Used   Substance and Sexual Activity    Alcohol use: Yes     Alcohol/week: 14.0 standard drinks of alcohol     Types: 14 Shots of liquor per week    Drug use: No    Sexual activity: Never   Other Topics Concern    Parent/sibling w/ CABG, MI or angioplasty before 65F 55M? 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     Social Drivers of Health     Financial Resource Strain: Not on File (2019)    Received from [facility]    Financial Resource Strain     Financial Resource Strain: 0   Food Insecurity: Not on File (2024)    Received from [facility]    Food Insecurity     Food: 0   Transportation Needs: Not on File (2019)    Received from [facility]    Transportation Needs     Transportation: 0   Physical Activity: Not on File (2019)    Received from [facility]    Physical Activity     Physical Activity: 0   Stress: Not on File (2019)    Received from [facility]    Stress     Stress: 0   Social Connections: Not on File (2024)    Received from [facility]    Social Connections     Connectedness: 0   Interpersonal Safety: Low Risk  (2024)    Interpersonal Safety     Do you feel physically and emotionally safe where you currently live?: Yes     Within the past 12 months, have you been hit, slapped, kicked or otherwise physically hurt by someone?: No     Within the past 12 months, have you been humiliated or emotionally abused in other ways by your partner or ex-partner?: No   Housing Stability: Not on File (2019)    Received from [facility]    Housing Stability     Housin     Living situation: Patient lives locally with   Education: PhD  Occupation: Professor at University    FAMILY HISTORY:  Family History   Problem Relation Age of Onset    Depression Mother     Lung Cancer Father         tobacco use    Breast Cancer Sister     Cervical Cancer Maternal Grandmother     Cerebrovascular Disease Maternal Grandmother     Cerebrovascular Disease Paternal Grandmother     Heart Disease Paternal Grandmother     Cancer No family hx of         No known family hx of skin cancer       Patient denies known family history of bleeding, clotting, or anesthesia related complications.     REVIEW OF SYSTEMS:   10-point reviews of systems was negative except as noted above in the HPI.     PHYSICAL  EXAM:   Vitals:    03/29/25 0802   BP: 138/74   Resp: 20   Temp: 97.7  F (36.5  C)   TempSrc: Oral   SpO2: 97%     General: Awake, alert, appropriate, following commands, NAD.  Lungs: Breathing comfortably and nonlabored, no wheezes or stridor noted.  Heart/Cardiovascular: Regular pulse, no peripheral cyanosis.  Right Upper Extremity: No deformity, skin intact. No significant tenderness to palpation over clavicle, AC joint, shoulder, arm, elbow, forearm, wrist. Normal ROM shoulder, elbow, wrist without pain. Motor intact distally with finger flexion/extension/intrinsics/EPL, OK sign 5/5 strength. SILT ax/m/r/u nerve distributions. Radial pulse palpable, 2+.   Left Upper Extremity: Focused exam of the left upper extremity demonstrates moderate swelling and erythema about the left thumb, localized to the area of the MCP joint.  She is moderately tender to palpation circumferentially but has worst tenderness along the ulnar aspect of the MCP joint.  She tolerates passive and active range of motion of the thumb IP joint as well as wrist and other finger motion with normal finger cascade and with minimal symptoms.  She does have significant pain with resisted extension of the thumb MCP joint.  No areas of drainage are appreciated.  No significant tenderness to palpation over clavicle, AC joint, shoulder, arm, elbow, forearm, wrist. Normal ROM shoulder, elbow, wrist without pain. Motor intact distally with finger flexion/extension/intrinsics/EPL, OK sign 5/5 strength. SILT ax/m/r/u nerve distributions.  Sensation distal to the area of concern is intact to light touch.  Radial pulse palpable, 2+.     LABS:  Hemoglobin   Date Value Ref Range Status   03/29/2025 13.2 11.7 - 15.7 g/dL Final   05/14/2024 14.3 11.7 - 15.7 g/dL Final   12/29/2019 15.6 11.7 - 15.7 g/dL Final   12/16/2019 14.6 11.7 - 15.7 g/dL Final     WBC   Date Value Ref Range Status   12/29/2019 6.0 4.0 - 11.0 10e9/L Final     WBC Count   Date Value Ref Range  "Status   03/29/2025 10.0 4.0 - 11.0 10e3/uL Final     Platelet Count   Date Value Ref Range Status   03/29/2025 239 150 - 450 10e3/uL Final   12/29/2019 230 150 - 450 10e9/L Final     INR   Date Value Ref Range Status   03/29/2025 1.05 0.85 - 1.15 Final   07/07/2018 1.19 (H) 0.86 - 1.14 Final     Creatinine   Date Value Ref Range Status   03/29/2025 0.85 0.51 - 0.95 mg/dL Final   12/29/2019 0.77 0.52 - 1.04 mg/dL Final     Glucose   Date Value Ref Range Status   03/29/2025 155 (H) 70 - 99 mg/dL Final   05/04/2022 120 (H) 70 - 99 mg/dL Final   12/29/2019 103 (H) 70 - 99 mg/dL Final     No results found for: \"CRP\"  Erythrocyte Sedimentation Rate   Date Value Ref Range Status   03/29/2025 9 0 - 30 mm/hr Final         IMAGING:  X-ray of the left hand from 3/18 does not demonstrate any acute osseous abnormalities.    MRI of the left thumb from 3/29 does demonstrate increased signal about the MCP joint as well as increased signal in the area of the extensor tendons.    IMPRESSION:   Alysha Youngblood is a 74 year old  female w/ about 2 weeks of left thumb pain that has been refractory to oral antibiotics and a Medrol steroid Dosepak.  Concern at this time is for possible infection or noninfectious tenosynovitis of the extensor tendons of the left thumb as well as possible crystalline arthropathy, inflammatory arthropathy, flare from osteoarthritis.  However, at this time the patient has normal inflammatory markers which would suggest less likely an infectious process.  Given the localized nature of her condition as well as lack of fluctuance on exam and negative inflammatory markers, and the patient's strong preference to avoid hospital admission if possible, will attempt to better control pain in the emergency department with a dose of Toradol and Dilaudid and his pain control can be improved then we will plan to have the patient discharged to home with follow-up with a hand specialist on 4/1 as previously planned.  This " was discussed with the patient who is in agreement with this plan.    - Plan for OR: None at this time, pending clinic visit with hand specialist on 4/1  - Anticoagulation/DVT Prophylaxis: Mobilization  - Antibiotics/Tetanus: P.o. Bactrim and ciprofloxacin  - X-rays/Imaging: Complete  - Activity: Range of motion as tolerated  - Weight bearing: Weightbearing as tolerated  - Pain control: Multimodal, per ED  - Diet: Regular from orthopedics perspective  - Follow-up: Clinic with Dr. Díaz on 4/1  - Disposition: Okay to discharge from ED if pain better controlled after Toradol and Dilaudid    Assessment and Plan discussed with Dr. Peacock, orthopaedic staff,    Cristofer Morales MD  PGY-3  Orthopaedic Surgery

## 2025-03-29 NOTE — DISCHARGE INSTRUCTIONS
Please make an appointment to follow up with Your Hand Surgeon Tuesday as scheduled as soon as possible even if entirely better.

## 2025-03-31 ENCOUNTER — VIRTUAL VISIT (OUTPATIENT)
Dept: ORTHOPEDICS | Facility: CLINIC | Age: 75
End: 2025-03-31
Attending: FAMILY MEDICINE
Payer: COMMERCIAL

## 2025-03-31 DIAGNOSIS — M65.949 SYNOVITIS OF HAND: Primary | ICD-10-CM

## 2025-03-31 DIAGNOSIS — M79.89 SWELLING OF LEFT THUMB: ICD-10-CM

## 2025-03-31 PROCEDURE — 98005 SYNCH AUDIO-VIDEO EST LOW 20: CPT | Performed by: FAMILY MEDICINE

## 2025-03-31 NOTE — LETTER
3/31/2025       RE[facility] [id number]     Dear Colleague,    Thank you for referring your patient, Alysha Youngblood, to the [facility] [place] [facility]. Please see a copy of my visit note below.    VIDEO VIIST  Call start: 9:02 AM  Call stop: 9:14 AM  Call duration: 12 min   Pt location: Home  Provider location: [facility] [place]  Service : [facility]      ASSESSMENT/PLAN:    (M65.949) Synovitis of hand  (primary encounter diagnosis)  Comment: stable after reassuring ED visit this weekend; precautions given  Plan: appt w/ Dr Quick in am; will keep me updated    (M79.89) Swelling of left thumb  Comment:   Plan: see above    Navin Mejia MD  March 31, 2025  9:14 AM        Pt is a 74 year old female last seen on 3/27/25 here for follow up of:     Left thumb pain. Patient's pain increased over the weekend and she was having increased warmth and redness. She was told go to the ED and they attempted to aspirate her joint and gave her oxycodone for the pain. She reports very mild improvement today. Labs in ED were normal. Was offered to stay for IV Abx but elected to return home and continue both Abx. No F/C. Pain is the primary issue along w/ swelling. Notes that when she scratches her thenar eminence she notes paresthesias     Per ED note on 3/29/25:  Alysha Youngblood is a 74 year old female with history of left thumb tendinitis who presents to the emergency department for further evaluation of left thumb swelling and abnormal MRI findings concerning for septic joint.  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  She developed some swelling to the lower base of the left thumb.  She went to urgent care for this on 3/11/2025, was given a prescription for Medrol Dosepak.  5 days later she presented to [facility] on 3/16/2025 with increased left thumb swelling and  decreased range of motion.  They took a picture of this as well as an x-ray which is negative for any acute osseous abnormalities.  She was treated with Tylenol and Bactrim.  She completed this course and presented to sports medicine clinic where she was seen by Dr. 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  She was given a prescription for prednisone.  She returned for follow-up 9 days later on 3/27/2025, left thumb MRI was ordered.  That was performed yesterday, results are concerning for septic arthritis of the first MCP joint.  She was given a prescription for Bactrim and Cipro, but her thumb swelling and pain keeps getting worse.  She was unable to sleep last night due to the pain and so presents to the emergency department for evaluation.  She did eat breakfast this morning at 6 AM but is otherwise willing to stay n.p.o.  No history of diabetes.  She would like an ice pack to help with her pain.   Alysha Youngblood is a 74 year old female with history of left thumb tendinitis who presents to the emergency department for further evaluation of left thumb swelling and abnormal MRI findings concerning for septic joint.  Ortho consult -in view of very normal inflammatory markers, not clear etiology, requested uric acid, no intervention at this time but to continue cipro and bactrim and pain control with oxycodon, follow up with hand surgery Tuesday.      Aspiration attempted under US but not enough sample for analysis.     I have reviewed the nursing notes. I have reviewed the findings, diagnosis, plan and need for follow up with the patient.    Per my last note:  (M79.89) Swelling of left thumb  (primary encounter diagnosis)  Comment: unclear etiology of refractory swelling at thumb MCP; check stat MRI and virtual follow-up after   Plan: MR Finger Left w/o Contrast    Past Medical History:   Diagnosis Date     Atrial fibrillation (H)     ablated 1/12      Ex-smoker      Hashimoto's disease      Hyperlipidaemia      Hypertension      ISABELA (obstructive sleep apnea) 3/12    AHI 7     PAC (premature atrial contraction)      PVC (premature ventricular contraction)       Current Outpatient Medications   Medication Sig Dispense Refill     amLODIPine (NORVASC) 10 MG tablet TAKE 1 TABLET BY MOUTH DAILY 90 tablet 2     apixaban ANTICOAGULANT (ELIQUIS ANTICOAGULANT) 5 MG tablet Take 1 tablet (5 mg) by mouth 2 times daily. 180 tablet 3     Calcium-Vitamin D-Vitamin K (VIACTIV PO) Take by mouth 2 times daily        ciprofloxacin (CIPRO) 500 MG tablet Take 1 tablet (500 mg) by mouth 2 times daily for 5 days. 10 tablet 0     fish oil-omega-3 fatty acids (FISH OIL) 1000 MG capsule One capsule daily.       hydrochlorothiazide (HYDRODIURIL) 25 MG tablet TAKE 1 TABLET BY MOUTH DAILY 90 tablet 2     loratadine (CLARITIN) 10 MG tablet Take 10 mg by mouth daily       losartan (COZAAR) 100 MG tablet Take 1 tablet (100 mg) by mouth daily. 90 tablet 2     lovastatin (MEVACOR) 40 MG tablet Take 1 tablet (40 mg) by mouth at bedtime. 90 tablet 3     metoprolol succinate ER (TOPROL XL) 50 MG 24 hr tablet TAKE 1 AND 1/2 TABLETS(75 MG) BY MOUTH DAILY 135 tablet 3     metoprolol tartrate (LOPRESSOR) 25 MG tablet TAKE 1 TABLET BY MOUTH AS DIRECTED UP TO 2 TABLETS BY MOUTH EVRYDAY WITH ONSET OF ARRHYTHMIA 30 tablet 1     oxyCODONE (ROXICODONE) 5 MG tablet Take 1 tablet (5 mg) by mouth every 6 hours as needed for severe pain. 12 tablet 0     predniSONE (DELTASONE) 20 MG tablet Take 3 tablets (60 mg) by mouth daily for 5 days, THEN 2 tablets (40 mg) daily for 5 days, THEN 1 tablet (20 mg) daily for 5 days. 30 tablet 0     RISEdronate (ACTONEL) 150 MG tablet Take 1 tablet (150 mg) by mouth every 30 days. Take with full glass of water on empty stomach, don't lay down for 30 min 4 tablet 0     sulfamethoxazole-trimethoprim (BACTRIM DS) 800-160 MG tablet Take 1 tablet by mouth 2 times daily for 10 days. 20  tablet 0      Allergies   Allergen Reactions     Ibuprofen Other (See Comments)     Nsaids GI Disturbance     Statins      Made her lips tingle, so they put her on another statin instead.     Liquid Adhesive Rash     After wearing patches for > 8 days  After wearing patches for > 8 days      ROS:   Gen- no fevers/chills   Derm - no rash/ + mild redness - stable  Neuro -see HPI  Remainder of ROS negative.     Exam:   Pleasant, alert, no distress  Showed me full passive motion of thumb w/o pain          Again, thank you for allowing me to participate in the care of your patient.      Sincerely,    Navin Mejia MD

## 2025-03-31 NOTE — PROGRESS NOTES
VIDEO VIIST  Call start: 9:02 AM  Call stop: 9:14 AM  Call duration: 12 min   Pt location: Home  [place] location[place] [facility] [place]  Service : [facility]      ASSESSMENT/PLAN:    (M65.949) Synovitis of hand  (primary encounter diagnosis)  Comment: stable after reassuring ED visit this weekend; precautions given  Plan: appt w/ Dr Quick in am; will keep me updated    (M79.89) Swelling of left thumb  Comment:   Plan: see above    Navin Mejia MD  March 31, 2025  9:14 AM        Pt is a 74 year old female last seen on 3/27/25 here for follow up of:     Left thumb pain. Patient's pain increased over the weekend and she was having increased warmth and redness. She was told go to the ED and they attempted to aspirate her joint and gave her oxycodone for the pain. She reports very mild improvement today. Labs in ED were normal. Was offered to stay for IV Abx but elected to return home and continue both Abx. No F/C. Pain is the primary issue along w/ swelling. Notes that when she scratches her thenar eminence she notes paresthesias     Per ED note on 3/29/25:  Alysha Youngblood is a 74 year old female with history of left thumb tendinitis who presents to the emergency department for further evaluation of left thumb swelling and abnormal MRI findings concerning for septic joint.  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  She developed some swelling to the lower base of the left thumb.  She went to urgent care for this on 3/11/2025, was given a prescription for Medrol Dosepak.  5 days later she presented to [place] ER on 3/16/2025 with increased left thumb swelling and decreased range of motion.  They took a picture of this as well as an x-ray which is negative for any acute osseous abnormalities.  She was treated with Tylenol and Bactrim.  She completed this course and presented to sports medicine clinic where she was seen by Dr. Navin Mejia on 3/18/2025, this was felt to be flare of  underlying osteoarthritis of the MCP joint.  She was given a prescription for prednisone.  She returned for follow-up 9 days later on 3/27/2025, left thumb MRI was ordered.  That was performed yesterday, results are concerning for septic arthritis of the first MCP joint.  She was given a prescription for Bactrim and Cipro, but her thumb swelling and pain keeps getting worse.  She was unable to sleep last night due to the pain and so presents to the emergency department for evaluation.  She did eat breakfast this morning at 6 AM but is otherwise willing to stay n.p.o.  No history of diabetes.  She would like an ice pack to help with her pain.   Alysha Younglbood is a 74 year old female with history of left thumb tendinitis who presents to the emergency department for further evaluation of left thumb swelling and abnormal MRI findings concerning for septic joint.  Ortho consult -in view of very normal inflammatory markers, not clear etiology, requested uric acid, no intervention at this time but to continue cipro and bactrim and pain control with oxycodon, follow up with hand surgery Tuesday.      Aspiration attempted under US but not enough sample for analysis.     I have reviewed the nursing notes. I have reviewed the findings, diagnosis, plan and need for follow up with the patient.    Per my last note:  (M79.89) Swelling of left thumb  (primary encounter diagnosis)  Comment: unclear etiology of refractory swelling at thumb MCP; check stat MRI and virtual follow-up after   Plan: MR Finger Left w/o Contrast    Past Medical History:   Diagnosis Date    Atrial fibrillation (H)     ablated 1/12    Ex-smoker     Hashimoto's disease     Hyperlipidaemia     Hypertension     ISABELA (obstructive sleep apnea) 3/12    AHI 7    PAC (premature atrial contraction)     PVC (premature ventricular contraction)       Current Outpatient Medications   Medication Sig Dispense Refill    amLODIPine (NORVASC) 10 MG tablet TAKE 1 TABLET BY MOUTH  DAILY 90 tablet 2    apixaban ANTICOAGULANT (ELIQUIS ANTICOAGULANT) 5 MG tablet Take 1 tablet (5 mg) by mouth 2 times daily. 180 tablet 3    Calcium-Vitamin D-Vitamin K (VIACTIV PO) Take by mouth 2 times daily       ciprofloxacin (CIPRO) 500 MG tablet Take 1 tablet (500 mg) by mouth 2 times daily for 5 days. 10 tablet 0    fish oil-omega-3 fatty acids (FISH OIL) 1000 MG capsule One capsule daily.      hydrochlorothiazide (HYDRODIURIL) 25 MG tablet TAKE 1 TABLET BY MOUTH DAILY 90 tablet 2    loratadine (CLARITIN) 10 MG tablet Take 10 mg by mouth daily      losartan (COZAAR) 100 MG tablet Take 1 tablet (100 mg) by mouth daily. 90 tablet 2    lovastatin (MEVACOR) 40 MG tablet Take 1 tablet (40 mg) by mouth at bedtime. 90 tablet 3    metoprolol succinate ER (TOPROL XL) 50 MG 24 hr tablet TAKE 1 AND 1/2 TABLETS(75 MG) BY MOUTH DAILY 135 tablet 3    metoprolol tartrate (LOPRESSOR) 25 MG tablet TAKE 1 TABLET BY MOUTH AS DIRECTED UP TO 2 TABLETS BY MOUTH EVRYDAY WITH ONSET OF ARRHYTHMIA 30 tablet 1    oxyCODONE (ROXICODONE) 5 MG tablet Take 1 tablet (5 mg) by mouth every 6 hours as needed for severe pain. 12 tablet 0    predniSONE (DELTASONE) 20 MG tablet Take 3 tablets (60 mg) by mouth daily for 5 days, THEN 2 tablets (40 mg) daily for 5 days, THEN 1 tablet (20 mg) daily for 5 days. 30 tablet 0    RISEdronate (ACTONEL) 150 MG tablet Take 1 tablet (150 mg) by mouth every 30 days. Take with full glass of water on empty stomach, don't lay down for 30 min 4 tablet 0    sulfamethoxazole-trimethoprim (BACTRIM DS) 800-160 MG tablet Take 1 tablet by mouth 2 times daily for 10 days. 20 tablet 0      Allergies   Allergen Reactions    Ibuprofen Other (See Comments)    Nsaids GI Disturbance    Statins      Made her lips tingle, so they put her on another statin instead.    Liquid Adhesive Rash     After wearing patches for > 8 days  After wearing patches for > 8 days      ROS:   Gen- no fevers/chills   Derm - no rash/ + mild redness -  stable  Neuro -see HPI  Remainder of ROS negative.     Exam:   Pleasant, alert, no distress  Showed me full passive motion of thumb w/o pain

## 2025-03-31 NOTE — TELEPHONE ENCOUNTER
DIAGNOSIS: left thumb infection; Roberto patient    APPOINTMENT DATE: 4/1/25   NOTES STATUS DETAILS   OFFICE NOTE from referring provider Internal 3/31/25 - Navin Mejia MD - Sports Med    DISCHARGE REPORT from the ER Care Everywhere 3/29/25 - [facility] ED   3/16/25 - [facility] ED    MEDICATION LIST Internal    MRI Internal 3/2/25 - MR Finger Left    XRAYS (IMAGES & REPORTS) Internal 3/18/25 - XR Hand Left

## 2025-04-01 ENCOUNTER — ANCILLARY PROCEDURE (OUTPATIENT)
Dept: GENERAL RADIOLOGY | Facility: CLINIC | Age: 75
End: 2025-04-01
Attending: STUDENT IN AN ORGANIZED HEALTH CARE EDUCATION/TRAINING PROGRAM
Payer: COMMERCIAL

## 2025-04-01 ENCOUNTER — PRE VISIT (OUTPATIENT)
Dept: ORTHOPEDICS | Facility: CLINIC | Age: 75
End: 2025-04-01

## 2025-04-01 ENCOUNTER — OFFICE VISIT (OUTPATIENT)
Dept: ORTHOPEDICS | Facility: CLINIC | Age: 75
End: 2025-04-01
Payer: COMMERCIAL

## 2025-04-01 ENCOUNTER — TELEPHONE (OUTPATIENT)
Dept: ORTHOPEDICS | Facility: CLINIC | Age: 75
End: 2025-04-01

## 2025-04-01 DIAGNOSIS — M79.645 PAIN OF LEFT THUMB: Primary | ICD-10-CM

## 2025-04-01 DIAGNOSIS — M79.645 PAIN OF LEFT THUMB: ICD-10-CM

## 2025-04-01 PROCEDURE — 99204 OFFICE O/P NEW MOD 45 MIN: CPT | Mod: 57 | Performed by: STUDENT IN AN ORGANIZED HEALTH CARE EDUCATION/TRAINING PROGRAM

## 2025-04-01 PROCEDURE — 1125F AMNT PAIN NOTED PAIN PRSNT: CPT | Performed by: STUDENT IN AN ORGANIZED HEALTH CARE EDUCATION/TRAINING PROGRAM

## 2025-04-01 PROCEDURE — 73130 X-RAY EXAM OF HAND: CPT | Mod: LT | Performed by: RADIOLOGY

## 2025-04-01 RX ORDER — CEFAZOLIN SODIUM 2 G/50ML
2 SOLUTION INTRAVENOUS
Status: CANCELLED | OUTPATIENT
Start: 2025-04-01

## 2025-04-01 RX ORDER — CEFAZOLIN SODIUM 2 G/50ML
2 SOLUTION INTRAVENOUS SEE ADMIN INSTRUCTIONS
Status: CANCELLED | OUTPATIENT
Start: 2025-04-01

## 2025-04-01 NOTE — NURSING NOTE
Reason For Visit:   Chief Complaint   Patient presents with    Consult     Left Thumb Infection       Primary MD: Danii Burks  Ref. MD: Roberto    Age: 74 year old    ?  No      There were no vitals taken for this visit.      Pain Assessment  Patient Currently in Pain: Yes  0-10 Pain Scale: 8  Primary Pain Location: Finger (Comment which one) (Left Thumb)               QuickDASH Assessment      4/1/2025     8:28 AM   QuickDASH Main   1. Open a tight or new jar Severe difficulty   2. Do heavy household chores (e.g., wash walls, floors) Severe difficulty   3. Carry a shopping bag or briefcase Moderate difficulty   4. Wash your back Severe difficulty   5. Use a knife to cut food Severe difficulty   6. Recreational activities in which you take some force or impact through your arm, shoulder or hand (e.g., golf, hammering, tennis, etc.) Unable   7. During the past week, to what extent has your arm, shoulder or hand problem interfered with your normal social activities with family, friends, neighbours or groups Slightly   8. During the past week, were you limited in your work or other regular daily activities as a result of your arm, shoulder or hand problem Moderately limited   9. Arm, shoulder or hand pain Extreme   10.Tingling (pins and needles) in your arm,shoulder or hand Mild   11. During the past week, how much difficulty have you had sleeping because of the pain in your arm, shoulder or hand Moderate difficulty   Quickdash Ability Score 63.64          Current Outpatient Medications   Medication Sig Dispense Refill    amLODIPine (NORVASC) 10 MG tablet TAKE 1 TABLET BY MOUTH DAILY 90 tablet 2    apixaban ANTICOAGULANT (ELIQUIS ANTICOAGULANT) 5 MG tablet Take 1 tablet (5 mg) by mouth 2 times daily. 180 tablet 3    Calcium-Vitamin D-Vitamin K (VIACTIV PO) Take by mouth 2 times daily       ciprofloxacin (CIPRO) 500 MG tablet Take 1 tablet (500 mg) by mouth 2 times daily for 5 days. 10 tablet 0    fish  oil-omega-3 fatty acids (FISH OIL) 1000 MG capsule One capsule daily.      hydrochlorothiazide (HYDRODIURIL) 25 MG tablet TAKE 1 TABLET BY MOUTH DAILY 90 tablet 2    loratadine (CLARITIN) 10 MG tablet Take 10 mg by mouth daily      losartan (COZAAR) 100 MG tablet Take 1 tablet (100 mg) by mouth daily. 90 tablet 2    lovastatin (MEVACOR) 40 MG tablet Take 1 tablet (40 mg) by mouth at bedtime. 90 tablet 3    metoprolol succinate ER (TOPROL XL) 50 MG 24 hr tablet TAKE 1 AND 1/2 TABLETS(75 MG) BY MOUTH DAILY 135 tablet 3    metoprolol tartrate (LOPRESSOR) 25 MG tablet TAKE 1 TABLET BY MOUTH AS DIRECTED UP TO 2 TABLETS BY MOUTH EVRYDAY WITH ONSET OF ARRHYTHMIA 30 tablet 1    oxyCODONE (ROXICODONE) 5 MG tablet Take 1 tablet (5 mg) by mouth every 6 hours as needed for severe pain. 12 tablet 0    RISEdronate (ACTONEL) 150 MG tablet Take 1 tablet (150 mg) by mouth every 30 days. Take with full glass of water on empty stomach, don't lay down for 30 min 4 tablet 0    sulfamethoxazole-trimethoprim (BACTRIM DS) 800-160 MG tablet Take 1 tablet by mouth 2 times daily for 10 days. 20 tablet 0       Allergies   Allergen Reactions    Ibuprofen Other (See Comments)    Nsaids GI Disturbance    Statins      Made her lips tingle, so they put her on another statin instead.    Liquid Adhesive Rash     After wearing patches for > 8 days  After wearing patches for > 8 days       Madeline Matthew, ATC

## 2025-04-01 NOTE — TELEPHONE ENCOUNTER
URGENT schedule for Wed or Thurs this week.    Procedure: Left thumb exploration with metacarpophalangeal joint arthrotomy, washout, irrigation and debridement, cultures, possible Penrose placement  Facility: [facility]  Length: 30 minutes  Anesthesia: Local, MAC  Post-op appointments needed: 2 weeks with surgeon or PA, 6 weeks with surgeon only.  Surgery packet/instructions given to patient?  Yes     Jay Rinaldi, RNCC

## 2025-04-01 NOTE — LETTER
4/1/2025      Alysha Youngblood  [address] [place] [id number]      Dear Colleague,    Thank you for referring your patient, Alysha Youngblood, to the [facility] [place]. Please see a copy of my visit note below.    Ortho Hand   Initial Clinic Visit     Presenting Complaint: Left thumb pain and swelling since 4 weeks     HPI:  Alysha Youngblood is a 74 year old female with known case of HTN and Afib. 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 Over time a swelling developed till the lower base of the left thumb. The pain is of a biting sensation, restricting her thumb movement, progressively worsening and severity to the extent that it disturbs her sleep. The redness has been present since the swelling. The swelling was initially localized to the thumb then spread to the left hand and then after Bactrim was localized to the left thumb only.     She went to urgent care for this on 3/11/2025, was given a prescription for Medrol Dosepak.  5 days later she presented to [place] ER on 3/16/2025 with increased left thumb swelling and decreased range of motion.  She was treated with Tylenol and Bactrim.  She completed this course and presented to sports medicine clinic where she was seen by Dr. 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  She was given a prescription for prednisone.  She returned for follow-up 9 days later on 3/27/2025, left thumb MRI was ordered.  That was performed, results are concerning for septic arthritis of the first MCP joint.  She was given a prescription for Bactrim and Cipro, but her thumb swelling and pain keeps getting worse.     She recently could not sleep due to the pain and so went to the emergency department for further evaluation of left thumb swelling and abnormal MRI findings concerning for septic joint.  Aspiration  attempted under US but not enough sample for analysis.    Review of Systems: Unremarkable     No family history of rheumatoid arthritis. Positive family history of osteoarthritis with father having hip replacement surgery.     Past Medical History:   Diagnosis Date     Atrial fibrillation (H)     ablated 1/12     Ex-smoker      Hashimoto's disease      Hyperlipidaemia      Hypertension      ISABELA (obstructive sleep apnea) 3/12    AHI 7     PAC (premature atrial contraction)      PVC (premature ventricular contraction)        Past Surgical History:   Procedure Laterality Date     APPENDECTOMY       COLONOSCOPY N/A 4/12/2024    Procedure: COLONOSCOPY, WITH POLYPECTOMY;  Surgeon: Naldo Herrera MD;  Location: [facility] OR     ENT SURGERY       H ABLATION FOCAL AFIB  6/24/2011    [facility].      H ABLATION FOCAL AFIB  1/12/2012    [facility]. afib and aflutter ablations.        Current Outpatient Medications   Medication Sig Dispense Refill     amLODIPine (NORVASC) 10 MG tablet TAKE 1 TABLET BY MOUTH DAILY 90 tablet 2     apixaban ANTICOAGULANT (ELIQUIS ANTICOAGULANT) 5 MG tablet Take 1 tablet (5 mg) by mouth 2 times daily. 180 tablet 3     Calcium-Vitamin D-Vitamin K (VIACTIV PO) Take by mouth 2 times daily        ciprofloxacin (CIPRO) 500 MG tablet Take 1 tablet (500 mg) by mouth 2 times daily for 5 days. 10 tablet 0     fish oil-omega-3 fatty acids (FISH OIL) 1000 MG capsule One capsule daily.       hydrochlorothiazide (HYDRODIURIL) 25 MG tablet TAKE 1 TABLET BY MOUTH DAILY 90 tablet 2     loratadine (CLARITIN) 10 MG tablet Take 10 mg by mouth daily       losartan (COZAAR) 100 MG tablet Take 1 tablet (100 mg) by mouth daily. 90 tablet 2     lovastatin (MEVACOR) 40 MG tablet Take 1 tablet (40 mg) by mouth at bedtime. 90 tablet 3     metoprolol succinate ER (TOPROL XL) 50 MG 24 hr tablet TAKE 1 AND 1/2 TABLETS(75 MG) BY MOUTH DAILY 135 tablet 3     metoprolol tartrate (LOPRESSOR) 25 MG tablet TAKE 1 TABLET BY MOUTH AS DIRECTED  "UP TO 2 TABLETS BY MOUTH EVRYDAY WITH ONSET OF ARRHYTHMIA 30 tablet 1     oxyCODONE (ROXICODONE) 5 MG tablet Take 1 tablet (5 mg) by mouth every 6 hours as needed for severe pain. 12 tablet 0     RISEdronate (ACTONEL) 150 MG tablet Take 1 tablet (150 mg) by mouth every 30 days. Take with full glass of water on empty stomach, don't lay down for 30 min 4 tablet 0     sulfamethoxazole-trimethoprim (BACTRIM DS) 800-160 MG tablet Take 1 tablet by mouth 2 times daily for 10 days. 20 tablet 0     No current facility-administered medications for this visit.       Allergies   Allergen Reactions     Ibuprofen Other (See Comments)     Nsaids GI Disturbance     Statins      Made her lips tingle, so they put her on another statin instead.     Liquid Adhesive Rash     After wearing patches for > 8 days  After wearing patches for > 8 days       Social History     Tobacco Use     Smoking status: Former     Current packs/day: 0.00     Types: Cigarettes     Quit date:      Years since quittin.2     Passive exposure: Never     Smokeless tobacco: Never   Vaping Use     Vaping status: Never Used   Substance Use Topics     Alcohol use: Yes     Alcohol/week: 14.0 standard drinks of alcohol     Types: 14 Shots of liquor per week     Drug use: No        There were no vitals filed for this visit.    Estimated body mass index is 36.32 kg/m  as calculated from the following:    Height as of 24: 1.676 m (5' 6\").    Weight as of 24: 102.1 kg (225 lb).     Physical Exam  Gen: well-appearing, cooperative, well-groomed  Hand Movement: Normal Passive without any pain but Pain on Active Movement   Swelling with erythema till the base of the left thumb       Assessment: Alysha Youngblood is a 74 year old female with known case of HTN and Afib presenting to the clinic for continued left thumb erythema and pain with MRI suspicion for septic arthritis. Prominent erythema present on the left thumb with tenderness. Could be cellulitis but " no skin break present. Likely tendinitis with overlying cellulitis. Currently on Eliquis, will need cardiology consultation for surgery optimization.     Repeat X-Ray for Comparison in Clinic- Findings revealed reduced joint space from before and subluxation in the left thumb. X ray findings suggest inflammatory arthritis, gout.     Plan: Wound exploration, washout and fluid drainage for culture in OR with sedation.     Aayush Kahn   Sub-Intern/Visiting Medical Student MS5  Plastic Surgery, [facility]       Attending Attestation:    In brief, 74-year-old female right-hand-dominant non-smoker presenting with left thumb and hand swelling that is now localized to the metacarpal phalangeal joint with substantial changes on XR concerning for a joint space and/or bone infection.  Despite having no significant passive or axial loaded tenderness, patient continues to not improve with broad oral antibiotic treatment and now has XR changes with progressive arthritis.  This may represent an inflammatory condition or an infection.  Patient has already had an attempted joint aspiration with no fluid.  In either case, it would not be unreasonable to proceed with exploration, irrigation and debridement, cultures.  Patient would like this.    Discussed the risks of surgery, including but not limited to: Infection, bleeding, pain, poor scarring, wound healing issues, need for revision or additional surgery, injury to nerves or tendons, neuroma formation, complex regional pain syndrome, stiffness, loss of digit, anesthesia related complication.  Despite these risks, patient consents to and would like to proceed with left thumb exploration with metacarpophalangeal joint arthrotomy, washout, irrigation and debridement, cultures, possible Penrose placement.  All questions answered.  We will plan to try to schedule this within the next 1-2 days.    A total of 30 minutes was devoted to review of chart, direct  face-to-face patient counseling and documentation during and on the day of this encounter, exclusive of any procedure performed.    Arsalan Quick MD, PhD, FACS            Again, thank you for allowing me to participate in the care of your patient.        Sincerely,        Arsalan Quick MD    Electronically signed

## 2025-04-01 NOTE — PROGRESS NOTES
Ortho Hand   Initial Clinic Visit     Presenting Complaint: Left thumb pain and swelling since 4 weeks     HPI:  Alysha Youngblood is a 74 year old female with known case of HTN and Afib. 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 Over time a swelling developed till the lower base of the left thumb. The pain is of a biting sensation, restricting her thumb movement, progressively worsening and severity to the extent that it disturbs her sleep. The redness has been present since the swelling. The swelling was initially localized to the thumb then spread to the left hand and then after Bactrim was localized to the left thumb only.     She went to urgent care for this on 3/11/2025, was given a prescription for Medrol Dosepak.  5 days later she presented to [place] [facility] on 3/16/2025 with increased left thumb swelling and decreased range of motion.  She was treated with Tylenol and Bactrim.  She completed this course and presented to sports medicine clinic where she was seen by Dr. 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  She was given a prescription for prednisone.  She returned for follow-up 9 days later on 3/27/2025, left thumb MRI was ordered.  That was performed, results are concerning for septic arthritis of the first MCP joint.  She was given a prescription for Bactrim and Cipro, but her thumb swelling and pain keeps getting worse.     She recently could not sleep due to the pain and so went to the emergency department for further evaluation of left thumb swelling and abnormal MRI findings concerning for septic joint.  Aspiration attempted under US but not enough sample for analysis.    Review of Systems: Unremarkable     No family history of rheumatoid arthritis. Positive family history of osteoarthritis with father having hip replacement surgery.     Past Medical History:    Diagnosis Date    Atrial fibrillation (H)     ablated 1/12    Ex-smoker     Hashimoto's disease     Hyperlipidaemia     Hypertension     ISABELA (obstructive sleep apnea) 3/12    AHI 7    PAC (premature atrial contraction)     PVC (premature ventricular contraction)        Past Surgical History:   Procedure Laterality Date    APPENDECTOMY      COLONOSCOPY N/A 4/12/2024    Procedure: COLONOSCOPY, WITH POLYPECTOMY;  Surgeon: Naldo Herrera MD;  Location: [facility] OR    ENT SURGERY      H ABLATION FOCAL AFIB  6/24/2011    [facility].     H ABLATION FOCAL AFIB  1/12/2012    [facility]. afib and aflutter ablations.        Current Outpatient Medications   Medication Sig Dispense Refill    amLODIPine (NORVASC) 10 MG tablet TAKE 1 TABLET BY MOUTH DAILY 90 tablet 2    apixaban ANTICOAGULANT (ELIQUIS ANTICOAGULANT) 5 MG tablet Take 1 tablet (5 mg) by mouth 2 times daily. 180 tablet 3    Calcium-Vitamin D-Vitamin K (VIACTIV PO) Take by mouth 2 times daily       ciprofloxacin (CIPRO) 500 MG tablet Take 1 tablet (500 mg) by mouth 2 times daily for 5 days. 10 tablet 0    fish oil-omega-3 fatty acids (FISH OIL) 1000 MG capsule One capsule daily.      hydrochlorothiazide (HYDRODIURIL) 25 MG tablet TAKE 1 TABLET BY MOUTH DAILY 90 tablet 2    loratadine (CLARITIN) 10 MG tablet Take 10 mg by mouth daily      losartan (COZAAR) 100 MG tablet Take 1 tablet (100 mg) by mouth daily. 90 tablet 2    lovastatin (MEVACOR) 40 MG tablet Take 1 tablet (40 mg) by mouth at bedtime. 90 tablet 3    metoprolol succinate ER (TOPROL XL) 50 MG 24 hr tablet TAKE 1 AND 1/2 TABLETS(75 MG) BY MOUTH DAILY 135 tablet 3    metoprolol tartrate (LOPRESSOR) 25 MG tablet TAKE 1 TABLET BY MOUTH AS DIRECTED UP TO 2 TABLETS BY MOUTH EVRYDAY WITH ONSET OF ARRHYTHMIA 30 tablet 1    oxyCODONE (ROXICODONE) 5 MG tablet Take 1 tablet (5 mg) by mouth every 6 hours as needed for severe pain. 12 tablet 0    RISEdronate (ACTONEL) 150 MG tablet Take 1 tablet (150 mg) by mouth every 30  "days. Take with full glass of water on empty stomach, don't lay down for 30 min 4 tablet 0    sulfamethoxazole-trimethoprim (BACTRIM DS) 800-160 MG tablet Take 1 tablet by mouth 2 times daily for 10 days. 20 tablet 0     No current facility-administered medications for this visit.       Allergies   Allergen Reactions    Ibuprofen Other (See Comments)    Nsaids GI Disturbance    Statins      Made her lips tingle, so they put her on another statin instead.    Liquid Adhesive Rash     After wearing patches for > 8 days  After wearing patches for > 8 days       Social History     Tobacco Use    Smoking status: Former     Current packs/day: 0.00     Types: Cigarettes     Quit date:      Years since quittin.2     Passive exposure: Never    Smokeless tobacco: Never   Vaping Use    Vaping status: Never Used   Substance Use Topics    Alcohol use: Yes     Alcohol/week: 14.0 standard drinks of alcohol     Types: 14 Shots of liquor per week    Drug use: No        There were no vitals filed for this visit.    Estimated body mass index is 36.32 kg/m  as calculated from the following:    Height as of 24: 1.676 m (5' 6\").    Weight as of 24: 102.1 kg (225 lb).     Physical Exam  Gen: well-appearing, cooperative, well-groomed  Hand Movement: Normal Passive without any pain but Pain on Active Movement   Swelling with erythema till the base of the left thumb       Assessment: Alysha Youngblood is a 74 year old female with known case of HTN and Afib presenting to the clinic for continued left thumb erythema and pain with MRI suspicion for septic arthritis. Prominent erythema present on the left thumb with tenderness. Could be cellulitis but no skin break present. Likely tendinitis with overlying cellulitis. Currently on Eliquis, will need cardiology consultation for surgery optimization.     Repeat X-Ray for Comparison in Clinic- Findings revealed reduced joint space from before and subluxation in the left thumb. X ray " findings suggest inflammatory arthritis, gout.     Plan: Wound exploration, washout and fluid drainage for culture in OR with sedation.     Aayush Kahn   Sub-Intern/Visiting Medical Student [id number]  Plastic Surgery, [facility]       Attending Attestation:    In brief, 74-year-old female right-hand-dominant non-smoker presenting with left thumb and hand swelling that is now localized to the metacarpal phalangeal joint with substantial changes on XR concerning for a joint space and/or bone infection.  Despite having no significant passive or axial loaded tenderness, patient continues to not improve with broad oral antibiotic treatment and now has XR changes with progressive arthritis.  This may represent an inflammatory condition or an infection.  Patient has already had an attempted joint aspiration with no fluid.  In either case, it would not be unreasonable to proceed with exploration, irrigation and debridement, cultures.  Patient would like this.    Discussed the risks of surgery, including but not limited to: Infection, bleeding, pain, poor scarring, wound healing issues, need for revision or additional surgery, injury to nerves or tendons, neuroma formation, complex regional pain syndrome, stiffness, loss of digit, anesthesia related complication.  Despite these risks, patient consents to and would like to proceed with left thumb exploration with metacarpophalangeal joint arthrotomy, washout, irrigation and debridement, cultures, possible Penrose placement.  All questions answered.  We will plan to try to schedule this within the next 1-2 days.    A total of 30 minutes was devoted to review of chart, direct face-to-face patient counseling and documentation during and on the day of this encounter, exclusive of any procedure performed.    Arsalan Quick MD, PhD, FACS

## 2025-04-01 NOTE — TELEPHONE ENCOUNTER
Called Pt to go over pre-op planning and post op expectations. Discussed medications. She has been holding Eliquis since Saturday. Will take metoprolol tomorrow AM with sip of water, but otherwise hold all other medication.     Jay Rinaldi, LIZETTECC

## 2025-04-01 NOTE — TELEPHONE ENCOUNTER
Called to schedule surgery with: Dr. Quick    Spoke with: Alysha     Date of Surgery: 4/2    Estimated Arrival time Discussed with Patient:   7 AM    Location of surgery: [facility]    Pre-operative H&P:  Dr. Quick will complete day of surgery    Post-operative Appointment w/YUDELKA or Surgeon: Dr. Quick 4/8 at 8:50 AM    Additional Appointments: N/A, no additional visits requested    Discussed with patient pre-op RN will call 2-3 days prior to surgery with arrival time and instructions:  Yes     Standard Ortho Surgery Packet: Yes patient received during appointment with provider 4/1/25    All patients questions were answered and was instructed to contact the clinic with any questions or concerns.     Additional Comments: N/A           Unique Vann on 4/1/2025 at 12:25 PM

## 2025-04-02 ENCOUNTER — ANESTHESIA (OUTPATIENT)
Dept: SURGERY | Facility: AMBULATORY SURGERY CENTER | Age: 75
End: 2025-04-02
Payer: COMMERCIAL

## 2025-04-02 ENCOUNTER — ANESTHESIA EVENT (OUTPATIENT)
Dept: SURGERY | Facility: AMBULATORY SURGERY CENTER | Age: 75
End: 2025-04-02
Payer: COMMERCIAL

## 2025-04-02 ENCOUNTER — HOSPITAL ENCOUNTER (OUTPATIENT)
Facility: AMBULATORY SURGERY CENTER | Age: 75
Discharge: HOME | End: 2025-04-02
Attending: STUDENT IN AN ORGANIZED HEALTH CARE EDUCATION/TRAINING PROGRAM
Payer: COMMERCIAL

## 2025-04-02 VITALS
HEART RATE: 84 BPM | RESPIRATION RATE: 16 BRPM | TEMPERATURE: 97.2 F | WEIGHT: 225 LBS | DIASTOLIC BLOOD PRESSURE: 75 MMHG | OXYGEN SATURATION: 97 % | SYSTOLIC BLOOD PRESSURE: 116 MMHG | HEIGHT: 66 IN | BODY MASS INDEX: 36.16 KG/M2

## 2025-04-02 DIAGNOSIS — M79.645 PAIN OF LEFT THUMB: Primary | ICD-10-CM

## 2025-04-02 LAB
BACTERIA SPEC CULT: ABNORMAL
BACTERIA SPEC CULT: NORMAL
CRYSTALS SNV MICRO: NORMAL
GRAM STAIN RESULT: ABNORMAL
GRAM STAIN RESULT: ABNORMAL
GRAM STAIN RESULT: NORMAL
GRAM STAIN RESULT: NORMAL

## 2025-04-02 PROCEDURE — 87102 FUNGUS ISOLATION CULTURE: CPT | Performed by: STUDENT IN AN ORGANIZED HEALTH CARE EDUCATION/TRAINING PROGRAM

## 2025-04-02 PROCEDURE — 89060 EXAM SYNOVIAL FLUID CRYSTALS: CPT | Performed by: STUDENT IN AN ORGANIZED HEALTH CARE EDUCATION/TRAINING PROGRAM

## 2025-04-02 PROCEDURE — 89050 BODY FLUID CELL COUNT: CPT | Performed by: STUDENT IN AN ORGANIZED HEALTH CARE EDUCATION/TRAINING PROGRAM

## 2025-04-02 PROCEDURE — 87070 CULTURE OTHR SPECIMN AEROBIC: CPT | Performed by: STUDENT IN AN ORGANIZED HEALTH CARE EDUCATION/TRAINING PROGRAM

## 2025-04-02 PROCEDURE — 87075 CULTR BACTERIA EXCEPT BLOOD: CPT | Performed by: STUDENT IN AN ORGANIZED HEALTH CARE EDUCATION/TRAINING PROGRAM

## 2025-04-02 PROCEDURE — 87205 SMEAR GRAM STAIN: CPT | Performed by: STUDENT IN AN ORGANIZED HEALTH CARE EDUCATION/TRAINING PROGRAM

## 2025-04-02 RX ORDER — CIPROFLOXACIN 500 MG/1
500 TABLET, FILM COATED ORAL 2 TIMES DAILY
Qty: 14 TABLET | Refills: 0 | Status: SHIPPED | OUTPATIENT
Start: 2025-04-02

## 2025-04-02 RX ORDER — SODIUM CHLORIDE, SODIUM LACTATE, POTASSIUM CHLORIDE, CALCIUM CHLORIDE 600; 310; 30; 20 MG/100ML; MG/100ML; MG/100ML; MG/100ML
INJECTION, SOLUTION INTRAVENOUS CONTINUOUS PRN
Status: DISCONTINUED | OUTPATIENT
Start: 2025-04-02 | End: 2025-04-02

## 2025-04-02 RX ORDER — LIDOCAINE HYDROCHLORIDE AND EPINEPHRINE 10; 10 MG/ML; UG/ML
INJECTION, SOLUTION INFILTRATION; PERINEURAL PRN
Status: DISCONTINUED | OUTPATIENT
Start: 2025-04-02 | End: 2025-04-02 | Stop reason: HOSPADM

## 2025-04-02 RX ORDER — FENTANYL CITRATE 50 UG/ML
25 INJECTION, SOLUTION INTRAMUSCULAR; INTRAVENOUS ONCE
Status: COMPLETED | OUTPATIENT
Start: 2025-04-02 | End: 2025-04-02

## 2025-04-02 RX ORDER — CEFAZOLIN SODIUM 2 G/50ML
2 SOLUTION INTRAVENOUS SEE ADMIN INSTRUCTIONS
Status: DISCONTINUED | OUTPATIENT
Start: 2025-04-02 | End: 2025-04-03 | Stop reason: HOSPADM

## 2025-04-02 RX ORDER — FENTANYL CITRATE 50 UG/ML
INJECTION, SOLUTION INTRAMUSCULAR; INTRAVENOUS PRN
Status: DISCONTINUED | OUTPATIENT
Start: 2025-04-02 | End: 2025-04-02

## 2025-04-02 RX ORDER — OXYCODONE HYDROCHLORIDE 5 MG/1
5 TABLET ORAL ONCE
Status: COMPLETED | OUTPATIENT
Start: 2025-04-02 | End: 2025-04-02

## 2025-04-02 RX ORDER — OXYCODONE HYDROCHLORIDE 5 MG/1
5 TABLET ORAL EVERY 6 HOURS PRN
Qty: 12 TABLET | Refills: 0 | Status: SHIPPED | OUTPATIENT
Start: 2025-04-02 | End: 2025-04-05

## 2025-04-02 RX ORDER — LIDOCAINE HYDROCHLORIDE 10 MG/ML
INJECTION, SOLUTION EPIDURAL; INFILTRATION; INTRACAUDAL; PERINEURAL PRN
Status: DISCONTINUED | OUTPATIENT
Start: 2025-04-02 | End: 2025-04-02 | Stop reason: HOSPADM

## 2025-04-02 RX ORDER — PROPOFOL 10 MG/ML
INJECTION, EMULSION INTRAVENOUS CONTINUOUS PRN
Status: DISCONTINUED | OUTPATIENT
Start: 2025-04-02 | End: 2025-04-02

## 2025-04-02 RX ORDER — BUPIVACAINE HYDROCHLORIDE 5 MG/ML
INJECTION, SOLUTION PERINEURAL PRN
Status: DISCONTINUED | OUTPATIENT
Start: 2025-04-02 | End: 2025-04-02 | Stop reason: HOSPADM

## 2025-04-02 RX ORDER — MAGNESIUM HYDROXIDE 1200 MG/15ML
LIQUID ORAL PRN
Status: DISCONTINUED | OUTPATIENT
Start: 2025-04-02 | End: 2025-04-02 | Stop reason: HOSPADM

## 2025-04-02 RX ORDER — CEFAZOLIN SODIUM 2 G/50ML
2 SOLUTION INTRAVENOUS
Status: DISCONTINUED | OUTPATIENT
Start: 2025-04-02 | End: 2025-04-03 | Stop reason: HOSPADM

## 2025-04-02 RX ORDER — ONDANSETRON 4 MG/1
4 TABLET, FILM COATED ORAL EVERY 6 HOURS PRN
Qty: 12 TABLET | Refills: 0 | Status: SHIPPED | OUTPATIENT
Start: 2025-04-02

## 2025-04-02 RX ORDER — ACETAMINOPHEN 325 MG/1
650 TABLET ORAL EVERY 6 HOURS PRN
COMMUNITY

## 2025-04-02 RX ORDER — SULFAMETHOXAZOLE AND TRIMETHOPRIM 800; 160 MG/1; MG/1
1 TABLET ORAL 2 TIMES DAILY
Qty: 14 TABLET | Refills: 0 | Status: SHIPPED | OUTPATIENT
Start: 2025-04-02

## 2025-04-02 RX ORDER — LIDOCAINE HYDROCHLORIDE 20 MG/ML
INJECTION, SOLUTION INFILTRATION; PERINEURAL PRN
Status: DISCONTINUED | OUTPATIENT
Start: 2025-04-02 | End: 2025-04-02

## 2025-04-02 RX ADMIN — SODIUM CHLORIDE, SODIUM LACTATE, POTASSIUM CHLORIDE, CALCIUM CHLORIDE: 600; 310; 30; 20 INJECTION, SOLUTION INTRAVENOUS at 07:36

## 2025-04-02 RX ADMIN — FENTANYL CITRATE 12.5 MCG: 50 INJECTION, SOLUTION INTRAMUSCULAR; INTRAVENOUS at 08:55

## 2025-04-02 RX ADMIN — FENTANYL CITRATE 12.5 MCG: 50 INJECTION, SOLUTION INTRAMUSCULAR; INTRAVENOUS at 08:50

## 2025-04-02 RX ADMIN — PROPOFOL 150 MCG/KG/MIN: 10 INJECTION, EMULSION INTRAVENOUS at 08:38

## 2025-04-02 RX ADMIN — OXYCODONE HYDROCHLORIDE 5 MG: 5 TABLET ORAL at 10:21

## 2025-04-02 RX ADMIN — FENTANYL CITRATE 25 MCG: 50 INJECTION, SOLUTION INTRAMUSCULAR; INTRAVENOUS at 08:38

## 2025-04-02 RX ADMIN — FENTANYL CITRATE 25 MCG: 50 INJECTION, SOLUTION INTRAMUSCULAR; INTRAVENOUS at 08:21

## 2025-04-02 RX ADMIN — LIDOCAINE HYDROCHLORIDE 60 MG: 20 INJECTION, SOLUTION INFILTRATION; PERINEURAL at 08:38

## 2025-04-02 ASSESSMENT — ENCOUNTER SYMPTOMS: DYSRHYTHMIAS: 1

## 2025-04-02 NOTE — ANESTHESIA PREPROCEDURE EVALUATION
Anesthesia Pre-Procedure Evaluation    Patient: Alysha Youngblood   MRN: [id number] : 1950        Procedure : Procedure(s):  LEFT thumb exploration with metacarpophalangeal joint arthrotomy, washout, irrigation and debridement, cultures, possible Penrose placement          Past Medical History:   Diagnosis Date     Atrial fibrillation (H)     ablated      Ex-smoker      Hashimoto's disease      Hyperlipidaemia      Hypertension      ISABELA (obstructive sleep apnea) 3/12    AHI 7     PAC (premature atrial contraction)      PVC (premature ventricular contraction)       Past Surgical History:   Procedure Laterality Date     APPENDECTOMY       COLONOSCOPY N/A 2024    Procedure: COLONOSCOPY, WITH POLYPECTOMY;  Surgeon: Naldo Herrera MD;  Location: [facility] OR     ENT SURGERY       H ABLATION FOCAL AFIB  2011    [facility].      H ABLATION FOCAL AFIB  2012    [facility]. afib and aflutter ablations.       Allergies   Allergen Reactions     Ibuprofen Other (See Comments)     Nsaids GI Disturbance     Statins      Made her lips tingle, so they put her on another statin instead.     Liquid Adhesive Rash     After wearing patches for > 8 days  After wearing patches for > 8 days      Social History     Tobacco Use     Smoking status: Former     Current packs/day: 0.00     Types: Cigarettes     Quit date:      Years since quittin.2     Passive exposure: Never     Smokeless tobacco: Never   Substance Use Topics     Alcohol use: Yes     Alcohol/week: 14.0 standard drinks of alcohol     Types: 14 Shots of liquor per week      Wt Readings from Last 1 Encounters:   25 102.1 kg (225 lb)        Anesthesia Evaluation   Pt has had prior anesthetic.     No history of anesthetic complications       ROS/MED HX  ENT/Pulmonary:     (+) sleep apnea, mild, doesn't use CPAP,                                      Neurologic:  - neg neurologic ROS     Cardiovascular:     (+)  hypertension- -   -  - -   Taking blood  thinners                     dysrhythmias, a-fib, a-flutter and Other, Irregular Heartbeat/Palpitations,            METS/Exercise Tolerance: 4 - Raking leaves, gardening    Hematologic:  - neg hematologic  ROS     Musculoskeletal:  - neg musculoskeletal ROS     GI/Hepatic:     (+) GERD, Asymptomatic on medication,                  Renal/Genitourinary:  - neg Renal ROS     Endo:     (+)          thyroid problem,     Obesity,       Psychiatric/Substance Use:  - neg psychiatric ROS     Infectious Disease:  - neg infectious disease ROS     Malignancy:  - neg malignancy ROS     Other:  - neg other ROS          Physical Exam    Airway        Mallampati: II   TM distance: > 3 FB   Neck ROM: full   Mouth opening: > 3 cm    Respiratory Devices and Support         Dental       (+) Modest Abnormalities - crowns, retainers, 1 or 2 missing teeth      Cardiovascular          Rhythm and rate: regular and normal     Pulmonary   pulmonary exam normal        breath sounds clear to auscultation       OUTSIDE LABS:  CBC:   Lab Results   Component Value Date    WBC 10.0 03/29/2025    WBC 6.6 05/14/2024    HGB 13.2 03/29/2025    HGB 14.3 05/14/2024    HCT 40.5 03/29/2025    HCT 42.7 05/14/2024     03/29/2025     05/14/2024     BMP:   Lab Results   Component Value Date     (L) 03/29/2025     05/14/2024    POTASSIUM 3.7 03/29/2025    POTASSIUM 3.9 05/14/2024    CHLORIDE 98 03/29/2025    CHLORIDE 99 05/14/2024    CO2 24 03/29/2025    CO2 24 05/14/2024    BUN 21.5 03/29/2025    BUN 16.1 05/14/2024    CR 0.85 03/29/2025    CR 0.77 05/14/2024     (H) 03/29/2025     (H) 05/14/2024     COAGS:   Lab Results   Component Value Date    PTT 36 07/07/2018    INR 1.05 03/29/2025     POC:   Lab Results   Component Value Date     (H) 03/08/2011     HEPATIC:   Lab Results   Component Value Date    ALBUMIN 3.6 03/29/2025    PROTTOTAL 5.9 (L) 03/29/2025    ALT 43 03/29/2025    AST 25 03/29/2025    ALKPHOS 69  "03/29/2025    BILITOTAL 0.5 03/29/2025     OTHER:   Lab Results   Component Value Date    LACT 1.7 03/29/2025    A1C 5.9 (H) 07/01/2023    JANAE 9.2 03/29/2025    MAG 2.0 12/29/2019    TSH 1.42 05/04/2022    T4 0.99 08/09/2013    T3 96 11/17/2011    SED 9 03/29/2025       Anesthesia Plan    ASA Status:  3    NPO Status:  NPO Appropriate    Anesthesia Type: MAC.   Induction: Intravenous.   Maintenance: TIVA.        Consents    Anesthesia Plan(s) and associated risks, benefits, and realistic alternatives discussed. Questions answered and patient/representative(s) expressed understanding.     - Discussed: Risks, Benefits and Alternatives for the PROCEDURE were discussed     - Discussed with:             Postoperative Care    Pain management: IV analgesics.   PONV prophylaxis: Ondansetron (or other 5HT-3)     Comments:             José Antonio Keenan MD    Clinically Significant Risk Factors Present on Admission                # Drug Induced Coagulation Defect: home medication list includes an anticoagulant medication    # Hypertension: Noted on problem list           # Obesity: Estimated body mass index is 36.32 kg/m  as calculated from the following:    Height as of this encounter: 1.676 m (5' 6\").    Weight as of this encounter: 102.1 kg (225 lb).                "

## 2025-04-02 NOTE — OP NOTE
"HAND SURGERY OPERATIVE REPORT     Date of Surgery: 4/2/2025  Surgical Service: Ortho Hand     Preoperative Diagnosis:   1.  Left thumb metacarpophalangeal joint pain associated with overlying cellulitis with question of joint space infection  2.  Left thumb metacarpophalangeal joint osteoarthritis     Postoperative Diagnosis: Same as preoperative diagnoses     Procedures Performed: Left thumb metacarpophalangeal arthrotomy with irrigation and debridement and procurement of cultures     Attending:  FRANKI Quick MD, PhD, FACS  Assistant: None     Complications: None apparent  Specimens:   Left thumb metacarpophalangeal joint tissue including bone for gram stain, aerobic, anaerobic, acid-fast bacilli and fungal cultures x 2  Left thumb metacarpophalangeal joint fluid for crystal analysis  Implants: None  Estimated blood loss: < 5 mL  Tourniquet time: Less than 30 minutes  Drains: 1/4\" inch Penrose  Wound classification: Clean  Anesthesia: MAC with field block     Indications for Procedure: 74-year-old female presenting 3.5+ weeks after developing left thumb metacarpophalangeal joint pain with overlying cellulitis, only slightly improving on antibiotics as well as steroids.  On serial imaging, there is acutely worsening loss of joint space.  On exam, there is some mild passive motion and axial loaded tenderness at that joint.  After discussing options, my recommendation is to explore the joint, washout, irrigate and debride, and send specimens for culture.  Patient is agreeable with the plan.    Discussed the risks of surgery, including but not limited to: Infection, bleeding, pain, poor scarring, wound healing issues, need for revision or additional surgery, injury to nerves or tendons, neuroma formation, complex regional pain syndrome, stiffness, loss of digit, anesthesia related complication.  Despite these risks, patient consents to and would like to proceed with left thumb exploration with metacarpophalangeal joint " arthrotomy, washout, irrigation and debridement, cultures, possible Penrose placement.  All questions answered.       Intraoperative findings: Left thumb metacarpal phalangeal joint with some murky fluid in with osteolysis and loss of cartilage predominantly affecting the radial aspect of the base of the proximal phalanx.  That portion of the joint surface was soft, fragmented, and debrided.  Additionally, there was some murky intramedullary tissue within the proximal phalanx consistent with osteomyelitis or an inflammatory condition.  There was no gouty tophus.  Following debridement, 60-75% of the joint surface on the proximal phalanx was gone.  The head of the left thumb metacarpal head degenerative arthritis with loss of cartilage.  Additionally, on coronal plane deviation stress, there was no significant laxity in the radial collateral ligament remained intact.     Description of Procedure: Patient was seen in the preoperative holding area.  Consent was verified.  The left thumb metacarpophalangeal joint was marked.  All additional questions were answered.  Discussed the risks of surgery, including but not limited to: Infection, bleeding, pain, poor scarring, wound healing issues, need for revision or additional surgery, injury to nerves or tendons, neuroma formation, complex regional pain syndrome, stiffness, loss of digit, anesthesia related complication.  Despite these risks, patient consents to and would like to proceed with left thumb exploration with metacarpophalangeal joint arthrotomy, washout, irrigation and debridement, cultures, possible Penrose placement.  All questions answered.  We will plan to try to schedule this within the next 1-2 days.  Patient was then transferred to the operating room and placed supine on the operating table.  All pressure points were padded.  Sequential compression devices were placed on bilateral lower extremities and verified to be operational.  IV antibiotic prophylaxis  was given.  Preinduction timeout was performed.  Monitored anesthesia care was commenced.  A field block was done using local anesthesia.  Next, the left hand was prepped and draped in usual sterile fashion.  Timeout was performed.  The left hand was elevated and the forearm tourniquet was inflated to 200 mmHg.  Next, a slightly curvilinear dorsal incision was made overlying the left thumb metacarpophalangeal joint.  Once through skin, skin flaps were gently raised over the extensor mechanism.  Next, the extensor mechanism was divided longitudinally with a #15 blade in between the extensor pollicis brevis and the extensor pollicis longus tendons.  The extensor mechanisms were raised over the joint capsule.  Of note, there is significant swelling of the capsule.  Next, a longitudinally oriented capsulotomy was performed to expose the left thumb metacarpophalangeal joint.  At this point, there was no gross purulence noted, but there is some murky fluid.  The radial base of the proximal phalanx was soft, fragmented and associated with osteolysis.  This part of the bone was debrided with a synovial rongeur.  The intramedullary canal of the proximal phalanx also had some soft murky fluid that was debrided with a curette.  All of this tissue was sent for culture and crystal analysis.  Additionally, the synovium was debrided with a synovial rongeur as well as the deep surface of the radial collateral ligament.  Of note, the left thumb metacarpal head was denuded of cartilage with exposed subchondral bone but the subchondral bone was hard.  Following debridement, the wound was irrigated with sterile saline.  1/4 inch Penrose was placed.  The joint capsule was closed with interrupted buried 2-0 PDS suture.  The tourniquet was taken down.  Hemostasis was obtained using bipolar electrocautery.  The extensor mechanism was repaired with 3-0 PDS suture in a buried interrupted fashion.  The skin was closed with interrupted 4-0  nylon suture.  The Penrose was secured with a nylon suture.  The incision was dressed with Xeroform, bacitracin, 4 x 4 gauze and a thumb spica splint was placed.  Patient tolerated the procedure with no issues and was transferred to the postanesthesia care unit with no events.     Postoperative plan: Clinic in 2 days for Penrose removal.  Follow-up cultures.  Continue antibiotics.  We will have to discuss possible left thumb metacarpophalangeal joint arthrodesis following clearance of infection or stabilization of inflammatory process.     Arsalan Quick MD, PhD, FACS

## 2025-04-02 NOTE — DISCHARGE INSTRUCTIONS
[facility]  Home Care Following Anesthesia  For 24 hours after surgery:  Get plenty of rest.  A responsible adult must stay with you for at least 24 hours after you leave the surgery center.  Do not drive or use heavy equipment.  If you have weakness or tingling, don't drive or use heavy equipment until this feeling goes away.   Do not drink alcohol.   Avoid strenuous or risky activities.  Ask for help when climbing stairs.  You may feel lightheaded.  IF so, sit for a few minutes before standing.  Have someone help you get up.   If you have nausea (feel sick to your stomach): Drink only clear liquids such as apple juice, ginger ale, broth or 7-Up.  Rest may also help.  Be sure to drink enough fluids.  Move to a regular diet as you feel able.   You may have a slight fever.  Call the doctor if your fever is over 100 F (37.7 C) (taken under the tongue) or lasts longer than 24 hours.  You may have a dry mouth, a sore throat, muscle aches or trouble sleeping. These should go away after 24 hours.  Do not make important or legal decisions.   It is recommended to avoid smoking.               Tips for taking pain medications  To get the best pain relief possible, remember these points:  Take pain medications as directed, before pain becomes severe.  Pain medication can upset your stomach: taking it with food may help.  Constipation is a common side effect of pain medication. Drink plenty of  fluids.  Eat foods high in fiber. Take a stool softener if recommended by your doctor or pharmacist.  Do not drink alcohol, drive or operate machinery while taking pain medications.  Ask about other ways to control pain, such as with heat, ice or relaxation.    Tylenol/Acetaminophen Consumption    If you feel your pain relief is insufficient, you may take Tylenol/Acetaminophen in addition to your narcotic pain medication.   Be careful not to exceed 4,000 mg of Tylenol/Acetaminophen in a 24 hour  period from all sources.  If you are taking extra strength Tylenol/acetaminophen (500 mg), the maximum dose is 8 tablets in 24 hours.  If you are taking regular strength acetaminophen (325 mg), the maximum dose is 12 tablets in 24 hours.    Call a doctor for any of the following:  Signs of infection (fever, growing tenderness at the surgery site, a large amount of drainage or bleeding, severe pain, foul-smelling drainage, redness, swelling).  It has been over 8 to 10 hours since surgery and you are still not able to urinate (pass water).  Headache for over 24 hours.  Numbness, tingling or weakness the day after surgery (if you had spinal anesthesia).  Signs of Covid-19 infection (temperature over 100 degrees, shortness of breath, cough, loss of taste/smell, generalized body aches, persistent headache, chills, sore throat, nausea/vomiting/diarrhea)    Your doctor is:       Dr. Arsalan Quick, Plastic Surgery: [phone]               After hours and weekends call the hospital @ [phone] and ask for the resident on call for:  Plastics  For emergency care, call the:  [facility]:  [phone] (TTY for hearing impaired: [phone])    Hand Surgery Discharge Instructions    Patient has been treated for left thumb joint pain with Dr. Quick on 4/2/2025.     Care: Please keep the splint/cast/dressing clean and dry at all times. Should it get wet, please call the clinic to schedule an appointment - as it may need to be replaced. Do not hesitate to use the sling to help protect the affected extremity and in particular in the setting of a nerve block.     Medications: You can take Ibuprofen 400-800 mg and Tylenol 650 mg for pain relief. Please take each every 6 hours, and for optimal pain relief - please stagger the medications so that you are taking one or the other every 3 hours. If you had a nerve block, the effects may last 8-12 hours. If you have been prescribed additional pain  medications, please take as instructed and as needed. If you are taking additional pain medications, please do not exceed 4000 mg of Tylenol daily from all sources. Also, if you are taking narcotic medications, please do not operate heavy machinery or drive. If you have been prescribed antibiotics, please also take as instructed.     Diet: Start with clear liquids and slow down if nauseated. Advance the diet as tolerated.    Weight-bearing/Activities: Move your fingers to prevent stiffness. Elevate the affected extremity to improve throbbing sensation or pain. No strenuous activities and do not raise your heart rate above 100 bpm for the first few weeks. If you had a fracture fixed, your extremity is non-weight-bearing. Otherwise, you can limit your weight-bearing with the affected extremity to 2-3 pounds unless otherwise specified.    ER Instructions: Please call the office and/or consider return to the ER if you experience worsening pain not relieved by medications, increased swelling, redness or high fevers >101F or if there are unexpected problems like shortness of breath.    Post-op follow-up: Clinic in 2 days with Dr. Quick or his PA at the [place] or [facility]. Please call our Ortho triage line if you have any questions or concerns before your clinic visit: [phone].    Arsalan Quick MD, PhD, FACS

## 2025-04-02 NOTE — ANESTHESIA POSTPROCEDURE EVALUATION
Patient: Alysha Youngblood    Procedure: Procedure(s):  LEFT thumb exploration with metacarpophalangeal joint arthrotomy, washout, irrigation and debridement, cultures,Penrose placement       Anesthesia Type:  MAC    Note:  Disposition: Outpatient   Postop Pain Control: Uneventful            Sign Out: Well controlled pain   PONV: No   Neuro/Psych: Uneventful            Sign Out: Acceptable/Baseline neuro status   Airway/Respiratory: Uneventful            Sign Out: Acceptable/Baseline resp. status   CV/Hemodynamics: Uneventful            Sign Out: Acceptable CV status; No obvious hypovolemia; No obvious fluid overload   Other NRE: NONE   DID A NON-ROUTINE EVENT OCCUR? No           Last vitals:  Vitals Value Taken Time   /75 04/02/25 1015   Temp 36.2  C (97.2  F) 04/02/25 1015   Pulse 84 04/02/25 1015   Resp 16 04/02/25 1015   SpO2 97 % 04/02/25 1015       Electronically Signed By: José Antonio Keenan MD  April 2, 2025  11:03 AM

## 2025-04-02 NOTE — ANESTHESIA CARE TRANSFER NOTE
Patient: Alysha Youngblood    Procedure: Procedure(s):  LEFT thumb exploration with metacarpophalangeal joint arthrotomy, washout, irrigation and debridement, cultures,Penrose placement       Diagnosis: Pain of left thumb [M79.645]  Diagnosis Additional Information: No value filed.    Anesthesia Type:   MAC     Note:    Oropharynx: oropharynx clear of all foreign objects and spontaneously breathing  Level of Consciousness: awake  Oxygen Supplementation: room air    Independent Airway: airway patency satisfactory and stable  Dentition: dentition unchanged  Vital Signs Stable: post-procedure vital signs reviewed and stable  Report to RN Given: handoff report given  Patient transferred to: Phase II  Comments: Uneventful transport   Report to RN - Johanna  Pt comfortable  Exchanging well; color natl  Pt responds appropriately to command  IV patent  Lips/teeth/dentition as preop status  Questions answered      Handoff Report: Identifed the Patient, Identified the Reponsible Provider, Reviewed the pertinent medical history, Discussed the surgical course, Reviewed Intra-OP anesthesia mangement and issues during anesthesia, Set expectations for post-procedure period and Allowed opportunity for questions and acknowledgement of understanding      Vitals:  Vitals Value Taken Time   /79 04/02/25 0945   Temp 36    Pulse 76 04/02/25 0945   Resp 14    SpO2 98 % 04/02/25 0946   Vitals shown include unfiled device data.    Electronically Signed By: EUSEBIA TONG CRNA  April 2, 2025  9:47 AM

## 2025-04-03 LAB
BACTERIA BLD CULT: NO GROWTH
BACTERIA BLD CULT: NO GROWTH
BACTERIA TISS BX CULT: ABNORMAL
BACTERIA TISS BX CULT: NORMAL

## 2025-04-04 ENCOUNTER — ANCILLARY PROCEDURE (OUTPATIENT)
Dept: GENERAL RADIOLOGY | Facility: CLINIC | Age: 75
End: 2025-04-04
Attending: STUDENT IN AN ORGANIZED HEALTH CARE EDUCATION/TRAINING PROGRAM
Payer: COMMERCIAL

## 2025-04-04 ENCOUNTER — OFFICE VISIT (OUTPATIENT)
Dept: ORTHOPEDICS | Facility: CLINIC | Age: 75
End: 2025-04-04
Payer: COMMERCIAL

## 2025-04-04 DIAGNOSIS — M79.645 PAIN OF LEFT THUMB: Primary | ICD-10-CM

## 2025-04-04 DIAGNOSIS — M79.645 PAIN OF LEFT THUMB: ICD-10-CM

## 2025-04-04 PROCEDURE — 1125F AMNT PAIN NOTED PAIN PRSNT: CPT | Performed by: STUDENT IN AN ORGANIZED HEALTH CARE EDUCATION/TRAINING PROGRAM

## 2025-04-04 PROCEDURE — 73130 X-RAY EXAM OF HAND: CPT | Mod: LT | Performed by: STUDENT IN AN ORGANIZED HEALTH CARE EDUCATION/TRAINING PROGRAM

## 2025-04-04 PROCEDURE — 99024 POSTOP FOLLOW-UP VISIT: CPT | Performed by: STUDENT IN AN ORGANIZED HEALTH CARE EDUCATION/TRAINING PROGRAM

## 2025-04-04 ASSESSMENT — PAIN SCALES - GENERAL: PAINLEVEL_OUTOF10: MILD PAIN (2)

## 2025-04-04 NOTE — LETTER
4/4/2025      Alysha Youngblood  [address] [place] [id number]      Dear Colleague,    Thank you for referring your patient, Alysha Youngblood, to the [facility]. Please see a copy of my visit note below.    Ortho Hand    Patient is doing better after an irrigation and debridement of the joint.  She is able to move the left thumb at the level of the metacarpophalangeal joint, which is an improvement over a few days ago.  She continues to take antibiotics.    On exam, left thumb incision is intact with no wounds or signs of infection.  The Penrose is in place with no purulent or murky drainage.  Some ongoing blanching erythema, though the swelling is less than on Tuesday.    XR: Left thumb metacarpophalangeal joint status post debridement with some erosion along the origin of the radial collateral ligament and osteochondral defect along the radial side of the base of the proximal phalanx.    I personally reviewed the imaging today and have provided my interpretation.     Gram stain: 1+ gram-positive cocci, 4+ white blood cells, predominantly PMNs  Cultures x 2: 2+ Staphylococcus aureus, pansensitive including to doxycycline and Bactrim    A/P: 74-year-old female 2 days status post left thumb metacarpophalangeal joint irrigation and debridement, cultures, Penrose placement    -Removed the Penrose today  -Discussed options for additional management.  We will have her continue with antibiotic treatment.  Placed an infectious disease consultation.  They can help tailor the antibiotics accordingly.  Will continue to monitor her clinical progress.  There is a chance that she may benefit from additional debridement, but this will be in part dependent on her overall clinical improvement while on antibiotics.  We did discuss the possibility of need for future intervention to the left thumb metacarpophalangeal joint, such as fusion.   -Return to clinic in approximately 1 week for  reevaluation    Arsalan Quick MD, PhD, FACS      Again, thank you for allowing me to participate in the care of your patient.        Sincerely,        Arsalan Quick MD    Electronically signed

## 2025-04-04 NOTE — NURSING NOTE
Chief Complaint   Patient presents with    Left Hand - Pain, Surgical Followup     Left thumb post op       There were no vitals filed for this visit.    There is no height or weight on file to calculate BMI.      MADIE Jaramillo NREMT

## 2025-04-05 LAB — BACTERIA TISS BX CULT: ABNORMAL

## 2025-04-05 NOTE — PROGRESS NOTES
Ortho Hand    Patient is doing better after an irrigation and debridement of the joint.  She is able to move the left thumb at the level of the metacarpophalangeal joint, which is an improvement over a few days ago.  She continues to take antibiotics.    On exam, left thumb incision is intact with no wounds or signs of infection.  The Penrose is in place with no purulent or murky drainage.  Some ongoing blanching erythema, though the swelling is less than on Tuesday.    XR: Left thumb metacarpophalangeal joint status post debridement with some erosion along the origin of the radial collateral ligament and osteochondral defect along the radial side of the base of the proximal phalanx.    I personally reviewed the imaging today and have provided my interpretation.     Gram stain: 1+ gram-positive cocci, 4+ white blood cells, predominantly PMNs  Cultures x 2: 2+ Staphylococcus aureus, pansensitive including to doxycycline and Bactrim    A/P: 74-year-old female 2 days status post left thumb metacarpophalangeal joint irrigation and debridement, cultures, Penrose placement    -Removed the Penrose today  -Discussed options for additional management.  We will have her continue with antibiotic treatment.  Placed an infectious disease consultation.  They can help tailor the antibiotics accordingly.  Will continue to monitor her clinical progress.  There is a chance that she may benefit from additional debridement, but this will be in part dependent on her overall clinical improvement while on antibiotics.  We did discuss the possibility of need for future intervention to the left thumb metacarpophalangeal joint, such as fusion.   -Return to clinic in approximately 1 week for reevaluation    Arsalan Quick MD, PhD, FACS

## 2025-04-06 ENCOUNTER — MYC REFILL (OUTPATIENT)
Dept: SURGERY | Facility: AMBULATORY SURGERY CENTER | Age: 75
End: 2025-04-06
Payer: COMMERCIAL

## 2025-04-06 DIAGNOSIS — M79.645 PAIN OF LEFT THUMB: ICD-10-CM

## 2025-04-07 RX ORDER — CIPROFLOXACIN 500 MG/1
500 TABLET, FILM COATED ORAL 2 TIMES DAILY
Qty: 30 TABLET | Refills: 0 | Status: SHIPPED | OUTPATIENT
Start: 2025-04-07

## 2025-04-07 RX ORDER — SULFAMETHOXAZOLE AND TRIMETHOPRIM 800; 160 MG/1; MG/1
1 TABLET ORAL 2 TIMES DAILY
Qty: 30 TABLET | Refills: 0 | Status: SHIPPED | OUTPATIENT
Start: 2025-04-07

## 2025-04-07 NOTE — TELEPHONE ENCOUNTER
Prescription refill requested for:   ciprofloxacin (CIPRO) 500 MG tablet       Last Written Prescription Date:  4/2/25  Last Fill Quantity: 14,   # refills: 0  Last Office Visit: 4/4/25  Future Office visit:           Jb Clayton ATC

## 2025-04-08 ENCOUNTER — TELEPHONE (OUTPATIENT)
Dept: INFECTIOUS DISEASES | Facility: CLINIC | Age: 75
End: 2025-04-08

## 2025-04-08 ENCOUNTER — OFFICE VISIT (OUTPATIENT)
Dept: ORTHOPEDICS | Facility: CLINIC | Age: 75
End: 2025-04-08
Payer: COMMERCIAL

## 2025-04-08 DIAGNOSIS — M79.645 PAIN OF LEFT THUMB: Primary | ICD-10-CM

## 2025-04-08 PROCEDURE — 1125F AMNT PAIN NOTED PAIN PRSNT: CPT | Performed by: STUDENT IN AN ORGANIZED HEALTH CARE EDUCATION/TRAINING PROGRAM

## 2025-04-08 PROCEDURE — 99024 POSTOP FOLLOW-UP VISIT: CPT | Performed by: STUDENT IN AN ORGANIZED HEALTH CARE EDUCATION/TRAINING PROGRAM

## 2025-04-08 NOTE — TELEPHONE ENCOUNTER
Received message from Dr. Rm to put patient as virtual add on 0930 on Thursday. Patient has urgent referral for staph osteomyelitis. Will send to clinic coordinator to set up and call patient.

## 2025-04-08 NOTE — PROGRESS NOTES
Ortho Hand    Doing well.  She does state that there is less pain with the left hand down and with motion.  There is still some soreness overlying the dorsal aspect of the affected left thumb joint.    On exam, left thumb incision is intact with no wounds.  Still some slightly improved left dorsal thumb cellulitis.    Cultures: Pansensitive Staph aureus    A/P: 74-year-old female 6 days status post left thumb metacarpophalangeal arthrotomy with irrigation and debridement with cultures    - Discussed options for additional management.  She has a visit with the infectious disease specialist on Friday.  For now, we will have her continue the Bactrim based on sensitivities.  The ID specialist can adjust as needed.    - Return to clinic in 1 week for clinical evaluation and suture removal.  If she does not make steady progress, we may have to consider an additional debridement.  At the next visit, we will obtain a repeat XR.  Patient is agreeable with the plan.    Arsalan Quick MD, PhD, FACS

## 2025-04-08 NOTE — LETTER
4/8/2025      Alysha Youngblood  [address] [place] [id number]      Dear Colleague,    Thank you for referring your patient, Alysha Youngblood, to the [facility]. Please see a copy of my visit note below.    Ortho Hand    Doing well.  She does state that there is less pain with the left hand down and with motion.  There is still some soreness overlying the dorsal aspect of the affected left thumb joint.    On exam, left thumb incision is intact with no wounds.  Still some slightly improved left dorsal thumb cellulitis.    Cultures: Pansensitive Staph aureus    A/P: 74-year-old female 6 days status post left thumb metacarpophalangeal arthrotomy with irrigation and debridement with cultures    - Discussed options for additional management.  She has a visit with the infectious disease specialist on Friday.  For now, we will have her continue the Bactrim based on sensitivities.  The ID specialist can adjust as needed.    - Return to clinic in 1 week for clinical evaluation and suture removal.  If she does not make steady progress, we may have to consider an additional debridement.  At the next visit, we will obtain a repeat XR.  Patient is agreeable with the plan.    Arsalan Quick MD, PhD, FACS      Again, thank you for allowing me to participate in the care of your patient.        Sincerely,        Arsalan Quick MD    Electronically signed

## 2025-04-08 NOTE — NURSING NOTE
Reason For Visit:   Chief Complaint   Patient presents with    Surgical Followup     Post op  Left thumb metacarpophalangeal arthrotomy with irrigation and debridement and procurement of cultures  DOS: 4/2/25       Primary MD: Danii Burks  Ref. MD: Tammi    Age: 74 year old    ?  No      There were no vitals taken for this visit.      Pain Assessment  Patient Currently in Pain: Yes  0-10 Pain Scale: 6  Primary Pain Location: Finger (Comment which one) (left thumb)  Pain Descriptors: Intermittent, Shooting, Stabbing    Hand Dominance Evaluation  Hand Dominance: Right          QuickDASH Assessment      4/1/2025     8:28 AM   QuickDASH Main   1. Open a tight or new jar Severe difficulty   2. Do heavy household chores (e.g., wash walls, floors) Severe difficulty   3. Carry a shopping bag or briefcase Moderate difficulty   4. Wash your back Severe difficulty   5. Use a knife to cut food Severe difficulty   6. Recreational activities in which you take some force or impact through your arm, shoulder or hand (e.g., golf, hammering, tennis, etc.) Unable   7. During the past week, to what extent has your arm, shoulder or hand problem interfered with your normal social activities with family, friends, neighbours or groups Slightly   8. During the past week, were you limited in your work or other regular daily activities as a result of your arm, shoulder or hand problem Moderately limited   9. Arm, shoulder or hand pain Extreme   10.Tingling (pins and needles) in your arm,shoulder or hand Mild   11. During the past week, how much difficulty have you had sleeping because of the pain in your arm, shoulder or hand Moderate difficulty   Quickdash Ability Score 63.64          Current Outpatient Medications   Medication Sig Dispense Refill    acetaminophen (TYLENOL) 325 MG tablet Take 650 mg by mouth every 6 hours as needed for mild pain.      amLODIPine (NORVASC) 10 MG tablet TAKE 1 TABLET BY MOUTH DAILY 90 tablet 2     apixaban ANTICOAGULANT (ELIQUIS ANTICOAGULANT) 5 MG tablet Take 1 tablet (5 mg) by mouth 2 times daily. 180 tablet 3    Calcium-Vitamin D-Vitamin K (VIACTIV PO) Take by mouth 2 times daily       ciprofloxacin (CIPRO) 500 MG tablet Take 1 tablet (500 mg) by mouth 2 times daily. 30 tablet 0    fish oil-omega-3 fatty acids (FISH OIL) 1000 MG capsule One capsule daily.      hydrochlorothiazide (HYDRODIURIL) 25 MG tablet TAKE 1 TABLET BY MOUTH DAILY 90 tablet 2    loratadine (CLARITIN) 10 MG tablet Take 10 mg by mouth daily      losartan (COZAAR) 100 MG tablet Take 1 tablet (100 mg) by mouth daily. 90 tablet 2    lovastatin (MEVACOR) 40 MG tablet Take 1 tablet (40 mg) by mouth at bedtime. 90 tablet 3    metoprolol succinate ER (TOPROL XL) 50 MG 24 hr tablet TAKE 1 AND 1/2 TABLETS(75 MG) BY MOUTH DAILY 135 tablet 3    metoprolol tartrate (LOPRESSOR) 25 MG tablet TAKE 1 TABLET BY MOUTH AS DIRECTED UP TO 2 TABLETS BY MOUTH EVRYDAY WITH ONSET OF ARRHYTHMIA 30 tablet 1    ondansetron (ZOFRAN) 4 MG tablet Take 1 tablet (4 mg) by mouth every 6 hours as needed for nausea. 12 tablet 0    RISEdronate (ACTONEL) 150 MG tablet Take 1 tablet (150 mg) by mouth every 30 days. Take with full glass of water on empty stomach, don't lay down for 30 min 4 tablet 0    sulfamethoxazole-trimethoprim (BACTRIM DS) 800-160 MG tablet Take 1 tablet by mouth 2 times daily. 30 tablet 0       Allergies   Allergen Reactions    Ibuprofen Other (See Comments)    Nsaids GI Disturbance    Statins      Made her lips tingle, so they put her on another statin instead.    Liquid Adhesive Rash     After wearing patches for > 8 days  After wearing patches for > 8 days       KIMBERLY FERREIRA, ATC

## 2025-04-08 NOTE — CONFIDENTIAL NOTE
DIAGNOSIS:   Pain of left thumb    DATE RECEIVED:  04.10.2025    NOTES (Gather within 2 years) STATUS DETAILS   OFFICE NOTE from referring provider   Internal 04.04.2025 Arsalan Quick MD    DISCHARGE REPORT from the ER Internal 03.29.2025 [facility]   LABS (any labs) Internal    MEDICATION LIST Internal    IMAGING  (NEED IMAGES AND REPORTS)     Other (anything related to diagnoses Internal 04.04.2025 XR Hand Let    04.01.2025, 03.18.2025  XR Hand Left    03.28.2025 MR left hand without contrast

## 2025-04-09 DIAGNOSIS — M79.642 HAND PAIN, LEFT: Primary | ICD-10-CM

## 2025-04-09 LAB
BACTERIA TISS BX CULT: NORMAL
BACTERIA TISS BX CULT: NORMAL

## 2025-04-10 ENCOUNTER — PRE VISIT (OUTPATIENT)
Dept: INFECTIOUS DISEASES | Facility: CLINIC | Age: 75
End: 2025-04-10
Payer: COMMERCIAL

## 2025-04-10 ENCOUNTER — TELEPHONE (OUTPATIENT)
Dept: INFECTIOUS DISEASES | Facility: CLINIC | Age: 75
End: 2025-04-10
Payer: COMMERCIAL

## 2025-04-10 ENCOUNTER — VIRTUAL VISIT (OUTPATIENT)
Dept: INFECTIOUS DISEASES | Facility: CLINIC | Age: 75
End: 2025-04-10
Attending: INTERNAL MEDICINE
Payer: COMMERCIAL

## 2025-04-10 DIAGNOSIS — M86.9 OSTEOMYELITIS DUE TO STAPHYLOCOCCUS AUREUS (H): Primary | ICD-10-CM

## 2025-04-10 DIAGNOSIS — A49.01 OSTEOMYELITIS DUE TO STAPHYLOCOCCUS AUREUS (H): Primary | ICD-10-CM

## 2025-04-10 LAB
BACTERIA TISS BX CULT: NORMAL
BACTERIA TISS BX CULT: NORMAL

## 2025-04-10 NOTE — TELEPHONE ENCOUNTER
COLLEEN [facility] 4/10 to sched a 5-6 week follow up with Dr. Rm. PLEASE OFFER 5/22 VIA IN-PERSON.

## 2025-04-10 NOTE — PROGRESS NOTES
Virtual Visit Details    Type of service:  Video Visit     Originating Location (pt. 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 She went to urgent care in [place] (where she was visiting her family) and cellulitis was diagnosed. She was given Bactrim DS one PO BID. A few days later she consulted a sports medicine doctor here at [facility] and was given a course of steroids. The pain and swelling progressed and she consulted Dr. Quick in orthopedics. MRI revealed an early osteo and extensive soft tissue involvement. He operated on April 2, 2025. Intraoperative cultures were positive for a pan sensitive staph aureus. She was given a prescription for ciprofloxacin but has not filled it yet. She continues on Bactrim DS.    She reports the pain, redness and edema are improved but she still has some swelling in the area above the incision. She has no fevers or chills. There is no drainage.    She has a h/o afib, hypercholesterolemia, pulmonary nodules and thyroid disease.    Medications include Amlodipine, Eliquis, hydrochlorothiazide, Losartan, Mevacor, Toprol, Actonel, and Bactrim.    Her WBC was 10.6 on 3/29 and CRP was 3.01    I have reviewed her xrays and MRI and her culture results including drug sensitivities.      Staphylococcus aureus       ABRAHAM     Ciprofloxacin <=0.5 ug/mL Susceptible *     Clindamycin 0.25 ug/mL Susceptible     Daptomycin 0.25 ug/mL Susceptible     Doxycycline <=0.5 ug/mL Susceptible     Erythromycin <=0.25 ug/mL Susceptible     Gentamicin <=0.5 ug/mL Susceptible     Inducible macrolide resistance test Negative ug/mL Negative *     Levofloxacin 0.25 ug/mL Susceptible *     Linezolid 2 ug/mL Susceptible *     Moxifloxacin <=0.25 ug/mL Susceptible *     Nitrofurantoin <=16 ug/mL Susceptible *     Oxacillin   Susceptible     PBP2A  Negative *     Rifampin <=0.5 ug/mL Susceptible *     Tetracycline <=1 ug/mL Susceptible     Tigecycline <=0.12 ug/mL Susceptible *     Trimethoprim/Sulfamethoxazole <=0.5/9.5 u... Susceptible     Vancomycin 1 ug/mL Susceptibl      I visually examined the wound via video and there was no obvious redness and by her report the edema was significantly better. There was noticeable edema between the incision and her IP joint.    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 She is on Bactrim DS and is significantly improved. We discussed the benefit of adding ciprofloxacin and the normal course of treatment for this type of infection. I recommended that she remain on Bactrim for a total of 6 weeks after surgery (May 16). She should have a repeat CRP in 3 weeks and followup with me before stopping the Bactrim. We discussed indications for contacting the clinic earlier (increase in pain, redness, swelling). She has a follow-up with Dr. Quick  next week;

## 2025-04-10 NOTE — LETTER
4/10/2025       RE: Alysha Youngblood  [address] [place] [id number]     Dear Colleague,    Thank you for referring your patient, Alysha Youngblood, to the [facility] [place] at [facility]. Please see a copy of my visit note below.    Virtual Visit Details    Type of service:  Video Visit     Originating Location (pt. 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 She went to urgent care in [place] (where she was visiting her family) and cellulitis was diagnosed. She was given Bactrim DS one PO BID. A few days later she consulted a sports medicine doctor here at [facility] and was given a course of steroids. The pain and swelling progressed and she consulted Dr. Quick in orthopedics. MRI revealed an early osteo and extensive soft tissue involvement. He operated on April 2, 2025. Intraoperative cultures were positive for a pan sensitive staph aureus. She was given a prescription for ciprofloxacin but has not filled it yet. She continues on Bactrim DS.    She reports the pain, redness and edema are improved but she still has some swelling in the area above the incision. She has no fevers or chills. There is no drainage.    She has a h/o afib, hypercholesterolemia, pulmonary nodules and thyroid disease.    Medications include Amlodipine, Eliquis, hydrochlorothiazide, Losartan, Mevacor, Toprol, Actonel, and Bactrim.    Her WBC was 10.6 on 3/29 and CRP was 3.01    I have reviewed her xrays and MRI and her culture results including drug sensitivities.      Staphylococcus aureus       ABRAHAM     Ciprofloxacin <=0.5 ug/mL Susceptible *     Clindamycin 0.25 ug/mL Susceptible     Daptomycin 0.25 ug/mL Susceptible     Doxycycline <=0.5 ug/mL Susceptible      Erythromycin <=0.25 ug/mL Susceptible     Gentamicin <=0.5 ug/mL Susceptible     Inducible macrolide resistance test Negative ug/mL Negative *     Levofloxacin 0.25 ug/mL Susceptible *     Linezolid 2 ug/mL Susceptible *     Moxifloxacin <=0.25 ug/mL Susceptible *     Nitrofurantoin <=16 ug/mL Susceptible *     Oxacillin  Susceptible     PBP2A  Negative *     Rifampin <=0.5 ug/mL Susceptible *     Tetracycline <=1 ug/mL Susceptible     Tigecycline <=0.12 ug/mL Susceptible *     Trimethoprim/Sulfamethoxazole <=0.5/9.5 u... Susceptible     Vancomycin 1 ug/mL Susceptibl      I visually examined the wound via video and there was no obvious redness and by her report the edema was significantly better. There was noticeable edema between the incision and her IP joint.    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 She is on Bactrim DS and is significantly improved. We discussed the benefit of adding ciprofloxacin and the normal course of treatment for this type of infection. I recommended that she remain on Bactrim for a total of 6 weeks after surgery (May 16). She should have a repeat CRP in 3 weeks and followup with me before stopping the Bactrim. We discussed indications for contacting the clinic earlier (increase in pain, redness, swelling). She has a follow-up with Dr. Quick  next week;      Again, thank you for allowing me to participate in the care of your patient.      Sincerely,    Thang Rm MD

## 2025-04-10 NOTE — NURSING NOTE
Current patient location: Pt is in [place] visiting per pt    Is the patient currently in the state of [place]? NO    Visit mode: VIDEO    If the visit is dropped, the patient can be reconnected by:VIDEO VISIT: Text to cell phone:   Telephone Information:   Mobile [phone]       Will anyone else be joining the visit? NO  (If patient encounters technical issues they should call [phone]    Are changes needed to the allergy or medication list? Pt stated no med changes    Are refills needed on medications prescribed by this physician? NO    Rooming Documentation:  Not applicable    Reason for visit: Consult    No other vitals to report per pt    Sarah Guerrero VVF

## 2025-04-11 DIAGNOSIS — I10 ESSENTIAL HYPERTENSION: ICD-10-CM

## 2025-04-14 ENCOUNTER — TELEPHONE (OUTPATIENT)
Dept: INFECTIOUS DISEASES | Facility: CLINIC | Age: 75
End: 2025-04-14
Payer: COMMERCIAL

## 2025-04-14 NOTE — TELEPHONE ENCOUNTER
Subjective Data:   GOLDY RODRIGUEZ is a 1 month old Female who is Hospital Day # 48.    Additional Information:  Additional Information:    IV infiltrated during 2nd aliquot of pRBC infusion. Received 15 cc/kg pRBC.  Received UOP increased to 8.4ml/hr over 8h s/p lasix. UOP now back to baseline.    CXR: ETT tube appeared slightly deep compared to prior using same measurement. Extra tension placed on it but not pulled back.  PM and AM CBGs with pH normalizing and CO2 downtrending toward normal.   Hct improved 26 --> 36 s/p transfusion on CBG. CBC with Hct of 32 s/p transfusion    Physical Exam:   Weight:         Weights   12/25 3:00: Abdominal Circumference (cm) 20  12/24 21:00: Pediatric Weight (kg) (Weight (kg))  1.008  Vital Signs:      T   P  R  BP   SpO2   Value  36.8C  145     79/30   98%           on supplemental O2  Date/Time 12/25 6:00 12/25 7:00   12/25 6:00  12/25 7:00  Range  (36.5C - 37.2C )  (140 - 180 )    (49 - 79 )/ (26 - 52 )  (78% - 98% )    Thermoregulation:   Environmental Control = incubator patient controlled  Skin Temp = 37.3 C  Set Temp = 36.5 C  Incubator Temp = 30.3 C      Pain Score = 2          Pain reported at 12/25 5:00: 2  General:    Extremely premature infant, asleep on right side, in NAD.     Neurologic:    Asleep, does not wake with exam, no movement of extremities in sleep. bulk and tone appropriate for GA.  Respiratory:    Adequate aeration b/l with equal transmittable breath sounds, chest with small vibrations 2/2/ jet vent. No grunting, flaring, or retractions.  Cardiac:    RRR from monitor, difficult to assess S1/S2 over ventilator, good pulses and perfusion.   Abdomen:    Abdomen soft and mildly distended (slightly above baseline), appears non-tender to exam. Difficult to assess for bowel sounds. OG in place.   Skin:    Warm and dry, thin skin. Less periorbital and foot swelling compared to yesterday. Bruising in LUE at site of infiltrated PIV.     System Based Note:  EP LVM 4/14 to sched a 5-6 week follow up with Dr. Rm. PLEASE OFFER 5/22 VIA IN-PERSON. --2nd attempt.      Respiratory:      Oxygen:   As of  7:00, this patient is on 96 of FiO2 (%) via HFJV    Ventilator Non-Invasive Settings    Ventilator Settings   4:30 Modes  CMV,  PC   4:30 Rate Set (breaths/min)  5   4:30 Pressure Control Set (cm H2O)  18   4:30 PEEP (cm H2O)  11   4:30 FiO2 (%)  100    Ventilator HFO Settings    Airway   6:00 Sputum  small;  white;  thick   1:37 Size  2.5   1:37 Type  endotracheal tube   21:00 Size  2.5   21:00 Type  ;  endotracheal tube         Apneas and Bradycardias :   Apneas 0  Bradycardias:   2        Oxygen Saturation Profile - 8 Hour Histogram:    6:00 Oxygen Saturation %   = 5.3   6:00 Oxygen Saturation 90-95%   = 55.2   6:00 Oxygen Saturation 85-89%   = 29.5   6:00 Oxygen Saturation 81-84%   = 8.9   6:00 Oxygen Saturation 0-80%   = 1.1    Oxygen Saturation Profile - 24 Hour Histogram:    6:00 Oxygen Saturation %   = 2.2   6:00 Oxygen Saturation 90-95%   = 28.9   6:00 Oxygen Saturation 85-89%   = 37.3   6:00 Oxygen Saturation 81-84%   = 21.8   6:00 Oxygen Saturation 0-80%   = 9.9  FEN/GI:    The Intake and Output Totals for the last 24 hours are:      Intake   Output  Net      162   151  11    Totals for Past 24 hours:  Enteral Intake % Oral  0 %  Enteral Intake vs IV  76 %  Total Intake  mL/kg/day  160.92 mL/kg/day  Total Output mL/kg/day  149.8 mL/kg/day  Urine mL/kg/hr  5.5 mL/kg/hr    Measured Intake:    OG Feed (orogastric tube) - 112 mL total, 131.7 mL/kg/day.  69% of total intake.    Measured IV Intake:  Total IV fluids= 34.83 mLs.  Parenteral Nutrition= null mLs.  Lipids= null mLs.      20 Abdominal Circumference (cm)  3:00  20 Abdominal Circumference (cm)  3:00    Bilirubin/Heme:        CBC: 2022 21:03              \     Hgb     /                              \     11.2       /  WBC  ----------------  Plt               11.0        ----------------    129 L            /     Hct     \                              /     32.0       \            RBC: 3.99     MCV: 80 L        Tranfusions Given: 9    Problem/Assessment/Plan:        Admitting Dx:   Prematurity: Entered Date: 2022 13:01       Additional Dx:   Chronic respiratory failure: Entered Date: 2022 11:57   Late onset  sepsis: Entered Date: 2022 11:56   Chronic lung disease of prematurity: Onset Date: 2022,  Entered Date: 2022 09:07   Hypophosphatemia: Onset Date: 2022, Entered Date:  2022 11:01   Hyponatremia of : Onset Date: 2022, Entered  Date: 2022 10:59   Feeding difficulties in : Onset Date: 2022,  Entered Date: 2022 10:14   Anemia of prematurity: Onset Date: 2022, Entered Date:  2022 16:39   Apnea of prematurity: Onset Date: 2022, Entered Date:  2022 14:50   Culture-negative sepsis: Onset Date: 2022, Entered  Date: 2022 14:49    Assessment:    Cadence Cui is a 26 3/7 SGA female, cGA 33.0wk, now DOL 46 born via urgent repeat  for NRFHT in the setting of maternal siPEC with active issues of extreme prematurity,  ELBW, respiratory failure 2/2 BPD s/p DART intubated on JET vent, apnea of prematurity, growth/nutrition now completing treatment for suspected culture negative sepsis versus UTI.     Cadence Johnston did much better overnight/this morning following pRBC, lasix, and adjustments to JET vent settings. pH (7.24 --> 7.37) and CO2 (71 --> 56) improved. Hgb improved to 35 on CBG, 32 on CBC. She only received 1 PRN morphine in the past day, which  was prior to making changes. She is now slowly weaning her FiO2, currently at 92%, with improvement in WOB. No changes to vent settings today.     We will continue to treat for culture negative sepsis vs. UTI with cefepime; today is day 10 of 10 (12/15-). We will increase feeds to 130mL and  fortify to 26 kcal. We will decrease TFG to 130 for now to prevent further need for diuresis given electrolyte  abnormalities and mineral bone disease. Tomorrow we will add back MCT oil and start NaPhos and CaCarb supplementation now that we have reduced her goal feeds. Growth labs tomorrow as well.     Patient remains critically ill and requires NICU level care for her respiratory failure and risk of cardiopulmonary decompensation.    Plan:    CNS:   #Apnea of prematurity  - PO caffeine 7.5 mg/kg   #Risk for IVH   -HUS DOL 1/3/7/30: No evidence of germinal matrix hemorrhage  #Risk for ROP   - 12/21: OU stage 0, zone 1  [ ] repeat exam 12/28  #agitation/pain  - morphine 0.05mg/kg/dose IV prn    CV:   *access: none   - MAP goal >30     RESP:   #Respiratory failure 2/2 BPD  s/p DART  - JET PC PIP/PEEP 32/11, R 300, iT 0.026, sigh R5, 18/11, iT 0.6  - Wean FiO2 as tolerated   - ETT exchanged 12/15  - am CBG     FEN/GI/ENDO:   #nutrition   - Restrict to   - STOP IVF  - Increase and fortify: D/MBM 26kcal feeds at 130cc/kg/hr over 2hrs (working back up to goal feed: M/DBM 26 kcal Q3H (160 cc/kg/d) over 2 hours  w/MCT oil)  - Vitamin D 200 Unit(s)  #Hypoglycemia, resolved  - Goal glucose >65  #Hyponatremia   - HOLD NaCl 4 mEq/kg/d (plan to restart as NaPhos, and not NaCl, once back on full feeds)    #c/f metabolic bone disease   #HypoPhos  #Hypercalcemia  #Elevated ALP  *endocrine consulted  [ ] f/u recs:  -- Start Phos supplementation at 1 mmol/kg/day which would provide an additional 25 mg of phosphorus a day -- (will start NaPhos supplement when at goal feeds and fortification)  -- Repeat PTH, urine calcium/phosphorus and RFP in 1 week.  #Abnormal TFTs  -12/5 TSH 5.01 FT4 1.21   [ ] Repeat TFTs 1/16     HEME/BILI:   - Transfusion thresholds: Hct <25, Plt <50  #Anemia  - s/p pRBC DOL 3, 11/16, 11/18, 11/21, 12/12, 12/24  - Fe 3.5mg/kg BID  - EPO MWF  #Thrombocytopenia- resolved   #Hyperbilirubinemia- resolved       ID:  - trach cx 12/15 - NGTD  - blood cx (arterial) 12/15 - CONS  - blood cx (venous) 12/15 - NGTD  - blood cx  - NGTD  - UA (clean catch) 12/15 - + 100 WBCs (2nd UA bagged and not interpreting)  #NEC r/o - resolved  - s/p amp (12/15 - ) nafacillin   - s/p flagyl (12/15 - )  - s/p gent (12/15 -)  #late onset culture neg sepsis r/o vs UTI  - gent q36h (12/15 -)  - vanc ( )  - cefepime ( - ) total 10 d course from 12/15    Other:   #OHNBS- inconclusive amino acids   - f/u Amino acids - normal   #Immunizations   - s/p Hep B DOL 30 ()    Labs:  [ ] Growth Labs  [ ] am CBG  - TFTs (next )    Patient discussed with Dr. Shaquille Mckenzie MD  Pediatric Medicine, PGY-1  [facility]      Daily Risk Screen:  Does patient have a central line? no   Does patient have an indwelling urinary catheter? no   Is the patient intubated? yes   Plan for extubation today? no   The patient continues to require intubation because they have continued cardiopulmonary lability/instability, they have inadequate gas exchange without positive pressure     Update:   Supervisory Update:    NICU Attending 22    Seen on rounds with residents and team    Cadence Johnston is a 1 month old 26 week prmature infant who requires critical care for chronic respiratory failure due to bronchopulmonary dysplasia  requiring ventilator support and close monitoring for:    -Resp Failure-Due to RDS - S/P DART which improved her oxygenation and need for 100% FiO2 and transitioned from HFOV to CV, now back on HFJV  -Late onset  sepsis from CoNS  -Anemia- hisotry of multiple PRBC transfusions  -AOP- on caffeine  -Nutrition  -Hypoglycemia - requiring feeds to be run continuously bu we are trying to condense  -Increased alkaline phosphatase (1174 on )  -anemia of prematurity     On 12/15 infant had abdominal distension with stacked loops on xray.  BCx x2 sent and UA sent.  BCx NGTD, one BCx with CONS,  repeat 12/16 NGTD.   UA with 100 WBC, unable to obtain culture. Replogle placed to LIS, monitoring KUB, normalized on 12/16.    started on ampicillin, gentamicin and flagyl.   Suspect ileus as opposed to NEC.  Flagyl stopped 12/17.  12/17 ampicillin changed to vancomycin with concern for CONS.  ID consulted, recommend treating for 7-10 days for presumed sepsis/UTI with cefepime  which is finishing today. HFJV settings were switched yesterday to more chronic BPD settings and FiO2 has stabilized since then with less amount of desats. Having some bloody ETT secretions today. Did receive pRBC yesterday followed by lasix.         Exam:  Wt= 1008 gms  Good perfusion  No increased work of breathing, active  Abdomen- soft and non distended    Imp:  Well ventilated on current HFJV settings, on high FiO2, better when prone, now being treated for presumed late onset sepsis/UTI.  Readvancing feeds and tolerating well.    Plan:  Continue HFJV, no changes made to settings as these new settings seemto work better for her, focus on growth and nutrition  Monitor blood gases and CXR, titrate as necessary to attempt to optimize oxygenation and ventilation  CXRay today for blood tinged ETT secretions, will consider increasing MAP on the HFJV if she is having pulm hemorrhage, plts did drop on AM labs today, will consider transfusing as well if bleeding does not stop   Cefepime, treat for total antibiotics 10 days, today is day 10/10  MBM/DBM advance to 130mL/kg, increase to 26kcal/oz, keep fluid restricted at 130cc/k/day to avoid using lasix or diuretics due to her metabolic bone disease      Rissa Corbin MD, MS,  Attending Neonatologist,  [facility] and [facility].            Attestation:   Note Completion:  I am a:  Resident/Fellow   Attending Attestation I saw and evaluated the patient.  I personally obtained the key and critical portions of the history and physical exam or was physically present for key and   critical portions performed by the resident/fellow. I reviewed the resident/fellow?s documentation and discussed the patient with the resident/fellow.  I agree with the resident/fellow?s medical decision making as documented in the resident ?s note    I personally evaluated the patient on 2022         Electronic Signatures:  Rissa Corbin)  (Signed 2022 19:25)   Authored: Update, Note Completion   Co-Signer: Subjective Data, Physical Exam, System Based Note, Problem/Assessment/Plan  Brit Mckenzie (Resident))  (Signed 2022 12:33)   Authored: Subjective Data, Physical Exam, System Based  Note, Problem/Assessment/Plan, Note Completion      Last Updated: 2022 19:25 by Rissa Corbin)

## 2025-04-15 ENCOUNTER — ANCILLARY PROCEDURE (OUTPATIENT)
Dept: GENERAL RADIOLOGY | Facility: CLINIC | Age: 75
End: 2025-04-15
Attending: STUDENT IN AN ORGANIZED HEALTH CARE EDUCATION/TRAINING PROGRAM
Payer: COMMERCIAL

## 2025-04-15 ENCOUNTER — OFFICE VISIT (OUTPATIENT)
Dept: ORTHOPEDICS | Facility: CLINIC | Age: 75
End: 2025-04-15
Payer: COMMERCIAL

## 2025-04-15 DIAGNOSIS — A49.01 OSTEOMYELITIS DUE TO STAPHYLOCOCCUS AUREUS (H): Primary | ICD-10-CM

## 2025-04-15 DIAGNOSIS — M79.642 HAND PAIN, LEFT: ICD-10-CM

## 2025-04-15 DIAGNOSIS — M86.9 OSTEOMYELITIS DUE TO STAPHYLOCOCCUS AUREUS (H): Primary | ICD-10-CM

## 2025-04-15 DIAGNOSIS — M79.645 PAIN OF LEFT THUMB: Primary | ICD-10-CM

## 2025-04-15 PROCEDURE — 73130 X-RAY EXAM OF HAND: CPT | Mod: LT | Performed by: RADIOLOGY

## 2025-04-15 PROCEDURE — 99024 POSTOP FOLLOW-UP VISIT: CPT | Performed by: STUDENT IN AN ORGANIZED HEALTH CARE EDUCATION/TRAINING PROGRAM

## 2025-04-15 PROCEDURE — 1125F AMNT PAIN NOTED PAIN PRSNT: CPT | Performed by: STUDENT IN AN ORGANIZED HEALTH CARE EDUCATION/TRAINING PROGRAM

## 2025-04-15 RX ORDER — AMLODIPINE BESYLATE 10 MG/1
10 TABLET ORAL DAILY
Qty: 90 TABLET | Refills: 3 | Status: SHIPPED | OUTPATIENT
Start: 2025-04-15

## 2025-04-15 RX ORDER — HYDROCHLOROTHIAZIDE 25 MG/1
25 TABLET ORAL DAILY
Qty: 90 TABLET | Refills: 3 | Status: SHIPPED | OUTPATIENT
Start: 2025-04-15

## 2025-04-15 RX ORDER — SULFAMETHOXAZOLE AND TRIMETHOPRIM 800; 160 MG/1; MG/1
1 TABLET ORAL 2 TIMES DAILY
Qty: 30 TABLET | Refills: 0 | Status: SHIPPED | OUTPATIENT
Start: 2025-04-15

## 2025-04-15 NOTE — PROGRESS NOTES
Ortho Hand    Patient is doing better.  She notices less pain in the left thumb.  The redness and swelling has also improved.  She met with the ID specialist.  She continues on Bactrim for 6 weeks total.    On exam, left thumb incision is well-healing with no wounds with improved swelling and erythema.  No tenderness when palpating the left thumb metacarpal phalangeal joint or with passive motion.    XR: No new osteolysis.  Volar and ulnar subluxation of the left thumb proximal phalanx at the metacarpophalangeal joint.    I personally reviewed the imaging today and have provided my interpretation.     A/P: 74-year-old female 2 weeks status post left thumb exploration with metacarpophalangeal joint arthrotomy, washout, irrigation and debridement with cultures, being treated for osteomyelitis    - Continue antibiotics per ID  - We did discuss that after completion treatment of the infection, we will evaluate for any residual pain at the left thumb metacarpophalangeal joint.  If there is significant pain and/or joint laxity, then we would consider a fusion surgery.  However, if there is no pain or laxity, we may elect not to proceed with any surgical intervention.  - Sutures removed today  - Virtual visit in 2 weeks    Arsalan Quick MD, PhD, FACS

## 2025-04-15 NOTE — TELEPHONE ENCOUNTER
Last Written Prescription:  hydrochlorothiazide (HYDRODIURIL) 25 MG tablet 90 tablet 2 7/26/2024 -- No   Sig - Route: TAKE 1 TABLET BY MOUTH DAILY - Oral     amLODIPine (NORVASC) 10 MG tablet 90 tablet 2 7/26/2024 -- No   Sig - Route: TAKE 1 TABLET BY MOUTH DAILY - Oral     ----------------------  Last Visit Date: 7/2/2024  [facility] [place]      Future Visit Date:   5/5/2025 11:30 AM (30 min)  [place]    Arrive by: 11:15 AM   [facility] PHYSICAL   [facility]   Danii Burks MD     ----------------------      Refill decision: Medication refilled per  Medication Refill in Ambulatory Care  policy.      BP Readings from Last 3 Encounters:   04/02/25 116/75   03/29/25 138/74   07/02/24 127/83   Last Comprehensive Metabolic Panel:  Lab Results   Component Value Date     (L) 03/29/2025    POTASSIUM 3.7 03/29/2025    CHLORIDE 98 03/29/2025    CO2 24 03/29/2025    ANIONGAP 12 03/29/2025     (H) 03/29/2025    BUN 21.5 03/29/2025    CR 0.85 03/29/2025    GFRESTIMATED 72 03/29/2025    JANAE 9.2 03/29/2025         Request from pharmacy:  Requested Prescriptions   Pending Prescriptions Disp Refills    hydrochlorothiazide (HYDRODIURIL) 25 MG tablet [Pharmacy Med Name: HYDROCHLOROTHIAZIDE 25MG TABLETS] 90 tablet 2     Sig: TAKE 1 TABLET BY MOUTH DAILY       Diuretics (Including Combos) Protocol Passed - 4/15/2025 10:04 AM        Passed - Most recent blood pressure under 140/90 in past 12 months     BP Readings from Last 3 Encounters:   04/02/25 116/75   03/29/25 138/74   07/02/24 127/83       No data recorded            Passed - Potassium level on file in past 12 months        Passed - Medication is active on med list and the sig matches. RN to manually verify dose and sig if red X/fail.     If the protocol passes (green check), you do not need to verify med dose and sig.    A prescription matches if they are the same clinical intention.    For Example: once daily and every morning are  the same.    The protocol can not identify upper and lower case letters as matching and will fail.     For Example: Take 1 tablet (50 mg) by mouth daily     TAKE 1 TABLET (50 MG) BY MOUTH DAILY    For all fails (red x), verify dose and sig.    If the refill does match what is on file, the RN can still proceed to approve the refill request.       If they do not match, route to the appropriate provider.             Passed - Medication indicated for associated diagnosis     Medication is associated with one or more of the following diagnoses:     Edema   Hypertension   Heart Failure   Meniere's Disease   Bilateral localized swelling of lower limbs   Pulmonary Hypertension          Passed - Has GFR on file in past 12 months and most recent value is normal        Passed - Recent (12 mo) or future (90 days) visit within the authorizing provider's specialty     The patient must have completed an in-person or virtual visit within the past 12 months or has a future visit scheduled within the next 90 days with the authorizing provider s specialty.  Urgent care and e-visits do not qualify as an office visit for this protocol.          Passed - Patient is age 18 or older        Passed - No active pregancy on record        Passed - No positive pregnancy test in past 12 months          amLODIPine (NORVASC) 10 MG tablet [Pharmacy Med Name: AMLODIPINE BESYLATE 10MG TABLETS] 90 tablet 2     Sig: TAKE 1 TABLET BY MOUTH DAILY       Calcium Channel Blockers Protocol  Passed - 4/15/2025 10:04 AM        Passed - Most recent blood pressure under 140/90 in past 12 months     BP Readings from Last 3 Encounters:   04/02/25 116/75   03/29/25 138/74   07/02/24 127/83       No data recorded            Passed - Medication is active on med list and the sig matches. RN to manually verify dose and sig if red X/fail.     If the protocol passes (green check), you do not need to verify med dose and sig.    A prescription matches if they are the same  clinical intention.    For Example: once daily and every morning are the same.    The protocol can not identify upper and lower case letters as matching and will fail.     For Example: Take 1 tablet (50 mg) by mouth daily     TAKE 1 TABLET (50 MG) BY MOUTH DAILY    For all fails (red x), verify dose and sig.    If the refill does match what is on file, the RN can still proceed to approve the refill request.       If they do not match, route to the appropriate provider.             Passed - Medication is indicated for associated diagnosis        Passed - GFR is on file in the past 12 months and most recent GFR is normal        Passed - Recent (12 mo) or future (90 days) visit within the authorizing provider's specialty     The patient must have completed an in-person or virtual visit within the past 12 months or has a future visit scheduled within the next 90 days with the authorizing provider s specialty.  Urgent care and e-visits do not qualify as an office visit for this protocol.          Passed - Patient is age 18 or older        Passed - No active pregnancy on record        Passed - No positive pregnancy test in past 12 months

## 2025-04-15 NOTE — NURSING NOTE
Reason For Visit:   Chief Complaint   Patient presents with    Surgical Followup     Post op LEFT thumb exploration with metacarpophalangeal joint arthrotomy, washout, irrigation and debridement, cultures,Penrose placement - Left. DOS: 4/2/25       Primary MD: Danii Burks  Ref. MD: Tammi    Age: 74 year old    ?  No      There were no vitals taken for this visit.      Pain Assessment  Patient Currently in Pain: Yes  0-10 Pain Scale: 4  Primary Pain Location: Finger (Comment which one) (Left thumb)  Pain Descriptors: Shooting, Sore, Intermittent, Aching    Hand Dominance Evaluation  Hand Dominance: Right          QuickDASH Assessment      4/1/2025     8:28 AM   QuickDASH Main   1. Open a tight or new jar Severe difficulty   2. Do heavy household chores (e.g., wash walls, floors) Severe difficulty   3. Carry a shopping bag or briefcase Moderate difficulty   4. Wash your back Severe difficulty   5. Use a knife to cut food Severe difficulty   6. Recreational activities in which you take some force or impact through your arm, shoulder or hand (e.g., golf, hammering, tennis, etc.) Unable   7. During the past week, to what extent has your arm, shoulder or hand problem interfered with your normal social activities with family, friends, neighbours or groups Slightly   8. During the past week, were you limited in your work or other regular daily activities as a result of your arm, shoulder or hand problem Moderately limited   9. Arm, shoulder or hand pain Extreme   10.Tingling (pins and needles) in your arm,shoulder or hand Mild   11. During the past week, how much difficulty have you had sleeping because of the pain in your arm, shoulder or hand Moderate difficulty   Quickdash Ability Score 63.64          Current Outpatient Medications   Medication Sig Dispense Refill    acetaminophen (TYLENOL) 325 MG tablet Take 650 mg by mouth every 6 hours as needed for mild pain.      amLODIPine (NORVASC) 10 MG tablet TAKE 1  TABLET BY MOUTH DAILY 90 tablet 2    apixaban ANTICOAGULANT (ELIQUIS ANTICOAGULANT) 5 MG tablet Take 1 tablet (5 mg) by mouth 2 times daily. 180 tablet 3    Calcium-Vitamin D-Vitamin K (VIACTIV PO) Take by mouth 2 times daily       ciprofloxacin (CIPRO) 500 MG tablet Take 1 tablet (500 mg) by mouth 2 times daily. 30 tablet 0    fish oil-omega-3 fatty acids (FISH OIL) 1000 MG capsule One capsule daily.      hydrochlorothiazide (HYDRODIURIL) 25 MG tablet TAKE 1 TABLET BY MOUTH DAILY 90 tablet 2    loratadine (CLARITIN) 10 MG tablet Take 10 mg by mouth daily      losartan (COZAAR) 100 MG tablet Take 1 tablet (100 mg) by mouth daily. 90 tablet 2    lovastatin (MEVACOR) 40 MG tablet Take 1 tablet (40 mg) by mouth at bedtime. 90 tablet 3    metoprolol succinate ER (TOPROL XL) 50 MG 24 hr tablet TAKE 1 AND 1/2 TABLETS(75 MG) BY MOUTH DAILY 135 tablet 3    metoprolol tartrate (LOPRESSOR) 25 MG tablet TAKE 1 TABLET BY MOUTH AS DIRECTED UP TO 2 TABLETS BY MOUTH EVRYDAY WITH ONSET OF ARRHYTHMIA 30 tablet 1    ondansetron (ZOFRAN) 4 MG tablet Take 1 tablet (4 mg) by mouth every 6 hours as needed for nausea. 12 tablet 0    RISEdronate (ACTONEL) 150 MG tablet Take 1 tablet (150 mg) by mouth every 30 days. Take with full glass of water on empty stomach, don't lay down for 30 min 4 tablet 0    sulfamethoxazole-trimethoprim (BACTRIM DS) 800-160 MG tablet Take 1 tablet by mouth 2 times daily. 30 tablet 0    sulfamethoxazole-trimethoprim (BACTRIM DS) 800-160 MG tablet Take 1 tablet by mouth 2 times daily. 30 tablet 0       Allergies   Allergen Reactions    Ibuprofen Other (See Comments)    Nsaids GI Disturbance    Statins      Made her lips tingle, so they put her on another statin instead.    Liquid Adhesive Rash     After wearing patches for > 8 days  After wearing patches for > 8 days       Naomi Yeh, ATC

## 2025-04-17 LAB
BACTERIA TISS BX CULT: NORMAL
BACTERIA TISS BX CULT: NORMAL

## 2025-04-24 LAB
BACTERIA TISS BX CULT: NORMAL
BACTERIA TISS BX CULT: NORMAL

## 2025-04-29 ENCOUNTER — VIRTUAL VISIT (OUTPATIENT)
Dept: ORTHOPEDICS | Facility: CLINIC | Age: 75
End: 2025-04-29
Payer: COMMERCIAL

## 2025-04-29 DIAGNOSIS — M79.645 PAIN OF LEFT THUMB: Primary | ICD-10-CM

## 2025-04-29 NOTE — LETTER
4/29/2025      Alysha Youngblood  [facility] [id number]      Dear Colleague,    Thank you for referring your patient, Alysha Youngblood, to the [facility] [place]. Please see a copy of my visit note below.    Ortho Hand    Virtual video visit start: 8:20 AM  Virtual video visit end: 8:30 AM  Patient is taking phone call from home in [place]    Patient has approximately 8 days left of antibiotic treatment.  She is doing better from a pain standpoint.  She is able to move the left thumb much better than before.  However, she still has a throbbing sensation in the thumb.     On the video, the incision is well-healed and not very visible, and she can actively move the left thumb across the palm, touch the tip of the small finger, and extend with some limitation.    Discussed options for management.  In my opinion, patient should return to clinic at 1-2 weeks after stopping antibiotics.  If there is a flareup in the pain, patient may benefit from an additional washout.  However, if patient continues to progress after cessation of antibiotic therapy, then we would consider reevaluation and deciding on additional management for the subluxed metacarpophalangeal joint.  Patient is agreeable with the plan.  Return to clinic in 2-3 weeks.    Left thumb metacarpophalangeal joint infection    Arsalan Quick MD, PhD, FACS      Again, thank you for allowing me to participate in the care of your patient.        Sincerely,        Arsalan Quick MD    Electronically signed

## 2025-04-29 NOTE — NURSING NOTE
Reason For Visit:   Chief Complaint   Patient presents with    Surgical Followup     4 week post op follow-up of LEFT thumb exploration with metacarpophalangeal joint arthrotomy, washout, irrigation and debridement, cultures,Penrose placement - Left  DOS:4/2/2025       Primary MD: Danii Burks  Ref. MD: Tammi    Age: 74 year old    ?  No      There were no vitals taken for this visit.      Pain Assessment  Patient Currently in Pain: Yes (takes Tylenol and ibuprofen regularly, last dose was at midnight)  0-10 Pain Scale: 1 (Pain can increase with ROM)  Primary Pain Location: Finger (Comment which one) (Left Thumb)  Pain Descriptors: Burning, Intermittent    Hand Dominance Evaluation  Hand Dominance: Right          QuickDASH Assessment      4/1/2025     8:28 AM   QuickDASH Main   1. Open a tight or new jar Severe difficulty   2. Do heavy household chores (e.g., wash walls, floors) Severe difficulty   3. Carry a shopping bag or briefcase Moderate difficulty   4. Wash your back Severe difficulty   5. Use a knife to cut food Severe difficulty   6. Recreational activities in which you take some force or impact through your arm, shoulder or hand (e.g., golf, hammering, tennis, etc.) Unable   7. During the past week, to what extent has your arm, shoulder or hand problem interfered with your normal social activities with family, friends, neighbours or groups Slightly   8. During the past week, were you limited in your work or other regular daily activities as a result of your arm, shoulder or hand problem Moderately limited   9. Arm, shoulder or hand pain Extreme   10.Tingling (pins and needles) in your arm,shoulder or hand Mild   11. During the past week, how much difficulty have you had sleeping because of the pain in your arm, shoulder or hand Moderate difficulty   Quickdash Ability Score 63.64          Current Outpatient Medications   Medication Sig Dispense Refill    acetaminophen (TYLENOL) 325 MG tablet Take  650 mg by mouth every 6 hours as needed for mild pain.      amLODIPine (NORVASC) 10 MG tablet Take 1 tablet (10 mg) by mouth daily. 90 tablet 3    apixaban ANTICOAGULANT (ELIQUIS ANTICOAGULANT) 5 MG tablet Take 1 tablet (5 mg) by mouth 2 times daily. 180 tablet 3    Calcium-Vitamin D-Vitamin K (VIACTIV PO) Take by mouth 2 times daily       ciprofloxacin (CIPRO) 500 MG tablet Take 1 tablet (500 mg) by mouth 2 times daily. 30 tablet 0    fish oil-omega-3 fatty acids (FISH OIL) 1000 MG capsule One capsule daily.      hydrochlorothiazide (HYDRODIURIL) 25 MG tablet Take 1 tablet (25 mg) by mouth daily. 90 tablet 3    loratadine (CLARITIN) 10 MG tablet Take 10 mg by mouth daily      losartan (COZAAR) 100 MG tablet Take 1 tablet (100 mg) by mouth daily. 90 tablet 2    lovastatin (MEVACOR) 40 MG tablet Take 1 tablet (40 mg) by mouth at bedtime. 90 tablet 3    metoprolol succinate ER (TOPROL XL) 50 MG 24 hr tablet TAKE 1 AND 1/2 TABLETS(75 MG) BY MOUTH DAILY 135 tablet 3    metoprolol tartrate (LOPRESSOR) 25 MG tablet TAKE 1 TABLET BY MOUTH AS DIRECTED UP TO 2 TABLETS BY MOUTH EVRYDAY WITH ONSET OF ARRHYTHMIA 30 tablet 1    ondansetron (ZOFRAN) 4 MG tablet Take 1 tablet (4 mg) by mouth every 6 hours as needed for nausea. 12 tablet 0    RISEdronate (ACTONEL) 150 MG tablet Take 1 tablet (150 mg) by mouth every 30 days. Take with full glass of water on empty stomach, don't lay down for 30 min 4 tablet 0    sulfamethoxazole-trimethoprim (BACTRIM DS) 800-160 MG tablet Take 1 tablet by mouth 2 times daily. 30 tablet 0    sulfamethoxazole-trimethoprim (BACTRIM DS) 800-160 MG tablet Take 1 tablet by mouth 2 times daily. 30 tablet 0       Allergies   Allergen Reactions    Ibuprofen Other (See Comments)    Nsaids GI Disturbance    Statins      Made her lips tingle, so they put her on another statin instead.    Liquid Adhesive Rash     After wearing patches for > 8 days  After wearing patches for > 8 days       Madeline Matthew, ATC

## 2025-04-29 NOTE — PROGRESS NOTES
Ortho Hand    Virtual video visit start: 8:20 AM  Virtual video visit end: 8:30 AM  Patient is taking phone call from home in [place]    Patient has approximately 8 days left of antibiotic treatment.  She is doing better from a pain standpoint.  She is able to move the left thumb much better than before.  However, she still has a throbbing sensation in the thumb.     On the video, the incision is well-healed and not very visible, and she can actively move the left thumb across the palm, touch the tip of the small finger, and extend with some limitation.    Discussed options for management.  In my opinion, patient should return to clinic at 1-2 weeks after stopping antibiotics.  If there is a flareup in the pain, patient may benefit from an additional washout.  However, if patient continues to progress after cessation of antibiotic therapy, then we would consider reevaluation and deciding on additional management for the subluxed metacarpophalangeal joint.  Patient is agreeable with the plan.  Return to clinic in 2-3 weeks.    Left thumb metacarpophalangeal joint infection    Arsalan Quick MD, PhD, FACS

## 2025-04-30 LAB
BACTERIA TISS BX CULT: NO GROWTH
BACTERIA TISS BX CULT: NO GROWTH

## 2025-04-30 SDOH — HEALTH STABILITY: PHYSICAL HEALTH: ON AVERAGE, HOW MANY MINUTES DO YOU ENGAGE IN EXERCISE AT THIS LEVEL?: 90 MIN

## 2025-04-30 SDOH — HEALTH STABILITY: PHYSICAL HEALTH: ON AVERAGE, HOW MANY DAYS PER WEEK DO YOU ENGAGE IN MODERATE TO STRENUOUS EXERCISE (LIKE A BRISK WALK)?: 6 DAYS

## 2025-04-30 ASSESSMENT — SOCIAL DETERMINANTS OF HEALTH (SDOH): HOW OFTEN DO YOU GET TOGETHER WITH FRIENDS OR RELATIVES?: MORE THAN THREE TIMES A WEEK

## 2025-05-05 ENCOUNTER — OFFICE VISIT (OUTPATIENT)
Dept: INTERNAL MEDICINE | Facility: CLINIC | Age: 75
End: 2025-05-05
Payer: COMMERCIAL

## 2025-05-05 VITALS
SYSTOLIC BLOOD PRESSURE: 118 MMHG | TEMPERATURE: 97.5 F | RESPIRATION RATE: 16 BRPM | HEIGHT: 66 IN | DIASTOLIC BLOOD PRESSURE: 78 MMHG | OXYGEN SATURATION: 97 % | HEART RATE: 73 BPM | BODY MASS INDEX: 37.38 KG/M2 | WEIGHT: 232.6 LBS

## 2025-05-05 DIAGNOSIS — E66.01 MORBID OBESITY (H): ICD-10-CM

## 2025-05-05 DIAGNOSIS — Z12.31 ENCOUNTER FOR SCREENING MAMMOGRAM FOR BREAST CANCER: ICD-10-CM

## 2025-05-05 DIAGNOSIS — Z00.00 ROUTINE GENERAL MEDICAL EXAMINATION AT A HEALTH CARE FACILITY: ICD-10-CM

## 2025-05-05 DIAGNOSIS — I48.92 ATRIAL FLUTTER, UNSPECIFIED TYPE (H): ICD-10-CM

## 2025-05-05 DIAGNOSIS — Z87.891 HISTORY OF TOBACCO USE, PRESENTING HAZARDS TO HEALTH: Primary | ICD-10-CM

## 2025-05-05 DIAGNOSIS — M43.16 SPONDYLOLISTHESIS OF LUMBAR REGION: ICD-10-CM

## 2025-05-05 DIAGNOSIS — Z23 NEED FOR COVID-19 VACCINE: ICD-10-CM

## 2025-05-05 DIAGNOSIS — Z11.59 NEED FOR HEPATITIS C SCREENING TEST: ICD-10-CM

## 2025-05-05 PROCEDURE — 90480 ADMN SARSCOV2 VAC 1/ONLY CMP: CPT | Performed by: INTERNAL MEDICINE

## 2025-05-05 PROCEDURE — 99214 OFFICE O/P EST MOD 30 MIN: CPT | Mod: 25 | Performed by: INTERNAL MEDICINE

## 2025-05-05 PROCEDURE — 91320 SARSCV2 VAC 30MCG TRS-SUC IM: CPT | Performed by: INTERNAL MEDICINE

## 2025-05-05 PROCEDURE — 3074F SYST BP LT 130 MM HG: CPT | Performed by: INTERNAL MEDICINE

## 2025-05-05 PROCEDURE — 99397 PER PM REEVAL EST PAT 65+ YR: CPT | Mod: 24 | Performed by: INTERNAL MEDICINE

## 2025-05-05 PROCEDURE — 3078F DIAST BP <80 MM HG: CPT | Performed by: INTERNAL MEDICINE

## 2025-05-05 NOTE — PROGRESS NOTES
"Preventive Care Visit  [facility]  Danii Bruks MD, Internal Medicine  May 5, 2025      Assessment & Plan     History of tobacco use, presenting hazards to health  Declines, nodules stable on last imaging    Need for hepatitis C screening test  Deferred to next visit    Need for COVID-19 vaccine  - COVID-19 12+ (PFIZER)    Encounter for screening mammogram for breast cancer  - Mammogram, screening w renato (3D); Future    BMI 37.0-37.9, adult  Discussed r/b/a to medication assisted weight loss.  - tirzepatide-Weight Management (ZEPBOUND) 2.5 MG/0.5ML prefilled pen; Inject 0.5 mLs (2.5 mg) subcutaneously every 7 days.    Spondylolisthesis of lumbar region  - Spine  Referral; Future    Routine general medical examination at a health care facility  - REVIEW OF HEALTH MAINTENANCE PROTOCOL ORDERS            BMI  Estimated body mass index is 37.56 kg/m  as calculated from the following:    Height as of this encounter: 1.676 m (5' 5.98\").    Weight as of this encounter: 105.5 kg (232 lb 9.6 oz).             Prasanna Encarnacion is a 74 year old, presenting for the following:  Physical (Pt wondering if she can get covid vaccine early, pt gaining weight and wants to talk about getting on ozempic, pt having lower back pain and interested in cortisone)        5/5/2025    11:18 AM   Additional Questions   Roomed by EM         HPI    PM of HTN, HLD, afib s/p ablation, ISABELA not on CPAP, colon polyps, benign pulmonary nodules, benign thyroid nodules, OA, trigger finger, presents for physical.    Elected to [employer] editorial board  In phased [facility] plan    She developed septic arthritis of L 1st MCP joint after protesting, eventually had surgery, then abx x 6 weeks    Weight: Does WW, feels confident about losing weight but needs accountability, interested in ozempic  Lipids: tolerating lovastatin  Sugars: A1c 5.9, would like to lose weight  Diarrhea: " intermittent, lactose intolerant? Uses splenda, coffee in AM, no pain/RUQ pain, no bloody stools, no mucus  Back pain: s/p Transforaminal TAD lumbosacral L  Afib: rare afib, does get episodes of aflutter, no recent cardioversions, she takes eliquis and metoprolol.  Vaccines: interested in COVID    Doing water aerobics 6 times per week  Doesn't use CPAP machine    Routine Health Maintenence:  Lung Ca Screening (>20 pk age 50-80): Stable, declines further imaing   3/21 IMPRESSION:  1. Unchanged pulmonary nodules since at least 2017. Lung-RADS 2:  recommend resume annual CT lung cancer screening.  2. Stable numerous calcified thyroid nodules.    Colonoscopy (45-75 yrs):  3/21, several sessile polyps, rec 3 year f/u, due 3/24, done, recommended 3/27    Dexa (>65W or 70M yrs):  3/21 most negative and valid T-score of -2.1 at the level of the left femoral neck and right femoral neck    Mammogram (40-75 yrs): 6/21, 11/23, due    Pap (21-65 yrs): no abnormal  Lab Results   Component Value Date    PAP NIL 03/01/2016             Advance Care Planning    Discussed advance care planning with patient; informed AVS has link to [employer].        4/30/2025   General Health   How would you rate your overall physical health? (!) FAIR   Feel stress (tense, anxious, or unable to sleep) Only a little   (!) STRESS CONCERN      4/30/2025   Nutrition   Diet: Regular (no restrictions)         4/30/2025   Exercise   Days per week of moderate/strenous exercise 6 days   Average minutes spent exercising at this level 90 min         4/30/2025   Social Factors   Frequency of gathering with friends or relatives More than three times a week   Worry food won't last until get money to buy more No   Food not last or not have enough money for food? No   Do you have housing? (Housing is defined as stable permanent housing and does not include staying outside in a car, in a tent, in an abandoned building, in an overnight shelter, or  couch-surfing.) Yes   Are you worried about losing your housing? No   Lack of transportation? No   Unable to get utilities (heat,electricity)? No         2025   Fall Risk   Fallen 2 or more times in the past year? No   Trouble with walking or balance? Yes           2025   Activities of Daily Living- Home Safety   Needs help with the following daily activites None of the above   Safety concerns in the home None of the above         2025   Dental   Dentist two times every year? Yes         2025   Hearing Screening   Hearing concerns? None of the above         2025   Driving Risk Screening   Patient/family members have concerns about driving No         2025   General Alertness/Fatigue Screening   Have you been more tired than usual lately? No         2025   Urinary Incontinence Screening   Bothered by leaking urine in past 6 months No         Today's PHQ-2 Score:       2025    11:25 AM   PHQ-2 (  [employer])   Q1: Little interest or pleasure in doing things 0    Q2: Feeling down, depressed or hopeless 0    PHQ-2 Score 0    Q1: Little interest or pleasure in doing things Not at all   Q2: Feeling down, depressed or hopeless Not at all   PHQ-2 Score 0       Proxy-reported           2025   Substance Use   Alcohol more than 3/day or more than 7/wk No   Do you have a current opioid prescription? No   How severe/bad is pain from 1 to 10? 10   Do you use any other substances recreationally? No     Social History     Tobacco Use    Smoking status: Former     Current packs/day: 0.00     Types: Cigarettes     Quit date:      Years since quittin.3     Passive exposure: Never    Smokeless tobacco: Never   Vaping Use    Vaping status: Never Used   Substance Use Topics    Alcohol use: Yes     Alcohol/week: 14.0 standard drinks of alcohol     Types: 14 Shots of liquor per week    Drug use: No           11/10/2023   LAST FHS-7 RESULTS   1st degree relative breast or ovarian cancer  Yes    No   Any relative bilateral breast cancer No    No   Any male have breast cancer No    No   Any ONE woman have BOTH breast AND ovarian cancer No    No   Any woman with breast cancer before 50yrs No    No   2 or more relatives with breast AND/OR ovarian cancer No    No   2 or more relatives with breast AND/OR bowel cancer No    No       Multiple values from one day are sorted in reverse-chronological order            ASCVD Risk   The 10-year ASCVD risk score (Attila WILKERSON, et al., 2019) is: 14.7%    Values used to calculate the score:      Age: 74 years      Sex: Female      Is Non- : No      Diabetic: No      Tobacco smoker: No      Systolic Blood Pressure: 118 mmHg      Is BP treated: Yes      HDL Cholesterol: 74 mg/dL      Total Cholesterol: 212 mg/dL            Reviewed and updated as needed this visit by Provider                      Current providers sharing in care for this patient include:  Patient Care Team:  Danii Burks MD as PCP - General (Internal Medicine)  Kiesha Cohen MD as MD (Internal Medicine)  Lanre Ramirez MD as MD (Dermapathology)  Tyra Norris MD as MD (Cardiology)  Fania Peace MD as MD (Internal Medicine)  Danii Ocampo MD (Inactive) as MD (OB/Gyn)  Lanre Ramirez MD as MD (Dermapathology)  Faina Rowell APRN CNP as Nurse Practitioner  Slim Herrera MD as MD (Family Practice)  Livier Land, RN as Specialty Care Coordinator (Cardiology)  Tyra Norris MD as Assigned Heart and Vascular Provider  Danii Burks MD as Assigned PCP  Miki Baer MD as MD (Ophthalmology)  Laurel Wynn CNP as Assigned Neuroscience Provider  Angelo Ta MD as Resident (Internal Medicine)  Arsalan Quick MD as Assigned Surgical Provider    The following health maintenance items are reviewed in Epic and correct as of today:  Health Maintenance   Topic Date Due    ANNUAL REVIEW OF HM ORDERS  Never  "done    ADVANCE CARE PLANNING  Never done    HEPATITIS C SCREENING  Never done    ZOSTER IMMUNIZATION (2 of 3) 06/02/2015    LUNG CANCER SCREENING  03/26/2022    MEDICARE ANNUAL WELLNESS VISIT  08/03/2024    COVID-19 Vaccine (9 - 2024-25 season) 04/05/2025    MAMMO SCREENING  11/10/2025    LIPID  12/31/2025    BMP  03/29/2026    FALL RISK ASSESSMENT  05/05/2026    COLORECTAL CANCER SCREENING  04/12/2027    DIABETES SCREENING  03/29/2028    DTAP/TDAP/TD IMMUNIZATION (6 - Td or Tdap) 04/04/2032    DEXA  11/10/2038    PHQ-2 (once per calendar year)  Completed    INFLUENZA VACCINE  Completed    Pneumococcal Vaccine: 50+ Years  Completed    RSV VACCINE  Completed    HPV IMMUNIZATION  Aged Out    MENINGITIS IMMUNIZATION  Aged Out            Objective    Exam  /78 (BP Location: Right arm, Patient Position: Sitting, Cuff Size: Adult Large)   Pulse 73   Temp 97.5  F (36.4  C) (Oral)   Resp 16   Ht 1.676 m (5' 5.98\")   Wt 105.5 kg (232 lb 9.6 oz)   SpO2 97%   BMI 37.56 kg/m     Estimated body mass index is 37.56 kg/m  as calculated from the following:    Height as of this encounter: 1.676 m (5' 5.98\").    Weight as of this encounter: 105.5 kg (232 lb 9.6 oz).    Physical Exam  Constitutional: Alert, oriented, pleasant, no acute distress  Head: Normocephalic, atraumatic  Eyes: Extra-ocular movements intact, no scleral icterus  ENT: Oropharynx clear, moist mucus membranes  Neck: Supple  Cardiovascular: Regular rate and rhythm, no murmurs, rubs or gallops, peripheral pulses full/symmetric  Respiratory: Good air movement bilaterally, lungs clear, no wheezes/rales/rhonchi  Musculoskeletal: No edema, normal muscle tone, normal gait, mild swelling/warmth/erythema of first MCP  Neurologic: Alert and oriented, cranial nerves 2-12 intact, no focal deficits  Skin: No rashes/lesions  Psychiatric: normal mentation, affect and mood          5/5/2025   Mini Cog   Clock Draw Score 2 Normal   3 Item Recall 3 objects recalled "   Mini Cog Total Score 5             Signed Electronically by: Danii Burks MD

## 2025-05-09 ENCOUNTER — ANCILLARY PROCEDURE (OUTPATIENT)
Dept: MAMMOGRAPHY | Facility: CLINIC | Age: 75
End: 2025-05-09
Attending: INTERNAL MEDICINE
Payer: COMMERCIAL

## 2025-05-09 DIAGNOSIS — Z12.31 ENCOUNTER FOR SCREENING MAMMOGRAM FOR BREAST CANCER: ICD-10-CM

## 2025-05-09 PROCEDURE — 77063 BREAST TOMOSYNTHESIS BI: CPT | Mod: GC | Performed by: RADIOLOGY

## 2025-05-09 PROCEDURE — 77067 SCR MAMMO BI INCL CAD: CPT | Mod: GC | Performed by: RADIOLOGY

## 2025-05-20 NOTE — PROGRESS NOTES
Assessment:     Diagnoses and all orders for this visit:  Left lumbar radiculopathy  -     PAIN Transforaminal TAD Inj Lumbosacral Left; Future  Spondylolisthesis of lumbar region  -     Spine  Referral  -     PAIN Transforaminal TAD Inj Lumbosacral Right; Future  -     PAIN Transforaminal TAD Inj Lumbosacral Left; Future  Right lumbar radiculopathy  -     PAIN Transforaminal TAD Inj Lumbosacral Right; Future  Chronic bilateral low back pain with bilateral sciatica  -     PAIN Transforaminal TAD Inj Lumbosacral Right; Future  -     PAIN Transforaminal TAD Inj Lumbosacral Left; Future  Lumbar foraminal stenosis  -     PAIN Transforaminal TAD Inj Lumbosacral Right; Future  -     PAIN Transforaminal TAD Inj Lumbosacral Left; Future  Spondylolisthesis, lumbar region     Alysha Youngblood is a 74 year old y.o. female with past medical history significant for atrial fibrillation/flutter, morbid obesity, knee osteoarthritis, goiter who presents today for follow-up regarding:    - Chronic recurrent bilateral low back pain rating to bilateral lower extremities slightly greater on the right.     Plan:     A shared decision making plan was used. The patient's values and choices were respected. Prior medical records were reviewed today. The following represents what was discussed and decided upon by the provider and the patient.        -DIAGNOSTIC TESTS: Images were personally reviewed and interpreted.   --Lumbar spine MRI 10/16/2022 with minimal anterolisthesis L4-5 with facet arthropathy with mild right and mild to moderate left foraminal stenosis.  L5-S1 mild to moderate right and mild left foraminal stenosis.  --Lumbar CT abdomen pelvis 6/28/2021 does show normal alignment lumbar spine with degenerative Grange is greatest at L4-5 with significant left foraminal stenosi    -INTERVENTIONS: We did discuss considering bilateral epidural steroid injection however her pain is greater on the right and previously she did  well doing that side separately which is reasonable.  Therefore ordered a right L4-5 transforaminal epidural steroid injection to be completed first and then 2 weeks later we can complete the injection on the left L4-5 TFESI.  She does have spondylolisthesis at the L4-5 level with central and foraminal stenosis.  If no benefit with these injections or minimal lasting benefit then would recommend updated imaging.    -MEDICATIONS: No changes in medications today.  Discussed side effects of medications and proper use. Patient verbalized understanding.    -PHYSICAL THERAPY: Did advise patient to continue with home exercises from recent physical therapy sessions.  Discussed the importance of core strengthening, ROM, stretching exercises with the patient and how each of these entities is important in decreasing pain.  Explained to the patient that the purpose of physical therapy is to teach the patient a home exercise program.  These exercises need to be performed every day in order to decrease pain and prevent future occurrences of pain.        -PATIENT EDUCATION:  Total time of 32 minutes, on the day of service, spent with the patient, reviewing the chart, placing orders, and documenting.     -FOLLOW UP: Follow-up for injection RIGHT L4-5 TFESI 2 weeks later left L4-5 TFESI.  Advised to contact clinic if symptoms worsen or change.    Subjective:     Alysha Youngblood is a 74 year old female who presents today for follow-up regarding chronic bilateral low back pain that radiates in bilateral lower extremities both the anterior and posterior thighs mostly above the knees with associated numbness and tingling, that has been progressively worsening over the last couple of months.  She does report that previous injections last year gave her relief for almost close to 6 months and she was feeling improvements in symptoms and improvement in activity tolerance, symptoms have been progressively worsening again but she denies any  new symptoms.  Currently pain is an 8/10, a 4 at its best, she feels the best when she is in the water.  Otherwise walking standing for periods of time is aggravating.  Denies lower extremity weakness or episodes of her legs giving out on her.    -Treatment to Date: No prior spinal surgery or spinal injection.  History of physical therapy for LBP.  Physical therapy Lum[facility] program x11 sessions 3/10/2023 patient is part of a home physical therapy program and continues with water aerobics multiple times a week as well.     History of right knee steroid injection 2021 with significant benefit  Left L4-5 TFESI 6/26/2023 with significant relief.  Left L4-5 TFESI 5/1/2024 with 100% relief of symptoms on the left for 5-6 months.   Right L4-5 TFESI 5/17/2024 with 100% relief of right leg pain for 5 to 6 months     -Medications:  Acetaminophen     -Gabapentin prescribed previously 10/12/2022 however patient decided not to trial this medication as she did not want to trial medication that would give her brain fog potentially.    Patient Active Problem List   Diagnosis    Elbow fracture    Atrial fibrillation (H)    PVC (premature ventricular contraction)    Goiter    Atrial flutter (H)    Osteoarthritis of right knee, unspecified osteoarthritis type    Morbid obesity (H)    Sleep apnea    Pulmonary nodules/lesions, multiple    Premature atrial contraction    Multiple thyroid nodules    Hyperlipidemia    Left atrial dilatation    History of tobacco abuse    Hashimoto's disease    Family history of cardiovascular disease    Essential (primary) hypertension    Esophageal reflux    Colon polyposis    Atrial fibrillation and flutter (H)    Pre-diabetes    Pain of left thumb       Current Outpatient Medications   Medication Sig Dispense Refill    acetaminophen (TYLENOL) 325 MG tablet Take 650 mg by mouth every 6 hours as needed for mild pain.      amLODIPine (NORVASC) 10 MG tablet Take 1 tablet (10 mg) by mouth daily. 90 tablet  3    apixaban ANTICOAGULANT (ELIQUIS ANTICOAGULANT) 5 MG tablet Take 1 tablet (5 mg) by mouth 2 times daily. 180 tablet 3    Calcium-Vitamin D-Vitamin K (VIACTIV PO) Take by mouth 2 times daily       fish oil-omega-3 fatty acids (FISH OIL) 1000 MG capsule One capsule daily.      hydrochlorothiazide (HYDRODIURIL) 25 MG tablet Take 1 tablet (25 mg) by mouth daily. 90 tablet 3    loratadine (CLARITIN) 10 MG tablet Take 10 mg by mouth daily      losartan (COZAAR) 100 MG tablet Take 1 tablet (100 mg) by mouth daily. 90 tablet 2    lovastatin (MEVACOR) 40 MG tablet Take 1 tablet (40 mg) by mouth at bedtime. 90 tablet 3    metoprolol succinate ER (TOPROL XL) 50 MG 24 hr tablet TAKE 1 AND 1/2 TABLETS(75 MG) BY MOUTH DAILY 135 tablet 3    metoprolol tartrate (LOPRESSOR) 25 MG tablet TAKE 1 TABLET BY MOUTH AS DIRECTED UP TO 2 TABLETS BY MOUTH EVRYDAY WITH ONSET OF ARRHYTHMIA 30 tablet 1    RISEdronate (ACTONEL) 150 MG tablet Take 1 tablet (150 mg) by mouth every 30 days. Take with full glass of water on empty stomach, don't lay down for 30 min 4 tablet 0    sulfamethoxazole-trimethoprim (BACTRIM DS) 800-160 MG tablet Take 1 tablet by mouth 2 times daily. 30 tablet 0    tirzepatide-Weight Management (ZEPBOUND) 2.5 MG/0.5ML prefilled pen Inject 0.5 mLs (2.5 mg) subcutaneously every 7 days. 2 mL 1     No current facility-administered medications for this visit.       Allergies   Allergen Reactions    Ibuprofen Other (See Comments)    Nsaids GI Disturbance    Statins      Made her lips tingle, so they put her on another statin instead.    Liquid Adhesive Rash     After wearing patches for > 8 days  After wearing patches for > 8 days       Past Medical History:   Diagnosis Date    Atrial fibrillation (H)     ablated 1/12    Ex-smoker     Hashimoto's disease     Hyperlipidaemia     Hypertension     ISABELA (obstructive sleep apnea) 3/12    AHI 7    PAC (premature atrial contraction)     PVC (premature ventricular contraction)          Review of Systems  ROS:  Specifically negative for bowel/bladder dysfunction, balance changes, headache, dizziness, foot drop, fevers, chills, appetite changes, nausea/vomiting, unexplained weight loss. Otherwise 13 systems reviewed are negative. Please see the patient's intake questionnaire from today for details.    Reviewed Social, Family, Past Medical and Past Surgical history with patient, no significant changes noted since prior visit.     Objective:     BP (!) 142/68   Pulse 88     PHYSICAL EXAMINATION:    --CONSTITUTIONAL: Well developed, well nourished, healthy appearing individual.  --PSYCHIATRIC: Appropriate mood and affect. No difficulty interacting due to temper, social withdrawal, or memory issues.  --SKIN: Lumbar region is dry and intact.   --RESPIRATORY: Normal rhythm and effort. No abnormal accessory muscle breathing patterns noted.   --MUSCULOSKELETAL:  Normal lumbar lordosis noted, no lateral shift.  --GROSS MOTOR: Easily arises from a seated position. Gait is non-antalgic  --LUMBAR SPINE:  Inspection reveals no evidence of deformity.     RESULTS:   Imaging: Spine imaging was reviewed today. The images were shown to the patient and the findings were explained using a spine model.      Lumbar spine MRI reviewed

## 2025-05-21 ENCOUNTER — OFFICE VISIT (OUTPATIENT)
Dept: PHYSICAL MEDICINE AND REHAB | Facility: CLINIC | Age: 75
End: 2025-05-21
Payer: COMMERCIAL

## 2025-05-21 VITALS — SYSTOLIC BLOOD PRESSURE: 142 MMHG | HEART RATE: 88 BPM | DIASTOLIC BLOOD PRESSURE: 68 MMHG

## 2025-05-21 DIAGNOSIS — M43.16 SPONDYLOLISTHESIS, LUMBAR REGION: ICD-10-CM

## 2025-05-21 DIAGNOSIS — M54.16 LEFT LUMBAR RADICULOPATHY: Primary | ICD-10-CM

## 2025-05-21 DIAGNOSIS — M43.16 SPONDYLOLISTHESIS OF LUMBAR REGION: ICD-10-CM

## 2025-05-21 DIAGNOSIS — G89.29 CHRONIC BILATERAL LOW BACK PAIN WITH BILATERAL SCIATICA: ICD-10-CM

## 2025-05-21 DIAGNOSIS — M48.061 LUMBAR FORAMINAL STENOSIS: ICD-10-CM

## 2025-05-21 DIAGNOSIS — M54.41 CHRONIC BILATERAL LOW BACK PAIN WITH BILATERAL SCIATICA: ICD-10-CM

## 2025-05-21 DIAGNOSIS — M54.42 CHRONIC BILATERAL LOW BACK PAIN WITH BILATERAL SCIATICA: ICD-10-CM

## 2025-05-21 DIAGNOSIS — M54.16 RIGHT LUMBAR RADICULOPATHY: ICD-10-CM

## 2025-05-21 ASSESSMENT — PAIN SCALES - GENERAL: PAINLEVEL_OUTOF10: SEVERE PAIN (8)

## 2025-05-21 NOTE — LETTER
5/21/2025      Alysha Youngblood  [address] [place] [id number]      Dear Colleague,    Thank you for referring your patient, Alysha Youngblood, to the [facility]. Please see a copy of my visit note below.      Assessment:     Diagnoses and all orders for this visit:  Left lumbar radiculopathy  -     PAIN Transforaminal TAD Inj Lumbosacral Left; Future  Spondylolisthesis of lumbar region  -     Spine  Referral  -     PAIN Transforaminal TAD Inj Lumbosacral Right; Future  -     PAIN Transforaminal TAD Inj Lumbosacral Left; Future  Right lumbar radiculopathy  -     PAIN Transforaminal TAD Inj Lumbosacral Right; Future  Chronic bilateral low back pain with bilateral sciatica  -     PAIN Transforaminal TAD Inj Lumbosacral Right; Future  -     PAIN Transforaminal TAD Inj Lumbosacral Left; Future  Lumbar foraminal stenosis  -     PAIN Transforaminal TAD Inj Lumbosacral Right; Future  -     PAIN Transforaminal TAD Inj Lumbosacral Left; Future  Spondylolisthesis, lumbar region     Alysha Youngblood is a 74 year old y.o. female with past medical history significant for atrial fibrillation/flutter, morbid obesity, knee osteoarthritis, goiter who presents today for follow-up regarding:    - Chronic recurrent bilateral low back pain rating to bilateral lower extremities slightly greater on the right.     Plan:     A shared decision making plan was used. The patient's values and choices were respected. Prior medical records were reviewed today. The following represents what was discussed and decided upon by the provider and the patient.        -DIAGNOSTIC TESTS: Images were personally reviewed and interpreted.   --Lumbar spine MRI 10/16/2022 with minimal anterolisthesis L4-5 with facet arthropathy with mild right and mild to moderate left foraminal stenosis.  L5-S1 mild to moderate right and mild left foraminal stenosis.  --Lumbar CT abdomen pelvis 6/28/2021 does show normal alignment  lumbar spine with degenerative Grange is greatest at L4-5 with significant left foraminal stenosi    -INTERVENTIONS: We did discuss considering bilateral epidural steroid injection however her pain is greater on the right and previously she did well doing that side separately which is reasonable.  Therefore ordered a right L4-5 transforaminal epidural steroid injection to be completed first and then 2 weeks later we can complete the injection on the left L4-5 TFESI.  She does have spondylolisthesis at the L4-5 level with central and foraminal stenosis.  If no benefit with these injections or minimal lasting benefit then would recommend updated imaging.    -MEDICATIONS: No changes in medications today.  Discussed side effects of medications and proper use. Patient verbalized understanding.    -PHYSICAL THERAPY: Did advise patient to continue with home exercises from recent physical therapy sessions.  Discussed the importance of core strengthening, ROM, stretching exercises with the patient and how each of these entities is important in decreasing pain.  Explained to the patient that the purpose of physical therapy is to teach the patient a home exercise program.  These exercises need to be performed every day in order to decrease pain and prevent future occurrences of pain.        -PATIENT EDUCATION:  Total time of 32 minutes, on the day of service, spent with the patient, reviewing the chart, placing orders, and documenting.     -FOLLOW UP: Follow-up for injection RIGHT L4-5 TFESI 2 weeks later left L4-5 TFESI.  Advised to contact clinic if symptoms worsen or change.    Subjective:     Alysha Youngblood is a 74 year old female who presents today for follow-up regarding chronic bilateral low back pain that radiates in bilateral lower extremities both the anterior and posterior thighs mostly above the knees with associated numbness and tingling, that has been progressively worsening over the last couple of months.  She  does report that previous injections last year gave her relief for almost close to 6 months and she was feeling improvements in symptoms and improvement in activity tolerance, symptoms have been progressively worsening again but she denies any new symptoms.  Currently pain is an 8/10, a 4 at its best, she feels the best when she is in the water.  Otherwise walking standing for periods of time is aggravating.  Denies lower extremity weakness or episodes of her legs giving out on her.    -Treatment to Date: No prior spinal surgery or spinal injection.  History of physical therapy for LBP.  Physical therapy [facility]bar [facility] program x11 sessions 3/10/2023 patient is part of a home physical therapy program and continues with water aerobics multiple times a week as well.     History of right knee steroid injection 2021 with significant benefit  Left L4-5 TFESI 6/26/2023 with significant relief.  Left L4-5 TFESI 5/1/2024 with 100% relief of symptoms on the left for 5-6 months.   Right L4-5 TFESI 5/17/2024 with 100% relief of right leg pain for 5 to 6 months     -Medications:  Acetaminophen     -Gabapentin prescribed previously 10/12/2022 however patient decided not to trial this medication as she did not want to trial medication that would give her brain fog potentially.    Patient Active Problem List   Diagnosis     Elbow fracture     Atrial fibrillation (H)     PVC (premature ventricular contraction)     Goiter     Atrial flutter (H)     Osteoarthritis of right knee, unspecified osteoarthritis type     Morbid obesity (H)     Sleep apnea     Pulmonary nodules/lesions, multiple     Premature atrial contraction     Multiple thyroid nodules     Hyperlipidemia     Left atrial dilatation     History of tobacco abuse     Hashimoto's disease     Family history of cardiovascular disease     Essential (primary) hypertension     Esophageal reflux     Colon polyposis     Atrial fibrillation and flutter (H)     Pre-diabetes     Pain of  left thumb       Current Outpatient Medications   Medication Sig Dispense Refill     acetaminophen (TYLENOL) 325 MG tablet Take 650 mg by mouth every 6 hours as needed for mild pain.       amLODIPine (NORVASC) 10 MG tablet Take 1 tablet (10 mg) by mouth daily. 90 tablet 3     apixaban ANTICOAGULANT (ELIQUIS ANTICOAGULANT) 5 MG tablet Take 1 tablet (5 mg) by mouth 2 times daily. 180 tablet 3     Calcium-Vitamin D-Vitamin K (VIACTIV PO) Take by mouth 2 times daily        fish oil-omega-3 fatty acids (FISH OIL) 1000 MG capsule One capsule daily.       hydrochlorothiazide (HYDRODIURIL) 25 MG tablet Take 1 tablet (25 mg) by mouth daily. 90 tablet 3     loratadine (CLARITIN) 10 MG tablet Take 10 mg by mouth daily       losartan (COZAAR) 100 MG tablet Take 1 tablet (100 mg) by mouth daily. 90 tablet 2     lovastatin (MEVACOR) 40 MG tablet Take 1 tablet (40 mg) by mouth at bedtime. 90 tablet 3     metoprolol succinate ER (TOPROL XL) 50 MG 24 hr tablet TAKE 1 AND 1/2 TABLETS(75 MG) BY MOUTH DAILY 135 tablet 3     metoprolol tartrate (LOPRESSOR) 25 MG tablet TAKE 1 TABLET BY MOUTH AS DIRECTED UP TO 2 TABLETS BY MOUTH EVRYDAY WITH ONSET OF ARRHYTHMIA 30 tablet 1     RISEdronate (ACTONEL) 150 MG tablet Take 1 tablet (150 mg) by mouth every 30 days. Take with full glass of water on empty stomach, don't lay down for 30 min 4 tablet 0     sulfamethoxazole-trimethoprim (BACTRIM DS) 800-160 MG tablet Take 1 tablet by mouth 2 times daily. 30 tablet 0     tirzepatide-Weight Management (ZEPBOUND) 2.5 MG/0.5ML prefilled pen Inject 0.5 mLs (2.5 mg) subcutaneously every 7 days. 2 mL 1     No current facility-administered medications for this visit.       Allergies   Allergen Reactions     Ibuprofen Other (See Comments)     Nsaids GI Disturbance     Statins      Made her lips tingle, so they put her on another statin instead.     Liquid Adhesive Rash     After wearing patches for > 8 days  After wearing patches for > 8 days       Past  Medical History:   Diagnosis Date     Atrial fibrillation (H)     ablated 1/12     Ex-smoker      Hashimoto's disease      Hyperlipidaemia      Hypertension      ISABELA (obstructive sleep apnea) 3/12    AHI 7     PAC (premature atrial contraction)      PVC (premature ventricular contraction)         Review of Systems  ROS:  Specifically negative for bowel/bladder dysfunction, balance changes, headache, dizziness, foot drop, fevers, chills, appetite changes, nausea/vomiting, unexplained weight loss. Otherwise 13 systems reviewed are negative. Please see the patient's intake questionnaire from today for details.    Reviewed Social, Family, Past Medical and Past Surgical history with patient, no significant changes noted since prior visit.     Objective:     BP (!) 142/68   Pulse 88     PHYSICAL EXAMINATION:    --CONSTITUTIONAL: Well developed, well nourished, healthy appearing individual.  --PSYCHIATRIC: Appropriate mood and affect. No difficulty interacting due to temper, social withdrawal, or memory issues.  --SKIN: Lumbar region is dry and intact.   --RESPIRATORY: Normal rhythm and effort. No abnormal accessory muscle breathing patterns noted.   --MUSCULOSKELETAL:  Normal lumbar lordosis noted, no lateral shift.  --GROSS MOTOR: Easily arises from a seated position. Gait is non-antalgic  --LUMBAR SPINE:  Inspection reveals no evidence of deformity.     RESULTS:   Imaging: Spine imaging was reviewed today. The images were shown to the patient and the findings were explained using a spine model.      Lumbar spine MRI reviewed                        Again, thank you for allowing me to participate in the care of your patient.        Sincerely,        Laurel Wynn CNP    Electronically signed

## 2025-05-21 NOTE — PATIENT INSTRUCTIONS
~Spine Center Scheduling #[phone].  ~Please call our [facility] Spine Nurse Navigation #[phone] with any questions or concerns about your treatment plan, if symptoms worsen and you would like to be seen urgently, or if you have problems controlling bladder and bowel function.  ~For any future flareups or new symptoms, recommend follow-up in clinic or contact the nurse navigator line.  ~Please note that any My Chart messages may take multiple days for a response due to the high volume of patients seen in clinic.  Anything sent Thursday night or after will be answered the following week when able, as Laurel Wynn CNP does not work in clinic on Fridays.   ~Laurel Wynn CNP is at the [facility] on Tuesdays, otherwise primarily at the [facility].       Importance of specialized Physical Therapy: Discussed the importance of core strengthening, ROM, stretching exercises with the patient and how each of these entities is important in decreasing pain.  Explained to the patient that the purpose of physical therapy is to teach the patient a home exercise program individualized to them and their specific health concerns.  These exercises need to be performed every day in order to decrease pain and prevent future occurrences of pain.           An injection has been ordered today to potentially help with your pain symptoms. These injections do not fix what is going on in your back, therefore they typically do not take away the pain completely, however they can many times help improve symptoms. Injections should always be completed along with other modalities such as physical therapy for the best long term outcomes. If injections alone are done, then pain will likely return.     [facility] Injection Requirements    A  is required for all fluoroscopically-guided injections.  Injection appointments may be cancelled if there are signs/symptoms of an  active infection or if the patient is being actively treated with antibiotics for a diagnosed infection.  Patients may have their steroid injection cancelled if they have had another steroid injection within 2 weeks.  Diabetic patients will have their blood glucose levels checked the day of their injection and the appointment will be rescheduled if the blood glucose level is 300 or higher.  Patients with allergies to cortisone, local anesthetics, iodine, or contrast dye should contact the Spine Center to further discuss these considerations.  Patients scheduled for medial branch block diagnostic injections should refrain from taking pain medication the day of the procedure.  The medial branch block injection appointment will be rescheduled if the patient's pain rating is not 5/10 or greater at the time of the procedure.  Patients taking warfarin/Coumadin will have their INR checked the day of the procedure and the procedure may be rescheduled if the INR is greater than 3.0.  Please contact the Spine Center (#[phone]) if you are taking any prescription blood-thinning medications (warfarin, Plavix, Lovenox, Eliquis, Brilinta, Effient, etc.) as special dosing adjustments may need to be made depending on the type of injection you are scheduled to receive.  It is recommended that you delay having your steroid injection if you have received a flu shot or shingles vaccine within 2 weeks.

## 2025-05-23 ENCOUNTER — ANCILLARY PROCEDURE (OUTPATIENT)
Dept: GENERAL RADIOLOGY | Facility: CLINIC | Age: 75
End: 2025-05-23
Attending: STUDENT IN AN ORGANIZED HEALTH CARE EDUCATION/TRAINING PROGRAM
Payer: COMMERCIAL

## 2025-05-23 DIAGNOSIS — M79.645 PAIN OF LEFT THUMB: ICD-10-CM

## 2025-05-23 PROCEDURE — 73140 X-RAY EXAM OF FINGER(S): CPT | Mod: LT | Performed by: RADIOLOGY

## 2025-05-29 LAB
ACID FAST STAIN (ARUP): NORMAL

## 2025-06-06 ENCOUNTER — TELEPHONE (OUTPATIENT)
Dept: INTERNAL MEDICINE | Facility: CLINIC | Age: 75
End: 2025-06-06
Payer: COMMERCIAL

## 2025-06-06 NOTE — TELEPHONE ENCOUNTER
[facility] Call Center    Phone Message    May a detailed message be left on voicemail: yes     Reason for Call: Other: Per pt was told by pharmacy that the clinic needs to call them to approve the medication refill of tirzepatide-Weight Management (ZEPBOUND) 2.5 MG/0.5ML prefilled pen. Please do this asap per pt as she takes the shot every Saturday,. Thank you.      Action Taken: Message routed to:  [facility] ([facility]    Travel Screening: Not Applicable     Date of Service:

## 2025-06-06 NOTE — TELEPHONE ENCOUNTER
Per pt calling again stating this needs to be done a PA asap. Please call pt as she is freaking out about what to do and the pharmacy is about to close. Thank you.

## 2025-06-07 ENCOUNTER — MYC MEDICAL ADVICE (OUTPATIENT)
Dept: INTERNAL MEDICINE | Facility: CLINIC | Age: 75
End: 2025-06-07
Payer: COMMERCIAL

## 2025-06-07 NOTE — TELEPHONE ENCOUNTER
Patient is calling as a follow up to her request for an increased dose of Zepbound.  Patient states insurance company will not approve 2 months of the same dose of Zepbound and she states that she should have a new prescription for Zepbound 5mg now.  Explained to patient that since this not prescribed as an essential medication she should follow up with the clinic on Monday.  Patient stated she understood but that she is very disappointed that it wasn't addressed or prescribed appropriately.  Acknowledged her frustrations and she stated that she will be calling on Monday.

## 2025-06-08 ENCOUNTER — NURSE TRIAGE (OUTPATIENT)
Dept: NURSING | Facility: CLINIC | Age: 75
End: 2025-06-08
Payer: COMMERCIAL

## 2025-06-08 NOTE — TELEPHONE ENCOUNTER
Nurse Triage SBAR    Is this a 2nd Level Triage? NO    Situation: Patient calling.     Background: Medication prescription request.     Assessment: Patient is calling for a prescription for Zepbound. Pt missed her dose yesterday because pharmacy stated she needs the prescription changed. Pt also stated she slipped on throw rug in the bathroom during the night three weeks ago and fell on her right hip.She has been having right hip pain. Rates pain in right hip pain none while sitting, but 7/10 when she first stands up. She is using a cane and taking Ibuprofen.     Protocol Recommended Disposition:   See HCP Within 4 Hours (Or PCP Triage)    Recommendation: See provider in UC within four hours.  Undetermined if pt will go to UC.  Pt also asking what problem would require a page to provider so that Zepbound could be discussed.  Advised it would depend on triage whether or not provider input was required and that we are unable to page for non essential meds.           Does the patient meet one of the following criteria for ADS visit consideration? 16+ years old, with an [facility] PCP     TIP  Providers, please consider if this condition is appropriate for management at one of our Acute and Diagnostic Services sites.     If patient is a good candidate, please use dotphrase <dot>triageresponse and select Refer to ADS to document.    Reason for Disposition   [1] SEVERE pain AND [2] not improved 2 hours after pain medicine/ice packs    Additional Information   Negative: Serious injury with multiple fractures (broken bones)   Negative: [1] Major bleeding (e.g., actively dripping or spurting) AND [2] can't be stopped   Negative: Bullet wound, stabbed by knife, or other serious penetrating wound   Negative: Looks like a dislocated joint (crooked or deformed)   Negative: Can't stand (bear weight) or walk   Negative: Sounds like a life-threatening emergency to the triager   Negative: Skin is split open or gaping (or length > 1/2  inch or 12 mm)   Negative: [1] Bleeding AND [2] won't stop after 10 minutes of direct pressure (using correct technique)   Negative: [1] Dirt in the wound AND [2] not removed with 15 minutes of scrubbing   Negative: Sounds like a serious injury to the triager    Protocols used: Hip Injury-A-AH

## 2025-06-09 ENCOUNTER — VIRTUAL VISIT (OUTPATIENT)
Dept: INTERNAL MEDICINE | Facility: CLINIC | Age: 75
End: 2025-06-09
Payer: COMMERCIAL

## 2025-06-09 PROCEDURE — 1125F AMNT PAIN NOTED PAIN PRSNT: CPT | Performed by: INTERNAL MEDICINE

## 2025-06-09 PROCEDURE — 98005 SYNCH AUDIO-VIDEO EST LOW 20: CPT | Mod: 24 | Performed by: INTERNAL MEDICINE

## 2025-06-09 NOTE — PATIENT INSTRUCTIONS
Thank you for visiting the Primary Care Center today at the [facility]! The following is some information about our clinic:     Primary Care Center Frequently-Asked Questions    (1) How do I schedule appointments at the [facility]?     Primary Care--to schedule or make changes to an existing appointment, please call our primary care line at [phone].    Labs--to schedule a lab appointment at the [facility] you can use [employer] or call [phone]. If you have a [place] location that is closer to home, you can reach out to that location for scheduling options.     Imaging--if you need to schedule a CT, X-ray, MRI, ultrasound, or other imaging study you can call [phone] to schedule at the [facility] or any other [facility] imaging location.     Referrals--if a referral to another specialty was ordered you can expect a phone call from their scheduling team. If you have not heard from them in a week, please call us or send us a [employer]ki message to check the status or get a scheduling number. Please note that this only applies to internal [facility] referrals. If the referral is external you would need to contact their office for scheduling.     (2) I have a question about my visit, who do I contact?     You can call us at the primary care line at [phone] to ask questions about your visit. You can also send a secure message through Kueski, which is reviewed by clinic staff. Please note that [employer] messages have a twenty-four to forty-eight business hour turnaround time and should not be used for urgent concerns.    (3) How will I get the results of my tests?    If you are signed up for [employer] all tests will be released to you within twenty-four hours of resulting. Please allow three to five days for your doctor to review your results and place a note interpreting the results. If you do not have [facility] you will receive your  results through mail seven to ten business days following the return of the tests. Please note that if there should be any urgent or concerning results that your doctor or their registered nurse will reach out to you the same day as the tests come back. If you have follow up questions about your results or would like to discuss the results in detail please schedule a follow up with your provider either in person or virtually.     (4) How do I get refills of my prescriptions?     You should always first contact your pharmacy for refills of your medications. If submitting a refill request on [employer], please be sure to submit the request only once--repeat requests can cause delays in refill. If you are requesting a NEW medication or a medication related to new symptoms you will need to schedule an appointment with a provider prior to approval. Please note: Routine medication refills have up to one to three business day turnaround whereas controlled substances refills have up to five to seven business day turnaround.    (5) I have new symptoms, what do I do?     If you are having an immediate medical emergency, you should dial 911 for assistance.   For anything urgent that needs to be seen within a few hours to one day you should visit a local urgent care for assistance.  For non-urgent symptoms that need to be seen within a few days to a week you can schedule with an available provider in primary care by going to [employer] or calling [phone].   If you are not sure how serious your symptoms are or you would like to receive medical advice you can always call [phone] to speak with a triage nurse.

## 2025-06-09 NOTE — PROGRESS NOTES
"Alysha is a 74 year old who is being evaluated via a billable video visit.    How would you like to obtain your AVS? MyChart  If the video visit is dropped, the invitation should be resent by: Send to e-mail at: [email or web address]  Will anyone else be joining your video visit? No      Are refills needed on medications prescribed by this physician? NO    Aaliyah Ley VVF      Assessment & Plan     BMI 37.0-37.9, adult  Tolerating zepbound, okay to advance dose. Sent in refill today. Advised she check back with me on next dose at least 1 week prior to refill due date to ensure she receives medication in time.            BMI  Estimated body mass index is 37.56 kg/m  as calculated from the following:    Height as of 5/5/25: 1.676 m (5' 5.98\").    Weight as of 5/5/25: 105.5 kg (232 lb 9.6 oz).             Subjective   Alysha is a 74 year old, presenting for the following health issues:  RECHECK and Weight Problem    History of Present Illness       Reason for visit:  Obesity    She eats 2-3 servings of fruits and vegetables daily.She consumes 0 sweetened beverage(s) daily.She exercises with enough effort to increase her heart rate 60 or more minutes per day.  She exercises with enough effort to increase her heart rate 6 days per week.   She is taking medications regularly.    Tolerating 2.5 mg dose, lost 11 lbs so far  Starting weight 232 start of May, now 221 lb  Still doing WW  Helping with cravings, less hunger  Ready for next higher dose                  Objective           Vitals:  No vitals were obtained today due to virtual visit.    Physical Exam   GENERAL: alert and no distress  EYES: Eyes grossly normal to inspection.  No discharge or erythema, or obvious scleral/conjunctival abnormalities.  RESP: No audible wheeze, cough, or visible cyanosis.    SKIN: Visible skin clear. No significant rash, abnormal pigmentation or lesions.  NEURO: Cranial nerves grossly intact.  Mentation and speech appropriate for age.  PSYCH: " Appropriate affect, tone, and pace of words          Video-Visit Details    Type of service:  Video Visit   Originating Location (pt. Location): Home    Distant Location (provider location):  On-site  Platform used for Video Visit: Alyssa  Signed Electronically by: Danii Burks MD

## 2025-06-12 ENCOUNTER — MYC MEDICAL ADVICE (OUTPATIENT)
Dept: INTERNAL MEDICINE | Facility: CLINIC | Age: 75
End: 2025-06-12
Payer: COMMERCIAL

## 2025-06-12 DIAGNOSIS — M25.551 HIP PAIN, RIGHT: Primary | ICD-10-CM

## 2025-06-16 ENCOUNTER — RADIOLOGY INJECTION OFFICE VISIT (OUTPATIENT)
Dept: PHYSICAL MEDICINE AND REHAB | Facility: CLINIC | Age: 75
End: 2025-06-16
Attending: NURSE PRACTITIONER
Payer: COMMERCIAL

## 2025-06-16 ENCOUNTER — MYC MEDICAL ADVICE (OUTPATIENT)
Dept: PHYSICAL MEDICINE AND REHAB | Facility: CLINIC | Age: 75
End: 2025-06-16

## 2025-06-16 VITALS
BODY MASS INDEX: 34.37 KG/M2 | OXYGEN SATURATION: 97 % | HEIGHT: 67 IN | DIASTOLIC BLOOD PRESSURE: 80 MMHG | HEART RATE: 103 BPM | WEIGHT: 219 LBS | SYSTOLIC BLOOD PRESSURE: 134 MMHG | TEMPERATURE: 98.4 F

## 2025-06-16 DIAGNOSIS — M25.551 HIP PAIN, RIGHT: Primary | ICD-10-CM

## 2025-06-16 DIAGNOSIS — M54.42 CHRONIC BILATERAL LOW BACK PAIN WITH BILATERAL SCIATICA: ICD-10-CM

## 2025-06-16 DIAGNOSIS — M54.41 CHRONIC BILATERAL LOW BACK PAIN WITH BILATERAL SCIATICA: ICD-10-CM

## 2025-06-16 DIAGNOSIS — Z91.81 HISTORY OF RECENT FALL: ICD-10-CM

## 2025-06-16 DIAGNOSIS — M54.16 RIGHT LUMBAR RADICULOPATHY: ICD-10-CM

## 2025-06-16 DIAGNOSIS — G89.29 CHRONIC BILATERAL LOW BACK PAIN WITH BILATERAL SCIATICA: ICD-10-CM

## 2025-06-16 DIAGNOSIS — M48.061 LUMBAR FORAMINAL STENOSIS: ICD-10-CM

## 2025-06-16 DIAGNOSIS — M43.16 SPONDYLOLISTHESIS OF LUMBAR REGION: ICD-10-CM

## 2025-06-16 PROCEDURE — 99207 PR NON-BILLABLE SERV PER CHARTING: CPT | Performed by: STUDENT IN AN ORGANIZED HEALTH CARE EDUCATION/TRAINING PROGRAM

## 2025-06-16 ASSESSMENT — PAIN SCALES - GENERAL: PAINLEVEL_OUTOF10: SEVERE PAIN (8)

## 2025-06-16 NOTE — PROGRESS NOTES
Alysha Youngblood is here today for a TFESI injection.  The patient is not able to have the injection today because she may have a fracture and needs updated imaging.  The patient will be rescheduled as needed.      No charge for today s visit.

## 2025-06-16 NOTE — TELEPHONE ENCOUNTER
Phone call to patient to discuss. States she was seen at [facility]. Provider there did order the MRI, but patient wants done within [facility].    Asked that she contact [facility] and have order faxed to radiology scheduling so that she can be scheduled. Fax is [phone]. Did give the scheduling number for [facility] ([phone] or [phone]). Stated understanding.

## 2025-06-16 NOTE — PATIENT INSTRUCTIONS
DISCHARGE INSTRUCTIONS    During office hours (8:00 a.m.- 4:00 p.m.) questions or concerns may be answered  by calling Spine Center Navigation Nurses at  [phone].  Messages received after hours will be returned the following business day.      In the case of an emergency, please dial 911 or seek assistance at the nearest Emergency Room/Urgent Care facility.     All Patients:    You may experience an increase in your symptoms for the first 2 days (It may take anywhere between 2 days - 2 weeks for the steroid to have maximum effect).    You may use ice on the injection site, as frequently as 20 minutes each hour if needed.    You may take your pain medicine.    You may continue taking your regular medication after your injection. If you have had a medial branch block you may resume pain medication once your pain diary is completed.    You may shower. No swimming, tub bath, or hot tub for 48 hours.  You may remove your bandaid/bandage as soon as you are home.    You may resume light activities, as tolerated.    Resume your usual diet as tolerated.    If you were told to hold any blood thinning medications you may resume taking them 24 hours after your procedure as prescribed.    It is strongly advised that you do not drive for 1-3 hours post injection.    If you have had oral sedation:  Do not drive for 8 hours post injection.        POSSIBLE STEROID SIDE EFFECTS (If steroid/cortisone was used for your procedure    Swelling of the legs              Skin redness (flushing)     Mouth (oral) irritation   Increased blood sugar (glucose) levels            Sweats                    Mood changes  Headache  Sleeplessness  Weakened immune system for up to 14 days, which could increase the risk of gentry the COVID-19 virus and/or experiencing more severe symptoms of the disease, if exposed.  Decreased effectiveness of vaccines if given within 2 weeks of the steroid.    -If you experience these symptoms, it should  only last for a short period         POSSIBLE PROCEDURE SIDE EFFECTS    Increased Pain           Increased numbness/tingling      Nausea/Vomiting          Bruising/bleeding at site      Redness or swelling                                              Difficulty walking      Weakness           Fever greater than 100.5    -Call the Spine Center if you are concerned    *In the event of a severe headache after an epidural steroid injection that is relieved by lying down, please call the [facility] to speak with a clinical staff member*

## 2025-06-18 NOTE — TELEPHONE ENCOUNTER
DIAGNOSIS: Right hip pain/ Laurel Wynn CNP/ [facility]/ scheduled for 6/24/25    APPOINTMENT DATE: 6/25/25   NOTES STATUS DETAILS   OFFICE NOTE from referring provider Internal 6/16/25 - Laurel Wynn CNP    MEDICATION LIST Internal    IMAGES     DEXA PACS 3/26/21-11/10/23 - DX Hip/Pelvis/Spine   MRI Scheduled 6/24/25 MR Hip Right    CT SCAN PACS 6/28/21 - CT Abd pelvis

## 2025-06-24 ENCOUNTER — HOSPITAL ENCOUNTER (OUTPATIENT)
Dept: MRI IMAGING | Facility: HOSPITAL | Age: 75
Discharge: HOME | End: 2025-06-24
Attending: ORTHOPAEDIC SURGERY
Payer: COMMERCIAL

## 2025-06-24 ENCOUNTER — MYC MEDICAL ADVICE (OUTPATIENT)
Dept: INTERNAL MEDICINE | Facility: CLINIC | Age: 75
End: 2025-06-24

## 2025-06-24 DIAGNOSIS — M25.559 HIP PAIN: ICD-10-CM

## 2025-06-24 PROCEDURE — 73721 MRI JNT OF LWR EXTRE W/O DYE: CPT | Mod: RT

## 2025-06-24 NOTE — TELEPHONE ENCOUNTER
MRI was ordered through [facility]. Patient also messaged sports medicine.     Myke Santana RN on 6/24/2025 at 11:57 AM

## 2025-06-25 ENCOUNTER — PRE VISIT (OUTPATIENT)
Dept: ORTHOPEDICS | Facility: CLINIC | Age: 75
End: 2025-06-25

## 2025-06-25 ENCOUNTER — MYC MEDICAL ADVICE (OUTPATIENT)
Dept: CARDIOLOGY | Facility: CLINIC | Age: 75
End: 2025-06-25

## 2025-06-27 NOTE — TELEPHONE ENCOUNTER
Stop 48 hrs before surgery.  Me to Tyra Norris MD  CK      6/26/25  2:55 PM  Patient is to have hip surgery on 7/9. Patient is wondering how long she should hold her Eliquis for prior to surgery. She says that she was not given specific recommendations by the surgery team. Should patient hold eliquis 2-3 days prior? Or do you recommend any other instructions?     [employer] message sent to patient.    Rosy Gomez RN, BSN  Cardiology RN Care Coordinator   [place]   Phone: [phone]  Fax: [phone] ([place]) [phone] ([place])     Paramedian Forehead Flap Division And Inset Text: Division and inset of the paramedian forehead flap was performed to achieve optimal aesthetic result, restore normal anatomic appearance and avoid distortion of normal anatomy, expedite and facilitate wound healing, achieve optimal functional result and because linear closure either not possible or would produce suboptimal result. The patient was prepped and draped in the usual manner. The pedicle was infiltrated with local anesthesia. The pedicle was sectioned with a #15 blade. The pedicle was de-bulked and trimmed to match the shape of the defect. Hemostasis was achieved. The flap donor site and free margin of the flap were secured with deep buried sutures and the wound edges were re-approximated.

## 2025-06-30 NOTE — TELEPHONE ENCOUNTER
Patient saw [employer] message. No questions at this time.    Rosy Gomez, RN, BSN  Cardiology RN Care Coordinator   [place]   Phone: [phone]  Fax: [phone] ([place]) [phone] ([place])

## 2025-07-01 ENCOUNTER — TRANSFERRED RECORDS (OUTPATIENT)
Dept: HEALTH INFORMATION MANAGEMENT | Facility: CLINIC | Age: 75
End: 2025-07-01
Payer: COMMERCIAL

## 2025-07-04 ENCOUNTER — MYC MEDICAL ADVICE (OUTPATIENT)
Dept: INTERNAL MEDICINE | Facility: CLINIC | Age: 75
End: 2025-07-04
Payer: COMMERCIAL

## 2025-07-11 DIAGNOSIS — I10 ESSENTIAL HYPERTENSION: ICD-10-CM

## 2025-07-15 RX ORDER — LOSARTAN POTASSIUM 100 MG/1
100 TABLET ORAL DAILY
Qty: 90 TABLET | Refills: 2 | Status: SHIPPED | OUTPATIENT
Start: 2025-07-15

## 2025-07-15 NOTE — TELEPHONE ENCOUNTER
Last Written Prescription:   Disp Refills Start End FREDERICK   losartan (COZAAR) 100 MG tablet 90 tablet 2 10/25/2024 -- No   Sig - Route: Take 1 tablet (100 mg) by mouth daily. - Oral     ----------------------  Last Visit Date:   6/9/2025  [facility] [place]    Future Visit Date:    7/22/2025 2:00 PM (30 min)  [place]   Arrive by:  1:45 PM   [facility]   [facility]   Esteban Neff MD          ----------------------      Refill decision:   [x] Medication refilled per  Medication Refill in Ambulatory Care  policy.      Last Comprehensive Metabolic Panel:  Lab Results   Component Value Date     (L) 03/29/2025    POTASSIUM 3.7 03/29/2025    CHLORIDE 98 03/29/2025    CO2 24 03/29/2025    ANIONGAP 12 03/29/2025     (H) 03/29/2025    BUN 21.5 03/29/2025    CR 0.85 03/29/2025    GFRESTIMATED 72 03/29/2025    JANAE 9.2 03/29/2025     BP Readings from Last 3 Encounters:   06/20/25 135/85   06/16/25 134/80   05/21/25 (!) 142/68         Request from pharmacy:  Requested Prescriptions   Pending Prescriptions Disp Refills    losartan (COZAAR) 100 MG tablet [Pharmacy Med Name: LOSARTAN 100MG TABLETS] 90 tablet 2     Sig: TAKE 1 TABLET(100 MG) BY MOUTH DAILY       Angiotensin-II Receptors Passed - 7/15/2025 11:20 AM        Passed - Most recent blood pressure under 140/90 in past 12 months     BP Readings from Last 3 Encounters:   06/20/25 135/85   06/16/25 134/80   05/21/25 (!) 142/68       Systolic (Patient Reported): 125 (6/9/2025 10:49 AM)  Diastolic (Patient Reported): 80 (6/9/2025 10:49 AM)              Passed - Medication is active on med list and the sig matches. RN to manually verify dose and sig if red X/fail.     If the protocol passes (green check), you do not need to verify med dose and sig.    A prescription matches if they are the same clinical intention.    For Example: once daily and every morning are the same.    The protocol can not identify upper and lower  case letters as matching and will fail.     For Example: Take 1 tablet (50 mg) by mouth daily     TAKE 1 TABLET (50 MG) BY MOUTH DAILY    For all fails (red x), verify dose and sig.    If the refill does match what is on file, the RN can still proceed to approve the refill request.       If they do not match, route to the appropriate provider.             Passed - Has GFR on file in past 12 months and most recent value is normal        Passed - Medication indicated for associated diagnosis     The medication is prescribed for one or more of the following conditions:    Chronic Kidney Disease (CDK)    Heart Failure (HF)    Diabetes, Nephropathy   Hypertension    Coronary Artery Disease (CAD)   Raynaud's Disease   Ischemic cardiomyopathy   Cardiomyopathy   Pulmonary Hypertension          Passed - Recent (12 month) or future (90 days) visit with authorizing provider's specialty (provided they have been seen in the past 15 months)     The patient must have completed an in-person or virtual visit within the past 12 months or has a future visit scheduled within the next 90 days with the authorizing provider s specialty.  Urgent care and e-visits do not qualify as an office visit for this protocol.          Passed - Patient is age 18 or older        Passed - No active pregnancy on record        Passed - Normal serum potassium on file in past 12 months     Recent Labs   Lab Test 03/29/25  0917   POTASSIUM 3.7                    Passed - No positive pregnancy test in past 12 months

## 2025-07-22 ENCOUNTER — OFFICE VISIT (OUTPATIENT)
Dept: INTERNAL MEDICINE | Facility: CLINIC | Age: 75
End: 2025-07-22
Payer: COMMERCIAL

## 2025-07-22 ENCOUNTER — LAB (OUTPATIENT)
Dept: LAB | Facility: CLINIC | Age: 75
End: 2025-07-22
Payer: COMMERCIAL

## 2025-07-22 VITALS
TEMPERATURE: 97.5 F | RESPIRATION RATE: 14 BRPM | HEART RATE: 55 BPM | HEIGHT: 66 IN | OXYGEN SATURATION: 97 % | BODY MASS INDEX: 33.94 KG/M2 | SYSTOLIC BLOOD PRESSURE: 115 MMHG | WEIGHT: 211.2 LBS | DIASTOLIC BLOOD PRESSURE: 73 MMHG

## 2025-07-22 DIAGNOSIS — I10 BENIGN ESSENTIAL HYPERTENSION: ICD-10-CM

## 2025-07-22 DIAGNOSIS — A49.01 OSTEOMYELITIS DUE TO STAPHYLOCOCCUS AUREUS (H): ICD-10-CM

## 2025-07-22 DIAGNOSIS — M86.9 OSTEOMYELITIS DUE TO STAPHYLOCOCCUS AUREUS (H): ICD-10-CM

## 2025-07-22 DIAGNOSIS — I10 BENIGN ESSENTIAL HYPERTENSION: Primary | ICD-10-CM

## 2025-07-22 LAB
ANION GAP SERPL CALCULATED.3IONS-SCNC: 13 MMOL/L (ref 7–15)
BASOPHILS # BLD AUTO: 0 10E3/UL (ref 0–0.2)
BASOPHILS NFR BLD AUTO: 1 %
BUN SERPL-MCNC: 18.7 MG/DL (ref 8–23)
CALCIUM SERPL-MCNC: 9.8 MG/DL (ref 8.8–10.4)
CHLORIDE SERPL-SCNC: 98 MMOL/L (ref 98–107)
CREAT SERPL-MCNC: 0.78 MG/DL (ref 0.51–0.95)
CRP SERPL-MCNC: <3 MG/L
EGFRCR SERPLBLD CKD-EPI 2021: 80 ML/MIN/1.73M2
EOSINOPHIL # BLD AUTO: 0.1 10E3/UL (ref 0–0.7)
EOSINOPHIL NFR BLD AUTO: 1 %
ERYTHROCYTE [DISTWIDTH] IN BLOOD BY AUTOMATED COUNT: 12 % (ref 10–15)
GLUCOSE SERPL-MCNC: 107 MG/DL (ref 70–99)
HCO3 SERPL-SCNC: 24 MMOL/L (ref 22–29)
HCT VFR BLD AUTO: 44.3 % (ref 35–47)
HGB BLD-MCNC: 14.6 G/DL (ref 11.7–15.7)
IMM GRANULOCYTES # BLD: 0 10E3/UL
IMM GRANULOCYTES NFR BLD: 1 %
LYMPHOCYTES # BLD AUTO: 1.2 10E3/UL (ref 0.8–5.3)
LYMPHOCYTES NFR BLD AUTO: 15 %
MCH RBC QN AUTO: 29.8 PG (ref 26.5–33)
MCHC RBC AUTO-ENTMCNC: 33 G/DL (ref 31.5–36.5)
MCV RBC AUTO: 90 FL (ref 78–100)
MONOCYTES # BLD AUTO: 0.8 10E3/UL (ref 0–1.3)
MONOCYTES NFR BLD AUTO: 10 %
NEUTROPHILS # BLD AUTO: 5.6 10E3/UL (ref 1.6–8.3)
NEUTROPHILS NFR BLD AUTO: 73 %
NRBC # BLD AUTO: 0 10E3/UL
NRBC BLD AUTO-RTO: 0 /100
PLATELET # BLD AUTO: 285 10E3/UL (ref 150–450)
POTASSIUM SERPL-SCNC: 3.8 MMOL/L (ref 3.4–5.3)
RBC # BLD AUTO: 4.9 10E6/UL (ref 3.8–5.2)
SODIUM SERPL-SCNC: 135 MMOL/L (ref 135–145)
WBC # BLD AUTO: 7.7 10E3/UL (ref 4–11)

## 2025-07-22 PROCEDURE — 86140 C-REACTIVE PROTEIN: CPT | Performed by: PATHOLOGY

## 2025-07-22 PROCEDURE — 99214 OFFICE O/P EST MOD 30 MIN: CPT | Performed by: INTERNAL MEDICINE

## 2025-07-22 PROCEDURE — 3074F SYST BP LT 130 MM HG: CPT | Performed by: INTERNAL MEDICINE

## 2025-07-22 PROCEDURE — 1125F AMNT PAIN NOTED PAIN PRSNT: CPT | Performed by: INTERNAL MEDICINE

## 2025-07-22 PROCEDURE — 36415 COLL VENOUS BLD VENIPUNCTURE: CPT | Performed by: PATHOLOGY

## 2025-07-22 PROCEDURE — 80048 BASIC METABOLIC PNL TOTAL CA: CPT | Performed by: PATHOLOGY

## 2025-07-22 PROCEDURE — 85025 COMPLETE CBC W/AUTO DIFF WBC: CPT | Performed by: PATHOLOGY

## 2025-07-22 PROCEDURE — 3078F DIAST BP <80 MM HG: CPT | Performed by: INTERNAL MEDICINE

## 2025-07-22 ASSESSMENT — PAIN SCALES - GENERAL: PAINLEVEL_OUTOF10: SEVERE PAIN (9)

## 2025-07-22 NOTE — PROGRESS NOTES
Preoperative Evaluation  [facility] INTERNAL MEDICINE [address] [address]Ocooqrtbaxsz Evaluation  [facility] INTERNAL MEDICINE [address] [address]  Phone: [phone]  Fax: [phone]  Primary Provider: Danii Burks MD  Pre-op Performing Provider: Esteban Neff MD  Jul 22, 2025 7/17/2025   Surgical Information   What procedure is being done? right hip replacement   Facility or Hospital where procedure/surgery will be performed: [facility] [place] 2]   Who is doing the procedure / surgery? Dr. Bauman   Date of surgery / procedure: July 24, 2025   Time of surgery / procedure: TBA   Where do you plan to recover after surgery? at home with family     Fax number for surgical facility: Note does not need to be faxed, will be available electronically in Epic.          7/22/2025     1:43 PM   Additional Questions   Roomed by GUADALUPE Mims   Alysha is a 74 year old, presenting for the following:  Pre-Op Exam    HPI    Patient is a 74 year old female with a past medical history of A-fib, arthritis, HLD, CAD, and HTN and is presenting for a pre operative visit. The patient denies any history of complications related to surgery or general anesthesia. She also denies any history of clotting disorders. She had been taking Eliquis but discontinued it on Sunday. Additionally, she has temporarily stopped taking Zepbound. The patient is able to ambulate with her walker and has been exercising, attending an aerobics class this morning. She denies experiencing chest pain, shortness of breath, or heart palpitations.        7/17/2025   Pre-Op Questionnaire   Have you ever had a heart attack or stroke? No   Have you ever had surgery on your heart or blood vessels, such as a stent placement, a coronary artery bypass, or surgery on an artery in your head, neck,  heart, or legs? (!) YES    Do you have chest pain with activity? No   Do you have a history of heart failure? No   Do you currently have a cold, bronchitis or symptoms of other infection? No   Do you have a cough, shortness of breath, or wheezing? No   Do you or anyone in your family have previous history of blood clots? No   Do you or does anyone in your family have a serious bleeding problem such as prolonged bleeding following surgeries or cuts? No   Have you ever had problems with anemia or been told to take iron pills? No   Have you had any abnormal blood loss such as black, tarry or bloody stools, or abnormal vaginal bleeding? No   Have you ever had a blood transfusion? No   Are you willing to have a blood transfusion if it is medically needed before, during, or after your surgery? Yes   Have you or any of your relatives ever had problems with anesthesia? No   Do you have sleep apnea, excessive snoring or daytime drowsiness? (!) YES   Do you have a CPAP machine? (!) NO    Do you have any artifical heart valves or other implanted medical devices like a pacemaker, defibrillator, or continuous glucose monitor? No   Do you have artificial joints? No   Are you allergic to latex? (!) YES     Preoperative Review of    reviewed - no record of controlled substances prescribed.          Patient Active Problem List    Diagnosis Date Noted    Pain of left thumb 04/01/2025     Priority: Medium    Pre-diabetes 09/29/2023     Priority: Medium    Morbid obesity (H) 11/19/2021     Priority: Medium    Osteoarthritis of right knee, unspecified osteoarthritis type 10/06/2021     Priority: Medium    History of tobacco abuse 06/23/2021     Priority: Medium     Formatting of this note might be different from the original.  Quit 2011      Atrial flutter (H) 11/02/2018     Priority: Medium    PVC (premature ventricular contraction) 08/31/2018     Priority: Medium    Goiter 08/22/2017     Priority: Medium    Pulmonary  nodules/lesions, multiple 05/05/2017     Priority: Medium     Formatting of this note might be different from the original.  Overview Note: ABNORMAL FINDINGS ON DIAGNOSTIC IMAGING OF LUNG #[id number]#    EXT_ID: [id number]      Colon polyposis 01/01/2016     Priority: Medium    Atrial fibrillation (H)      Priority: Medium     ablated 1/12      Elbow fracture 09/16/2013     Priority: Medium    Sleep apnea 04/11/2012     Priority: Medium    Premature atrial contraction 11/23/2011     Priority: Medium    Left atrial dilatation 05/03/2011     Priority: Medium    Hyperlipidemia 03/23/2011     Priority: Medium     Formatting of this note might be different from the original.  Overview Note: DISORDERS OF LIPOPROTEIN METABOLISM AND OTHER LIPIDEMIAS #[id number]#    EXT_ID: [id number]      Hashimoto's disease 03/23/2011     Priority: Medium    Family history of cardiovascular disease 03/23/2011     Priority: Medium    Atrial fibrillation and flutter (H) 03/16/2011     Priority: Medium     Formatting of this note might be different from the original.  Overview:   ablated 1/12  Formatting of this note might be different from the original.  Overview Note: ATRIAL FIBRILLATION AND FLUTTER #[id number]#    EXT_ID: [id number]  Formatting of this note might be different from the original.    -s/p complex radiofrequency ablation procedure 6/24/2011    -s/p repeat ablation procedure 1/12/2012      Multiple thyroid nodules 05/01/2007     Priority: Medium     Formatting of this note might be different from the original.  Overview Note: OTHER NONTOXIC GOITER #[id number]#    EXT_ID: [id number]      Esophageal reflux 05/01/2007     Priority: Medium     Formatting of this note might be different from the original.  Overview Note: GERD #[id number]#    EXT_ID: [id number]      Essential (primary) hypertension 03/21/2007     Priority: Medium     Formatting of this note might be different from the original.  Overview Note: ESSENTIAL (PRIMARY) HYPERTENSION #[id number]#    EXT_ID: [id number]         Past Medical History:   Diagnosis Date    Arthritis     Atrial fibrillation (H)     ablated 1/12    Atrial flutter (H) Nov 2011    Latest incident needing caridoversion  Oct 22, 2018    Colon polyps     Coronary artery disease 13 years ago    afib and flutter. ablations    Ex-smoker     Hashimoto's disease     Hyperlipidaemia     Hypertension     ISABELA (obstructive sleep apnea) 03/01/2012    AHI 7    Osteoporosis 2008    PAC (premature atrial contraction)     Premature beats Sept 2011    PVC (premature ventricular contraction)     Supraventricular premature beats Sept 2011     Past Surgical History:   Procedure Laterality Date    APPENDECTOMY      BIOPSY  2014    samples from thyroid, benign    CARDIAC SURGERY  2012 and 2013    Ablations    COLONOSCOPY N/A 04/12/2024    Procedure: COLONOSCOPY, WITH POLYPECTOMY;  Surgeon: Naldo Herrera MD;  Location: [facility] [place] OR    ENT SURGERY      H ABLATION ATRIAL FLUTTER  1/12/2012    H ABLATION FOCAL AFIB  06/24/2011    [facility].     H ABLATION FOCAL AFIB  01/12/2012    [facility]. afib and aflutter ablations.     IRRIGATION AND DEBRIDEMENT HAND, COMBINED Left 04/02/2025    Procedure: LEFT thumb exploration with metacarpophalangeal joint arthrotomy, washout, irrigation and debridement, cultures,Penrose placement;  Surgeon: Arsalan Quick MD;  Location: [facility] [place] OR    MA STOMACH SURGERY PROCEDURE UNLISTED  15 yrs ago    benign cyst     Current Outpatient Medications   Medication Sig Dispense Refill    acetaminophen (TYLENOL) 325 MG tablet Take 650 mg by mouth every 6 hours as needed for mild pain.      amLODIPine (NORVASC) 10 MG tablet Take 1 tablet (10 mg) by mouth daily. 90 tablet 3    apixaban ANTICOAGULANT (ELIQUIS ANTICOAGULANT) 5 MG tablet Take 1 tablet (5 mg) by mouth 2 times daily. 180 tablet 3    Calcium-Vitamin D-Vitamin K (VIACTIV PO) Take by mouth 2 times daily       fish oil-omega-3 fatty acids (FISH OIL) 1000 MG capsule One capsule daily.      hydrochlorothiazide  "(HYDRODIURIL) 25 MG tablet Take 1 tablet (25 mg) by mouth daily. 90 tablet 3    loratadine (CLARITIN) 10 MG tablet Take 10 mg by mouth daily      losartan (COZAAR) 100 MG tablet TAKE 1 TABLET(100 MG) BY MOUTH DAILY 90 tablet 2    metoprolol succinate ER (TOPROL XL) 50 MG 24 hr tablet TAKE 1 AND 1/2 TABLETS(75 MG) BY MOUTH DAILY 135 tablet 3    metoprolol tartrate (LOPRESSOR) 25 MG tablet TAKE 1 TABLET BY MOUTH AS DIRECTED UP TO 2 TABLETS BY MOUTH EVRYDAY WITH ONSET OF ARRHYTHMIA 30 tablet 1    tirzepatide-Weight Management (ZEPBOUND) 2.5 MG/0.5ML prefilled pen Inject 0.5 mLs (2.5 mg) subcutaneously every 7 days. 2 mL 1       Allergies   Allergen Reactions    Ibuprofen Other (See Comments)    Nsaids GI Disturbance    Statins      Made her lips tingle, so they put her on another statin instead.    Liquid Adhesive Rash     After wearing patches for > 8 days  After wearing patches for > 8 days        Social History     Tobacco Use    Smoking status: Former     Current packs/day: 0.00     Average packs/day: 0.5 packs/day for 43.6 years (21.8 ttl pk-yrs)     Types: Cigarettes     Start date: 1967     Quit date: 2011     Years since quittin.8     Passive exposure: Never    Smokeless tobacco: Never   Substance Use Topics    Alcohol use: Yes     Alcohol/week: 14.0 standard drinks of alcohol     Types: 14 Shots of liquor per week     Comment: social drinker       History   Drug Use No               Objective    /73 (BP Location: Right arm, Patient Position: Sitting, Cuff Size: Adult Regular)   Pulse 55   Temp 97.5  F (36.4  C) (Temporal)   Resp 14   Ht 1.676 m (5' 6\")   Wt 95.8 kg (211 lb 3.2 oz)   SpO2 97%   Breastfeeding No   BMI 34.09 kg/m     Estimated body mass index is 34.09 kg/m  as calculated from the following:    Height as of this encounter: 1.676 m (5' 6\").    Weight as of this encounter: 95.8 kg (211 lb 3.2 oz).    Physical Exam  GENERAL: alert and no distress  NECK: no adenopathy, no " asymmetry, masses, or scars  RESP: lungs clear to auscultation - no rales, rhonchi or wheezes  CV: regular rate and rhythm, normal S1 S2, no S3 or S4, no murmur, click or rub, no peripheral edema    Labs reviewed:  Results for orders placed or performed in visit on 07/22/25   CRP inflammation     Status: Normal   Result Value Ref Range    CRP Inflammation <3.00 <5.00 mg/L   Basic metabolic panel     Status: Abnormal   Result Value Ref Range    Sodium 135 135 - 145 mmol/L    Potassium 3.8 3.4 - 5.3 mmol/L    Chloride 98 98 - 107 mmol/L    Carbon Dioxide (CO2) 24 22 - 29 mmol/L    Anion Gap 13 7 - 15 mmol/L    Urea Nitrogen 18.7 8.0 - 23.0 mg/dL    Creatinine 0.78 0.51 - 0.95 mg/dL    GFR Estimate 80 >60 mL/min/1.73m2    Calcium 9.8 8.8 - 10.4 mg/dL    Glucose 107 (H) 70 - 99 mg/dL   CBC with platelets and differential     Status: None   Result Value Ref Range    WBC Count 7.7 4.0 - 11.0 10e3/uL    RBC Count 4.90 3.80 - 5.20 10e6/uL    Hemoglobin 14.6 11.7 - 15.7 g/dL    Hematocrit 44.3 35.0 - 47.0 %    MCV 90 78 - 100 fL    MCH 29.8 26.5 - 33.0 pg    MCHC 33.0 31.5 - 36.5 g/dL    RDW 12.0 10.0 - 15.0 %    Platelet Count 285 150 - 450 10e3/uL    % Neutrophils 73 %    % Lymphocytes 15 %    % Monocytes 10 %    % Eosinophils 1 %    % Basophils 1 %    % Immature Granulocytes 1 %    NRBCs per 100 WBC 0 <1 /100    Absolute Neutrophils 5.6 1.6 - 8.3 10e3/uL    Absolute Lymphocytes 1.2 0.8 - 5.3 10e3/uL    Absolute Monocytes 0.8 0.0 - 1.3 10e3/uL    Absolute Eosinophils 0.1 0.0 - 0.7 10e3/uL    Absolute Basophils 0.0 0.0 - 0.2 10e3/uL    Absolute Immature Granulocytes 0.0 <=0.4 10e3/uL    Absolute NRBCs 0.0 10e3/uL   CBC with Platelets & Differential     Status: None    Narrative    The following orders were created for panel order CBC with Platelets & Differential.  Procedure                               Abnormality         Status                     ---------                               -----------         ------                      CBC with platelets and ...[[id number]]                      Final result                 Please view results for these tests on the individual orders.         Recent Labs   Lab Test 03/29/25  0917   HGB 13.2      INR 1.05   *   POTASSIUM 3.7   CR 0.85    .    Revised Cardiac Risk Index (RCRI)  The patient has the following serious cardiovascular risks for perioperative complications:   - High risk surgery (>5% cardiac complication risk) = 1 point     RCRI Interpretation: 0 points: Class I (very low risk - 0.4% complication rate)        7/17/2025   Surgical Information   What procedure is being done? right hip replacement   Facility or Hospital where procedure/surgery will be performed: [facility] [place]   Who is doing the procedure / surgery? Dr. Bauman   Date of surgery / procedure: July 24, 2025   Time of surgery / procedure: TBA   Where do you plan to recover after surgery? at home with family     Fax number for surgical facility: [phone].        7/22/2025     1:43 PM   Additional Questions   Roomed by GUADALUPE         7/17/2025   Pre-Op Questionnaire   Have you ever had a heart attack or stroke? No   Have you ever had surgery on your heart or blood vessels, such as a stent placement, a coronary artery bypass, or surgery on an artery in your head, neck, heart, or legs? (!) YES   Do you have chest pain with activity? No   Do you have a history of heart failure? No   Do you currently have a cold, bronchitis or symptoms of other infection? No   Do you have a cough, shortness of breath, or wheezing? No   Do you or anyone in your family have previous history of blood clots? No   Do you or does anyone in your family have a serious bleeding problem such as prolonged bleeding following surgeries or cuts? No   Have you ever had problems with anemia or been told to take iron pills? No   Have you had any abnormal blood loss such as black, tarry or bloody stools, or abnormal  vaginal bleeding? No   Have you ever had a blood transfusion? No   Are you willing to have a blood transfusion if it is medically needed before, during, or after your surgery? Yes   Have you or any of your relatives ever had problems with anesthesia? No   Do you have sleep apnea, excessive snoring or daytime drowsiness? (!) YES   Do you have a CPAP machine? (!) NO    Do you have any artifical heart valves or other implanted medical devices like a pacemaker, defibrillator, or continuous glucose monitor? No   Do you have artificial joints? No   Are you allergic to latex? (!) YES       ASSESSMENT   DJD hip  Hx of A-Fib  HTN  Osteoporosis   Ex-smoker     PLAN    Patient is stable from a medical and cardiovascular standpoint does not require any additional imaging or investigation prior to her planned low risk surgery and she would be low risk for 7/24 for right hip replacement. We reviewed medications to discontinue prior to surgery (eliquis, zepbound). She is due for blood work. Ordered BMP, CBC. Additionally, patient has already undergone EKG in the last 6 months.  Will take her metoprolol the morning of surgery.      Shanti Rendon, DO  Internal Medicine PGY-1  [facility]

## 2025-08-05 ENCOUNTER — TELEPHONE (OUTPATIENT)
Dept: PHYSICAL MEDICINE AND REHAB | Facility: CLINIC | Age: 75
End: 2025-08-05
Payer: COMMERCIAL

## 2025-08-08 ENCOUNTER — TRANSFERRED RECORDS (OUTPATIENT)
Dept: HEALTH INFORMATION MANAGEMENT | Facility: CLINIC | Age: 75
End: 2025-08-08
Payer: COMMERCIAL

## 2025-08-20 ENCOUNTER — RADIOLOGY INJECTION OFFICE VISIT (OUTPATIENT)
Dept: PHYSICAL MEDICINE AND REHAB | Facility: CLINIC | Age: 75
End: 2025-08-20
Attending: NURSE PRACTITIONER
Payer: COMMERCIAL

## 2025-08-20 VITALS
TEMPERATURE: 98.1 F | BODY MASS INDEX: 32.78 KG/M2 | WEIGHT: 204 LBS | OXYGEN SATURATION: 94 % | HEIGHT: 66 IN | SYSTOLIC BLOOD PRESSURE: 114 MMHG | HEART RATE: 90 BPM | DIASTOLIC BLOOD PRESSURE: 68 MMHG | RESPIRATION RATE: 16 BRPM

## 2025-08-20 DIAGNOSIS — G89.29 CHRONIC BILATERAL LOW BACK PAIN WITH BILATERAL SCIATICA: ICD-10-CM

## 2025-08-20 DIAGNOSIS — M48.061 LUMBAR FORAMINAL STENOSIS: ICD-10-CM

## 2025-08-20 DIAGNOSIS — M54.16 LEFT LUMBAR RADICULOPATHY: ICD-10-CM

## 2025-08-20 DIAGNOSIS — M54.41 CHRONIC BILATERAL LOW BACK PAIN WITH BILATERAL SCIATICA: ICD-10-CM

## 2025-08-20 DIAGNOSIS — M54.42 CHRONIC BILATERAL LOW BACK PAIN WITH BILATERAL SCIATICA: ICD-10-CM

## 2025-08-20 PROCEDURE — 64483 NJX AA&/STRD TFRM EPI L/S 1: CPT | Mod: LT | Performed by: STUDENT IN AN ORGANIZED HEALTH CARE EDUCATION/TRAINING PROGRAM

## 2025-08-20 RX ORDER — BUPIVACAINE HYDROCHLORIDE 2.5 MG/ML
INJECTION, SOLUTION EPIDURAL; INFILTRATION; INTRACAUDAL; PERINEURAL
Status: COMPLETED | OUTPATIENT
Start: 2025-08-20 | End: 2025-08-20

## 2025-08-20 RX ORDER — DEXAMETHASONE SODIUM PHOSPHATE 10 MG/ML
INJECTION, SOLUTION INTRAMUSCULAR; INTRAVENOUS
Status: COMPLETED | OUTPATIENT
Start: 2025-08-20 | End: 2025-08-20

## 2025-08-20 RX ORDER — LIDOCAINE HYDROCHLORIDE 10 MG/ML
INJECTION, SOLUTION EPIDURAL; INFILTRATION; INTRACAUDAL; PERINEURAL
Status: COMPLETED | OUTPATIENT
Start: 2025-08-20 | End: 2025-08-20

## 2025-08-20 RX ADMIN — BUPIVACAINE HYDROCHLORIDE 1 ML: 2.5 INJECTION, SOLUTION EPIDURAL; INFILTRATION; INTRACAUDAL; PERINEURAL at 15:12

## 2025-08-20 RX ADMIN — LIDOCAINE HYDROCHLORIDE 1 ML: 10 INJECTION, SOLUTION EPIDURAL; INFILTRATION; INTRACAUDAL; PERINEURAL at 15:11

## 2025-08-20 RX ADMIN — DEXAMETHASONE SODIUM PHOSPHATE 10 MG: 10 INJECTION, SOLUTION INTRAMUSCULAR; INTRAVENOUS at 15:12

## 2025-08-20 ASSESSMENT — PAIN SCALES - GENERAL
PAINLEVEL_OUTOF10: MILD PAIN (3)
PAINLEVEL_OUTOF10: MILD PAIN (2)

## (undated) DEVICE — DRAIN PENROSE 0.25"X18" LATEX FREE GR201

## (undated) DEVICE — GOWN XLG DISP 9545

## (undated) DEVICE — KIT ENDO FIRST STEP DISINFECTANT 200ML W/POUCH EP-4

## (undated) DEVICE — SPECIMEN CONTAINER 3OZ W/FORMALIN 59901

## (undated) DEVICE — ENDO SNARE EXACTO COLD 9MM LOOP 2.4MMX230CM 00711115

## (undated) DEVICE — TUBING SUCTION MEDI-VAC 1/4"X20' N620A

## (undated) DEVICE — DRAPE STERI TOWEL LG 1010

## (undated) DEVICE — Device

## (undated) DEVICE — DRAPE STOCKINETTE 4" 8544

## (undated) DEVICE — KIT ENDO TURNOVER/PROCEDURE CARRY-ON 101822

## (undated) DEVICE — SOL WATER IRRIG 500ML BOTTLE 2F7113

## (undated) DEVICE — LINEN ORTHO PACK 5446

## (undated) DEVICE — SUCTION MANIFOLD NEPTUNE 2 SYS 1 PORT 702-025-000

## (undated) DEVICE — FCP BIOPSY RADIAL JAW 4 JUMBO 3.2MM CHANNEL M00513370

## (undated) DEVICE — TOURNIQUET SGL BLADDER 18"X4" RED 5921-218-135

## (undated) DEVICE — ESU HOLSTER PLASTIC DISP E2400

## (undated) DEVICE — LIGHT HANDLE X1 31140133

## (undated) DEVICE — SOL NACL 0.9% IRRIG 500ML BOTTLE 2F7123

## (undated) DEVICE — ENDO TRAP POLYP E-TRAP 00711099

## (undated) DEVICE — PREP CHLORAPREP 26ML TINTED HI-LITE ORANGE 930815

## (undated) DEVICE — BNDG KLING 2" 2231

## (undated) DEVICE — GOWN IMPERVIOUS 2XL BLUE

## (undated) DEVICE — GLOVE BIOGEL PI MICRO SZ 7.5 48575

## (undated) DEVICE — PACK HAND CUSTOM ASC

## (undated) RX ORDER — LIDOCAINE HYDROCHLORIDE 10 MG/ML
INJECTION, SOLUTION EPIDURAL; INFILTRATION; INTRACAUDAL; PERINEURAL
Status: DISPENSED
Start: 2025-04-02

## (undated) RX ORDER — PROPOFOL 10 MG/ML
INJECTION, EMULSION INTRAVENOUS
Status: DISPENSED
Start: 2025-04-02

## (undated) RX ORDER — OXYCODONE HYDROCHLORIDE 5 MG/1
TABLET ORAL
Status: DISPENSED
Start: 2025-04-02

## (undated) RX ORDER — FENTANYL CITRATE 50 UG/ML
INJECTION, SOLUTION INTRAMUSCULAR; INTRAVENOUS
Status: DISPENSED
Start: 2025-04-02

## (undated) RX ORDER — TRIAMCINOLONE ACETONIDE 40 MG/ML
INJECTION, SUSPENSION INTRA-ARTICULAR; INTRAMUSCULAR
Status: DISPENSED
Start: 2021-10-23

## (undated) RX ORDER — TRIAMCINOLONE ACETONIDE 40 MG/ML
INJECTION, SUSPENSION INTRA-ARTICULAR; INTRAMUSCULAR
Status: DISPENSED
Start: 2022-04-01

## (undated) RX ORDER — LIDOCAINE HYDROCHLORIDE 10 MG/ML
INJECTION, SOLUTION INFILTRATION; PERINEURAL
Status: DISPENSED
Start: 2022-04-01

## (undated) RX ORDER — ONDANSETRON 2 MG/ML
INJECTION INTRAMUSCULAR; INTRAVENOUS
Status: DISPENSED
Start: 2025-04-02

## (undated) RX ORDER — CEFAZOLIN SODIUM 2 G/50ML
SOLUTION INTRAVENOUS
Status: DISPENSED
Start: 2025-04-02

## (undated) RX ORDER — LIDOCAINE HYDROCHLORIDE 10 MG/ML
INJECTION, SOLUTION EPIDURAL; INFILTRATION; INTRACAUDAL; PERINEURAL
Status: DISPENSED
Start: 2021-10-23